# Patient Record
Sex: FEMALE | Race: WHITE | NOT HISPANIC OR LATINO | Employment: OTHER | URBAN - METROPOLITAN AREA
[De-identification: names, ages, dates, MRNs, and addresses within clinical notes are randomized per-mention and may not be internally consistent; named-entity substitution may affect disease eponyms.]

---

## 2017-01-05 ENCOUNTER — APPOINTMENT (OUTPATIENT)
Dept: GENERAL RADIOLOGY | Facility: CLINIC | Age: 82
End: 2017-01-05
Attending: EMERGENCY MEDICINE
Payer: COMMERCIAL

## 2017-01-05 ENCOUNTER — HOSPITAL ENCOUNTER (EMERGENCY)
Facility: CLINIC | Age: 82
Discharge: HOME | End: 2017-01-05
Attending: EMERGENCY MEDICINE | Admitting: EMERGENCY MEDICINE
Payer: COMMERCIAL

## 2017-01-05 VITALS
OXYGEN SATURATION: 97 % | BODY MASS INDEX: 29.99 KG/M2 | RESPIRATION RATE: 14 BRPM | HEART RATE: 67 BPM | HEIGHT: 65 IN | SYSTOLIC BLOOD PRESSURE: 161 MMHG | WEIGHT: 180 LBS | DIASTOLIC BLOOD PRESSURE: 76 MMHG

## 2017-01-05 DIAGNOSIS — R07.9 CHEST PAIN, UNSPECIFIED TYPE: ICD-10-CM

## 2017-01-05 LAB
ANION GAP SERPL CALCULATED.3IONS-SCNC: 8 MMOL/L (ref 3–14)
BASOPHILS # BLD AUTO: 0.1 10E9/L (ref 0–0.2)
BASOPHILS NFR BLD AUTO: 0.6 %
BUN SERPL-MCNC: 17 MG/DL (ref 7–30)
CALCIUM SERPL-MCNC: 9 MG/DL (ref 8.5–10.1)
CHLORIDE SERPL-SCNC: 111 MMOL/L (ref 94–109)
CO2 SERPL-SCNC: 27 MMOL/L (ref 20–32)
CREAT SERPL-MCNC: 0.95 MG/DL (ref 0.52–1.04)
DIFFERENTIAL METHOD BLD: NORMAL
EOSINOPHIL # BLD AUTO: 0.3 10E9/L (ref 0–0.7)
EOSINOPHIL NFR BLD AUTO: 3.6 %
ERYTHROCYTE [DISTWIDTH] IN BLOOD BY AUTOMATED COUNT: 12.9 % (ref 10–15)
GFR SERPL CREATININE-BSD FRML MDRD: 57 ML/MIN/1.7M2
GLUCOSE SERPL-MCNC: 84 MG/DL (ref 70–99)
HCT VFR BLD AUTO: 45 % (ref 35–47)
HGB BLD-MCNC: 14.7 G/DL (ref 11.7–15.7)
IMM GRANULOCYTES # BLD: 0 10E9/L (ref 0–0.4)
IMM GRANULOCYTES NFR BLD: 0.4 %
LYMPHOCYTES # BLD AUTO: 3.2 10E9/L (ref 0.8–5.3)
LYMPHOCYTES NFR BLD AUTO: 37.3 %
MCH RBC QN AUTO: 30.4 PG (ref 26.5–33)
MCHC RBC AUTO-ENTMCNC: 32.7 G/DL (ref 31.5–36.5)
MCV RBC AUTO: 93 FL (ref 78–100)
MONOCYTES # BLD AUTO: 0.7 10E9/L (ref 0–1.3)
MONOCYTES NFR BLD AUTO: 8 %
NEUTROPHILS # BLD AUTO: 4.3 10E9/L (ref 1.6–8.3)
NEUTROPHILS NFR BLD AUTO: 50.1 %
NT-PROBNP SERPL-MCNC: 155 PG/ML (ref 0–1800)
PLATELET # BLD AUTO: 212 10E9/L (ref 150–450)
POTASSIUM SERPL-SCNC: 4.1 MMOL/L (ref 3.4–5.3)
RBC # BLD AUTO: 4.83 10E12/L (ref 3.8–5.2)
SODIUM SERPL-SCNC: 146 MMOL/L (ref 133–144)
TROPONIN I SERPL-MCNC: NORMAL UG/L (ref 0–0.04)
TROPONIN I SERPL-MCNC: NORMAL UG/L (ref 0–0.04)
WBC # BLD AUTO: 8.6 10E9/L (ref 4–11)

## 2017-01-05 PROCEDURE — 93005 ELECTROCARDIOGRAM TRACING: CPT

## 2017-01-05 PROCEDURE — 93010 ELECTROCARDIOGRAM REPORT: CPT | Performed by: EMERGENCY MEDICINE

## 2017-01-05 PROCEDURE — 71020 XR CHEST 2 VW: CPT

## 2017-01-05 PROCEDURE — 83880 ASSAY OF NATRIURETIC PEPTIDE: CPT | Performed by: EMERGENCY MEDICINE

## 2017-01-05 PROCEDURE — 80048 BASIC METABOLIC PNL TOTAL CA: CPT | Performed by: EMERGENCY MEDICINE

## 2017-01-05 PROCEDURE — 85025 COMPLETE CBC W/AUTO DIFF WBC: CPT | Performed by: EMERGENCY MEDICINE

## 2017-01-05 PROCEDURE — 99285 EMERGENCY DEPT VISIT HI MDM: CPT | Mod: 25

## 2017-01-05 PROCEDURE — 99285 EMERGENCY DEPT VISIT HI MDM: CPT | Mod: 25 | Performed by: EMERGENCY MEDICINE

## 2017-01-05 PROCEDURE — 84484 ASSAY OF TROPONIN QUANT: CPT | Performed by: EMERGENCY MEDICINE

## 2017-01-05 ASSESSMENT — ENCOUNTER SYMPTOMS
FATIGUE: 0
CHEST TIGHTNESS: 0
APPETITE CHANGE: 0
LIGHT-HEADEDNESS: 0
SHORTNESS OF BREATH: 1
HEADACHES: 0
NECK PAIN: 0
WEAKNESS: 0
VOMITING: 0
COUGH: 0
NUMBNESS: 0
ABDOMINAL PAIN: 0
FEVER: 0
NAUSEA: 0
BACK PAIN: 0
DIARRHEA: 0

## 2017-01-05 NOTE — ED AVS SNAPSHOT
[facility] Emergency Department    [address] [address]    Phone:  [phone]    Fax:  [phone]                                       Caitlyn Wasserman   MRN: [id number]    Department:  [facility] Emergency Department   Date of Visit:  1/5/2017           Patient Information     Date Of Birth          1935        Your diagnoses for this visit were:     Chest pain, unspecified type        You were seen by Tenzin Alvarez MD.      Follow-up Information     Follow up with Stephanie Salgado MD. Schedule an appointment as soon as possible for a visit in 1 day.    Specialty:  Family Practice    Why:  For follow up of your chest pain and to get results of your stress test    Contact information:    [facility]  [address] [place] [address] [id number] [phone]          Follow up with [facility]. Call today.    Specialty:  Cardiology    Why:  To schedule a stress test    Contact information:    [address] [place] [address] [phone]    Additional information:    The medical center is located at   [address] [place] [address] (between [facility] and   [facility] in [place], four miles north   of [place]).      Discharge References/Attachments     CHEST PAIN, UNCERTAIN CAUSE (ENGLISH)      24 Hour Appointment Hotline       To make an appointment at any [facility] clinic, call [phone] ([phone]). If you don't have a family doctor or clinic, we will help you find one. [facility] clinics are conveniently located to serve the needs of you and your family.          ED Discharge Orders     Exercise Stress Echocardiogram                    Review of your medicines      Our records show that you are taking the medicines listed below. If these are incorrect, please call your family doctor or clinic.        Dose / Directions Last dose taken    atenolol 50 MG tablet   Commonly known as:  TENORMIN   Dose:  50 mg   Quantity:  90  tablet        Take 1 tablet (50 mg) by mouth daily   Refills:  3        ipratropium 0.03 % spray   Commonly known as:  ATROVENT   Dose:  2 spray   Quantity:  2 Box        Spray 2 sprays in nostril daily as needed for rhinitis   Refills:  1        METAMUCIL PO        Refills:  0        sulfamethoxazole-trimethoprim 800-160 MG per tablet   Commonly known as:  BACTRIM DS/SEPTRA DS   Dose:  1 tablet   Quantity:  14 tablet        Take 1 tablet by mouth 2 times daily   Refills:  0        TUMS 500 MG chewable tablet   Quantity:  90   Generic drug:  calcium carbonate        two tablets daily   Refills:  0                Procedures and tests performed during your visit     Basic metabolic panel    CBC with platelets differential    Chest XR,  PA & LAT    EKG 12-LEAD, TRACING ONLY    NT pro BNP    Troponin I    Troponin I (second draw)      Orders Needing Specimen Collection     None      Pending Results     No orders found from 1/4/2017 to 1/6/2017.            Pending Culture Results     No orders found from 1/4/2017 to 1/6/2017.       Test Results from your hospital stay           1/5/2017  4:48 AM - Interface, Team[employer] Results      Component Results     Component Value Ref Range & Units Status    WBC 8.6 4.0 - 11.0 10e9/L Final    RBC Count 4.83 3.8 - 5.2 10e12/L Final    Hemoglobin 14.7 11.7 - 15.7 g/dL Final    Hematocrit 45.0 35.0 - 47.0 % Final    MCV 93 78 - 100 fl Final    MCH 30.4 26.5 - 33.0 pg Final    MCHC 32.7 31.5 - 36.5 g/dL Final    RDW 12.9 10.0 - 15.0 % Final    Platelet Count 212 150 - 450 10e9/L Final    Diff Method Automated Method  Final    % Neutrophils 50.1 % Final    % Lymphocytes 37.3 % Final    % Monocytes 8.0 % Final    % Eosinophils 3.6 % Final    % Basophils 0.6 % Final    % Immature Granulocytes 0.4 % Final    Absolute Neutrophil 4.3 1.6 - 8.3 10e9/L Final    Absolute Lymphocytes 3.2 0.8 - 5.3 10e9/L Final    Absolute Monocytes 0.7 0.0 - 1.3 10e9/L Final    Absolute Eosinophils 0.3 0.0 - 0.7  10e9/L Final    Absolute Basophils 0.1 0.0 - 0.2 10e9/L Final    Abs Immature Granulocytes 0.0 0 - 0.4 10e9/L Final         1/5/2017  5:04 AM - Interface, Flexilab Results      Component Results     Component Value Ref Range & Units Status    Sodium 146 (H) 133 - 144 mmol/L Final    Potassium 4.1 3.4 - 5.3 mmol/L Final    Chloride 111 (H) 94 - 109 mmol/L Final    Carbon Dioxide 27 20 - 32 mmol/L Final    Anion Gap 8 3 - 14 mmol/L Final    Glucose 84 70 - 99 mg/dL Final    Urea Nitrogen 17 7 - 30 mg/dL Final    Creatinine 0.95 0.52 - 1.04 mg/dL Final    GFR Estimate 57 (L) >60 mL/min/1.7m2 Final    Non  GFR Calc    GFR Estimate If Black 69 >60 mL/min/1.7m2 Final    African American GFR Calc    Calcium 9.0 8.5 - 10.1 mg/dL Final         1/5/2017  5:04 AM - Interface, Flexilab Results      Component Results     Component Value Ref Range & Units Status    Troponin I ES  0.000 - 0.045 ug/L Final    <0.015  The 99th percentile for upper reference range is 0.045 ug/L.  Troponin values in   the range of 0.045 - 0.120 ug/L may be associated with risks of adverse   clinical events.           1/5/2017  5:30 AM - Interface, Flexilab Results      Component Results     Component Value Ref Range & Units Status    N-Terminal Pro BNP Inpatient 155 0 - 1800 pg/mL Final    Reference range shown and results flagged as abnormal are suggested inpatient   cut points for confirming diagnosis if CHF in an acute setting. Establishing   a   baseline value for each individual patient is useful for follow-up. An   inpatient or emergency department NT-proPBNP <300 pg/mL effectively rules out   acute CHF, with 99% negative predictive value.  The outpatient non-acute reference range for ruling out CHF is:   0-125 pg/mL (age 18 to less than 75)   0-450 pg/mL (age 75 yrs and older)           1/5/2017  6:31 AM - Interface, Radiant Ib      Narrative     XR CHEST 2 VW  1/5/2017 5:48 AM      HISTORY: Dyspnea.     COMPARISON:  7/6/2009.    FINDINGS: The heart size is normal. Thoracic aorta is tortuous. The  lungs are clear. No pneumothorax or pleural effusion. Degenerative  disease in the spine.        Impression     IMPRESSION: No acute abnormality.    ZULEMA GRAF MD         1/5/2017  7:20 AM - Interface, [employer] Results      Component Results     Component Value Ref Range & Units Status    Troponin I ES  0.000 - 0.045 ug/L Final    <0.015  The 99th percentile for upper reference range is 0.045 ug/L.  Troponin values in   the range of 0.045 - 0.120 ug/L may be associated with risks of adverse   clinical events.                  Thank you for choosing [facility]       Thank you for choosing [facility] for your care. Our goal is always to provide you with excellent care. Hearing back from our patients is one way we can continue to improve our services. Please take a few minutes to complete the written survey that you may receive in the mail after you visit with us. Thank you!        [employer] Information     [employer] gives you secure access to your electronic health record. If you see a primary care provider, you can also send messages to your care team and make appointments. If you have questions, please call your primary care clinic.  If you do not have a primary care provider, please call [phone] and they will assist you.        Care EveryWhere ID     This is your Care EveryWhere ID. This could be used by other organizations to access your [facility] medical records  [id number]        After Visit Summary       This is your record. Keep this with you and show to your community pharmacist(s) and doctor(s) at your next visit.

## 2017-01-05 NOTE — ED AVS SNAPSHOT
[facility] Emergency Department    [address] [address]    Phone:  [phone]    Fax:  [phone]                                       Caitlyn Wasserman   MRN: [id number]    Department:  [facility] Emergency Department   Date of Visit:  1/5/2017           After Visit Summary Signature Page     I have received my discharge instructions, and my questions have been answered. I have discussed any challenges I see with this plan with the nurse or doctor.    ..........................................................................................................................................  Patient/Patient Representative Signature      ..........................................................................................................................................  Patient Representative Print Name and Relationship to Patient    ..................................................               ................................................  Date                                            Time    ..........................................................................................................................................  Reviewed by Signature/Title    ...................................................              ..............................................  Date                                                            Time

## 2017-01-05 NOTE — ED NOTES
Pt woke to use the bathroom and noticed 2/10 left arm pain from her shoulder to her elbow. Pain lasted approx 10 minutes and resolved w/o intervention. Pt took 325mg asa at home before coming in. Pt denies nausea or  chest pain. Denies pain at this time.

## 2017-01-05 NOTE — ED PROVIDER NOTES
"  History     Chief Complaint   Patient presents with     Arm Pain     ? cardiac     HPI  Caitlyn Wasserman is a 81 year old female with history of hyperlipidemia, COPD, and hypertension who presents for evaluation of left arm pain tonight.  Patient reports she woke from sleep to use the restroom and noticed some mild left arm pain from her shoulder to her elbow.  The patient reports a burning sensation from her left shoulder down to her elbow.  Discomfort not worsened by any kind of movement of her arm or neck.  Patient took 325 mg of aspirin and symptoms went away within about 10 minutes.  No associated nausea, diaphoresis, or dyspnea with the arm pain tonight.  Patient denies any previous similar symptoms.  Patient does report some exertional dyspnea recently which she has attributed to her COPD but has found her inhaler does not help so she has not been taking it.  Patient also reports some increasing lower extremity edema.    I have reviewed the Medications, Allergies, Past Medical and Surgical History, and Social History in the Epic system.    Review of Systems   Constitutional: Negative for fever, appetite change and fatigue.   HENT: Negative for congestion.    Respiratory: Positive for shortness of breath (exertional). Negative for cough and chest tightness.    Cardiovascular: Positive for leg swelling (Chronic).   Gastrointestinal: Negative for nausea, vomiting, abdominal pain and diarrhea.   Genitourinary: Negative for decreased urine volume.   Musculoskeletal: Negative for back pain and neck pain.   Neurological: Negative for weakness, light-headedness, numbness and headaches.   All other systems reviewed and are negative.      Physical Exam   BP: (!) 190/94 mmHg  Pulse: 67  Resp: 19  Height: 165.1 cm (5' 5\")  Weight: 81.647 kg (180 lb)  SpO2: 99 %  Physical Exam   Constitutional: She is oriented to person, place, and time. She appears well-developed and well-nourished. No distress.   HENT:   Head: " Normocephalic.   Mouth/Throat: Oropharynx is clear and moist.   Eyes: Pupils are equal, round, and reactive to light.   Neck: Normal range of motion.   Cardiovascular: Normal rate, regular rhythm and normal heart sounds.    No murmur heard.  Pulmonary/Chest: Effort normal and breath sounds normal. No respiratory distress. She has no rales.   Abdominal: Soft. There is no tenderness.   Musculoskeletal: Normal range of motion. She exhibits edema (3+ pitting bilaterally).   Neurological: She is alert and oriented to person, place, and time.   Skin: Skin is warm and dry. She is not diaphoretic.   Psychiatric: She has a normal mood and affect.   Nursing note and vitals reviewed.      ED Course   Procedures             EKG Interpretation:      Interpreted by Tenzin Alvarez  Time reviewed: 0440  Symptoms at time of EKG: left arm pain   Rhythm: normal sinus   Rate: Normal  Axis: Normal  Ectopy: None  Conduction: normal  ST Segments/ T Waves: ST Segment Depression I, aVF, V4, V5 and V6  Q Waves: none  Comparison to prior: Similar to previous EKG 10/08/2014    Clinical Impression: Sinus rhythm with lateral ST depressions, Not significantly changed from previous                       Labs Ordered and Resulted from Time of ED Arrival Up to the Time of Departure from the ED   BASIC METABOLIC PANEL - Abnormal; Notable for the following:     Sodium 146 (*)     Chloride 111 (*)     GFR Estimate 57 (*)     All other components within normal limits   CBC WITH PLATELETS DIFFERENTIAL   TROPONIN I   NT PROBNP INPATIENT   TROPONIN I          HEART Score  Background  Calculates the overall risk of adverse event in patient's presenting with chest pain.  Based on 5 criteria (each assigned 0-2 points) including suspiciousness of history, EKG, age, risk factors and troponin.    Data  81 year old female  has Urinary frequency; Abdominal pain, left lower quadrant; LEFT HIP PAIN; Right shoulder pain; Numbness in right leg; Cataract;  Recurrent UTI; HYPERLIPIDEMIA LDL GOAL <130; COPD (chronic obstructive pulmonary disease) (H); Kyphosis of cervical region; Neck pain, chronic; Health Care Home; Essential tremor; Pedal edema; Osteoarthritis, right knee; Chronic rhinitis; Essential hypertension; and Dysuria on her problem list.   reports that she quit smoking about 46 years ago. Her smoking use included Cigarettes. She has a 20 pack-year smoking history. She has never used smokeless tobacco.  family history includes Allergies in her maternal grandfather; C.A.D. in her brother and father; CANCER in her brother and mother; CEREBROVASCULAR DISEASE in her father; DIABETES in her paternal grandmother; Neurologic Disorder in her mother; Respiratory in her maternal grandfather. There is no history of Breast Cancer.  TROPI   *   1/5/2017    Value: <0.015  The 99th percentile for upper reference range is 0.045 ug/L.  Troponin values in   the range of 0.045 - 0.120 ug/L may be associated with risks of adverse   clinical events.    Criteria   0-2 points for each of 5 items (maximum of 10 points):  Score 0- History slightly suspicious for coronary syndrome  Score 1- EKG with Non-specific repolarization disturbance  Score 2- Age 65 years or older  Score 1- One to 2 risk factors for atherosclerotic disease  Score 0- Within normal limits for troponin levels  Interpretation  Risk of adverse outcome  Heart Score: 4  Total Score 4-6- Adverse Outcome Risk 20.3% - Supports admission with standard rule-out management -serial troponins and stress testing    6:17 AM: Pt re-assessed. Remains pain free. Given her age and risk factors, despite negative troponin and low risk story, patient is a moderate risk for adverse outcome and I advised admission for serial troponins and stress test.  Patient reports that given she is feeling better and she is concerned about the cost of an observation admission, she requests outpatient stress test and a planned follow-up with her  primary care provider.  I recommended that if we were going to proceed with this course, that we repeat a 2 hour troponin level and if this is negative, she can continue with outpatient stress test.  Patient is amenable to this plan.          Assessments & Plan (with Medical Decision Making)  81-year-old female with history of hypertension, hyperlipidemia, and obesity as well as COPD who presents for evaluation of left arm pain tonight.  Patient states she woke from sleep to urinate and shortly thereafter developed burning discomfort in her left arm.  No other neurologic symptoms to suggest nerve impingement as the source.  Patient denied associated diaphoresis, nausea, or dyspnea tonight does report recent exertional dyspnea.  EKG showed some ST depressions, but this was unchanged from previous.  Troponin 1 was negative.  X-ray obtained showed no evidence of effusion or cardiomegaly.  Given patient's pitting edema, exertional dyspnea, concern for possible progressive heart failure: BNP obtained was normal making this exceedingly unlikely.  Discussed recommendation for serial troponins and stress test.  Patient concerned about cost of this and would prefer outpatient workup.  Patient denies of her intermediate risk for adverse outcome but still prefers outpatient workup.  Stress test ordered for patient inpatient advised that she must follow up closely with her primary care provider after obtaining the stress test next 1-2 days for results.  Patient also advised that if she develops any new symptoms or recurrent symptoms or anything else that is concerning to her that she must return to the emergency department for repeat evaluation if she has not been fully cleared from a cardiac standpoint at this time.  2nd troponin also performed at 2 hours from ED arrival is also normal essentially ruling out acute myocardial infarction.  Patient discharged home with plan for stress test in 24-48 hours and close primary care  follow-up      I have reviewed the nursing notes.    I have reviewed the findings, diagnosis, plan and need for follow up with the patient.    New Prescriptions    No medications on file       Final diagnoses:   Chest pain, unspecified type       1/5/2017   [facility] EMERGENCY DEPARTMENT      Alvarez, Tenzin Mckeon MD  01/05/17 0738

## 2017-01-06 ENCOUNTER — HOSPITAL ENCOUNTER (OUTPATIENT)
Dept: CARDIOLOGY | Facility: CLINIC | Age: 82
Discharge: HOME | End: 2017-01-06
Attending: EMERGENCY MEDICINE | Admitting: EMERGENCY MEDICINE
Payer: COMMERCIAL

## 2017-01-06 DIAGNOSIS — R07.9 CHEST PAIN, UNSPECIFIED TYPE: ICD-10-CM

## 2017-01-06 PROCEDURE — 93016 CV STRESS TEST SUPVJ ONLY: CPT | Performed by: INTERNAL MEDICINE

## 2017-01-06 PROCEDURE — 93321 DOPPLER ECHO F-UP/LMTD STD: CPT | Mod: 26 | Performed by: INTERNAL MEDICINE

## 2017-01-06 PROCEDURE — 93018 CV STRESS TEST I&R ONLY: CPT | Performed by: INTERNAL MEDICINE

## 2017-01-06 PROCEDURE — 93325 DOPPLER ECHO COLOR FLOW MAPG: CPT | Mod: 26 | Performed by: INTERNAL MEDICINE

## 2017-01-06 PROCEDURE — 93350 STRESS TTE ONLY: CPT | Mod: 26 | Performed by: INTERNAL MEDICINE

## 2017-01-06 PROCEDURE — 93350 STRESS TTE ONLY: CPT | Mod: TC

## 2017-01-10 ENCOUNTER — OFFICE VISIT (OUTPATIENT)
Dept: FAMILY MEDICINE | Facility: CLINIC | Age: 82
End: 2017-01-10
Payer: COMMERCIAL

## 2017-01-10 VITALS
WEIGHT: 174 LBS | HEIGHT: 64 IN | SYSTOLIC BLOOD PRESSURE: 144 MMHG | TEMPERATURE: 97.7 F | HEART RATE: 73 BPM | BODY MASS INDEX: 29.71 KG/M2 | DIASTOLIC BLOOD PRESSURE: 71 MMHG

## 2017-01-10 DIAGNOSIS — N39.0 RECURRENT UTI: ICD-10-CM

## 2017-01-10 DIAGNOSIS — J44.9 CHRONIC OBSTRUCTIVE PULMONARY DISEASE, UNSPECIFIED COPD TYPE (H): Primary | ICD-10-CM

## 2017-01-10 DIAGNOSIS — R30.0 DYSURIA: ICD-10-CM

## 2017-01-10 DIAGNOSIS — R60.0 PEDAL EDEMA: ICD-10-CM

## 2017-01-10 DIAGNOSIS — J31.0 CHRONIC RHINITIS: ICD-10-CM

## 2017-01-10 PROCEDURE — 99214 OFFICE O/P EST MOD 30 MIN: CPT | Performed by: FAMILY MEDICINE

## 2017-01-10 RX ORDER — IPRATROPIUM BROMIDE 21 UG/1
2 SPRAY, METERED NASAL DAILY PRN
Qty: 2 BOX | Refills: 1 | Status: SHIPPED | OUTPATIENT
Start: 2017-01-10 | End: 2018-02-15

## 2017-01-10 RX ORDER — HYDROCHLOROTHIAZIDE 12.5 MG/1
12.5 TABLET ORAL DAILY
Qty: 30 TABLET | Refills: 1 | Status: SHIPPED | OUTPATIENT
Start: 2017-01-10 | End: 2018-02-15 | Stop reason: ALTCHOICE

## 2017-01-10 NOTE — MR AVS SNAPSHOT
After Visit Summary   1/10/2017    Caitlyn Wasserman    MRN: [id number]           Patient Information     Date Of Birth          1935        Visit Information        Provider Department      1/10/2017 9:20 AM Stephanie Salgado MD [facility]        Today's Diagnoses     Chronic obstructive pulmonary disease, unspecified COPD type (H)    -  1     Pedal edema         Chronic rhinitis           Care Instructions          Thank you for choosing [facility].  You may be receiving a survey in the mail from [facility] regarding your visit today.  Please take a few minutes to complete and return the survey to let us know how we are doing.      If you have questions or concerns, please contact us via [employer] or you can contact your care team at [phone].    Our Clinic hours are:  Monday 6:40 am  to 7:00 pm  Tuesday -Friday 6:40 am to 5:00 pm    The [place] outpatient lab hours are:  Monday - Friday 6:10 am to 4:45 pm  Saturdays 7:00 am to 11:00 am  Appointments are required, call [phone]    If you have clinical questions after hours or would like to schedule an appointment,  call the clinic at [phone].          Follow-ups after your visit        Additional Services     PULMONARY REHAB REFERRAL                 Future tests that were ordered for you today     Open Future Orders        Priority Expected Expires Ordered    Potassium Routine  2/10/2017 1/10/2017    PULMONARY REHAB REFERRAL Routine  1/10/2018 1/10/2017    General PFT Lab (Please always keep checked) Routine  1/10/2018 1/10/2017    Pulmonary Function Test Routine  1/10/2018 1/10/2017            Who to contact     If you have questions or need follow up information about today's clinic visit or your schedule please contact [facility] directly at [phone].  Normal or non-critical lab and imaging results will be communicated to you by MyChart, letter or phone within 4 business days after the clinic  "has received the results. If you do not hear from us within 7 days, please contact the clinic through [employer] or phone. If you have a critical or abnormal lab result, we will notify you by phone as soon as possible.  Submit refill requests through [employer] or call your pharmacy and they will forward the refill request to us. Please allow 3 business days for your refill to be completed.          Additional Information About Your Visit        [employer] Information     [employer] gives you secure access to your electronic health record. If you see a primary care provider, you can also send messages to your care team and make appointments. If you have questions, please call your primary care clinic.  If you do not have a primary care provider, please call [phone] and they will assist you.        Care EveryWhere ID     This is your Care EveryWhere ID. This could be used by other organizations to access your [facility] medical records  [id number]        Your Vitals Were     Pulse Temperature Height BMI (Body Mass Index)          73 97.7  F (36.5  C) (Tympanic) 5' 4\" (1.626 m) 29.85 kg/m2         Blood Pressure from Last 3 Encounters:   01/10/17 144/71   01/05/17 161/76   11/08/16 138/80    Weight from Last 3 Encounters:   01/10/17 174 lb (78.926 kg)   01/05/17 180 lb (81.647 kg)   11/08/16 173 lb (78.472 kg)                 Today's Medication Changes          These changes are accurate as of: 1/10/17  9:49 AM.  If you have any questions, ask your nurse or doctor.               Start taking these medicines.        Dose/Directions    hydrochlorothiazide 12.5 MG Tabs tablet   Used for:  Pedal edema   Started by:  Stephanie Salgado MD        Dose:  12.5 mg   Take 1 tablet (12.5 mg) by mouth daily   Quantity:  30 tablet   Refills:  1         Stop taking these medicines if you haven't already. Please contact your care team if you have questions.     METAMUCIL PO   Stopped by:  Stephanie Salgado MD           " sulfamethoxazole-trimethoprim 800-160 MG per tablet   Commonly known as:  BACTRIM DS/SEPTRA DS   Stopped by:  Stephanie Salgado MD           TUMS 500 MG chewable tablet   Generic drug:  calcium carbonate   Stopped by:  Stephanie Salgado MD                Where to get your medicines      These medications were sent to [facility] - [address]  [address], [place] [address]    Hours:  [facility] Phone:  [phone]    - hydrochlorothiazide 12.5 MG Tabs tablet  - ipratropium 0.03 % spray             Primary Care Provider Office Phone # Fax #    Stephanie Salgado [phone]       [facility] [address] [facility] [address]        Thank you!     Thank you for choosing [facility]  for your care. Our goal is always to provide you with excellent care. Hearing back from our patients is one way we can continue to improve our services. Please take a few minutes to complete the written survey that you may receive in the mail after your visit with us. Thank you!             Your Updated Medication List - Protect others around you: Learn how to safely use, store and throw away your medicines at www.disposemymeds.org.          This list is accurate as of: 1/10/17  9:49 AM.  Always use your most recent med list.                   Brand Name Dispense Instructions for use    atenolol 50 MG tablet    TENORMIN    90 tablet    Take 1 tablet (50 mg) by mouth daily       hydrochlorothiazide 12.5 MG Tabs tablet     30 tablet    Take 1 tablet (12.5 mg) by mouth daily       ipratropium 0.03 % spray    ATROVENT    2 Box    Spray 2 sprays in nostril daily as needed for rhinitis

## 2017-01-10 NOTE — PATIENT INSTRUCTIONS
Thank you for choosing [facility].  You may be receiving a survey in the mail from Karl Lieberman regarding your visit today.  Please take a few minutes to complete and return the survey to let us know how we are doing.      If you have questions or concerns, please contact us via [employer] or you can contact your care team at [phone].    Our Clinic hours are:  Monday 6:40 am  to 7:00 pm  Tuesday -Friday 6:40 am to 5:00 pm    The [place] outpatient lab hours are:  Monday - Friday 6:10 am to 4:45 pm  Saturdays 7:00 am to 11:00 am  Appointments are required, call [phone]    If you have clinical questions after hours or would like to schedule an appointment,  call the clinic at [phone].

## 2017-01-10 NOTE — PROGRESS NOTES
a  SUBJECTIVE:                                                    Caitlyn Wasserman is 81 year old female   Chief Complaint   Patient presents with     Hospital F/U     ER f/u 1/5/17 left arm pain     Refill Request     needs refill on nasal spray     ED/UC Followup:    [place]  [facility] [place] of [place]: 1/5/17  Reason for visit: Left arm pain  Current Status: Improved  ED note:        Arm Pain        ? cardiac      HPI  Caitlyn Wasserman is a 81 year old female with history of hyperlipidemia, COPD, and hypertension who presents for evaluation of left arm pain tonight.  Patient reports she woke from sleep to use the restroom and noticed some mild left arm pain from her shoulder to her elbow.  The patient reports a burning sensation from her left shoulder down to her elbow.  Discomfort not worsened by any kind of movement of her arm or neck.  Patient took 325 mg of aspirin and symptoms went away within about 10 minutes. No associated nausea, diaphoresis, or dyspnea with the arm pain tonight.  Patient denies any previous similar symptoms.  Patient does report some exertional dyspnea recently which she has attributed to her COPD but has found her inhaler does not help so she has not been taking it.  Patient also reports some increasing lower extremity edema.           Stress test was normal, no ischemia and pain is gone     Problem list and histories reviewed & adjusted, as indicated.  Additional history: 6 UTI in past year, edema in legs bilaterally, thigh pain on right.  Dyspnea with exertion, history of smoking, refill nasal spray.  Inhalers do not help, need new medication.    Patient Active Problem List   Diagnosis     Urinary frequency     Abdominal pain, left lower quadrant     LEFT HIP PAIN     Right shoulder pain     Numbness in right leg     Cataract     Recurrent UTI     HYPERLIPIDEMIA LDL GOAL <130     COPD (chronic obstructive pulmonary disease) (H)     Kyphosis of cervical region     Neck pain, chronic      Health Care Home     Essential tremor     Pedal edema     Osteoarthritis, right knee     Chronic rhinitis     Essential hypertension     Dysuria     Chronic obstructive pulmonary disease, unspecified COPD type (H)     Past Surgical History   Procedure Laterality Date     Hc anter colporrhaphy,blad/vagina  6/04     Cystocele Repair,rectocele repair.     Joint replacement, hip rt/lt  3/07, 4/07     Joint Replacement Hip /LT- dislocated in front repeat surgery 1 week later       Social History   Substance Use Topics     Smoking status: Former Smoker -- 1.00 packs/day for 20 years     Types: Cigarettes     Quit date: 01/01/1971     Smokeless tobacco: Never Used     Alcohol Use: Yes      Comment: 1 wine a month maybe     Family History   Problem Relation Age of Onset     CANCER Mother      Bladder     C.A.D. Father      CEREBROVASCULAR DISEASE Father      Allergies Maternal Grandfather      Respiratory Maternal Grandfather      Asthma     DIABETES Paternal Grandmother      Type II     CANCER Brother      Multiple myloma     C.A.D. Brother      Breast Cancer No family hx of      Neurologic Disorder Mother      heriditary tremor         Current Outpatient Prescriptions   Medication Sig Dispense Refill     ipratropium (ATROVENT) 0.03 % spray Spray 2 sprays in nostril daily as needed for rhinitis 2 Box 1     hydrochlorothiazide 12.5 MG TABS tablet Take 1 tablet (12.5 mg) by mouth daily 30 tablet 1     atenolol (TENORMIN) 50 MG tablet Take 1 tablet (50 mg) by mouth daily 90 tablet 3     Allergies   Allergen Reactions     Codeine      Cortisone      Diclofenac Sodium      Hydrocodone Nausea and Vomiting     Oxycodone Nausea and Vomiting     Tramadol Nausea and Vomiting     Recent Labs   Lab Test  01/05/17   0440  11/08/16   1334   09/12/12   1215  03/12/12   0941  11/05/11   0841   07/30/10   0928   LDL   --    --    --    --   138*  151*   --   135*   HDL   --    --    --    --   44*  46*   --   44*   TRIG   --    --    --     "--   158*  204*   --   145   ALT   --    --    --    --   18   --    --    --    CR  0.95  0.93   < >  0.97   --    --    < >   --    GFRESTIMATED  57*  58*   < >  56*   --    --    < >   --    GFRESTBLACK  69  70   < >  68   --    --    < >   --    POTASSIUM  4.1  4.0   < >  4.7   --    --    --    --    TSH   --   1.83   --   1.62   --    --    --    --     < > = values in this interval not displayed.      BP Readings from Last 3 Encounters:   01/10/17 144/71   01/05/17 161/76   11/08/16 138/80    Wt Readings from Last 3 Encounters:   01/10/17 174 lb (78.926 kg)   01/05/17 180 lb (81.647 kg)   11/08/16 173 lb (78.472 kg)         ROS:  Constitutional, HEENT, cardiovascular, pulmonary, gi and gu systems are negative, except as otherwise noted.    OBJECTIVE:                                                    /71 mmHg  Pulse 73  Temp(Src) 97.7  F (36.5  C) (Tympanic)  Ht 5' 4\" (1.626 m)  Wt 174 lb (78.926 kg)  BMI 29.85 kg/m2  GENERAL APPEARANCE ADULT: Alert, no acute distress  HENT: Ears and TMs normal, oral mucosa and posterior oropharynx normal  RESP: lungs clear to auscultation   CV: normal rate, regular rhythm, no murmur or gallop  PSYCH: mentation appears normal., affect and mood normal  Diagnostic Test Results:  pending     ASSESSMENT/PLAN:                                                    1. Chronic obstructive pulmonary disease, unspecified COPD type (H)  Inhaler in past did not help, will get better diagnosis with PFT and pulmonary rehab  - PULMONARY REHAB REFERRAL; Future  - General PFT Lab (Please always keep checked); Future  - Pulmonary Function Test; Future    2. Pedal edema  Recheck potassium in 2 weeks  - hydrochlorothiazide 12.5 MG TABS tablet; Take 1 tablet (12.5 mg) by mouth daily  Dispense: 30 tablet; Refill: 1  - ANKLE-ARM INDEX SINGLE LEVEL BILATERAL; Future  - Potassium; Future    3. Chronic rhinitis  due for review and refill, taking medication without difficulty  - ipratropium " (ATROVENT) 0.03 % spray; Spray 2 sprays in nostril daily as needed for rhinitis  Dispense: 2 Box; Refill: 1    4. Dysuria  5. Recurrent UTI  No acute sxs at this time, has several positive cultures for E. Coli, recommend better toileting hygiene and if still occuring see urology.      Stephanie Salgado MD  [facility]

## 2017-01-10 NOTE — NURSING NOTE
"Chief Complaint   Patient presents with     Hospital F/U     ER f/u 1/5/17 left arm pain     Refill Request     needs refill on nasal spray       Initial /71 mmHg  Pulse 73  Temp(Src) 97.7  F (36.5  C) (Tympanic)  Ht 5' 4\" (1.626 m)  Wt 174 lb (78.926 kg)  BMI 29.85 kg/m2 Estimated body mass index is 29.85 kg/(m^2) as calculated from the following:    Height as of this encounter: 5' 4\" (1.626 m).    Weight as of this encounter: 174 lb (78.926 kg).  BP completed using cuff size: regular  "

## 2017-01-12 DIAGNOSIS — J31.0 CHRONIC RHINITIS: ICD-10-CM

## 2017-01-12 LAB — POTASSIUM SERPL-SCNC: 3.7 MMOL/L (ref 3.4–5.3)

## 2017-01-12 PROCEDURE — 36415 COLL VENOUS BLD VENIPUNCTURE: CPT | Performed by: FAMILY MEDICINE

## 2017-01-12 PROCEDURE — 84132 ASSAY OF SERUM POTASSIUM: CPT | Performed by: FAMILY MEDICINE

## 2017-01-18 ENCOUNTER — HOSPITAL ENCOUNTER (OUTPATIENT)
Dept: RESPIRATORY THERAPY | Facility: CLINIC | Age: 82
Discharge: HOME | End: 2017-01-18
Attending: FAMILY MEDICINE | Admitting: FAMILY MEDICINE
Payer: COMMERCIAL

## 2017-01-18 DIAGNOSIS — J44.9 CHRONIC OBSTRUCTIVE PULMONARY DISEASE, UNSPECIFIED COPD TYPE (H): ICD-10-CM

## 2017-01-18 PROCEDURE — 94060 EVALUATION OF WHEEZING: CPT | Mod: 26 | Performed by: INTERNAL MEDICINE

## 2017-01-18 PROCEDURE — 94729 DIFFUSING CAPACITY: CPT | Mod: 26 | Performed by: INTERNAL MEDICINE

## 2017-01-18 PROCEDURE — 94726 PLETHYSMOGRAPHY LUNG VOLUMES: CPT

## 2017-01-18 PROCEDURE — 94729 DIFFUSING CAPACITY: CPT

## 2017-01-18 PROCEDURE — 94060 EVALUATION OF WHEEZING: CPT

## 2017-01-18 PROCEDURE — 94726 PLETHYSMOGRAPHY LUNG VOLUMES: CPT | Mod: 26 | Performed by: INTERNAL MEDICINE

## 2017-01-19 ENCOUNTER — OFFICE VISIT (OUTPATIENT)
Dept: FAMILY MEDICINE | Facility: CLINIC | Age: 82
End: 2017-01-19
Payer: COMMERCIAL

## 2017-01-19 VITALS
SYSTOLIC BLOOD PRESSURE: 113 MMHG | HEIGHT: 64 IN | BODY MASS INDEX: 29.41 KG/M2 | DIASTOLIC BLOOD PRESSURE: 64 MMHG | WEIGHT: 172.3 LBS | HEART RATE: 63 BPM

## 2017-01-19 DIAGNOSIS — J44.9 CHRONIC OBSTRUCTIVE PULMONARY DISEASE, UNSPECIFIED COPD TYPE (H): ICD-10-CM

## 2017-01-19 DIAGNOSIS — I10 HTN, GOAL BELOW 140/90: Primary | ICD-10-CM

## 2017-01-19 DIAGNOSIS — R60.0 PEDAL EDEMA: ICD-10-CM

## 2017-01-19 PROCEDURE — 99214 OFFICE O/P EST MOD 30 MIN: CPT | Performed by: FAMILY MEDICINE

## 2017-01-19 RX ORDER — ATENOLOL AND CHLORTHALIDONE TABLET 50; 25 MG/1; MG/1
1 TABLET ORAL DAILY
Qty: 90 TABLET | Refills: 3 | Status: SHIPPED | OUTPATIENT
Start: 2017-01-19 | End: 2018-01-17

## 2017-01-19 RX ORDER — CALCIUM CARBONATE 500 MG/1
1 TABLET, CHEWABLE ORAL DAILY
Qty: 150 TABLET | COMMUNITY
Start: 2017-01-19 | End: 2019-12-09

## 2017-01-19 NOTE — NURSING NOTE
"Chief Complaint   Patient presents with     RECHECK     recheck pedal edema       Initial /64 mmHg  Pulse 63  Ht 5' 4\" (1.626 m)  Wt 172 lb 4.8 oz (78.155 kg)  BMI 29.56 kg/m2 Estimated body mass index is 29.56 kg/(m^2) as calculated from the following:    Height as of this encounter: 5' 4\" (1.626 m).    Weight as of this encounter: 172 lb 4.8 oz (78.155 kg).  BP completed using cuff size: regular  "

## 2017-01-19 NOTE — PROGRESS NOTES
a  SUBJECTIVE:                                                    Caitlyn Wasserman is 81 year old female   Chief Complaint   Patient presents with     RECHECK     recheck pedal edema     Concern - Recheck pedal edema     Onset: intermittent the past several years    Description: Pt states that the edema is getting better, but not 100% normal yet    Intensity: mild    Progression of Symptoms:  improving    Accompanying Signs & Symptoms:  n/a       Previous history of similar problem:   Yes, hx of pedal edema    Precipitating factors:   Worsened by: being on her feet    Alleviating factors:  Improved by: taking her medications         Problem list and histories reviewed & adjusted, as indicated.  Additional history: as documented    Patient Active Problem List   Diagnosis     Urinary frequency     Abdominal pain, left lower quadrant     LEFT HIP PAIN     Right shoulder pain     Numbness in right leg     Cataract     Recurrent UTI     HYPERLIPIDEMIA LDL GOAL <130     COPD (chronic obstructive pulmonary disease) (H)     Kyphosis of cervical region     Neck pain, chronic     Health Care Home     Essential tremor     Pedal edema     Osteoarthritis, right knee     Chronic rhinitis     Essential hypertension     Dysuria     Chronic obstructive pulmonary disease, unspecified COPD type (H)     Past Surgical History   Procedure Laterality Date     Hc anter colporrhaphy,blad/vagina  6/04     Cystocele Repair,rectocele repair.     Joint replacement, hip rt/lt  3/07, 4/07     Joint Replacement Hip /LT- dislocated in front repeat surgery 1 week later       Social History   Substance Use Topics     Smoking status: Former Smoker -- 1.00 packs/day for 20 years     Types: Cigarettes     Quit date: 01/01/1971     Smokeless tobacco: Never Used     Alcohol Use: Yes      Comment: 1 wine a month maybe     Family History   Problem Relation Age of Onset     CANCER Mother      Bladder     C.A.D. Father      CEREBROVASCULAR DISEASE Father       Allergies Maternal Grandfather      Respiratory Maternal Grandfather      Asthma     DIABETES Paternal Grandmother      Type II     CANCER Brother      Multiple myloma     C.A.D. Brother      Breast Cancer No family hx of      Neurologic Disorder Mother      heriditary tremor         Current Outpatient Prescriptions   Medication Sig Dispense Refill     ipratropium (ATROVENT) 0.03 % spray Spray 2 sprays in nostril daily as needed for rhinitis 2 Box 1     hydrochlorothiazide 12.5 MG TABS tablet Take 1 tablet (12.5 mg) by mouth daily 30 tablet 1     atenolol (TENORMIN) 50 MG tablet Take 1 tablet (50 mg) by mouth daily 90 tablet 3     Allergies   Allergen Reactions     Codeine      Cortisone      Diclofenac Sodium      Hydrocodone Nausea and Vomiting     Oxycodone Nausea and Vomiting     Tramadol Nausea and Vomiting     Recent Labs   Lab Test  01/12/17   0918  01/05/17   0440  11/08/16   1334   09/12/12   1215  03/12/12   0941  11/05/11   0841   07/30/10   0928   LDL   --    --    --    --    --   138*  151*   --   135*   HDL   --    --    --    --    --   44*  46*   --   44*   TRIG   --    --    --    --    --   158*  204*   --   145   ALT   --    --    --    --    --   18   --    --    --    CR   --   0.95  0.93   < >  0.97   --    --    < >   --    GFRESTIMATED   --   57*  58*   < >  56*   --    --    < >   --    GFRESTBLACK   --   69  70   < >  68   --    --    < >   --    POTASSIUM  3.7  4.1  4.0   < >  4.7   --    --    --    --    TSH   --    --   1.83   --   1.62   --    --    --    --     < > = values in this interval not displayed.      BP Readings from Last 3 Encounters:   01/19/17 113/64   01/10/17 144/71   01/05/17 161/76    Wt Readings from Last 3 Encounters:   01/19/17 172 lb 4.8 oz (78.155 kg)   01/10/17 174 lb (78.926 kg)   01/05/17 180 lb (81.647 kg)         ROS:  Constitutional, HEENT, cardiovascular, pulmonary, gi and gu systems are negative, except as otherwise noted.    OBJECTIVE:                    "                                 /64 mmHg  Pulse 63  Ht 5' 4\" (1.626 m)  Wt 172 lb 4.8 oz (78.155 kg)  BMI 29.56 kg/m2  GENERAL APPEARANCE ADULT: Alert, no acute distress  HENT: Ears and TMs normal, oral mucosa and posterior oropharynx normal  RESP: lungs clear to auscultation   CV: normal rate, regular rhythm, no murmur or gallop, bilateral pedal edema, +1 to mid thigh  Diagnostic Test Results:  Results for orders placed or performed during the hospital encounter of 01/18/17   General PFT Lab (Please always keep checked)   Result Value Ref Range    FVC-Pred 2.56 L    FVC-Pre 2.27 L    FVC-%Pred-Pre 88 %    FEV1-Pre 1.77 L    FEV1-%Pred-Pre 90 %    FEV1FVC-Pred 77 %    FEV1FVC-Pre 78 %    FEFMax-Pred 4.86 L/sec    FEFMax-Pre 5.16 L/sec    FEFMax-%Pred-Pre 106 %    FEF2575-Pred 1.57 L/sec    FEF2575-Pre 1.55 L/sec    MYL2403-%Pred-Pre 98 %    FEF2575-Post 1.35 L/sec    GCM4604-%Pred-Post 86 %    ExpTime-Pre 6.92 sec    FIFMax-Pre 2.14 L/sec    VC-Pred 2.87 L    VC-Pre 2.52 L    VC-%Pred-Pre 87 %    IC-Pred 2.47 L    IC-Pre 2.20 L    IC-%Pred-Pre 89 %    ERV-Pred 0.40 L    ERV-Pre 0.23 L    ERV-%Pred-Pre 56 %    FEV1FEV6-Pred 78 %    FEV1FEV6-Pre 78 %    FRCPleth-Pred 2.72 L    FRCPleth-Pre 2.98 L    FRCPleth-%Pred-Pre 109 %    RVPleth-Pred 2.21 L    RVPleth-Pre 2.66 L    RVPleth-%Pred-Pre 120 %    TLCPleth-Pred 4.94 L    TLCPleth-Pre 5.19 L    TLCPleth-%Pred-Pre 105 %    DLCOunc-Pred 19.49 ml/min/mmHg    DLCOunc-Pre 17.84 ml/min/mmHg    DLCOunc-%Pred-Pre 91 %    DLCOcor-Pre 17.19 ml/min/mmHg    DLCOcor-%Pred-Pre 88 %    VA-Pre 3.91 L    VA-%Pred-Pre 77 %    FEV1SVC-Pred 68 %    FEV1SVC-Pre 70 %        ASSESSMENT/PLAN:                                                    1. HTN, goal below 140/90  2. Pedal edema  Will take 2 12.5 chlorthalidone and if edema better with fill new combination pill.  If not return for alternative.  Potassium was normal, checked last week.  - atenolol-chlorthalidone (TENORETIC 50) " 50-25 MG per tablet; Take 1 tablet by mouth daily  Dispense: 90 tablet; Refill: 3    3. Chronic obstructive pulmonary disease, unspecified COPD type (H)  Did pulmonary function tests yesterday, no interpretation yet.  Has tried inhalers in past without much improvement, may need now.      Stephanie Salgado MD  [facility]

## 2017-01-19 NOTE — PATIENT INSTRUCTIONS
Thank you for choosing [facility].  You may be receiving a survey in the mail from Karl Lieberman regarding your visit today.  Please take a few minutes to complete and return the survey to let us know how we are doing.      If you have questions or concerns, please contact us via [employer] or you can contact your care team at [phone].    Our Clinic hours are:  Monday 6:40 am  to 7:00 pm  Tuesday -Friday 6:40 am to 5:00 pm    The [place] outpatient lab hours are:  Monday - Friday 6:10 am to 4:45 pm  Saturdays 7:00 am to 11:00 am  Appointments are required, call [phone]    If you have clinical questions after hours or would like to schedule an appointment,  call the clinic at [phone].

## 2017-01-19 NOTE — MR AVS SNAPSHOT
After Visit Summary   1/19/2017    Caitlyn Wasserman    MRN: [id number]           Patient Information     Date Of Birth          1935        Visit Information        Provider Department      1/19/2017 8:00 AM Stephanie Salgado MD [facility]        Today's Diagnoses     HTN, goal below 140/90    -  1     Pedal edema           Care Instructions          Thank you for choosing [facility].  You may be receiving a survey in the mail from [facility] regarding your visit today.  Please take a few minutes to complete and return the survey to let us know how we are doing.      If you have questions or concerns, please contact us via [employer] or you can contact your care team at [phone].    Our Clinic hours are:  Monday 6:40 am  to 7:00 pm  Tuesday -Friday 6:40 am to 5:00 pm    The [place] outpatient lab hours are:  Monday - Friday 6:10 am to 4:45 pm  Saturdays 7:00 am to 11:00 am  Appointments are required, call [phone]    If you have clinical questions after hours or would like to schedule an appointment,  call the clinic at [phone].          Follow-ups after your visit        Who to contact     If you have questions or need follow up information about today's clinic visit or your schedule please contact [facility] directly at [phone].  Normal or non-critical lab and imaging results will be communicated to you by MyChart, letter or phone within 4 business days after the clinic has received the results. If you do not hear from us within 7 days, please contact the clinic through [employer] or phone. If you have a critical or abnormal lab result, we will notify you by phone as soon as possible.  Submit refill requests through [employer] or call your pharmacy and they will forward the refill request to us. Please allow 3 business days for your refill to be completed.          Additional Information About Your Visit        [employer] Information     [employer] gives you  "secure access to your electronic health record. If you see a primary care provider, you can also send messages to your care team and make appointments. If you have questions, please call your primary care clinic.  If you do not have a primary care provider, please call [phone] and they will assist you.        Care EveryWhere ID     This is your Care EveryWhere ID. This could be used by other organizations to access your [facility] medical records  [id number]        Your Vitals Were     Pulse Height BMI (Body Mass Index)             63 5' 4\" (1.626 m) 29.56 kg/m2          Blood Pressure from Last 3 Encounters:   01/19/17 113/64   01/10/17 144/71   01/05/17 161/76    Weight from Last 3 Encounters:   01/19/17 172 lb 4.8 oz (78.155 kg)   01/10/17 174 lb (78.926 kg)   01/05/17 180 lb (81.647 kg)              We Performed the Following     Basic metabolic panel          Today's Medication Changes          These changes are accurate as of: 1/19/17  8:32 AM.  If you have any questions, ask your nurse or doctor.               Start taking these medicines.        Dose/Directions    atenolol-chlorthalidone 50-25 MG per tablet   Commonly known as:  TENORETIC 50   Used for:  HTN, goal below 140/90, Pedal edema   Started by:  Stephanie Salgado MD        Dose:  1 tablet   Take 1 tablet by mouth daily   Quantity:  90 tablet   Refills:  3            Where to get your medicines      These medications were sent to [facility] - [address]  [address], [place] [address]    Hours:  [facility] Phone:  [phone]    - atenolol-chlorthalidone 50-25 MG per tablet             Primary Care Provider Office Phone # Fax #    Stephanie Salgado [phone]       [facility] [address] [facility] [address]        Thank you!     Thank you for choosing [facility]  for your care. Our goal is always to provide you with " excellent care. Hearing back from our patients is one way we can continue to improve our services. Please take a few minutes to complete the written survey that you may receive in the mail after your visit with us. Thank you!             Your Updated Medication List - Protect others around you: Learn how to safely use, store and throw away your medicines at www.disposemymeds.org.          This list is accurate as of: 1/19/17  8:32 AM.  Always use your most recent med list.                   Brand Name Dispense Instructions for use    atenolol 50 MG tablet    TENORMIN    90 tablet    Take 1 tablet (50 mg) by mouth daily       atenolol-chlorthalidone 50-25 MG per tablet    TENORETIC 50    90 tablet    Take 1 tablet by mouth daily       hydrochlorothiazide 12.5 MG Tabs tablet     30 tablet    Take 1 tablet (12.5 mg) by mouth daily       ipratropium 0.03 % spray    ATROVENT    2 Box    Spray 2 sprays in nostril daily as needed for rhinitis

## 2017-01-20 ASSESSMENT — PATIENT HEALTH QUESTIONNAIRE - PHQ9: SUM OF ALL RESPONSES TO PHQ QUESTIONS 1-9: 4

## 2017-01-22 LAB
DLCOCOR-%PRED-PRE: 88 %
DLCOCOR-PRE: 17.19 ML/MIN/MMHG
DLCOUNC-%PRED-PRE: 91 %
DLCOUNC-PRE: 17.84 ML/MIN/MMHG
DLCOUNC-PRED: 19.49 ML/MIN/MMHG
ERV-%PRED-PRE: 56 %
ERV-PRE: 0.23 L
ERV-PRED: 0.4 L
EXPTIME-PRE: 6.92 SEC
FEF2575-%PRED-POST: 86 %
FEF2575-%PRED-PRE: 98 %
FEF2575-POST: 1.35 L/SEC
FEF2575-PRE: 1.55 L/SEC
FEF2575-PRED: 1.57 L/SEC
FEFMAX-%PRED-PRE: 106 %
FEFMAX-PRE: 5.16 L/SEC
FEFMAX-PRED: 4.86 L/SEC
FEV1-%PRED-PRE: 90 %
FEV1-PRE: 1.77 L
FEV1FEV6-PRE: 78 %
FEV1FEV6-PRED: 78 %
FEV1FVC-PRE: 78 %
FEV1FVC-PRED: 77 %
FEV1SVC-PRE: 70 %
FEV1SVC-PRED: 68 %
FIFMAX-PRE: 2.14 L/SEC
FRCPLETH-%PRED-PRE: 109 %
FRCPLETH-PRE: 2.98 L
FRCPLETH-PRED: 2.72 L
FVC-%PRED-PRE: 88 %
FVC-PRE: 2.27 L
FVC-PRED: 2.56 L
IC-%PRED-PRE: 89 %
IC-PRE: 2.2 L
IC-PRED: 2.47 L
RVPLETH-%PRED-PRE: 120 %
RVPLETH-PRE: 2.66 L
RVPLETH-PRED: 2.21 L
TLCPLETH-%PRED-PRE: 105 %
TLCPLETH-PRE: 5.19 L
TLCPLETH-PRED: 4.94 L
VA-%PRED-PRE: 77 %
VA-PRE: 3.91 L
VC-%PRED-PRE: 87 %
VC-PRE: 2.52 L
VC-PRED: 2.87 L

## 2017-01-24 NOTE — PROGRESS NOTES
Quick Note:    Pulmonary function tests are normal. Please notify.   Thank you. EUSEBIO GRAF MD    ______

## 2017-02-21 ENCOUNTER — OFFICE VISIT (OUTPATIENT)
Dept: FAMILY MEDICINE | Facility: CLINIC | Age: 82
End: 2017-02-21
Payer: COMMERCIAL

## 2017-02-21 VITALS
BODY MASS INDEX: 29.53 KG/M2 | SYSTOLIC BLOOD PRESSURE: 145 MMHG | WEIGHT: 173 LBS | HEART RATE: 77 BPM | DIASTOLIC BLOOD PRESSURE: 72 MMHG | RESPIRATION RATE: 16 BRPM | TEMPERATURE: 97.6 F | OXYGEN SATURATION: 97 % | HEIGHT: 64 IN

## 2017-02-21 DIAGNOSIS — N39.0 URINARY TRACT INFECTION WITHOUT HEMATURIA, SITE UNSPECIFIED: Primary | ICD-10-CM

## 2017-02-21 DIAGNOSIS — R30.0 DYSURIA: ICD-10-CM

## 2017-02-21 DIAGNOSIS — E78.49 OTHER HYPERLIPIDEMIA: ICD-10-CM

## 2017-02-21 LAB
ALBUMIN UR-MCNC: 100 MG/DL
APPEARANCE UR: CLEAR
BACTERIA #/AREA URNS HPF: ABNORMAL /HPF
BILIRUB UR QL STRIP: NEGATIVE
COLOR UR AUTO: YELLOW
GLUCOSE UR STRIP-MCNC: NEGATIVE MG/DL
HGB UR QL STRIP: ABNORMAL
KETONES UR STRIP-MCNC: NEGATIVE MG/DL
LEUKOCYTE ESTERASE UR QL STRIP: ABNORMAL
NITRATE UR QL: NEGATIVE
NON-SQ EPI CELLS #/AREA URNS LPF: ABNORMAL /LPF
PH UR STRIP: 6.5 PH (ref 5–7)
RBC #/AREA URNS AUTO: ABNORMAL /HPF (ref 0–2)
SP GR UR STRIP: 1.02 (ref 1–1.03)
URN SPEC COLLECT METH UR: ABNORMAL
UROBILINOGEN UR STRIP-ACNC: 0.2 EU/DL (ref 0.2–1)
WBC #/AREA URNS AUTO: ABNORMAL /HPF (ref 0–2)

## 2017-02-21 PROCEDURE — 87086 URINE CULTURE/COLONY COUNT: CPT | Performed by: NURSE PRACTITIONER

## 2017-02-21 PROCEDURE — 87088 URINE BACTERIA CULTURE: CPT | Performed by: NURSE PRACTITIONER

## 2017-02-21 PROCEDURE — 81001 URINALYSIS AUTO W/SCOPE: CPT | Performed by: NURSE PRACTITIONER

## 2017-02-21 PROCEDURE — 99213 OFFICE O/P EST LOW 20 MIN: CPT | Performed by: NURSE PRACTITIONER

## 2017-02-21 PROCEDURE — 87186 SC STD MICRODIL/AGAR DIL: CPT | Performed by: NURSE PRACTITIONER

## 2017-02-21 RX ORDER — SULFAMETHOXAZOLE/TRIMETHOPRIM 800-160 MG
1 TABLET ORAL 2 TIMES DAILY
Qty: 14 TABLET | Refills: 0 | Status: SHIPPED | OUTPATIENT
Start: 2017-02-21 | End: 2018-02-15

## 2017-02-21 NOTE — NURSING NOTE
"Chief Complaint   Patient presents with     Urinary Problem       Initial /72 (BP Location: Right arm, Patient Position: Chair, Cuff Size: Adult Regular)  Pulse 77  Temp 97.6  F (36.4  C) (Oral)  Resp 16  Ht 5' 4\" (1.626 m)  Wt 173 lb (78.5 kg)  SpO2 97%  BMI 29.7 kg/m2 Estimated body mass index is 29.7 kg/(m^2) as calculated from the following:    Height as of this encounter: 5' 4\" (1.626 m).    Weight as of this encounter: 173 lb (78.5 kg).  Medication Reconciliation: complete  "

## 2017-02-21 NOTE — PROGRESS NOTES
SUBJECTIVE:                                                    Caitlyn Wasserman is a 81 year old female who presents to clinic today for the following health issues:  she is not on the HCTZ or the Tenormin as she was switched to a atenolon-chlorthalidone instead.    URINARY TRACT SYMPTOMS      Duration: started yesterday morning    Description  dysuria, frequency and urgency    Intensity:  moderate    Accompanying signs and symptoms:  Fever/chills: no   Flank pain no   Nausea and vomiting: no   Vaginal symptoms: none  Abdominal/Pelvic Pain: YES- yesterday    History  History of frequent UTI's: YES  History of kidney stones: no   Sexually Active: no   Possibility of pregnancy: No    Precipitating or alleviating factors: None    Therapies tried and outcome: excederin       Outcome: feeling better today.           Problem list and histories reviewed & adjusted, as indicated.  Additional history: she states today she actually feels a bit better, she's been drinking a lot of water.      Patient Active Problem List   Diagnosis     Urinary frequency     LEFT HIP PAIN     Right shoulder pain     Numbness in right leg     Cataract     Recurrent UTI     HYPERLIPIDEMIA LDL GOAL <130     Kyphosis of cervical region     Neck pain, chronic     Health Care Home     Essential tremor     Pedal edema     Osteoarthritis, right knee     Chronic rhinitis     Essential hypertension     Dysuria     Chronic obstructive pulmonary disease, unspecified COPD type (H)     Past Surgical History   Procedure Laterality Date     Hc anter colporrhaphy,blad/vagina  6/04     Cystocele Repair,rectocele repair.     Joint replacement, hip rt/lt  3/07, 4/07     Joint Replacement Hip /LT- dislocated in front repeat surgery 1 week later       Social History   Substance Use Topics     Smoking status: Former Smoker     Packs/day: 1.00     Years: 20.00     Types: Cigarettes     Quit date: 1/1/1971     Smokeless tobacco: Never Used     Alcohol use Yes       "Comment: 1 wine a month maybe     Family History   Problem Relation Age of Onset     CANCER Mother      Bladder     Neurologic Disorder Mother      heriditary tremor     C.A.D. Father      CEREBROVASCULAR DISEASE Father      Allergies Maternal Grandfather      Respiratory Maternal Grandfather      Asthma     DIABETES Paternal Grandmother      Type II     CANCER Brother      Multiple myloma     C.A.D. Brother      Breast Cancer No family hx of          Current Outpatient Prescriptions   Medication Sig Dispense Refill     acetaminophen-caffeine (EXCEDRIN TENSION HEADACHE) 500-65 MG TABS Take 2 tablets by mouth every 6 hours as needed for mild pain       sulfamethoxazole-trimethoprim (BACTRIM DS/SEPTRA DS) 800-160 MG per tablet Take 1 tablet by mouth 2 times daily 14 tablet 0     atenolol-chlorthalidone (TENORETIC 50) 50-25 MG per tablet Take 1 tablet by mouth daily 90 tablet 3     psyllium (METAMUCIL) 28.3 % POWD 2 teaspoons a day in water       calcium carbonate (TUMS) 500 MG chewable tablet Take 1 tablet (500 mg) by mouth daily 150 tablet      ipratropium (ATROVENT) 0.03 % spray Spray 2 sprays in nostril daily as needed for rhinitis 2 Box 1     hydrochlorothiazide 12.5 MG TABS tablet Take 1 tablet (12.5 mg) by mouth daily (Patient not taking: Reported on 2/21/2017) 30 tablet 1     Allergies   Allergen Reactions     Codeine      Cortisone      Diclofenac Sodium      Hydrocodone Nausea and Vomiting     Oxycodone Nausea and Vomiting     Tramadol Nausea and Vomiting        ROS: 10 point ROS neg other than the symptoms noted above in the HPI.    OBJECTIVE:                                                    BP (P) 138/70  Pulse 77  Temp 97.6  F (36.4  C) (Oral)  Resp 16  Ht 5' 4\" (1.626 m)  Wt 173 lb (78.5 kg)  SpO2 97%  BMI 29.7 kg/m2  Body mass index is 29.7 kg/(m^2).  GENERAL: healthy, alert and no distress  NECK: no adenopathy, no asymmetry  RESP: lungs clear to auscultation - no rales, rhonchi or wheezes  CV: regular " rate and rhythm, normal S1 S2, no S3 or S4, no murmur  ABDOMEN: soft, nontender, no hepatosplenomegaly, no masses and bowel sounds normal, no CVA tenderness  MS: no gross musculoskeletal defects noted      Diagnostic Test Results:  Results for orders placed or performed in visit on 02/21/17 (from the past 24 hour(s))   UA reflex to Microscopic and Culture   Result Value Ref Range    Color Urine Yellow     Appearance Urine Clear     Glucose Urine Negative NEG mg/dL    Bilirubin Urine Negative NEG    Ketones Urine Negative NEG mg/dL    Specific Gravity Urine 1.020 1.003 - 1.035    Blood Urine Large (A) NEG    pH Urine 6.5 5.0 - 7.0 pH    Protein Albumin Urine 100 (A) NEG mg/dL    Urobilinogen Urine 0.2 0.2 - 1.0 EU/dL    Nitrite Urine Negative NEG    Leukocyte Esterase Urine Moderate (A) NEG    Source Midstream Urine    Urine Microscopic   Result Value Ref Range    WBC Urine  (A) 0 - 2 /HPF    RBC Urine  (A) 0 - 2 /HPF    Squamous Epithelial /LPF Urine Few FEW /LPF    Bacteria Urine Moderate (A) NEG /HPF        ASSESSMENT/PLAN:                                                            1. Urinary tract infection without hematuria, site unspecified    - sulfamethoxazole-trimethoprim (BACTRIM DS/SEPTRA DS) 800-160 MG per tablet; Take 1 tablet by mouth 2 times daily  Dispense: 14 tablet; Refill: 0  Discussed how to take the medication(s), expected outcomes, potential side effects.    2. Dysuria    - UA reflex to Microscopic and Culture  - Urine Culture Aerobic Bacterial  - Urine Microscopic    3. Other hyperlipidemia    - Lipid Profile (Chol, Trig, HDL, LDL calc); Future  She's due for this. She has refused statins in the past due to leg pain. Will see where her level is at now but she remains hesitant to resume a different statin.    See Patient Instructions  Follow up if symptoms persist or worsen and as needed.    Patient Instructions               Urinary Tract Infection         What is a urinary tract  infection?   A urinary tract infection (UTI) is an infection in the urinary tract. The urinary tract includes the:   kidneys   ureters (the tubes draining urine from the kidneys to the bladder)   bladder   urethra (the tube that drains urine from the bladder).   Any or all of these parts of the urinary tract can get infected.   How does it occur?   Urinary tract infection is usually caused by bacteria. Normally the urinary tract does not have any bacteria or other organisms in it. Bacteria that cause UTI often spread from the rectum or vagina to the urethra and then to the bladder or kidneys. Urinary tract infection is more common in women than men because the urethra is shorter in women. This makes it easier for bacteria to move up to the bladder. Sometimes bacteria spread from another part of the body through the bloodstream to the urinary tract.   Some of the things that can lead to an infection are:   a blockage in the urinary tract, such as a kidney stone   sexual activity   getting older, when it may get harder to empty and flush out the bladder completely.   Women are more likely to have an infection if they:   are newly sexually active or have a new sex partner   are past menopause   are pregnant   have a history of diabetes, a problem with the immune system, sickle-cell anemia, stroke, kidney stones, or any illness that makes it hard to empty the bladder completely.   What are the symptoms?   The symptoms of UTI may include:   urinating more often   feeling an urgent need to urinate   pain or discomfort (burning) when you urinate   urine that smells bad   pain in the lower pelvis, stomach, lower back, or side   urine that looks cloudy or reddish   fever or chills   sweats   nausea and vomiting   leaking of urine   change in amount of urine, either more or less   pain during sex.   How is it diagnosed?   Your healthcare provider will ask about your symptoms and medical history. Your provider will examine  you. The exam may include a pelvic exam. Your provider will check for tenderness of the bladder or kidney. A sample of your urine may be tested for bacteria and pus. If you are having fever and are feeling very ill, you may have a blood test to look for signs of more serious infection.   If you keep having infections or symptoms after treatment, your provider may suggest these tests:   An intravenous pyelogram (IVP). An IVP is a special type of X-ray of the kidneys, ureters, and bladder.   An ultrasound scan to look at the urinary tract.   A cystoscopy. This is an exam of the inside of the urethra and bladder with a small lighted instrument. It is usually done by a specialist called a urologist.   How is it treated?   UTIs are usually treated with antibiotics. Your provider can also prescribe a medicine called Pyridium to relieve burning and discomfort. (Pyridium turns your urine a dark orange color.)   If the infection is causing fever, pain, or vomiting or you have a severe kidney infection, you may need to stay at the hospital for treatment.   How long will the effects last?   With antibiotic treatment, the symptoms of a bacterial infection stop in 1 to 3 days. Take all of the antibiotic your healthcare provider prescribes, even after the symptoms go away. If you stop taking your medicine before the scheduled end of treatment, the infection may come back   Without treatment, the infection can last a long time. If it is not treated, the infection can permanently damage the bladder and kidneys, or it may spread to the blood. If the infection spreads to the blood, it can be fatal.   How can I take care of myself?   Follow your healthcare provider's treatment. Take all of the antibiotic that your healthcare provider prescribes, even when you feel better. Do not take medicine left over from previous prescriptions.   Drink more fluids, especially water, to help flush bacteria from your system.   If you have a fever:    Take aspirin or acetaminophen to control the fever. Check with your healthcare provider before you give any medicine that contains aspirin or salicylates to a child or teen. This includes medicines like baby aspirin, some cold medicines, and Pepto Bismol. Children and teens who take aspirin are at risk for a serious illness called Reye's syndrome.   Keep a daily record of your temperature.   A hot water bottle or an electric heating pad on a low setting can help relieve cramps or lower abdominal or back pain. Keep a cloth between your skin and the hot water bottle or heating pad so that you don't burn your skin.   Soaking in a tub for 20 to 30 minutes may help relieve any back or abdominal pain.   Follow your healthcare provider's directions for a follow-up urine test. Your provider may want to test your urine soon after you finish taking the antibiotic.   Call your healthcare provider right away if:   You keep having symptoms after taking an antibiotic for 2 days.   Your symptoms get worse.   You have a fever of 101.5? F (38.6? C) or higher.   You have new vomiting.   You have new pain in your side, back, or belly.   You have any symptoms that worry you.   How can I help prevent urinary tract infection?   You can help prevent UTIs if you:   Drink lots of fluids every day.   Don't wait to go to the bathroom when you feel the need to urinate.   Empty your bladder completely when you urinate.   Use good hygiene when you use the toilet. For example, wipe from front to back to keep rectal bacteria from getting into the vagina and urethra.   Avoid using irritating cosmetics or chemicals in the area of the vagina and urethra (such as strong soaps, feminine hygiene sprays or douches, or scented napkins or panty liners).   Practice safe sex. Always use latex or polyurethane condoms.   Urinate soon after sex.   Keep your genital area clean.   Wear underwear that is all cotton or has a cotton crotch. Pantyhose should also  have a cotton crotch. Cotton allows better air circulation than nylon. Change underwear and pantyhose every day.     Published by [employer].  This content is reviewed periodically and is subject to change as new health information becomes available. The information is intended to inform and educate and is not a replacement for medical evaluation, advice, diagnosis or treatment by a healthcare professional.   Developed by Malissa Ruvalcaba RN, [place], and [employer].   ? 2010 [facility] and/or its affiliates. All Rights Reserved.   Copyright   [employer] 2011            Thank you for choosing [facility].  You may be receiving a survey in the mail from [employer] regarding your visit today.  Please take a few minutes to complete and return the survey to let us know how we are doing.      Our Clinic hours are:  Mondays    7:20 am - 7 pm  Tues -  Fri  7:20 am - 5 pm    Clinic Phone: [phone]    The clinic lab opens at 7:30 am Mon - Fri and appointments are required.    [facility] [place]  Ph. [phone]  Monday-Thursday 8 am - 7pm  Tues/Wed/Fri 8 am - 5:30 pm             SUDHAKAR Yung CNP  [facility]

## 2017-02-21 NOTE — PATIENT INSTRUCTIONS
Urinary Tract Infection         What is a urinary tract infection?   A urinary tract infection (UTI) is an infection in the urinary tract. The urinary tract includes the:   kidneys   ureters (the tubes draining urine from the kidneys to the bladder)   bladder   urethra (the tube that drains urine from the bladder).   Any or all of these parts of the urinary tract can get infected.   How does it occur?   Urinary tract infection is usually caused by bacteria. Normally the urinary tract does not have any bacteria or other organisms in it. Bacteria that cause UTI often spread from the rectum or vagina to the urethra and then to the bladder or kidneys. Urinary tract infection is more common in women than men because the urethra is shorter in women. This makes it easier for bacteria to move up to the bladder. Sometimes bacteria spread from another part of the body through the bloodstream to the urinary tract.   Some of the things that can lead to an infection are:   a blockage in the urinary tract, such as a kidney stone   sexual activity   getting older, when it may get harder to empty and flush out the bladder completely.   Women are more likely to have an infection if they:   are newly sexually active or have a new sex partner   are past menopause   are pregnant   have a history of diabetes, a problem with the immune system, sickle-cell anemia, stroke, kidney stones, or any illness that makes it hard to empty the bladder completely.   What are the symptoms?   The symptoms of UTI may include:   urinating more often   feeling an urgent need to urinate   pain or discomfort (burning) when you urinate   urine that smells bad   pain in the lower pelvis, stomach, lower back, or side   urine that looks cloudy or reddish   fever or chills   sweats   nausea and vomiting   leaking of urine   change in amount of urine, either more or less   pain during sex.   How is it diagnosed?   Your healthcare provider will ask  about your symptoms and medical history. Your provider will examine you. The exam may include a pelvic exam. Your provider will check for tenderness of the bladder or kidney. A sample of your urine may be tested for bacteria and pus. If you are having fever and are feeling very ill, you may have a blood test to look for signs of more serious infection.   If you keep having infections or symptoms after treatment, your provider may suggest these tests:   An intravenous pyelogram (IVP). An IVP is a special type of X-ray of the kidneys, ureters, and bladder.   An ultrasound scan to look at the urinary tract.   A cystoscopy. This is an exam of the inside of the urethra and bladder with a small lighted instrument. It is usually done by a specialist called a urologist.   How is it treated?   UTIs are usually treated with antibiotics. Your provider can also prescribe a medicine called Pyridium to relieve burning and discomfort. (Pyridium turns your urine a dark orange color.)   If the infection is causing fever, pain, or vomiting or you have a severe kidney infection, you may need to stay at the hospital for treatment.   How long will the effects last?   With antibiotic treatment, the symptoms of a bacterial infection stop in 1 to 3 days. Take all of the antibiotic your healthcare provider prescribes, even after the symptoms go away. If you stop taking your medicine before the scheduled end of treatment, the infection may come back   Without treatment, the infection can last a long time. If it is not treated, the infection can permanently damage the bladder and kidneys, or it may spread to the blood. If the infection spreads to the blood, it can be fatal.   How can I take care of myself?   Follow your healthcare provider's treatment. Take all of the antibiotic that your healthcare provider prescribes, even when you feel better. Do not take medicine left over from previous prescriptions.   Drink more fluids, especially  water, to help flush bacteria from your system.   If you have a fever:   Take aspirin or acetaminophen to control the fever. Check with your healthcare provider before you give any medicine that contains aspirin or salicylates to a child or teen. This includes medicines like baby aspirin, some cold medicines, and Pepto Bismol. Children and teens who take aspirin are at risk for a serious illness called Reye's syndrome.   Keep a daily record of your temperature.   A hot water bottle or an electric heating pad on a low setting can help relieve cramps or lower abdominal or back pain. Keep a cloth between your skin and the hot water bottle or heating pad so that you don't burn your skin.   Soaking in a tub for 20 to 30 minutes may help relieve any back or abdominal pain.   Follow your healthcare provider's directions for a follow-up urine test. Your provider may want to test your urine soon after you finish taking the antibiotic.   Call your healthcare provider right away if:   You keep having symptoms after taking an antibiotic for 2 days.   Your symptoms get worse.   You have a fever of 101.5? F (38.6? C) or higher.   You have new vomiting.   You have new pain in your side, back, or belly.   You have any symptoms that worry you.   How can I help prevent urinary tract infection?   You can help prevent UTIs if you:   Drink lots of fluids every day.   Don't wait to go to the bathroom when you feel the need to urinate.   Empty your bladder completely when you urinate.   Use good hygiene when you use the toilet. For example, wipe from front to back to keep rectal bacteria from getting into the vagina and urethra.   Avoid using irritating cosmetics or chemicals in the area of the vagina and urethra (such as strong soaps, feminine hygiene sprays or douches, or scented napkins or panty liners).   Practice safe sex. Always use latex or polyurethane condoms.   Urinate soon after sex.   Keep your genital area clean.   Wear  underwear that is all cotton or has a cotton crotch. Pantyhose should also have a cotton crotch. Cotton allows better air circulation than nylon. Change underwear and pantyhose every day.     Published by [employer].  This content is reviewed periodically and is subject to change as new health information becomes available. The information is intended to inform and educate and is not a replacement for medical evaluation, advice, diagnosis or treatment by a healthcare professional.   Developed by Malissa Ruvalcaba RN, [place], and [employer].   ? 2010 [facility] and/or its affiliates. All Rights Reserved.   Copyright   [employer] 2011            Thank you for choosing [facility].  You may be receiving a survey in the mail from [employer] regarding your visit today.  Please take a few minutes to complete and return the survey to let us know how we are doing.      Our Clinic hours are:  Mondays    7:20 am - 7 pm  Tues -  Fri  7:20 am - 5 pm    Clinic Phone: [phone]    The clinic lab opens at 7:30 am Mon - Fri and appointments are required.    [facility] [place]  Ph. [phone]  Monday-Thursday 8 am - 7pm  Tues/Wed/Fri 8 am - 5:30 pm

## 2017-02-21 NOTE — MR AVS SNAPSHOT
After Visit Summary   2/21/2017    Caitlyn Wasserman    MRN: [id number]           Patient Information     Date Of Birth          1935        Visit Information        Provider Department      2/21/2017 9:00 AM Cindy Flor APRN [facility]        Today's Diagnoses     Urinary tract infection without hematuria, site unspecified    -  1    Dysuria          Care Instructions                Urinary Tract Infection         What is a urinary tract infection?   A urinary tract infection (UTI) is an infection in the urinary tract. The urinary tract includes the:   kidneys   ureters (the tubes draining urine from the kidneys to the bladder)   bladder   urethra (the tube that drains urine from the bladder).   Any or all of these parts of the urinary tract can get infected.   How does it occur?   Urinary tract infection is usually caused by bacteria. Normally the urinary tract does not have any bacteria or other organisms in it. Bacteria that cause UTI often spread from the rectum or vagina to the urethra and then to the bladder or kidneys. Urinary tract infection is more common in women than men because the urethra is shorter in women. This makes it easier for bacteria to move up to the bladder. Sometimes bacteria spread from another part of the body through the bloodstream to the urinary tract.   Some of the things that can lead to an infection are:   a blockage in the urinary tract, such as a kidney stone   sexual activity   getting older, when it may get harder to empty and flush out the bladder completely.   Women are more likely to have an infection if they:   are newly sexually active or have a new sex partner   are past menopause   are pregnant   have a history of diabetes, a problem with the immune system, sickle-cell anemia, stroke, kidney stones, or any illness that makes it hard to empty the bladder completely.   What are the symptoms?   The symptoms of UTI may include:    urinating more often   feeling an urgent need to urinate   pain or discomfort (burning) when you urinate   urine that smells bad   pain in the lower pelvis, stomach, lower back, or side   urine that looks cloudy or reddish   fever or chills   sweats   nausea and vomiting   leaking of urine   change in amount of urine, either more or less   pain during sex.   How is it diagnosed?   Your healthcare provider will ask about your symptoms and medical history. Your provider will examine you. The exam may include a pelvic exam. Your provider will check for tenderness of the bladder or kidney. A sample of your urine may be tested for bacteria and pus. If you are having fever and are feeling very ill, you may have a blood test to look for signs of more serious infection.   If you keep having infections or symptoms after treatment, your provider may suggest these tests:   An intravenous pyelogram (IVP). An IVP is a special type of X-ray of the kidneys, ureters, and bladder.   An ultrasound scan to look at the urinary tract.   A cystoscopy. This is an exam of the inside of the urethra and bladder with a small lighted instrument. It is usually done by a specialist called a urologist.   How is it treated?   UTIs are usually treated with antibiotics. Your provider can also prescribe a medicine called Pyridium to relieve burning and discomfort. (Pyridium turns your urine a dark orange color.)   If the infection is causing fever, pain, or vomiting or you have a severe kidney infection, you may need to stay at the hospital for treatment.   How long will the effects last?   With antibiotic treatment, the symptoms of a bacterial infection stop in 1 to 3 days. Take all of the antibiotic your healthcare provider prescribes, even after the symptoms go away. If you stop taking your medicine before the scheduled end of treatment, the infection may come back   Without treatment, the infection can last a long time. If it is not treated,  the infection can permanently damage the bladder and kidneys, or it may spread to the blood. If the infection spreads to the blood, it can be fatal.   How can I take care of myself?   Follow your healthcare provider's treatment. Take all of the antibiotic that your healthcare provider prescribes, even when you feel better. Do not take medicine left over from previous prescriptions.   Drink more fluids, especially water, to help flush bacteria from your system.   If you have a fever:   Take aspirin or acetaminophen to control the fever. Check with your healthcare provider before you give any medicine that contains aspirin or salicylates to a child or teen. This includes medicines like baby aspirin, some cold medicines, and Pepto Bismol. Children and teens who take aspirin are at risk for a serious illness called Reye's syndrome.   Keep a daily record of your temperature.   A hot water bottle or an electric heating pad on a low setting can help relieve cramps or lower abdominal or back pain. Keep a cloth between your skin and the hot water bottle or heating pad so that you don't burn your skin.   Soaking in a tub for 20 to 30 minutes may help relieve any back or abdominal pain.   Follow your healthcare provider's directions for a follow-up urine test. Your provider may want to test your urine soon after you finish taking the antibiotic.   Call your healthcare provider right away if:   You keep having symptoms after taking an antibiotic for 2 days.   Your symptoms get worse.   You have a fever of 101.5? F (38.6? C) or higher.   You have new vomiting.   You have new pain in your side, back, or belly.   You have any symptoms that worry you.   How can I help prevent urinary tract infection?   You can help prevent UTIs if you:   Drink lots of fluids every day.   Don't wait to go to the bathroom when you feel the need to urinate.   Empty your bladder completely when you urinate.   Use good hygiene when you use the toilet.  For example, wipe from front to back to keep rectal bacteria from getting into the vagina and urethra.   Avoid using irritating cosmetics or chemicals in the area of the vagina and urethra (such as strong soaps, feminine hygiene sprays or douches, or scented napkins or panty liners).   Practice safe sex. Always use latex or polyurethane condoms.   Urinate soon after sex.   Keep your genital area clean.   Wear underwear that is all cotton or has a cotton crotch. Pantyhose should also have a cotton crotch. Cotton allows better air circulation than nylon. Change underwear and pantyhose every day.     Published by [employer].  This content is reviewed periodically and is subject to change as new health information becomes available. The information is intended to inform and educate and is not a replacement for medical evaluation, advice, diagnosis or treatment by a healthcare professional.   Developed by Malissa Ruvalcaba RN, [place], and [employer].   ? 2010 [facility] and/or its affiliates. All Rights Reserved.   Copyright   [employer] 2011            Thank you for choosing [facility].  You may be receiving a survey in the mail from [email or web address] regarding your visit today.  Please take a few minutes to complete and return the survey to let us know how we are doing.      Our Clinic hours are:  Mondays    7:20 am - 7 pm  Tues -  Fri  7:20 am - 5 pm    Clinic Phone: [phone]    The clinic lab opens at 7:30 am Mon - Fri and appointments are required.    [facility]  Ph. [phone]  Monday-Thursday 8 am - 7pm  Tues/Wed/Fri 8 am - 5:30 pm               Follow-ups after your visit        Who to contact     If you have questions or need follow up information about today's clinic visit or your schedule please contact [facility] directly at [phone].  Normal or non-critical lab and imaging results will be communicated to you by MyChart, letter or phone within 4  "business days after the clinic has received the results. If you do not hear from us within 7 days, please contact the clinic through [employer] or phone. If you have a critical or abnormal lab result, we will notify you by phone as soon as possible.  Submit refill requests through [employer] or call your pharmacy and they will forward the refill request to us. Please allow 3 business days for your refill to be completed.          Additional Information About Your Visit        [employer] Information     [employer] gives you secure access to your electronic health record. If you see a primary care provider, you can also send messages to your care team and make appointments. If you have questions, please call your primary care clinic.  If you do not have a primary care provider, please call [phone] and they will assist you.        Care EveryWhere ID     This is your Care EveryWhere ID. This could be used by other organizations to access your [facility] medical records  [id number]        Your Vitals Were     Pulse Temperature Respirations Height Pulse Oximetry BMI (Body Mass Index)    77 97.6  F (36.4  C) (Oral) 16 5' 4\" (1.626 m) 97% 29.7 kg/m2       Blood Pressure from Last 3 Encounters:   02/21/17 (P) 138/70   01/19/17 113/64   01/10/17 144/71    Weight from Last 3 Encounters:   02/21/17 173 lb (78.5 kg)   01/19/17 172 lb 4.8 oz (78.2 kg)   01/10/17 174 lb (78.9 kg)              We Performed the Following     UA reflex to Microscopic and Culture     Urine Culture Aerobic Bacterial     Urine Microscopic          Today's Medication Changes          These changes are accurate as of: 2/21/17  9:28 AM.  If you have any questions, ask your nurse or doctor.               Start taking these medicines.        Dose/Directions    sulfamethoxazole-trimethoprim 800-160 MG per tablet   Commonly known as:  BACTRIM DS/SEPTRA DS   Used for:  Urinary tract infection without hematuria, site unspecified   Started by:  Cindy Flor APRN " CNP        Dose:  1 tablet   Take 1 tablet by mouth 2 times daily   Quantity:  14 tablet   Refills:  0         Stop taking these medicines if you haven't already. Please contact your care team if you have questions.     atenolol 50 MG tablet   Commonly known as:  TENORMIN   Stopped by:  Cindy Flor APRN CNP                Where to get your medicines      These medications were sent to [facility] - [place], [place] - [address]  [address] [facility] [address]     Phone:  [phone]     sulfamethoxazole-trimethoprim 800-160 MG per tablet                Primary Care Provider Office Phone # Fax #    Stephanie Mia Salgado [phone]       [facility] [address] [facility] [address]        Thank you!     Thank you for choosing [facility]  for your care. Our goal is always to provide you with excellent care. Hearing back from our patients is one way we can continue to improve our services. Please take a few minutes to complete the written survey that you may receive in the mail after your visit with us. Thank you!             Your Updated Medication List - Protect others around you: Learn how to safely use, store and throw away your medicines at www.disposemymeds.org.          This list is accurate as of: 2/21/17  9:28 AM.  Always use your most recent med list.                   Brand Name Dispense Instructions for use    acetaminophen-caffeine 500-65 MG Tabs    EXCEDRIN TENSION HEADACHE     Take 2 tablets by mouth every 6 hours as needed for mild pain       atenolol-chlorthalidone 50-25 MG per tablet    TENORETIC 50    90 tablet    Take 1 tablet by mouth daily       calcium carbonate 500 MG chewable tablet    TUMS    150 tablet    Take 1 tablet (500 mg) by mouth daily       hydrochlorothiazide 12.5 MG Tabs tablet     30 tablet    Take 1 tablet (12.5 mg) by mouth daily       ipratropium 0.03 % spray    ATROVENT     2 Box    Spray 2 sprays in nostril daily as needed for rhinitis       METAMUCIL 28.3 % Powd   Generic drug:  psyllium      2 teaspoons a day in water       sulfamethoxazole-trimethoprim 800-160 MG per tablet    BACTRIM DS/SEPTRA DS    14 tablet    Take 1 tablet by mouth 2 times daily

## 2017-02-24 LAB
BACTERIA SPEC CULT: ABNORMAL
MICRO REPORT STATUS: ABNORMAL
MICROORGANISM SPEC CULT: ABNORMAL
SPECIMEN SOURCE: ABNORMAL

## 2017-02-24 RX ORDER — NITROFURANTOIN 25; 75 MG/1; MG/1
100 CAPSULE ORAL 2 TIMES DAILY
Qty: 14 CAPSULE | Refills: 0 | Status: SHIPPED | OUTPATIENT
Start: 2017-02-24 | End: 2017-03-03

## 2017-04-20 ENCOUNTER — TRANSFERRED RECORDS (OUTPATIENT)
Dept: HEALTH INFORMATION MANAGEMENT | Facility: CLINIC | Age: 82
End: 2017-04-20

## 2017-10-18 ENCOUNTER — ALLIED HEALTH/NURSE VISIT (OUTPATIENT)
Dept: FAMILY MEDICINE | Facility: CLINIC | Age: 82
End: 2017-10-18
Payer: COMMERCIAL

## 2017-10-18 DIAGNOSIS — Z23 NEED FOR PROPHYLACTIC VACCINATION AND INOCULATION AGAINST INFLUENZA: Primary | ICD-10-CM

## 2017-10-18 PROCEDURE — 90662 IIV NO PRSV INCREASED AG IM: CPT

## 2017-10-18 PROCEDURE — 99207 ZZC NO CHARGE NURSE ONLY: CPT

## 2017-10-18 PROCEDURE — G0008 ADMIN INFLUENZA VIRUS VAC: HCPCS

## 2017-10-18 NOTE — MR AVS SNAPSHOT
After Visit Summary   10/18/2017    Caitlyn Wasserman    MRN: [id number]           Patient Information     Date Of Birth          1935        Visit Information        Provider Department      10/18/2017 9:30 AM Nahum/Lonnie Medrano [facility]        Today's Diagnoses     Need for prophylactic vaccination and inoculation against influenza    -  1       Follow-ups after your visit        Who to contact     If you have questions or need follow up information about today's clinic visit or your schedule please contact [facility] directly at [phone].  Normal or non-critical lab and imaging results will be communicated to you by [employer], letter or phone within 4 business days after the clinic has received the results. If you do not hear from us within 7 days, please contact the clinic through [employer] or phone. If you have a critical or abnormal lab result, we will notify you by phone as soon as possible.  Submit refill requests through [employer] or call your pharmacy and they will forward the refill request to us. Please allow 3 business days for your refill to be completed.          Additional Information About Your Visit        MyChart Information     [employer] gives you secure access to your electronic health record. If you see a primary care provider, you can also send messages to your care team and make appointments. If you have questions, please call your primary care clinic.  If you do not have a primary care provider, please call [phone] and they will assist you.        Care EveryWhere ID     This is your Care EveryWhere ID. This could be used by other organizations to access your [facility] medical records  [id number]         Blood Pressure from Last 3 Encounters:   02/21/17 (P) 138/70   01/19/17 113/64   01/10/17 144/71    Weight from Last 3 Encounters:   02/21/17 173 lb (78.5 kg)   01/19/17 172 lb 4.8 oz (78.2 kg)   01/10/17 174 lb (78.9 kg)              We  Performed the Following     ADMIN INFLUENZA (For MEDICARE Patients ONLY) []     FLU VACCINE, INCREASED ANTIGEN, PRESV FREE, AGE 65+ [80647]        Primary Care Provider Office Phone # Fax #    Stephanie Mia Salgado [phone] [address] [facility] [address]        Equal Access to Services     ST[facility]E [facility] : Hadii aad ku hadasho Soomaali, waaxda luqadaha, qaybta kaalmada adeegyada, alondra hongin hayaan adejailene olsonsarahisha salmeron . So [facility] [phone].    ATENCIÓN: Si habla español, tiene a duffy disposición servicios gratuitos de asistencia lingüística. [facility] [phone].    We comply with applicable federal civil rights laws and [place] laws. We do not discriminate on the basis of race, color, national origin, age, disability, sex, sexual orientation, or gender identity.            Thank you!     Thank you for choosing [facility]  for your care. Our goal is always to provide you with excellent care. Hearing back from our patients is one way we can continue to improve our services. Please take a few minutes to complete the written survey that you may receive in the mail after your visit with us. Thank you!             Your Updated Medication List - Protect others around you: Learn how to safely use, store and throw away your medicines at www.disposemymeds.org.          This list is accurate as of: 10/18/17  9:32 AM.  Always use your most recent med list.                   Brand Name Dispense Instructions for use Diagnosis    acetaminophen-caffeine 500-65 MG Tabs    EXCEDRIN TENSION HEADACHE     Take 2 tablets by mouth every 6 hours as needed for mild pain        atenolol-chlorthalidone 50-25 MG per tablet    TENORETIC 50    90 tablet    Take 1 tablet by mouth daily    HTN, goal below 140/90, Pedal edema       calcium carbonate 500 MG chewable tablet    TUMS    150 tablet    Take 1 tablet (500 mg) by mouth daily        hydrochlorothiazide 12.5 MG Tabs tablet     30  tablet    Take 1 tablet (12.5 mg) by mouth daily    Pedal edema       ipratropium 0.03 % spray    ATROVENT    2 Box    Spray 2 sprays in nostril daily as needed for rhinitis    Chronic rhinitis       METAMUCIL 28.3 % Powd   Generic drug:  psyllium      2 teaspoons a day in water        sulfamethoxazole-trimethoprim 800-160 MG per tablet    BACTRIM DS/SEPTRA DS    14 tablet    Take 1 tablet by mouth 2 times daily    Urinary tract infection without hematuria, site unspecified

## 2017-10-18 NOTE — PROGRESS NOTES
Injectable Influenza Immunization Documentation    1.  Is the person to be vaccinated sick today?   No    2. Does the person to be vaccinated have an allergy to a component   of the vaccine?   No    3. Has the person to be vaccinated ever had a serious reaction   to influenza vaccine in the past?   No    4. Has the person to be vaccinated ever had Guillain-Barré syndrome?   No    Form completed by Nay Goldstein CMA

## 2018-01-17 DIAGNOSIS — R60.0 PEDAL EDEMA: ICD-10-CM

## 2018-01-17 DIAGNOSIS — I10 HTN, GOAL BELOW 140/90: ICD-10-CM

## 2018-01-17 NOTE — TELEPHONE ENCOUNTER
"Requested Prescriptions   Pending Prescriptions Disp Refills     atenolol-chlorthalidone (TENORETIC 50) 50-25 MG per tablet  Last Written Prescription Date:  01/19/17  Last Fill Quantity: 90,  # refills: 3   Last Office Visit with G, P or [facility] prescribing provider:  02/21/17   Future Office Visit:      90 tablet 3     Sig: Take 1 tablet by mouth daily    Beta-Blockers Protocol Failed    1/17/2018  3:50 PM       Failed - Blood pressure under 140/90    BP Readings from Last 3 Encounters:   02/21/17 (P) 138/70   01/19/17 113/64   01/10/17 144/71                Passed - Patient is age 6 or older       Passed - Recent or future visit with authorizing provider's specialty    Patient had office visit in the last year or has a visit in the next 30 days with authorizing provider.  See \"Patient Info\" tab in inbasket, or \"Choose Columns\" in Meds & Orders section of the refill encounter.               "

## 2018-01-23 RX ORDER — ATENOLOL AND CHLORTHALIDONE TABLET 50; 25 MG/1; MG/1
1 TABLET ORAL DAILY
Qty: 30 TABLET | Refills: 0 | Status: SHIPPED | OUTPATIENT
Start: 2018-01-23 | End: 2018-02-15

## 2018-02-15 ENCOUNTER — OFFICE VISIT (OUTPATIENT)
Dept: FAMILY MEDICINE | Facility: CLINIC | Age: 83
End: 2018-02-15
Payer: COMMERCIAL

## 2018-02-15 VITALS
BODY MASS INDEX: 28.67 KG/M2 | TEMPERATURE: 97.8 F | HEART RATE: 53 BPM | DIASTOLIC BLOOD PRESSURE: 71 MMHG | OXYGEN SATURATION: 98 % | SYSTOLIC BLOOD PRESSURE: 125 MMHG | RESPIRATION RATE: 18 BRPM | WEIGHT: 167 LBS

## 2018-02-15 DIAGNOSIS — J44.9 CHRONIC OBSTRUCTIVE PULMONARY DISEASE, UNSPECIFIED COPD TYPE (H): Primary | ICD-10-CM

## 2018-02-15 DIAGNOSIS — J30.89 CHRONIC NON-SEASONAL ALLERGIC RHINITIS, UNSPECIFIED TRIGGER: ICD-10-CM

## 2018-02-15 DIAGNOSIS — I10 HTN, GOAL BELOW 140/90: ICD-10-CM

## 2018-02-15 DIAGNOSIS — M19.90 ARTHRITIS: ICD-10-CM

## 2018-02-15 DIAGNOSIS — R60.0 PEDAL EDEMA: ICD-10-CM

## 2018-02-15 LAB
ALBUMIN SERPL-MCNC: 3.8 G/DL (ref 3.4–5)
ALP SERPL-CCNC: 75 U/L (ref 40–150)
ALT SERPL W P-5'-P-CCNC: 23 U/L (ref 0–50)
ANION GAP SERPL CALCULATED.3IONS-SCNC: 6 MMOL/L (ref 3–14)
AST SERPL W P-5'-P-CCNC: 24 U/L (ref 0–45)
BASOPHILS # BLD AUTO: 0.1 10E9/L (ref 0–0.2)
BASOPHILS NFR BLD AUTO: 1 %
BILIRUB SERPL-MCNC: 0.6 MG/DL (ref 0.2–1.3)
BUN SERPL-MCNC: 15 MG/DL (ref 7–30)
CALCIUM SERPL-MCNC: 9.2 MG/DL (ref 8.5–10.1)
CHLORIDE SERPL-SCNC: 102 MMOL/L (ref 94–109)
CHOLEST SERPL-MCNC: 236 MG/DL
CO2 SERPL-SCNC: 31 MMOL/L (ref 20–32)
CREAT SERPL-MCNC: 0.88 MG/DL (ref 0.52–1.04)
CRP SERPL-MCNC: <2.9 MG/L (ref 0–8)
DIFFERENTIAL METHOD BLD: NORMAL
EOSINOPHIL # BLD AUTO: 0.3 10E9/L (ref 0–0.7)
EOSINOPHIL NFR BLD AUTO: 3.9 %
ERYTHROCYTE [DISTWIDTH] IN BLOOD BY AUTOMATED COUNT: 12.9 % (ref 10–15)
ERYTHROCYTE [SEDIMENTATION RATE] IN BLOOD BY WESTERGREN METHOD: 7 MM/H (ref 0–30)
GFR SERPL CREATININE-BSD FRML MDRD: 61 ML/MIN/1.7M2
GLUCOSE SERPL-MCNC: 96 MG/DL (ref 70–99)
HCT VFR BLD AUTO: 44.3 % (ref 35–47)
HDLC SERPL-MCNC: 62 MG/DL
HGB BLD-MCNC: 14.5 G/DL (ref 11.7–15.7)
LDLC SERPL CALC-MCNC: 154 MG/DL
LYMPHOCYTES # BLD AUTO: 2.1 10E9/L (ref 0.8–5.3)
LYMPHOCYTES NFR BLD AUTO: 29.6 %
MCH RBC QN AUTO: 31.4 PG (ref 26.5–33)
MCHC RBC AUTO-ENTMCNC: 32.7 G/DL (ref 31.5–36.5)
MCV RBC AUTO: 96 FL (ref 78–100)
MONOCYTES # BLD AUTO: 0.6 10E9/L (ref 0–1.3)
MONOCYTES NFR BLD AUTO: 8.5 %
NEUTROPHILS # BLD AUTO: 4.1 10E9/L (ref 1.6–8.3)
NEUTROPHILS NFR BLD AUTO: 57 %
NONHDLC SERPL-MCNC: 174 MG/DL
PLATELET # BLD AUTO: 280 10E9/L (ref 150–450)
POTASSIUM SERPL-SCNC: 4 MMOL/L (ref 3.4–5.3)
PROT SERPL-MCNC: 7.6 G/DL (ref 6.8–8.8)
RBC # BLD AUTO: 4.62 10E12/L (ref 3.8–5.2)
SODIUM SERPL-SCNC: 139 MMOL/L (ref 133–144)
TRIGL SERPL-MCNC: 98 MG/DL
URATE SERPL-MCNC: 6.8 MG/DL (ref 2.6–6)
WBC # BLD AUTO: 7.1 10E9/L (ref 4–11)

## 2018-02-15 PROCEDURE — 86038 ANTINUCLEAR ANTIBODIES: CPT | Performed by: FAMILY MEDICINE

## 2018-02-15 PROCEDURE — 36415 COLL VENOUS BLD VENIPUNCTURE: CPT | Performed by: FAMILY MEDICINE

## 2018-02-15 PROCEDURE — 84550 ASSAY OF BLOOD/URIC ACID: CPT | Performed by: FAMILY MEDICINE

## 2018-02-15 PROCEDURE — 86618 LYME DISEASE ANTIBODY: CPT | Performed by: FAMILY MEDICINE

## 2018-02-15 PROCEDURE — 86431 RHEUMATOID FACTOR QUANT: CPT | Performed by: FAMILY MEDICINE

## 2018-02-15 PROCEDURE — 86140 C-REACTIVE PROTEIN: CPT | Performed by: FAMILY MEDICINE

## 2018-02-15 PROCEDURE — 85652 RBC SED RATE AUTOMATED: CPT | Performed by: FAMILY MEDICINE

## 2018-02-15 PROCEDURE — 85025 COMPLETE CBC W/AUTO DIFF WBC: CPT | Performed by: FAMILY MEDICINE

## 2018-02-15 PROCEDURE — 99214 OFFICE O/P EST MOD 30 MIN: CPT | Performed by: FAMILY MEDICINE

## 2018-02-15 PROCEDURE — 80061 LIPID PANEL: CPT | Performed by: FAMILY MEDICINE

## 2018-02-15 PROCEDURE — 80053 COMPREHEN METABOLIC PANEL: CPT | Performed by: FAMILY MEDICINE

## 2018-02-15 RX ORDER — ATENOLOL AND CHLORTHALIDONE TABLET 50; 25 MG/1; MG/1
1 TABLET ORAL DAILY
Qty: 90 TABLET | Refills: 3 | Status: SHIPPED | OUTPATIENT
Start: 2018-02-15 | End: 2019-03-11

## 2018-02-15 RX ORDER — IPRATROPIUM BROMIDE 21 UG/1
2 SPRAY, METERED NASAL DAILY PRN
Qty: 2 BOX | Refills: 3 | Status: SHIPPED | OUTPATIENT
Start: 2018-02-15 | End: 2019-05-07

## 2018-02-15 ASSESSMENT — ANXIETY QUESTIONNAIRES
6. BECOMING EASILY ANNOYED OR IRRITABLE: NOT AT ALL
2. NOT BEING ABLE TO STOP OR CONTROL WORRYING: NOT AT ALL
GAD7 TOTAL SCORE: 0
IF YOU CHECKED OFF ANY PROBLEMS ON THIS QUESTIONNAIRE, HOW DIFFICULT HAVE THESE PROBLEMS MADE IT FOR YOU TO DO YOUR WORK, TAKE CARE OF THINGS AT HOME, OR GET ALONG WITH OTHER PEOPLE: NOT DIFFICULT AT ALL
7. FEELING AFRAID AS IF SOMETHING AWFUL MIGHT HAPPEN: NOT AT ALL
5. BEING SO RESTLESS THAT IT IS HARD TO SIT STILL: NOT AT ALL
1. FEELING NERVOUS, ANXIOUS, OR ON EDGE: NOT AT ALL
3. WORRYING TOO MUCH ABOUT DIFFERENT THINGS: NOT AT ALL

## 2018-02-15 ASSESSMENT — PAIN SCALES - GENERAL: PAINLEVEL: MILD PAIN (2)

## 2018-02-15 ASSESSMENT — PATIENT HEALTH QUESTIONNAIRE - PHQ9: 5. POOR APPETITE OR OVEREATING: NOT AT ALL

## 2018-02-15 NOTE — NURSING NOTE
"Chief Complaint   Patient presents with     Hypertension     Arthritis       Initial Breastfeeding? No Estimated body mass index is 29.7 kg/(m^2) as calculated from the following:    Height as of 2/21/17: 5' 4\" (1.626 m).    Weight as of 2/21/17: 173 lb (78.5 kg).      Health Maintenance that is potentially due pending provider review:  PHQ9    Possibly completing today per provider review.    Is there anyone who you would like to be able to receive your results? No  If yes have patient fill out KATE    "

## 2018-02-15 NOTE — MR AVS SNAPSHOT
After Visit Summary   2/15/2018    Caitlyn Wasserman    MRN: [id number]           Patient Information     Date Of Birth          1935        Visit Information        Provider Department      2/15/2018 8:40 AM Stephanie Salgado MD [facility]        Today's Diagnoses     Chronic obstructive pulmonary disease, unspecified COPD type (H)    -  1    HTN, goal below 140/90        Pedal edema        Chronic non-seasonal allergic rhinitis, unspecified trigger        Arthritis           Follow-ups after your visit        Additional Services     LYMPHEDEMA THERAPY REFERRAL                 Who to contact     If you have questions or need follow up information about today's clinic visit or your schedule please contact [facility] directly at [phone].  Normal or non-critical lab and imaging results will be communicated to you by MyChart, letter or phone within 4 business days after the clinic has received the results. If you do not hear from us within 7 days, please contact the clinic through CompareMyFarehart or phone. If you have a critical or abnormal lab result, we will notify you by phone as soon as possible.  Submit refill requests through [employer]thel[employer] or call your pharmacy and they will forward the refill request to us. Please allow 3 business days for your refill to be completed.          Additional Information About Your Visit        MyChart Information     2theloo gives you secure access to your electronic health record. If you see a primary care provider, you can also send messages to your care team and make appointments. If you have questions, please call your primary care clinic.  If you do not have a primary care provider, please call [phone] and they will assist you.        Care EveryWhere ID     This is your Care EveryWhere ID. This could be used by other organizations to access your [facility] medical records  [id number]        Your Vitals Were     Pulse Temperature  Respirations Pulse Oximetry Breastfeeding? BMI (Body Mass Index)    53 97.8  F (36.6  C) (Tympanic) 18 98% No 28.67 kg/m2       Blood Pressure from Last 3 Encounters:   02/15/18 125/71   02/21/17 (P) 138/70   01/19/17 113/64    Weight from Last 3 Encounters:   02/15/18 167 lb (75.8 kg)   02/21/17 173 lb (78.5 kg)   01/19/17 172 lb 4.8 oz (78.2 kg)              We Performed the Following     Anti Nuclear Solange IgG by IFA with Reflex     CBC with platelets differential     Comprehensive metabolic panel     CRP inflammation     Erythrocyte sedimentation rate auto     Lipid panel reflex to direct LDL Fasting     Lyme Disease Solange with reflex to WB Serum     LYMPHEDEMA THERAPY REFERRAL     Rheumatoid factor     Uric acid          Today's Medication Changes          These changes are accurate as of 2/15/18  9:19 AM.  If you have any questions, ask your nurse or doctor.               Stop taking these medicines if you haven't already. Please contact your care team if you have questions.     hydrochlorothiazide 12.5 MG Tabs tablet   Stopped by:  Stephanie Salgado MD           sulfamethoxazole-trimethoprim 800-160 MG per tablet   Commonly known as:  BACTRIM DS/SEPTRA DS   Stopped by:  Stephanie Salgado MD                Where to get your medicines      These medications were sent to [facility] - [place] - [address]  [address], [place] [address]    Hours:  SHAYY Gallegos [facility] Phone:  [phone]     atenolol-chlorthalidone 50-25 MG per tablet    ipratropium 0.03 % spray                Primary Care Provider Office Phone # Fax #    Stephanie Salgado [phone] [address] [facility] [address]        Equal Access to Services     MICAHEL JACKSON : Amarjit Sierra, darryl christianson, jack kaalmajasmyne bronson, alondra rizvi. So [facility] [phone].    ATENCIÓN: Si habla español, tiene a duffy disposición servicios  oksana de asistencia lingüística. Sumanth montanez [phone].    We comply with applicable federal civil rights laws and [place] laws. We do not discriminate on the basis of race, color, national origin, age, disability, sex, sexual orientation, or gender identity.            Thank you!     Thank you for choosing [facility]  for your care. Our goal is always to provide you with excellent care. Hearing back from our patients is one way we can continue to improve our services. Please take a few minutes to complete the written survey that you may receive in the mail after your visit with us. Thank you!             Your Updated Medication List - Protect others around you: Learn how to safely use, store and throw away your medicines at www.disposemymeds.org.          This list is accurate as of 2/15/18  9:19 AM.  Always use your most recent med list.                   Brand Name Dispense Instructions for use Diagnosis    acetaminophen-caffeine 500-65 MG Tabs    EXCEDRIN TENSION HEADACHE     Take 2 tablets by mouth every 6 hours as needed for mild pain        atenolol-chlorthalidone 50-25 MG per tablet    TENORETIC 50    90 tablet    Take 1 tablet by mouth daily Needs labs and office visit    HTN, goal below 140/90, Pedal edema       calcium carbonate 500 MG chewable tablet    TUMS    150 tablet    Take 1 tablet (500 mg) by mouth daily        ipratropium 0.03 % spray    ATROVENT    2 Box    Spray 2 sprays in nostril daily as needed for rhinitis    Chronic non-seasonal allergic rhinitis, unspecified trigger       METAMUCIL 28.3 % Powd   Generic drug:  psyllium      2 teaspoons a day in water

## 2018-02-15 NOTE — PROGRESS NOTES
"  SUBJECTIVE:   Caitlyn Wasserman is a 82 year old female who presents to clinic today for the following health issues:      Hypertension Follow-up      Outpatient blood pressures are being checked at home.  Results are wnl.    Low Salt Diet: not monitoring salt      Amount of exercise or physical activity: as tolerated due to right knee pain with knee replacement    Problems taking medications regularly: No    Medication side effects: none    Diet: regular (no restrictions)      Musculoskeletal problem/pain      Duration: 1.5 month    Description  Location: left middle finger    Intensity:  mild    Accompanying signs and symptoms: swelling and lump with bleeding and festering, getting better but does have other \"arthritic bumps\" on the hands    History  Previous similar problem: no   Previous evaluation:  None for this bump    Precipitating or alleviating factors:  Trauma or overuse: no   Aggravating factors include: lifting    Therapies tried and outcome: kept clean and now going down in size      Problem list and histories reviewed & adjusted, as indicated.  Additional history: as documented    Patient Active Problem List   Diagnosis     Urinary frequency     LEFT HIP PAIN     Right shoulder pain     Numbness in right leg     Cataract     Recurrent UTI     HYPERLIPIDEMIA LDL GOAL <130     Kyphosis of cervical region     Neck pain, chronic     Health Care Home     Essential tremor     Pedal edema     Osteoarthritis, right knee     Chronic rhinitis     Essential hypertension     Dysuria     Chronic obstructive pulmonary disease, unspecified COPD type (H)     Past Surgical History:   Procedure Laterality Date     HC ANTER COLPORRHAPHY,BLAD/VAGINA  6/04    Cystocele Repair,rectocele repair.     JOINT REPLACEMENT, HIP RT/LT  3/07, 4/07    Joint Replacement Hip /LT- dislocated in front repeat surgery 1 week later       Social History   Substance Use Topics     Smoking status: Former Smoker     Packs/day: 1.00     Years: " 20.00     Types: Cigarettes     Quit date: 1/1/1971     Smokeless tobacco: Never Used     Alcohol use Yes      Comment: 1 wine a month maybe     Family History   Problem Relation Age of Onset     CANCER Mother      Bladder     Neurologic Disorder Mother      heriditary tremor     C.A.D. Father      CEREBROVASCULAR DISEASE Father      Allergies Maternal Grandfather      Respiratory Maternal Grandfather      Asthma     DIABETES Paternal Grandmother      Type II     CANCER Brother      Multiple myloma     C.A.D. Brother      Breast Cancer No family hx of          Current Outpatient Prescriptions   Medication Sig Dispense Refill     atenolol-chlorthalidone (TENORETIC 50) 50-25 MG per tablet Take 1 tablet by mouth daily Needs labs and office visit 90 tablet 3     ipratropium (ATROVENT) 0.03 % spray Spray 2 sprays in nostril daily as needed for rhinitis 2 Box 3     acetaminophen-caffeine (EXCEDRIN TENSION HEADACHE) 500-65 MG TABS Take 2 tablets by mouth every 6 hours as needed for mild pain       psyllium (METAMUCIL) 28.3 % POWD 2 teaspoons a day in water       calcium carbonate (TUMS) 500 MG chewable tablet Take 1 tablet (500 mg) by mouth daily 150 tablet      [DISCONTINUED] atenolol-chlorthalidone (TENORETIC 50) 50-25 MG per tablet Take 1 tablet by mouth daily Needs labs and office visit 30 tablet 0     Allergies   Allergen Reactions     Codeine      Cortisone      Diclofenac Sodium      Hydrocodone Nausea and Vomiting     Oxycodone Nausea and Vomiting     Tramadol Nausea and Vomiting     Recent Labs   Lab Test  01/12/17   0918  01/05/17   0440  11/08/16   1334   09/12/12   1215  03/12/12   0941  11/05/11   0841   07/30/10   0928   LDL   --    --    --    --    --   138*  151*   --   135*   HDL   --    --    --    --    --   44*  46*   --   44*   TRIG   --    --    --    --    --   158*  204*   --   145   ALT   --    --    --    --    --   18   --    --    --    CR   --   0.95  0.93   < >  0.97   --    --    < >   --     GFRESTIMATED   --   57*  58*   < >  56*   --    --    < >   --    GFRESTBLACK   --   69  70   < >  68   --    --    < >   --    POTASSIUM  3.7  4.1  4.0   < >  4.7   --    --    --    --    TSH   --    --   1.83   --   1.62   --    --    --    --     < > = values in this interval not displayed.      BP Readings from Last 3 Encounters:   02/15/18 125/71   02/21/17 (P) 138/70   01/19/17 113/64    Wt Readings from Last 3 Encounters:   02/15/18 167 lb (75.8 kg)   02/21/17 173 lb (78.5 kg)   01/19/17 172 lb 4.8 oz (78.2 kg)         ROS:  Constitutional, HEENT, cardiovascular, pulmonary, gi and gu systems are negative, except as otherwise noted.    OBJECTIVE:                                                    /71  Pulse 53  Temp 97.8  F (36.6  C) (Tympanic)  Resp 18  Wt 167 lb (75.8 kg)  SpO2 98%  Breastfeeding? No  BMI 28.67 kg/m2  GENERAL APPEARANCE ADULT: Alert, no acute distress  HENT: Ears and TMs normal, oral mucosa and posterior oropharynx normal  RESP: lungs clear to auscultation   CV: normal rate, regular rhythm, no murmur or gallop    MS: arthritis of the DID and CMC joints  PSYCH: mentation appears normal., affect and mood normal  Diagnostic Test Results:  PHQ-9 Interpretation:  0-9  Not Major Depression  10-19  Mild/Moderate Depression monthly contacts, meds and therapy  20-27  Severe Depression, weekly contacts, meds and therapy  PHQ-9 SCORE 1/19/2017 2/15/2018   Total Score 4 2   GAD7 score   Interpretation:    0-5  mild anxiety,   6-10 moderate anxiety   11-15 severe anxiety  Last 3 GAD7 scores on record =  CALVIN-7 SCORE 2/15/2018   Total Score 0                    ASSESSMENT/PLAN:                                                    1. HTN, goal below 140/90  due for labs and refill, taking medication without difficulty  - atenolol-chlorthalidone (TENORETIC 50) 50-25 MG per tablet; Take 1 tablet by mouth daily Needs labs and office visit  Dispense: 90 tablet; Refill: 3  - Lipid panel reflex to  direct LDL Fasting    2. Pedal edema  due for labs and refill, taking medication without difficulty  - atenolol-chlorthalidone (TENORETIC 50) 50-25 MG per tablet; Take 1 tablet by mouth daily Needs labs and office visit  Dispense: 90 tablet; Refill: 3  - LYMPHEDEMA THERAPY REFERRAL    3. Chronic non-seasonal allergic rhinitis, unspecified trigger  due for review and refill, taking medication without difficulty  - ipratropium (ATROVENT) 0.03 % spray; Spray 2 sprays in nostril daily as needed for rhinitis  Dispense: 2 Box; Refill: 3    4. Chronic obstructive pulmonary disease, unspecified COPD type (H)  Stable not using medications    5. Arthritis  Family history of osteoarthritis.  Never had workup.  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  - Erythrocyte sedimentation rate auto  - CRP inflammation  - CBC with platelets differential  - Uric acid  - Rheumatoid factor  - Lyme Disease Solange with reflex to WB Serum  - Anti Nuclear Solange IgG by IFA with Reflex  - Comprehensive metabolic panel    Stephanie Salgado MD  [facility]

## 2018-02-16 LAB
ANA SER QL IF: NEGATIVE
B BURGDOR IGG+IGM SER QL: 0.77 (ref 0–0.89)
RHEUMATOID FACT SER NEPH-ACNC: 27 IU/ML (ref 0–20)

## 2018-02-16 ASSESSMENT — PATIENT HEALTH QUESTIONNAIRE - PHQ9: SUM OF ALL RESPONSES TO PHQ QUESTIONS 1-9: 2

## 2018-02-16 ASSESSMENT — ANXIETY QUESTIONNAIRES: GAD7 TOTAL SCORE: 0

## 2018-02-17 PROCEDURE — 82274 ASSAY TEST FOR BLOOD FECAL: CPT | Performed by: NURSE PRACTITIONER

## 2018-02-21 ENCOUNTER — DOCUMENTATION ONLY (OUTPATIENT)
Dept: LAB | Facility: CLINIC | Age: 83
End: 2018-02-21

## 2018-02-21 DIAGNOSIS — Z12.11 SPECIAL SCREENING FOR MALIGNANT NEOPLASMS, COLON: ICD-10-CM

## 2018-02-21 DIAGNOSIS — Z12.11 SPECIAL SCREENING FOR MALIGNANT NEOPLASMS, COLON: Primary | ICD-10-CM

## 2018-02-21 LAB — HEMOCCULT STL QL IA: NEGATIVE

## 2018-02-21 NOTE — PROGRESS NOTES
Patient reports:  LOV 2/15/18  She was handed a FIT test to complete on her way out of her OV   [facility] reports there is not an order in Epic  See note below  Order cued for completion and signature  Please advise    Routing to provider.    Luisa WALLACE Rn

## 2018-02-22 ENCOUNTER — HOSPITAL ENCOUNTER (OUTPATIENT)
Dept: PHYSICAL THERAPY | Facility: CLINIC | Age: 83
Setting detail: THERAPIES SERIES
End: 2018-02-22
Attending: FAMILY MEDICINE
Payer: COMMERCIAL

## 2018-02-22 PROCEDURE — 97161 PT EVAL LOW COMPLEX 20 MIN: CPT | Mod: GP | Performed by: PHYSICAL THERAPIST

## 2018-02-22 PROCEDURE — G8988 SELF CARE GOAL STATUS: HCPCS | Mod: GP,CI | Performed by: PHYSICAL THERAPIST

## 2018-02-22 PROCEDURE — 97140 MANUAL THERAPY 1/> REGIONS: CPT | Mod: GP | Performed by: PHYSICAL THERAPIST

## 2018-02-22 PROCEDURE — G8987 SELF CARE CURRENT STATUS: HCPCS | Mod: GP,CK | Performed by: PHYSICAL THERAPIST

## 2018-02-22 PROCEDURE — 40000099 ZZH STATISTIC LYMPHEDEMA VISIT: Performed by: PHYSICAL THERAPIST

## 2018-02-22 NOTE — PROGRESS NOTES
"Outpatient Lymphedema Therapy Evaluation     02/22/18 1300   Rehab Discipline   Discipline PT   Type of Visit   Type of visit Initial Edema Evaluation       present No   General Information   Start of care 02/22/18   Referring physician Dr. Naomi MD   Orders Evaluate and treat as indicated   Order date 02/15/18   Medical diagnosis BLE pedal edema   Edema onset (2/15/18 - date of referral)   Affected body parts RLE;LLE   Edema etiology comments dependency; L-hip and R-knee replacements ~5 years ago; reports swelling has been \"on and off\" since hip replacement in 2007   Pertinent history of current problem (PT: include personal factors and/or comorbidities that impact the POC; OT: include additional occupational profile info) up until recently pt noticed that diet makes a big difference in her swelling, especially sugar; reports since after Christmas 2017 has been trying to watch diet more;  swelling best in the morning but not compeltely gone and worsens with dependency during the day; reports not doing a lot of elevation, does sleep in a bed at night; recently haddiuretic increased but pt didn't notice change in swelling   Surgical / medical history reviewed Yes   Edema special tests (no history of DVTs)   Prior level of functional mobility Independent with functional mobility, no AD   Prior treatment Diuretics  (with BP med)   Patient role / employment history Retired   Living environment comments lives with    Current assistive devices (no AD)   Fall Risk Screen   Fall screen completed by PT   Have you fallen 2 or more times in the past year? No   Have you fallen and had an injury in the past year? No   Is patient a fall risk? No   System Outcome Measures   Outcome Measures Lymphedema   Lymphedema Life Impact Scale (score range 0-72). A higher score indicates greater impairment. 10   Subjective Report   Patient report of symptoms legs are tight   Precautions   Precautions comments no " known precautions   Patient / Family Goals   Patient / family goals statement to make the swelling better   Pain   Patient currently in pain No   Vital Signs   Vital signs Pulse;SPO2   Pulse 68bpm   SPO2 97%   Cognitive Status   Orientation Orientation to person, place and time   Level of consciousness Alert   Follows commands and answers questions 100% of the time   Personal safety and judgement Intact   Memory Intact   Edema Exam / Assessment   Skin condition Pitting;Intact   Skin condition comments BLE skin all intact with 2+ pitting at B feet and then 3+ pitting present ankles to knee, pittnig is soft   Scar No   Capillary refill Symmetrical   Dorsal pedal pulse Symmetrical   Stemmer sign Negative   Ulceration No   Girth Measurements   Girth Measurements Refer to separate girth measurement flowsheet   Volume LE   Right LE (mL) 3845.7   Left LE (mL) 3735.4   LE volume comparison RLE volume greater than LLE volume   % difference 2.9%   Range of Motion   ROM No deficits were identified   ROM comments BLE WFL   Strength   Strength No deficits were identified   Strength comments BLE WFL   Posture   Posture Forward head position   Palpation   Palpation denies sensitivities   Activities of Daily Living   Activities of Daily Living Independent with functional moblity and ADL, no AD   Sensory   Sensory perception Light touch   Light touch Intact   Vascular Assessment   Vascular Assessment Comments multiple spider veins in B feet   Coordination   Coordination Gross motor coordination appropriate   Muscle Tone   Muscle tone No deficits were identified   Planned Edema Interventions   Planned edema interventions Gradient compression bandaging;Fit for compression garment;Exercises;Precautions to prevent infection / exacerbation;Education;Manual therapy;Skin care / precautions;Home management program development   Clinical Impression   Criteria for skilled therapeutic intervention met Yes   Therapy diagnosis BLE secondary  lymphedema   Influenced by the following impairments / conditions Stage 2;Phlebolymphedema   Functional limitations due to impairments / conditions snug fiting socks and shoes; heaviness in legs with mobility   Clinical Presentation Stable/Uncomplicated   Clinical Presentation Rationale clinical judgement; no weeping or wounds; edema is acute on chronic issue   Clinical Decision Making (Complexity) Low complexity   Treatment frequency 2 times / week   Treatment duration 12 weeks   Patient / family and/or staff in agreement with plan of care Yes   Risks and benefits of therapy have been explained Yes   Education Assessment   Preferred learning style Listening   Barriers to learning No barriers   Goals   Edema Eval Goals 1;2;3;4;5   Goal 1   Goal identifier stg   Goal description pt to have around the clock tolerance to BLE GCB for edema reduction response   Target date 03/08/18   Goal 2   Goal identifier stg   Goal description pt and/or  to be independent with BLE GCB for edema reduction response   Target date 03/08/18   Goal 3   Goal identifier ltg   Goal description once appropriate, pt to be independent with donning, doffing and care of compression garments for longterm edema management for maintenance   Target date 05/23/18   Goal 4   Goal identifier ltg   Goal description pt to be independent with longterm edema management via HEP, elevation and compression garment wear/use   Target date 05/23/18   Goal 5   Goal identifier ltg   Goal description pt to have at least 2-3 point improvement on LLIS due to decreased edema reductions in BLEs   Target date 05/23/18   Total Evaluation Time   Total evaluation time 10

## 2018-02-26 ENCOUNTER — HOSPITAL ENCOUNTER (OUTPATIENT)
Dept: PHYSICAL THERAPY | Facility: CLINIC | Age: 83
Setting detail: THERAPIES SERIES
End: 2018-02-26
Attending: FAMILY MEDICINE
Payer: COMMERCIAL

## 2018-02-26 PROCEDURE — 97140 MANUAL THERAPY 1/> REGIONS: CPT | Mod: GP | Performed by: REHABILITATION PRACTITIONER

## 2018-02-26 PROCEDURE — 40000099 ZZH STATISTIC LYMPHEDEMA VISIT: Performed by: REHABILITATION PRACTITIONER

## 2018-02-28 ENCOUNTER — HOSPITAL ENCOUNTER (OUTPATIENT)
Dept: PHYSICAL THERAPY | Facility: CLINIC | Age: 83
Setting detail: THERAPIES SERIES
End: 2018-02-28
Attending: FAMILY MEDICINE
Payer: COMMERCIAL

## 2018-02-28 PROCEDURE — 40000099 ZZH STATISTIC LYMPHEDEMA VISIT: Performed by: REHABILITATION PRACTITIONER

## 2018-02-28 PROCEDURE — 97140 MANUAL THERAPY 1/> REGIONS: CPT | Mod: GP | Performed by: REHABILITATION PRACTITIONER

## 2018-03-06 ENCOUNTER — HOSPITAL ENCOUNTER (OUTPATIENT)
Dept: PHYSICAL THERAPY | Facility: CLINIC | Age: 83
Setting detail: THERAPIES SERIES
End: 2018-03-06
Attending: FAMILY MEDICINE
Payer: COMMERCIAL

## 2018-03-06 PROCEDURE — 97140 MANUAL THERAPY 1/> REGIONS: CPT | Mod: GP | Performed by: REHABILITATION PRACTITIONER

## 2018-03-06 PROCEDURE — 40000099 ZZH STATISTIC LYMPHEDEMA VISIT: Performed by: REHABILITATION PRACTITIONER

## 2018-03-08 ENCOUNTER — HOSPITAL ENCOUNTER (OUTPATIENT)
Dept: PHYSICAL THERAPY | Facility: CLINIC | Age: 83
Setting detail: THERAPIES SERIES
End: 2018-03-08
Attending: FAMILY MEDICINE
Payer: COMMERCIAL

## 2018-03-08 PROCEDURE — 40000099 ZZH STATISTIC LYMPHEDEMA VISIT: Performed by: REHABILITATION PRACTITIONER

## 2018-03-08 PROCEDURE — 97140 MANUAL THERAPY 1/> REGIONS: CPT | Mod: GP | Performed by: REHABILITATION PRACTITIONER

## 2018-03-08 NOTE — ADDENDUM NOTE
Encounter addended by: Kalpana Wolf, PT on: 3/8/2018 12:38 PM<BR>     Actions taken: Flowsheet accepted

## 2018-03-13 ENCOUNTER — HOSPITAL ENCOUNTER (OUTPATIENT)
Dept: PHYSICAL THERAPY | Facility: CLINIC | Age: 83
Setting detail: THERAPIES SERIES
End: 2018-03-13
Attending: FAMILY MEDICINE
Payer: COMMERCIAL

## 2018-03-13 PROCEDURE — 97140 MANUAL THERAPY 1/> REGIONS: CPT | Mod: GP | Performed by: REHABILITATION PRACTITIONER

## 2018-03-13 PROCEDURE — 40000099 ZZH STATISTIC LYMPHEDEMA VISIT: Performed by: REHABILITATION PRACTITIONER

## 2018-04-04 NOTE — ADDENDUM NOTE
Encounter addended by: Kalpana Wolf, PT on: 4/4/2018  3:43 PM<BR>     Actions taken: Sign clinical note, Episode resolved

## 2018-04-04 NOTE — PROGRESS NOTES
Outpatient Physical Therapy Discharge Note     Patient: Caitlyn Wasserman  : 1935    Beginning/End Dates of Reporting Period:  2018 to 3/24/2018    Referring Provider: Dr. Naomi MD    Therapy Diagnosis:  BLE secondary lymphedema     Client Self Report: brought in new garments and brother in-law gave me a pr he does not wear anymore they are the same size but closed toe    Objective Measurements:  Objective Measure: girth  Details: R LE +7.6%, L LE +12.2%     Outcome Measures (most recent score):   LLIS = 10 on date of initial evaluation; pt didn't return to clinic for final appt for discharge so unable to re-assess LLIS    Goals:  Goal Identifier stg   Goal Description pt to have around the clock tolerance to BLE GCB for edema reduction response   Target Date 18   Date Met  18   Progress:     Goal Identifier stg   Goal Description pt and/or  to be independent with BLE GCB for edema reduction response   Target Date 18   Date Met  18   Progress:     Goal Identifier ltg   Goal Description once appropriate, pt to be independent with donning, doffing and care of compression garments for longterm edema management for maintenance   Target Date 18   Date Met      Progress:     Goal Identifier ltg   Goal Description pt to be independent with longterm edema management via HEP, elevation and compression garment wear/use   Target Date 18   Date Met      Progress:     Goal Identifier ltg   Goal Description pt to have at least 2-3 point improvement on LLIS due to decreased edema reductions in BLEs   Target Date 18   Date Met      Progress:       Progress Toward Goals:   Patient was making good progress toward goals and fit into knee hi 20-30mmHg compression stockings (Jobst Relief OT, SB, medium/reg). Last seen in clinic on 3/13/18 and instructed to return in 1 week but pt has cancelled all future appts.      Plan:  Discharge from therapy.    Discharge:    Reason for  Discharge: Patient chooses to discontinue therapy.  Medicare G-code: Patient did not attend a final scheduled session prior to discharge. Unable to determine discharge functional status.    Equipment Issued: 20-30mmHg knee hi Jobst Relief OT, SB (medium, regular)    Discharge Plan: Patient to continue home program.

## 2018-04-11 ENCOUNTER — OFFICE VISIT (OUTPATIENT)
Dept: FAMILY MEDICINE | Facility: CLINIC | Age: 83
End: 2018-04-11
Payer: COMMERCIAL

## 2018-04-11 VITALS
SYSTOLIC BLOOD PRESSURE: 128 MMHG | WEIGHT: 166 LBS | DIASTOLIC BLOOD PRESSURE: 86 MMHG | BODY MASS INDEX: 28.34 KG/M2 | HEART RATE: 61 BPM | TEMPERATURE: 98 F | HEIGHT: 64 IN

## 2018-04-11 DIAGNOSIS — N90.7 LABIAL CYST: Primary | ICD-10-CM

## 2018-04-11 PROCEDURE — 99213 OFFICE O/P EST LOW 20 MIN: CPT | Performed by: FAMILY MEDICINE

## 2018-04-11 RX ORDER — CEPHALEXIN 500 MG/1
500 CAPSULE ORAL 3 TIMES DAILY
Qty: 40 CAPSULE | Refills: 0 | Status: SHIPPED | OUTPATIENT
Start: 2018-04-11 | End: 2018-05-21

## 2018-04-11 NOTE — PROGRESS NOTES
SUBJECTIVE:   Caitlyn Wasserman is a 82 year old female who presents to clinic today for the following health issues:      Concern - Patient has lump in vaginal area   Onset: 4-8    Description:   Patient notice a lump on vulva about 3 days ago she has been trying to treat it with warm water.it is very painful      Intensity: moderate, severe    Progression of Symptoms:  Worsening with pain     Accompanying Signs & Symptoms:  Patient has always had bladder problems but not this type of problem    Previous history of similar problem:   None     Precipitating factors:   Worsened by: with pressure, rubbing from her jeans     Alleviating factors:  Improved by: none     Therapies Tried and outcome: Patient had tried to release some pressure by squeezing some of the liquid out of it, did feel alittle better after   History as above, patient noticed a lump on her left labial a few days ago, cyst is painful to the touch. She reports that she attempted to squeeze fluid out of the lump and had some blood come out of it. She reports no fevers or chills. Lump seems worse with her jeans rubbing on it .       Problem list and histories reviewed & adjusted, as indicated.  Additional history: as documented    Patient Active Problem List   Diagnosis     Urinary frequency     LEFT HIP PAIN     Right shoulder pain     Numbness in right leg     Cataract     Recurrent UTI     HYPERLIPIDEMIA LDL GOAL <130     Kyphosis of cervical region     Neck pain, chronic     Health Care Home     Essential tremor     Pedal edema     Osteoarthritis, right knee     Chronic rhinitis     Essential hypertension     Dysuria     Chronic obstructive pulmonary disease, unspecified COPD type (H)     Past Surgical History:   Procedure Laterality Date     HC ANTER COLPORRHAPHY,BLAD/VAGINA  6/04    Cystocele Repair,rectocele repair.     JOINT REPLACEMENT, HIP RT/LT  3/07, 4/07    Joint Replacement Hip /LT- dislocated in front repeat surgery 1 week later        Social History   Substance Use Topics     Smoking status: Former Smoker     Packs/day: 1.00     Years: 20.00     Types: Cigarettes     Quit date: 1/1/1971     Smokeless tobacco: Never Used     Alcohol use Yes      Comment: 1 wine a month maybe     Family History   Problem Relation Age of Onset     CANCER Mother      Bladder     Neurologic Disorder Mother      heriditary tremor     C.A.D. Father      CEREBROVASCULAR DISEASE Father      Allergies Maternal Grandfather      Respiratory Maternal Grandfather      Asthma     DIABETES Paternal Grandmother      Type II     CANCER Brother      Multiple myloma     C.A.D. Brother      Breast Cancer No family hx of          Current Outpatient Prescriptions   Medication Sig Dispense Refill     cephALEXin (KEFLEX) 500 MG capsule Take 1 capsule (500 mg) by mouth 3 times daily 40 capsule 0     atenolol-chlorthalidone (TENORETIC 50) 50-25 MG per tablet Take 1 tablet by mouth daily Needs labs and office visit 90 tablet 3     ipratropium (ATROVENT) 0.03 % spray Spray 2 sprays in nostril daily as needed for rhinitis 2 Box 3     acetaminophen-caffeine (EXCEDRIN TENSION HEADACHE) 500-65 MG TABS Take 2 tablets by mouth every 6 hours as needed for mild pain       psyllium (METAMUCIL) 28.3 % POWD 2 teaspoons a day in water       calcium carbonate (TUMS) 500 MG chewable tablet Take 1 tablet (500 mg) by mouth daily 150 tablet      Allergies   Allergen Reactions     Codeine      Cortisone      Diclofenac Sodium      Hydrocodone Nausea and Vomiting     Oxycodone Nausea and Vomiting     Tramadol Nausea and Vomiting     BP Readings from Last 3 Encounters:   04/11/18 128/86   02/15/18 125/71   02/21/17 (P) 138/70    Wt Readings from Last 3 Encounters:   04/11/18 166 lb (75.3 kg)   02/15/18 167 lb (75.8 kg)   02/21/17 173 lb (78.5 kg)                  Labs reviewed in EPIC    Reviewed and updated as needed this visit by clinical staff  Tobacco  Allergies  Med Hx  Surg Hx  Fam Hx  Soc Hx     "  Reviewed and updated as needed this visit by Provider         ROS:  Constitutional, HEENT, cardiovascular, pulmonary, gi and gu systems are negative, except as otherwise noted.    OBJECTIVE:     /86  Pulse 61  Temp 98  F (36.7  C) (Tympanic)  Ht 5' 4\" (1.626 m)  Wt 166 lb (75.3 kg)  BMI 28.49 kg/m2  Body mass index is 28.49 kg/(m^2).  GENERAL: healthy, alert and no distress   (female): normal female external genitalia, normal urethral meatus , vaginal mucosa pink, moist, well rugated and vaginal skin findings: There is a small cyst on the right labial , has a sore on the cyst. It is tender to the touch and hard.    Diagnostic Test Results:  none     ASSESSMENT/PLAN:   1. Labial cyst  Patient prescribed antibiotics. Encouraged to have sitz baths. May need an incision and drainage if swelling persists.  - cephALEXin (KEFLEX) 500 MG capsule; Take 1 capsule (500 mg) by mouth 3 times daily  Dispense: 40 capsule; Refill: 0    FUTURE APPOINTMENTS:       - Follow-up visit as needed    Leana Braga MD  [facility]  "

## 2018-04-11 NOTE — PATIENT INSTRUCTIONS
Thank you for choosing [facility].  You may be receiving a survey in the mail from [employer] regarding your visit today.  Please take a few minutes to complete and return the survey to let us know how we are doing.      If you have questions or concerns, please contact us via [employer] or you can contact your care team at [phone].    Our Clinic hours are:  Monday 6:40 am  to 7:00 pm  Tuesday -Friday 6:40 am to 5:00 pm    The [place] outpatient lab hours are:  Monday - Friday 6:10 am to 4:45 pm  Saturdays 7:00 am to 11:00 am  Appointments are required, call [phone]    If you have clinical questions after hours or would like to schedule an appointment,  call the clinic at [phone].  Bartholin s Cyst: Common Treatments    The Bartholin s glands are a pair of very small glands found inside the labia (vaginal lips). (There is one on either side.) The glands produce fluid to help keep the vagina moist. When the opening of a Bartholin s gland becomes blocked, the gland may swell and form a cyst. The cyst may vary in size from small to large (1 to 3 cm in size). It may feel like a firm lump within the labia.  Treatment depends on various factors. These include the size of the cyst, whether it causes symptoms, and whether it s infected. Small and/or uninfected cysts don t usually need treatment. Large cysts and those that are painful or infected will likely need treatment. Below are some common treatments that may be done:    Incision and drainage: With this procedure, the area over the cyst is numbed. The cyst is cut and drained. Often, a thin tube with a balloon on the end (called a Word catheter) is then inserted into the empty cyst. This allows for further drainage of fluid. The catheter is left in place for 4 to 6 weeks. It is then removed by the healthcare provider.    Marsupialization: With this procedure, the area over the cyst is numbed. The cyst is cut and drained. The edges of the skin  are then stitched to create a small opening that will allow for further drainage of fluid.  Note: If you have recurrent cysts, other treatments may be needed. Your provider can tell you more about these options.  If your cyst is infected, antibiotics will likely be prescribed. Most infections of Bartholin s cysts are due to bacteria that are normally present in the vagina. Sometimes, the bacteria may have been passed to you during sex. This is called a sexually transmitted disease (STD). A test (culture) of the fluid can confirm if you have an STD.  Home care  Medicines    If antibiotics are prescribed, be sure to take them exactly as directed. Don t stop taking the medicine, even if you start to feel better. Note: If the cause of your infection is an STD, any sexual partners you have will also need to be tested and treated.    If you have pain, use over-the-counter pain medicine as directed. If needed, stronger pain medicines can be prescribed.   General care    To help relieve discomfort, you may be advised to take sitz baths for the next few days. For this, you soak in bath filled with 2 to 3 inches of warm water for 10 to 15 minutes, a few times a day.    Avoid having sex until the cyst has healed. If the cause of your infection is an STD, also avoid having sex until you and any partners you have are done with treatment.  Follow-up care  Follow up with your healthcare provider as directed. Be sure to keep all appointments. These are needed to ensure that you are recovering well after your treatment. If you have a catheter, your provider will let you know when to return to have it removed.  When to seek medical advice  Call your healthcare provider right away if any of these occur:    Fever does not go down or worsens    Increased redness, pain, swelling, or foul-smelling drainage from the cyst or the area around it    Pain in the lower abdomen     New rash or joint pain    Catheter falls out before the scheduled  time to remove it (only if a Word catheter was placed)  Date Last Reviewed: 6/11/2015 2000-2017 [employer]. [address] [place] [address], [place] [address]. All rights reserved. This information is not intended as a substitute for professional medical care. Always follow your healthcare professional's instructions.

## 2018-04-11 NOTE — NURSING NOTE
"Chief Complaint   Patient presents with     Vaginal Problem     lump       Initial /66 (BP Location: Left arm, Cuff Size: Adult Regular)  Pulse 61  Temp 98  F (36.7  C) (Tympanic)  Ht 5' 4\" (1.626 m)  Wt 166 lb (75.3 kg)  BMI 28.49 kg/m2 Estimated body mass index is 28.49 kg/(m^2) as calculated from the following:    Height as of this encounter: 5' 4\" (1.626 m).    Weight as of this encounter: 166 lb (75.3 kg).  Medication Reconciliation: complete  "

## 2018-04-11 NOTE — MR AVS SNAPSHOT
After Visit Summary   4/11/2018    Caitlyn Wasserman    MRN: [id number]           Patient Information     Date Of Birth          1935        Visit Information        Provider Department      4/11/2018 11:20 AM Leana Braga MD [facility]        Today's Diagnoses     Labial cyst    -  1      Care Instructions          Thank you for choosing [facility].  You may be receiving a survey in the mail from [employer] regarding your visit today.  Please take a few minutes to complete and return the survey to let us know how we are doing.      If you have questions or concerns, please contact us via [employer] or you can contact your care team at [phone].    Our Clinic hours are:  Monday 6:40 am  to 7:00 pm  Tuesday -Friday 6:40 am to 5:00 pm    The [place] outpatient lab hours are:  Monday - Friday 6:10 am to 4:45 pm  Saturdays 7:00 am to 11:00 am  Appointments are required, call [phone]    If you have clinical questions after hours or would like to schedule an appointment,  call the clinic at [phone].  Bartholin s Cyst: Common Treatments    The Bartholin s glands are a pair of very small glands found inside the labia (vaginal lips). (There is one on either side.) The glands produce fluid to help keep the vagina moist. When the opening of a Bartholin s gland becomes blocked, the gland may swell and form a cyst. The cyst may vary in size from small to large (1 to 3 cm in size). It may feel like a firm lump within the labia.  Treatment depends on various factors. These include the size of the cyst, whether it causes symptoms, and whether it s infected. Small and/or uninfected cysts don t usually need treatment. Large cysts and those that are painful or infected will likely need treatment. Below are some common treatments that may be done:    Incision and drainage: With this procedure, the area over the cyst is numbed. The cyst is cut and drained. Often, a thin tube  with a balloon on the end (called a Word catheter) is then inserted into the empty cyst. This allows for further drainage of fluid. The catheter is left in place for 4 to 6 weeks. It is then removed by the healthcare provider.    Marsupialization: With this procedure, the area over the cyst is numbed. The cyst is cut and drained. The edges of the skin are then stitched to create a small opening that will allow for further drainage of fluid.  Note: If you have recurrent cysts, other treatments may be needed. Your provider can tell you more about these options.  If your cyst is infected, antibiotics will likely be prescribed. Most infections of Bartholin s cysts are due to bacteria that are normally present in the vagina. Sometimes, the bacteria may have been passed to you during sex. This is called a sexually transmitted disease (STD). A test (culture) of the fluid can confirm if you have an STD.  Home care  Medicines    If antibiotics are prescribed, be sure to take them exactly as directed. Don t stop taking the medicine, even if you start to feel better. Note: If the cause of your infection is an STD, any sexual partners you have will also need to be tested and treated.    If you have pain, use over-the-counter pain medicine as directed. If needed, stronger pain medicines can be prescribed.   General care    To help relieve discomfort, you may be advised to take sitz baths for the next few days. For this, you soak in bath filled with 2 to 3 inches of warm water for 10 to 15 minutes, a few times a day.    Avoid having sex until the cyst has healed. If the cause of your infection is an STD, also avoid having sex until you and any partners you have are done with treatment.  Follow-up care  Follow up with your healthcare provider as directed. Be sure to keep all appointments. These are needed to ensure that you are recovering well after your treatment. If you have a catheter, your provider will let you know when to  return to have it removed.  When to seek medical advice  Call your healthcare provider right away if any of these occur:    Fever does not go down or worsens    Increased redness, pain, swelling, or foul-smelling drainage from the cyst or the area around it    Pain in the lower abdomen     New rash or joint pain    Catheter falls out before the scheduled time to remove it (only if a Word catheter was placed)  Date Last Reviewed: 6/11/2015 2000-2017 [employer]. [address] [place] [address]. All rights reserved. This information is not intended as a substitute for professional medical care. Always follow your healthcare professional's instructions.                Follow-ups after your visit        Who to contact     If you have questions or need follow up information about today's clinic visit or your schedule please contact [facility] directly at [phone].  Normal or non-critical lab and imaging results will be communicated to you by MyChart, letter or phone within 4 business days after the clinic has received the results. If you do not hear from us within 7 days, please contact the clinic through [employer] or phone. If you have a critical or abnormal lab result, we will notify you by phone as soon as possible.  Submit refill requests through [employer] or call your pharmacy and they will forward the refill request to us. Please allow 3 business days for your refill to be completed.          Additional Information About Your Visit        MyChart Information     [employer] gives you secure access to your electronic health record. If you see a primary care provider, you can also send messages to your care team and make appointments. If you have questions, please call your primary care clinic.  If you do not have a primary care provider, please call [phone] and they will assist you.        Care EveryWhere ID     This is your Care EveryWhere ID. This could be used by other  "organizations to access your [facility] medical records  [id number]        Your Vitals Were     Pulse Temperature Height BMI (Body Mass Index)          61 98  F (36.7  C) (Tympanic) 5' 4\" (1.626 m) 28.49 kg/m2         Blood Pressure from Last 3 Encounters:   04/11/18 145/69   02/15/18 125/71   02/21/17 (P) 138/70    Weight from Last 3 Encounters:   04/11/18 166 lb (75.3 kg)   02/15/18 167 lb (75.8 kg)   02/21/17 173 lb (78.5 kg)              Today, you had the following     No orders found for display         Today's Medication Changes          These changes are accurate as of 4/11/18 11:49 AM.  If you have any questions, ask your nurse or doctor.               Start taking these medicines.        Dose/Directions    cephALEXin 500 MG capsule   Commonly known as:  KEFLEX   Used for:  Labial cyst   Started by:  Leana Braga MD        Dose:  500 mg   Take 1 capsule (500 mg) by mouth 3 times daily   Quantity:  40 capsule   Refills:  0            Where to get your medicines      These medications were sent to [facility] - [id number]  [address], [place] [address]    Hours:  SHAYY Gallegos  Phone:  [phone]     cephALEXin 500 MG capsule                Primary Care Provider Office Phone # Fax #    Stephanie Mia Salgado [phone] [address] [facility] [address]        Equal Access to Services     [facility] AH: Hadii jeff correa hadasho Soomaali, waaxda luqadaha, qaybta kaalmada adeegyada, alondra rizvi. So [facility] [phone].    ATENCIÓN: Si habla español, tiene a duffy disposición servicios gratuitos de asistencia lingüística. Llame al [phone].    We comply with applicable federal civil rights laws and [place] laws. We do not discriminate on the basis of race, color, national origin, age, disability, sex, sexual orientation, or gender identity.            Thank you!     Thank you for " choosing [facility]  for your care. Our goal is always to provide you with excellent care. Hearing back from our patients is one way we can continue to improve our services. Please take a few minutes to complete the written survey that you may receive in the mail after your visit with us. Thank you!             Your Updated Medication List - Protect others around you: Learn how to safely use, store and throw away your medicines at www.disposemymeds.org.          This list is accurate as of 4/11/18 11:49 AM.  Always use your most recent med list.                   Brand Name Dispense Instructions for use Diagnosis    acetaminophen-caffeine 500-65 MG Tabs    EXCEDRIN TENSION HEADACHE     Take 2 tablets by mouth every 6 hours as needed for mild pain        atenolol-chlorthalidone 50-25 MG per tablet    TENORETIC 50    90 tablet    Take 1 tablet by mouth daily Needs labs and office visit    HTN, goal below 140/90, Pedal edema       calcium carbonate 500 MG chewable tablet    TUMS    150 tablet    Take 1 tablet (500 mg) by mouth daily        cephALEXin 500 MG capsule    KEFLEX    40 capsule    Take 1 capsule (500 mg) by mouth 3 times daily    Labial cyst       ipratropium 0.03 % spray    ATROVENT    2 Box    Spray 2 sprays in nostril daily as needed for rhinitis    Chronic non-seasonal allergic rhinitis, unspecified trigger       METAMUCIL 28.3 % Powd   Generic drug:  psyllium      2 teaspoons a day in water

## 2018-04-11 NOTE — NURSING NOTE
"Chief Complaint   Patient presents with     Vaginal Problem     lump       Initial /69  Pulse 61  Temp 98  F (36.7  C) (Tympanic)  Ht 5' 4\" (1.626 m)  Wt 166 lb (75.3 kg)  BMI 28.49 kg/m2 Estimated body mass index is 28.49 kg/(m^2) as calculated from the following:    Height as of this encounter: 5' 4\" (1.626 m).    Weight as of this encounter: 166 lb (75.3 kg).  Medication Reconciliation: complete  "

## 2018-05-21 ENCOUNTER — OFFICE VISIT (OUTPATIENT)
Dept: FAMILY MEDICINE | Facility: CLINIC | Age: 83
End: 2018-05-21
Payer: COMMERCIAL

## 2018-05-21 VITALS
TEMPERATURE: 97.1 F | SYSTOLIC BLOOD PRESSURE: 125 MMHG | RESPIRATION RATE: 16 BRPM | WEIGHT: 166 LBS | BODY MASS INDEX: 28.34 KG/M2 | HEART RATE: 58 BPM | HEIGHT: 64 IN | OXYGEN SATURATION: 96 % | DIASTOLIC BLOOD PRESSURE: 70 MMHG

## 2018-05-21 DIAGNOSIS — R30.0 DYSURIA: ICD-10-CM

## 2018-05-21 DIAGNOSIS — N39.0 URINARY TRACT INFECTION WITHOUT HEMATURIA, SITE UNSPECIFIED: Primary | ICD-10-CM

## 2018-05-21 DIAGNOSIS — N89.8 VAGINAL ITCHING: ICD-10-CM

## 2018-05-21 DIAGNOSIS — R82.90 NONSPECIFIC FINDING ON EXAMINATION OF URINE: ICD-10-CM

## 2018-05-21 LAB
ALBUMIN UR-MCNC: 30 MG/DL
APPEARANCE UR: CLEAR
BACTERIA #/AREA URNS HPF: ABNORMAL /HPF
BILIRUB UR QL STRIP: NEGATIVE
COLOR UR AUTO: YELLOW
GLUCOSE UR STRIP-MCNC: NEGATIVE MG/DL
HGB UR QL STRIP: ABNORMAL
KETONES UR STRIP-MCNC: NEGATIVE MG/DL
LEUKOCYTE ESTERASE UR QL STRIP: ABNORMAL
NITRATE UR QL: NEGATIVE
PH UR STRIP: 5.5 PH (ref 5–7)
RBC #/AREA URNS AUTO: ABNORMAL /HPF
SOURCE: ABNORMAL
SP GR UR STRIP: 1.01 (ref 1–1.03)
SPECIMEN SOURCE: NORMAL
UROBILINOGEN UR STRIP-ACNC: 0.2 EU/DL (ref 0.2–1)
WBC #/AREA URNS AUTO: ABNORMAL /HPF
WET PREP SPEC: NORMAL

## 2018-05-21 PROCEDURE — 87088 URINE BACTERIA CULTURE: CPT | Performed by: NURSE PRACTITIONER

## 2018-05-21 PROCEDURE — 81001 URINALYSIS AUTO W/SCOPE: CPT | Performed by: NURSE PRACTITIONER

## 2018-05-21 PROCEDURE — 87186 SC STD MICRODIL/AGAR DIL: CPT | Performed by: NURSE PRACTITIONER

## 2018-05-21 PROCEDURE — 87210 SMEAR WET MOUNT SALINE/INK: CPT | Performed by: NURSE PRACTITIONER

## 2018-05-21 PROCEDURE — 99213 OFFICE O/P EST LOW 20 MIN: CPT | Performed by: NURSE PRACTITIONER

## 2018-05-21 PROCEDURE — 87086 URINE CULTURE/COLONY COUNT: CPT | Performed by: NURSE PRACTITIONER

## 2018-05-21 RX ORDER — SULFAMETHOXAZOLE/TRIMETHOPRIM 800-160 MG
1 TABLET ORAL 2 TIMES DAILY
Qty: 14 TABLET | Refills: 0 | Status: SHIPPED | OUTPATIENT
Start: 2018-05-21 | End: 2018-05-24 | Stop reason: ALTCHOICE

## 2018-05-21 NOTE — MR AVS SNAPSHOT
After Visit Summary   5/21/2018    Caitlyn Wasserman    MRN: [id number]           Patient Information     Date Of Birth          1935        Visit Information        Provider Department      5/21/2018 10:40 AM Cindy Flor APRN [facility]        Today's Diagnoses     Urinary tract infection without hematuria, site unspecified    -  1    Dysuria        Vaginal itching        Nonspecific finding on examination of urine          Care Instructions                Urinary Tract Infection         What is a urinary tract infection?   A urinary tract infection (UTI) is an infection in the urinary tract. The urinary tract includes the:   kidneys   ureters (the tubes draining urine from the kidneys to the bladder)   bladder   urethra (the tube that drains urine from the bladder).   Any or all of these parts of the urinary tract can get infected.   How does it occur?   Urinary tract infection is usually caused by bacteria. Normally the urinary tract does not have any bacteria or other organisms in it. Bacteria that cause UTI often spread from the rectum or vagina to the urethra and then to the bladder or kidneys. Urinary tract infection is more common in women than men because the urethra is shorter in women. This makes it easier for bacteria to move up to the bladder. Sometimes bacteria spread from another part of the body through the bloodstream to the urinary tract.   Some of the things that can lead to an infection are:   a blockage in the urinary tract, such as a kidney stone   sexual activity   getting older, when it may get harder to empty and flush out the bladder completely.   Women are more likely to have an infection if they:   are newly sexually active or have a new sex partner   are past menopause   are pregnant   have a history of diabetes, a problem with the immune system, sickle-cell anemia, stroke, kidney stones, or any illness that makes it hard to empty the  bladder completely.   What are the symptoms?   The symptoms of UTI may include:   urinating more often   feeling an urgent need to urinate   pain or discomfort (burning) when you urinate   urine that smells bad   pain in the lower pelvis, stomach, lower back, or side   urine that looks cloudy or reddish   fever or chills   sweats   nausea and vomiting   leaking of urine   change in amount of urine, either more or less   pain during sex.   How is it diagnosed?   Your healthcare provider will ask about your symptoms and medical history. Your provider will examine you. The exam may include a pelvic exam. Your provider will check for tenderness of the bladder or kidney. A sample of your urine may be tested for bacteria and pus. If you are having fever and are feeling very ill, you may have a blood test to look for signs of more serious infection.   If you keep having infections or symptoms after treatment, your provider may suggest these tests:   An intravenous pyelogram (IVP). An IVP is a special type of X-ray of the kidneys, ureters, and bladder.   An ultrasound scan to look at the urinary tract.   A cystoscopy. This is an exam of the inside of the urethra and bladder with a small lighted instrument. It is usually done by a specialist called a urologist.   How is it treated?   UTIs are usually treated with antibiotics. Your provider can also prescribe a medicine called Pyridium to relieve burning and discomfort. (Pyridium turns your urine a dark orange color.)   If the infection is causing fever, pain, or vomiting or you have a severe kidney infection, you may need to stay at the hospital for treatment.   How long will the effects last?   With antibiotic treatment, the symptoms of a bacterial infection stop in 1 to 3 days. Take all of the antibiotic your healthcare provider prescribes, even after the symptoms go away. If you stop taking your medicine before the scheduled end of treatment, the infection may come back    Without treatment, the infection can last a long time. If it is not treated, the infection can permanently damage the bladder and kidneys, or it may spread to the blood. If the infection spreads to the blood, it can be fatal.   How can I take care of myself?   Follow your healthcare provider's treatment. Take all of the antibiotic that your healthcare provider prescribes, even when you feel better. Do not take medicine left over from previous prescriptions.   Drink more fluids, especially water, to help flush bacteria from your system.   If you have a fever:   Take aspirin or acetaminophen to control the fever. Check with your healthcare provider before you give any medicine that contains aspirin or salicylates to a child or teen. This includes medicines like baby aspirin, some cold medicines, and Pepto Bismol. Children and teens who take aspirin are at risk for a serious illness called Reye's syndrome.   Keep a daily record of your temperature.   A hot water bottle or an electric heating pad on a low setting can help relieve cramps or lower abdominal or back pain. Keep a cloth between your skin and the hot water bottle or heating pad so that you don't burn your skin.   Soaking in a tub for 20 to 30 minutes may help relieve any back or abdominal pain.   Follow your healthcare provider's directions for a follow-up urine test. Your provider may want to test your urine soon after you finish taking the antibiotic.   Call your healthcare provider right away if:   You keep having symptoms after taking an antibiotic for 2 days.   Your symptoms get worse.   You have a fever of 101.5? F (38.6? C) or higher.   You have new vomiting.   You have new pain in your side, back, or belly.   You have any symptoms that worry you.   How can I help prevent urinary tract infection?   You can help prevent UTIs if you:   Drink lots of fluids every day.   Don't wait to go to the bathroom when you feel the need to urinate.   Empty your  bladder completely when you urinate.   Use good hygiene when you use the toilet. For example, wipe from front to back to keep rectal bacteria from getting into the vagina and urethra.   Avoid using irritating cosmetics or chemicals in the area of the vagina and urethra (such as strong soaps, feminine hygiene sprays or douches, or scented napkins or panty liners).   Practice safe sex. Always use latex or polyurethane condoms.   Urinate soon after sex.   Keep your genital area clean.   Wear underwear that is all cotton or has a cotton crotch. Pantyhose should also have a cotton crotch. Cotton allows better air circulation than nylon. Change underwear and pantyhose every day.     Published by [employer].  This content is reviewed periodically and is subject to change as new health information becomes available. The information is intended to inform and educate and is not a replacement for medical evaluation, advice, diagnosis or treatment by a healthcare professional.   Developed by Malissa Ruvalcaba RN, [place], and [employer].   ? 2010 [facility] and/or its affiliates. All Rights Reserved.   Copyright   Clinical Reference Systems 2011      Follow up if symptoms persist or worsen and as needed.        Thank you for choosing [facility].  You may be receiving a survey in the mail from [employer] regarding your visit today.  Please take a few minutes to complete and return the survey to let us know how we are doing.      Our Clinic hours are:  Mondays    7:20 am - 7 pm  Tues -  Fri  7:20 am - 5 pm    Clinic Phone: [phone]    The clinic lab opens at 7:30 am Mon - Fri and appointments are required.    [facility]  Ph. [phone]  Monday-Thursday 8 am - 7pm  Tues/Wed/Fri 8 am - 5:30 pm                 Follow-ups after your visit        Who to contact     If you have questions or need follow up information about today's clinic visit or your schedule please contact [facility]  "directly at [phone].  Normal or non-critical lab and imaging results will be communicated to you by IRIhart, letter or phone within 4 business days after the clinic has received the results. If you do not hear from us within 7 days, please contact the clinic through to bet or phone. If you have a critical or abnormal lab result, we will notify you by phone as soon as possible.  Submit refill requests through [employer] or call your pharmacy and they will forward the refill request to us. Please allow 3 business days for your refill to be completed.          Additional Information About Your Visit        IRIhart Information     [employer] gives you secure access to your electronic health record. If you see a primary care provider, you can also send messages to your care team and make appointments. If you have questions, please call your primary care clinic.  If you do not have a primary care provider, please call [phone] and they will assist you.        Care EveryWhere ID     This is your Care EveryWhere ID. This could be used by other organizations to access your [place] medical records  [id number]        Your Vitals Were     Pulse Temperature Respirations Height Pulse Oximetry BMI (Body Mass Index)    58 97.1  F (36.2  C) (Oral) 16 5' 4\" (1.626 m) 96% 28.49 kg/m2       Blood Pressure from Last 3 Encounters:   05/21/18 125/70   04/11/18 128/86   02/15/18 125/71    Weight from Last 3 Encounters:   05/21/18 166 lb (75.3 kg)   04/11/18 166 lb (75.3 kg)   02/15/18 167 lb (75.8 kg)              We Performed the Following     UA reflex to Microscopic and Culture     Urine Culture Aerobic Bacterial     Urine Microscopic     Wet prep          Today's Medication Changes          These changes are accurate as of 5/21/18 11:27 AM.  If you have any questions, ask your nurse or doctor.               Start taking these medicines.        Dose/Directions    sulfamethoxazole-trimethoprim 800-160 MG per tablet   Commonly " known as:  BACTRIM DS/SEPTRA DS   Used for:  Urinary tract infection without hematuria, site unspecified   Started by:  Cindy Flor APRN CNP        Dose:  1 tablet   Take 1 tablet by mouth 2 times daily   Quantity:  14 tablet   Refills:  0            Where to get your medicines      [facility] [place] [facility] [place] [facility] [address] [address] [facility] [facility]  [address] [facility] [address]     Phone:  [phone]     sulfamethoxazole-trimethoprim 800-160 MG per tablet                Primary Care Provider Office Phone # Fax #    Stephanie Mia Salgado [phone] [address] [facility] [address]        Equal Access to Services     MICHAEL JACKSON AH: Hadii jeff correa hadasho Soomaali, waaxda luqadaha, qaybta kaalmada adeegyada, alondra goff haycorey salmeron . So [facility] [phone].    ATENCIÓN: Si habla español, tiene a duffy disposición servicios gratuitos de asistencia lingüística. Llame al [phone].    We comply with applicable federal civil rights laws and [place] laws. We do not discriminate on the basis of race, color, national origin, age, disability, sex, sexual orientation, or gender identity.            Thank you!     Thank you for choosing [facility]  for your care. Our goal is always to provide you with excellent care. Hearing back from our patients is one way we can continue to improve our services. Please take a few minutes to complete the written survey that you may receive in the mail after your visit with us. Thank you!             Your Updated Medication List - Protect others around you: Learn how to safely use, store and throw away your medicines at www.disposemymeds.org.          This list is accurate as of 5/21/18 11:27 AM.  Always use your most recent med list.                   Brand Name Dispense Instructions for use Diagnosis    acetaminophen-caffeine 500-65 MG Tabs    EXCEDRIN TENSION  HEADACHE     Take 2 tablets by mouth every 6 hours as needed for mild pain        APPLE CIDER VINEGAR DIET PO      Apple cider vinegar, lemon and honey hot drink every 4 days.        atenolol-chlorthalidone 50-25 MG per tablet    TENORETIC 50    90 tablet    Take 1 tablet by mouth daily Needs labs and office visit    HTN, goal below 140/90, Pedal edema       calcium carbonate 500 MG chewable tablet    TUMS    150 tablet    Take 1 tablet (500 mg) by mouth daily        ipratropium 0.03 % spray    ATROVENT    2 Box    Spray 2 sprays in nostril daily as needed for rhinitis    Chronic non-seasonal allergic rhinitis, unspecified trigger       METAMUCIL 28.3 % Powd   Generic drug:  psyllium      2 teaspoons a day in water        sulfamethoxazole-trimethoprim 800-160 MG per tablet    BACTRIM DS/SEPTRA DS    14 tablet    Take 1 tablet by mouth 2 times daily    Urinary tract infection without hematuria, site unspecified

## 2018-05-21 NOTE — PATIENT INSTRUCTIONS
Urinary Tract Infection         What is a urinary tract infection?   A urinary tract infection (UTI) is an infection in the urinary tract. The urinary tract includes the:   kidneys   ureters (the tubes draining urine from the kidneys to the bladder)   bladder   urethra (the tube that drains urine from the bladder).   Any or all of these parts of the urinary tract can get infected.   How does it occur?   Urinary tract infection is usually caused by bacteria. Normally the urinary tract does not have any bacteria or other organisms in it. Bacteria that cause UTI often spread from the rectum or vagina to the urethra and then to the bladder or kidneys. Urinary tract infection is more common in women than men because the urethra is shorter in women. This makes it easier for bacteria to move up to the bladder. Sometimes bacteria spread from another part of the body through the bloodstream to the urinary tract.   Some of the things that can lead to an infection are:   a blockage in the urinary tract, such as a kidney stone   sexual activity   getting older, when it may get harder to empty and flush out the bladder completely.   Women are more likely to have an infection if they:   are newly sexually active or have a new sex partner   are past menopause   are pregnant   have a history of diabetes, a problem with the immune system, sickle-cell anemia, stroke, kidney stones, or any illness that makes it hard to empty the bladder completely.   What are the symptoms?   The symptoms of UTI may include:   urinating more often   feeling an urgent need to urinate   pain or discomfort (burning) when you urinate   urine that smells bad   pain in the lower pelvis, stomach, lower back, or side   urine that looks cloudy or reddish   fever or chills   sweats   nausea and vomiting   leaking of urine   change in amount of urine, either more or less   pain during sex.   How is it diagnosed?   Your healthcare provider will ask  about your symptoms and medical history. Your provider will examine you. The exam may include a pelvic exam. Your provider will check for tenderness of the bladder or kidney. A sample of your urine may be tested for bacteria and pus. If you are having fever and are feeling very ill, you may have a blood test to look for signs of more serious infection.   If you keep having infections or symptoms after treatment, your provider may suggest these tests:   An intravenous pyelogram (IVP). An IVP is a special type of X-ray of the kidneys, ureters, and bladder.   An ultrasound scan to look at the urinary tract.   A cystoscopy. This is an exam of the inside of the urethra and bladder with a small lighted instrument. It is usually done by a specialist called a urologist.   How is it treated?   UTIs are usually treated with antibiotics. Your provider can also prescribe a medicine called Pyridium to relieve burning and discomfort. (Pyridium turns your urine a dark orange color.)   If the infection is causing fever, pain, or vomiting or you have a severe kidney infection, you may need to stay at the hospital for treatment.   How long will the effects last?   With antibiotic treatment, the symptoms of a bacterial infection stop in 1 to 3 days. Take all of the antibiotic your healthcare provider prescribes, even after the symptoms go away. If you stop taking your medicine before the scheduled end of treatment, the infection may come back   Without treatment, the infection can last a long time. If it is not treated, the infection can permanently damage the bladder and kidneys, or it may spread to the blood. If the infection spreads to the blood, it can be fatal.   How can I take care of myself?   Follow your healthcare provider's treatment. Take all of the antibiotic that your healthcare provider prescribes, even when you feel better. Do not take medicine left over from previous prescriptions.   Drink more fluids, especially  water, to help flush bacteria from your system.   If you have a fever:   Take aspirin or acetaminophen to control the fever. Check with your healthcare provider before you give any medicine that contains aspirin or salicylates to a child or teen. This includes medicines like baby aspirin, some cold medicines, and Pepto Bismol. Children and teens who take aspirin are at risk for a serious illness called Reye's syndrome.   Keep a daily record of your temperature.   A hot water bottle or an electric heating pad on a low setting can help relieve cramps or lower abdominal or back pain. Keep a cloth between your skin and the hot water bottle or heating pad so that you don't burn your skin.   Soaking in a tub for 20 to 30 minutes may help relieve any back or abdominal pain.   Follow your healthcare provider's directions for a follow-up urine test. Your provider may want to test your urine soon after you finish taking the antibiotic.   Call your healthcare provider right away if:   You keep having symptoms after taking an antibiotic for 2 days.   Your symptoms get worse.   You have a fever of 101.5? F (38.6? C) or higher.   You have new vomiting.   You have new pain in your side, back, or belly.   You have any symptoms that worry you.   How can I help prevent urinary tract infection?   You can help prevent UTIs if you:   Drink lots of fluids every day.   Don't wait to go to the bathroom when you feel the need to urinate.   Empty your bladder completely when you urinate.   Use good hygiene when you use the toilet. For example, wipe from front to back to keep rectal bacteria from getting into the vagina and urethra.   Avoid using irritating cosmetics or chemicals in the area of the vagina and urethra (such as strong soaps, feminine hygiene sprays or douches, or scented napkins or panty liners).   Practice safe sex. Always use latex or polyurethane condoms.   Urinate soon after sex.   Keep your genital area clean.   Wear  underwear that is all cotton or has a cotton crotch. Pantyhose should also have a cotton crotch. Cotton allows better air circulation than nylon. Change underwear and pantyhose every day.     Published by [employer].  This content is reviewed periodically and is subject to change as new health information becomes available. The information is intended to inform and educate and is not a replacement for medical evaluation, advice, diagnosis or treatment by a healthcare professional.   Developed by Malissa Ruvalcaba RN, [place], and [employer].   ? 2010 [facility] [facility] and/or its affiliates. All Rights Reserved.   Copyright   Clinical Reference Systems 2011      Follow up if symptoms persist or worsen and as needed.        Thank you for choosing [facility].  You may be receiving a survey in the mail from [employer] regarding your visit today.  Please take a few minutes to complete and return the survey to let us know how we are doing.      Our Clinic hours are:  Mondays    7:20 am - 7 pm  Tues -  Fri  7:20 am - 5 pm    Clinic Phone: [phone]    The clinic lab opens at 7:30 am Mon - Fri and appointments are required.    [facility]. [phone]  Monday-Thursday 8 am - 7pm  Tues/Wed/Fri 8 am - 5:30 pm

## 2018-05-21 NOTE — PROGRESS NOTES
SUBJECTIVE:   Caitlyn Wasserman is a 82 year old female who presents to clinic today for the following health issues:      URINARY TRACT SYMPTOMS      Duration: started Friday on a drive to [place].    Description  dysuria, frequency, urgency and incontinence    Intensity:  moderate    Accompanying signs and symptoms:  Fever/chills: no   Flank pain no   Nausea and vomiting: no   Vaginal symptoms: itching  Abdominal/Pelvic Pain: no     History  History of frequent UTI's: YES  History of kidney stones: no   Sexually Active: no   Possibility of pregnancy: No    Precipitating or alleviating factors: was on a car trip for a wedding to [place].    Therapies tried and outcome: Apple cider vinegar, lemon juice with honey and hot water.   Outcome: has helped to keep the UTI's away for 1 year.          Problem list and histories reviewed & adjusted, as indicated.  Additional history: she has been doing really well as far as not having any recurrent UTI's since she started drinking apple cider vinegar daily.  Current UTI symptoms started 3 days ago and have slowly worsening.   No hematuria, no fever, chills.    Patient Active Problem List   Diagnosis     Urinary frequency     LEFT HIP PAIN     Right shoulder pain     Numbness in right leg     Cataract     Recurrent UTI     HYPERLIPIDEMIA LDL GOAL <130     Kyphosis of cervical region     Neck pain, chronic     Health Care Home     Essential tremor     Pedal edema     Osteoarthritis, right knee     Chronic rhinitis     Essential hypertension     Dysuria     Chronic obstructive pulmonary disease, unspecified COPD type (H)     Past Surgical History:   Procedure Laterality Date     HC ANTER COLPORRHAPHY,BLAD/VAGINA  6/04    Cystocele Repair,rectocele repair.     JOINT REPLACEMENT, HIP RT/LT  3/07, 4/07    Joint Replacement Hip /LT- dislocated in front repeat surgery 1 week later       Social History   Substance Use Topics     Smoking status: Former Smoker     Packs/day: 1.00     Years:  20.00     Types: Cigarettes     Quit date: 1/1/1971     Smokeless tobacco: Never Used     Alcohol use Yes      Comment: 1 wine a month maybe     Family History   Problem Relation Age of Onset     CANCER Mother      Bladder     Neurologic Disorder Mother      heriditary tremor     C.A.D. Father      CEREBROVASCULAR DISEASE Father      Allergies Maternal Grandfather      Respiratory Maternal Grandfather      Asthma     DIABETES Paternal Grandmother      Type II     CANCER Brother      Multiple myloma     C.A.D. Brother      Breast Cancer No family hx of          Current Outpatient Prescriptions   Medication Sig Dispense Refill     acetaminophen-caffeine (EXCEDRIN TENSION HEADACHE) 500-65 MG TABS Take 2 tablets by mouth every 6 hours as needed for mild pain       atenolol-chlorthalidone (TENORETIC 50) 50-25 MG per tablet Take 1 tablet by mouth daily Needs labs and office visit 90 tablet 3     calcium carbonate (TUMS) 500 MG chewable tablet Take 1 tablet (500 mg) by mouth daily 150 tablet      ipratropium (ATROVENT) 0.03 % spray Spray 2 sprays in nostril daily as needed for rhinitis 2 Box 3     Misc Natural Products (APPLE CIDER VINEGAR DIET PO) Apple cider vinegar, lemon and honey hot drink every 4 days.       psyllium (METAMUCIL) 28.3 % POWD 2 teaspoons a day in water       sulfamethoxazole-trimethoprim (BACTRIM DS/SEPTRA DS) 800-160 MG per tablet Take 1 tablet by mouth 2 times daily 14 tablet 0     Allergies   Allergen Reactions     Codeine      Cortisone      Diclofenac Sodium      Hydrocodone Nausea and Vomiting     Oxycodone Nausea and Vomiting     Tramadol Nausea and Vomiting       Reviewed and updated as needed this visit by clinical staff  Tobacco  Allergies  Meds  Med Hx  Surg Hx  Fam Hx  Soc Hx      Reviewed and updated as needed this visit by Provider          ROS: 10 point ROS neg other than the symptoms noted above in the HPI.    OBJECTIVE:     /70 (BP Location: Left arm, Patient Position:  "Chair, Cuff Size: Adult Regular)  Pulse 58  Temp 97.1  F (36.2  C) (Oral)  Resp 16  Ht 5' 4\" (1.626 m)  Wt 166 lb (75.3 kg)  SpO2 96%  BMI 28.49 kg/m2  Body mass index is 28.49 kg/(m^2).  GENERAL: healthy, alert and no distress  HENT: pharynx without erythema  NECK: no adenopathy, no asymmetry  RESP: lungs clear to auscultation - no rales, rhonchi or wheezes  CV: regular rate and rhythm, normal S1 S2, no S3 or S4, no murmur  ABDOMEN: soft, nontender, no hepatosplenomegaly, no masses and bowel sounds normal, no CVA tenderness  MS: no gross musculoskeletal defects noted      Diagnostic Test Results:  Results for orders placed or performed in visit on 05/21/18 (from the past 24 hour(s))   UA reflex to Microscopic and Culture   Result Value Ref Range    Color Urine Yellow     Appearance Urine Clear     Glucose Urine Negative NEG^Negative mg/dL    Bilirubin Urine Negative NEG^Negative    Ketones Urine Negative NEG^Negative mg/dL    Specific Gravity Urine 1.010 1.003 - 1.035    Blood Urine Moderate (A) NEG^Negative    pH Urine 5.5 5.0 - 7.0 pH    Protein Albumin Urine 30 (A) NEG^Negative mg/dL    Urobilinogen Urine 0.2 0.2 - 1.0 EU/dL    Nitrite Urine Negative NEG^Negative    Leukocyte Esterase Urine Small (A) NEG^Negative    Source Midstream Urine    Wet prep   Result Value Ref Range    Specimen Description Vagina     Wet Prep No yeast seen     Wet Prep No clue cells seen     Wet Prep No Trichomonas seen    Urine Microscopic   Result Value Ref Range    WBC Urine 25-50 (A) OTO5^0 - 5 /HPF    RBC Urine O - 2 OTO2^O - 2 /HPF    Bacteria Urine Many (A) NEG^Negative /HPF       ASSESSMENT/PLAN:             1. Urinary tract infection without hematuria, site unspecified    - sulfamethoxazole-trimethoprim (BACTRIM DS/SEPTRA DS) 800-160 MG per tablet; Take 1 tablet by mouth 2 times daily  Dispense: 14 tablet; Refill: 0  Discussed how to take the medication(s), expected outcomes, potential side effects.    2. Dysuria    - UA " reflex to Microscopic and Culture  - Urine Microscopic  - Urine Culture Aerobic Bacterial    3. Vaginal itching    - Wet prep    4. Nonspecific finding on examination of urine    - Urine Culture Aerobic Bacterial    See Patient Instructions  Follow up if symptoms persist or worsen and as needed.    Patient Instructions               Urinary Tract Infection         What is a urinary tract infection?   A urinary tract infection (UTI) is an infection in the urinary tract. The urinary tract includes the:   kidneys   ureters (the tubes draining urine from the kidneys to the bladder)   bladder   urethra (the tube that drains urine from the bladder).   Any or all of these parts of the urinary tract can get infected.   How does it occur?   Urinary tract infection is usually caused by bacteria. Normally the urinary tract does not have any bacteria or other organisms in it. Bacteria that cause UTI often spread from the rectum or vagina to the urethra and then to the bladder or kidneys. Urinary tract infection is more common in women than men because the urethra is shorter in women. This makes it easier for bacteria to move up to the bladder. Sometimes bacteria spread from another part of the body through the bloodstream to the urinary tract.   Some of the things that can lead to an infection are:   a blockage in the urinary tract, such as a kidney stone   sexual activity   getting older, when it may get harder to empty and flush out the bladder completely.   Women are more likely to have an infection if they:   are newly sexually active or have a new sex partner   are past menopause   are pregnant   have a history of diabetes, a problem with the immune system, sickle-cell anemia, stroke, kidney stones, or any illness that makes it hard to empty the bladder completely.   What are the symptoms?   The symptoms of UTI may include:   urinating more often   feeling an urgent need to urinate   pain or discomfort (burning) when you  urinate   urine that smells bad   pain in the lower pelvis, stomach, lower back, or side   urine that looks cloudy or reddish   fever or chills   sweats   nausea and vomiting   leaking of urine   change in amount of urine, either more or less   pain during sex.   How is it diagnosed?   Your healthcare provider will ask about your symptoms and medical history. Your provider will examine you. The exam may include a pelvic exam. Your provider will check for tenderness of the bladder or kidney. A sample of your urine may be tested for bacteria and pus. If you are having fever and are feeling very ill, you may have a blood test to look for signs of more serious infection.   If you keep having infections or symptoms after treatment, your provider may suggest these tests:   An intravenous pyelogram (IVP). An IVP is a special type of X-ray of the kidneys, ureters, and bladder.   An ultrasound scan to look at the urinary tract.   A cystoscopy. This is an exam of the inside of the urethra and bladder with a small lighted instrument. It is usually done by a specialist called a urologist.   How is it treated?   UTIs are usually treated with antibiotics. Your provider can also prescribe a medicine called Pyridium to relieve burning and discomfort. (Pyridium turns your urine a dark orange color.)   If the infection is causing fever, pain, or vomiting or you have a severe kidney infection, you may need to stay at the hospital for treatment.   How long will the effects last?   With antibiotic treatment, the symptoms of a bacterial infection stop in 1 to 3 days. Take all of the antibiotic your healthcare provider prescribes, even after the symptoms go away. If you stop taking your medicine before the scheduled end of treatment, the infection may come back   Without treatment, the infection can last a long time. If it is not treated, the infection can permanently damage the bladder and kidneys, or it may spread to the blood. If the  infection spreads to the blood, it can be fatal.   How can I take care of myself?   Follow your healthcare provider's treatment. Take all of the antibiotic that your healthcare provider prescribes, even when you feel better. Do not take medicine left over from previous prescriptions.   Drink more fluids, especially water, to help flush bacteria from your system.   If you have a fever:   Take aspirin or acetaminophen to control the fever. Check with your healthcare provider before you give any medicine that contains aspirin or salicylates to a child or teen. This includes medicines like baby aspirin, some cold medicines, and Pepto Bismol. Children and teens who take aspirin are at risk for a serious illness called Reye's syndrome.   Keep a daily record of your temperature.   A hot water bottle or an electric heating pad on a low setting can help relieve cramps or lower abdominal or back pain. Keep a cloth between your skin and the hot water bottle or heating pad so that you don't burn your skin.   Soaking in a tub for 20 to 30 minutes may help relieve any back or abdominal pain.   Follow your healthcare provider's directions for a follow-up urine test. Your provider may want to test your urine soon after you finish taking the antibiotic.   Call your healthcare provider right away if:   You keep having symptoms after taking an antibiotic for 2 days.   Your symptoms get worse.   You have a fever of 101.5? F (38.6? C) or higher.   You have new vomiting.   You have new pain in your side, back, or belly.   You have any symptoms that worry you.   How can I help prevent urinary tract infection?   You can help prevent UTIs if you:   Drink lots of fluids every day.   Don't wait to go to the bathroom when you feel the need to urinate.   Empty your bladder completely when you urinate.   Use good hygiene when you use the toilet. For example, wipe from front to back to keep rectal bacteria from getting into the vagina and  urethra.   Avoid using irritating cosmetics or chemicals in the area of the vagina and urethra (such as strong soaps, feminine hygiene sprays or douches, or scented napkins or panty liners).   Practice safe sex. Always use latex or polyurethane condoms.   Urinate soon after sex.   Keep your genital area clean.   Wear underwear that is all cotton or has a cotton crotch. Pantyhose should also have a cotton crotch. Cotton allows better air circulation than nylon. Change underwear and pantyhose every day.     Published by [employer].  This content is reviewed periodically and is subject to change as new health information becomes available. The information is intended to inform and educate and is not a replacement for medical evaluation, advice, diagnosis or treatment by a healthcare professional.   Developed by Malissa Ruvalcaba RN, [place], and [employer].   ? 2010 [employer] and/or its affiliates. All Rights Reserved.   Copyright   [employer] 2011      Follow up if symptoms persist or worsen and as needed.        Thank you for choosing [facility].  You may be receiving a survey in the mail from [employer] regarding your visit today.  Please take a few minutes to complete and return the survey to let us know how we are doing.      Our Clinic hours are:  Mondays    7:20 am - 7 pm  Tues -  Fri  7:20 am - 5 pm    Clinic Phone: [phone]    The clinic lab opens at 7:30 am Mon - Fri and appointments are required.    [facility]  Ph. [phone]  Monday-Thursday 8 am - 7pm  Tues/Wed/Fri 8 am - 5:30 pm             SUDHAKAR Yung CNP  [facility]

## 2018-05-24 DIAGNOSIS — N39.0 URINARY TRACT INFECTION: Primary | ICD-10-CM

## 2018-05-24 LAB
BACTERIA SPEC CULT: ABNORMAL
SPECIMEN SOURCE: ABNORMAL

## 2018-05-24 RX ORDER — CIPROFLOXACIN 250 MG/1
250 TABLET, FILM COATED ORAL 2 TIMES DAILY
Qty: 6 TABLET | Refills: 0 | Status: CANCELLED | OUTPATIENT
Start: 2018-05-24

## 2018-05-24 RX ORDER — CIPROFLOXACIN 250 MG/1
250 TABLET, FILM COATED ORAL 2 TIMES DAILY
Qty: 6 TABLET | Refills: 0 | Status: SHIPPED | OUTPATIENT
Start: 2018-05-24 | End: 2019-04-27

## 2018-05-24 NOTE — PROGRESS NOTES
Order for cipro 250mg po BID x 3 days pended and sent to PCP due to interaction flagging:    Drug-Drug Interaction Report    Calcium / Quinolones    Significance: Moderate     Warning: The antimicrobial effectiveness of ciprofloxacin may be decreased by calcium carbonate, APPLE CIDER VINEGAR DIET PO secondary to impaired gastrointestinal absorption.    Onset: Rapid    Document Level: Probable    Interacting Medications/Orders:  Calcium  Oral, Systemic Quinolones  Oral, Systemic   1. calcium carbonate    Order ([id number]): calcium carbonate (TUMS) 500 MG chewable tablet Route: Oral  Start: 01/19/2017 End: none Frequency: DAILY  2. APPLE CIDER VINEGAR DIET PO    Order ([id number]): Misc Natural Products (APPLE CIDER VINEGAR DIET PO) Route: Oral  Start: none End: none Frequency: 1. ciprofloxacin    Order: ciprofloxacin (CIPRO) 250 MG tablet Route: Oral  Start: 05/24/2018 End: none Frequency: 2 TIMES DAILY     Management Code: Professional review suggested    Gypsy BEASLEY RN

## 2018-06-12 ENCOUNTER — TRANSFERRED RECORDS (OUTPATIENT)
Dept: HEALTH INFORMATION MANAGEMENT | Facility: CLINIC | Age: 83
End: 2018-06-12

## 2018-09-28 ENCOUNTER — ALLIED HEALTH/NURSE VISIT (OUTPATIENT)
Dept: FAMILY MEDICINE | Facility: CLINIC | Age: 83
End: 2018-09-28
Payer: COMMERCIAL

## 2018-09-28 DIAGNOSIS — Z23 NEED FOR PROPHYLACTIC VACCINATION AND INOCULATION AGAINST INFLUENZA: Primary | ICD-10-CM

## 2018-09-28 PROCEDURE — 90662 IIV NO PRSV INCREASED AG IM: CPT

## 2018-09-28 PROCEDURE — G0008 ADMIN INFLUENZA VIRUS VAC: HCPCS

## 2018-09-28 NOTE — MR AVS SNAPSHOT
After Visit Summary   9/28/2018    Caitlyn Wasserman    MRN: [id number]           Patient Information     Date Of Birth          1935        Visit Information        Provider Department      9/28/2018 1:00 PM Nahum/Lonnie Medrano [facility]        Today's Diagnoses     Need for prophylactic vaccination and inoculation against influenza    -  1       Follow-ups after your visit        Who to contact     If you have questions or need follow up information about today's clinic visit or your schedule please contact [facility] directly at [phone].  Normal or non-critical lab and imaging results will be communicated to you by [employer], letter or phone within 4 business days after the clinic has received the results. If you do not hear from us within 7 days, please contact the clinic through [employer] or phone. If you have a critical or abnormal lab result, we will notify you by phone as soon as possible.  Submit refill requests through [employer] or call your pharmacy and they will forward the refill request to us. Please allow 3 business days for your refill to be completed.          Additional Information About Your Visit        MyChart Information     [employer] gives you secure access to your electronic health record. If you see a primary care provider, you can also send messages to your care team and make appointments. If you have questions, please call your primary care clinic.  If you do not have a primary care provider, please call [phone] and they will assist you.        Care EveryWhere ID     This is your Care EveryWhere ID. This could be used by other organizations to access your [facility] medical records  [id number]         Blood Pressure from Last 3 Encounters:   05/21/18 125/70   04/11/18 128/86   02/15/18 125/71    Weight from Last 3 Encounters:   05/21/18 166 lb (75.3 kg)   04/11/18 166 lb (75.3 kg)   02/15/18 167 lb (75.8 kg)              We Performed the  Following     ADMIN INFLUENZA (For MEDICARE Patients ONLY) []     FLU VACCINE, INCREASED ANTIGEN, PRESV FREE, AGE 65+ [34043]        Primary Care Provider Office Phone # Fax #    Stephanie Mia Salgado [phone] [address] [address]        Equal Access to Services     MICHAEL JACKSON : Hadii aad ku hadasho Soomaali, waaxda luqadaha, qaybta kaalmada adeegyada, waxay idiin hayaan adeeg kharash laKerryaan . So [facility] [phone].    ATENCIÓN: Si habla español, tiene a duffy disposición servicios gratuitos de asistencia lingüística. Llame al [phone].    We comply with applicable federal civil rights laws and [place] laws. We do not discriminate on the basis of race, color, national origin, age, disability, sex, sexual orientation, or gender identity.            Thank you!     Thank you for choosing [facility]  for your care. Our goal is always to provide you with excellent care. Hearing back from our patients is one way we can continue to improve our services. Please take a few minutes to complete the written survey that you may receive in the mail after your visit with us. Thank you!             Your Updated Medication List - Protect others around you: Learn how to safely use, store and throw away your medicines at www.disposemymeds.org.          This list is accurate as of 9/28/18  1:15 PM.  Always use your most recent med list.                   Brand Name Dispense Instructions for use Diagnosis    acetaminophen-caffeine 500-65 MG Tabs    EXCEDRIN TENSION HEADACHE     Take 2 tablets by mouth every 6 hours as needed for mild pain        APPLE CIDER VINEGAR DIET PO      Apple cider vinegar, lemon and honey hot drink every 4 days.        atenolol-chlorthalidone 50-25 MG per tablet    TENORETIC 50    90 tablet    Take 1 tablet by mouth daily Needs labs and office visit    HTN, goal below 140/90, Pedal edema       calcium carbonate 500 MG chewable tablet    TUMS     150 tablet    Take 1 tablet (500 mg) by mouth daily        ciprofloxacin 250 MG tablet    CIPRO    6 tablet    Take 1 tablet (250 mg) by mouth 2 times daily    Urinary tract infection without hematuria, site unspecified       ipratropium 0.03 % spray    ATROVENT    2 Box    Spray 2 sprays in nostril daily as needed for rhinitis    Chronic non-seasonal allergic rhinitis, unspecified trigger       METAMUCIL 28.3 % Powd   Generic drug:  psyllium      2 teaspoons a day in water

## 2018-09-28 NOTE — PROGRESS NOTES

## 2018-09-28 NOTE — NURSING NOTE
Prior to injection verified patient identity using patient's name and date of birth.  Due to injection administration, patient instructed to remain in clinic for 15 minutes  afterwards, and to report any adverse reaction to me immediately.    Khushboo Olivarez

## 2018-10-30 ENCOUNTER — TRANSFERRED RECORDS (OUTPATIENT)
Dept: HEALTH INFORMATION MANAGEMENT | Facility: CLINIC | Age: 83
End: 2018-10-30

## 2019-03-11 DIAGNOSIS — I10 HTN, GOAL BELOW 140/90: ICD-10-CM

## 2019-03-11 DIAGNOSIS — R60.0 PEDAL EDEMA: ICD-10-CM

## 2019-03-11 RX ORDER — ATENOLOL AND CHLORTHALIDONE TABLET 50; 25 MG/1; MG/1
1 TABLET ORAL DAILY
Qty: 30 TABLET | Refills: 0 | Status: SHIPPED | OUTPATIENT
Start: 2019-03-11 | End: 2019-12-09

## 2019-03-11 NOTE — LETTER
[facility]  [address] [address]  [phone]        March 11, 2019  Caitlyn MULLIGAN Medina  [address] [address]    Dear Caitlyn,    I care about your health and have reviewed your health plan. I have reviewed your medical conditions, medication list, and lab results and am making recommendations based on this review, to better manage your health.    You are in particular need of attention regarding:  -Wellness (Physical) Visit     I am recommending that you:  -schedule a WELLNESS (Physical) APPOINTMENT with me.        Here is a list of Health Maintenance topics that are due now or due soon:  Health Maintenance Due   Topic Date Due     COPD ACTION PLAN Q1 YR  1935     MEDICARE ANNUAL WELLNESS VISIT  10/07/2002     ZOSTER IMMUNIZATION (2 of 3) 11/05/2009     ADVANCE DIRECTIVE PLANNING Q5 YRS  11/04/2016     DEXA Q3 YR  12/17/2016     DTAP/TDAP/TD IMMUNIZATION (2 - Td) 10/27/2018     CALVIN QUESTIONNAIRE 1 YEAR  02/15/2019     PHQ-9 Q1YR  02/15/2019     FALL RISK ASSESSMENT  02/15/2019       Please call us at [phone] (or use Risk I/O) to address the above recommendations.     Thank you for trusting [facility] and we appreciate the opportunity to serve you.  We look forward to supporting your healthcare needs in the future.    Healthy Regards,    Dr. Salgado/Gypsy BEASLEY RN

## 2019-04-27 ENCOUNTER — HOSPITAL ENCOUNTER (EMERGENCY)
Facility: CLINIC | Age: 84
Discharge: HOME | End: 2019-04-27
Attending: PHYSICIAN ASSISTANT | Admitting: PHYSICIAN ASSISTANT
Payer: COMMERCIAL

## 2019-04-27 VITALS
SYSTOLIC BLOOD PRESSURE: 145 MMHG | HEART RATE: 50 BPM | WEIGHT: 154 LBS | OXYGEN SATURATION: 99 % | TEMPERATURE: 97.1 F | DIASTOLIC BLOOD PRESSURE: 86 MMHG | BODY MASS INDEX: 26.43 KG/M2 | RESPIRATION RATE: 16 BRPM

## 2019-04-27 DIAGNOSIS — N30.00 ACUTE CYSTITIS WITHOUT HEMATURIA: ICD-10-CM

## 2019-04-27 LAB
ALBUMIN UR-MCNC: NEGATIVE MG/DL
APPEARANCE UR: ABNORMAL
BILIRUB UR QL STRIP: NEGATIVE
COLOR UR AUTO: YELLOW
GLUCOSE UR STRIP-MCNC: NEGATIVE MG/DL
HGB UR QL STRIP: NEGATIVE
KETONES UR STRIP-MCNC: NEGATIVE MG/DL
LEUKOCYTE ESTERASE UR QL STRIP: ABNORMAL
NITRATE UR QL: NEGATIVE
PH UR STRIP: 7 PH (ref 5–7)
RBC #/AREA URNS AUTO: 3 /HPF (ref 0–2)
SOURCE: ABNORMAL
SP GR UR STRIP: 1.01 (ref 1–1.03)
SQUAMOUS #/AREA URNS AUTO: 8 /HPF (ref 0–1)
UROBILINOGEN UR STRIP-MCNC: 0 MG/DL (ref 0–2)
WBC #/AREA URNS AUTO: >182 /HPF (ref 0–5)
WBC CLUMPS #/AREA URNS HPF: PRESENT /HPF

## 2019-04-27 PROCEDURE — G0463 HOSPITAL OUTPT CLINIC VISIT: HCPCS | Performed by: PHYSICIAN ASSISTANT

## 2019-04-27 PROCEDURE — 87086 URINE CULTURE/COLONY COUNT: CPT | Performed by: PHYSICIAN ASSISTANT

## 2019-04-27 PROCEDURE — 81001 URINALYSIS AUTO W/SCOPE: CPT | Performed by: PHYSICIAN ASSISTANT

## 2019-04-27 PROCEDURE — 99214 OFFICE O/P EST MOD 30 MIN: CPT | Mod: Z6 | Performed by: PHYSICIAN ASSISTANT

## 2019-04-27 RX ORDER — CIPROFLOXACIN 250 MG/1
250 TABLET, FILM COATED ORAL 2 TIMES DAILY
Qty: 6 TABLET | Refills: 0 | Status: SHIPPED | OUTPATIENT
Start: 2019-04-27 | End: 2019-05-07

## 2019-04-27 NOTE — ED AVS SNAPSHOT
[facility]  [address] [address]  Phone:  [phone]  Fax:  [phone]                                    Caitlyn Wasserman   MRN: [id number]    Department:  [facility]   Date of Visit:  4/27/2019           After Visit Summary Signature Page    I have received my discharge instructions, and my questions have been answered. I have discussed any challenges I see with this plan with the nurse or doctor.    ..........................................................................................................................................  Patient/Patient Representative Signature      ..........................................................................................................................................  Patient Representative Print Name and Relationship to Patient    ..................................................               ................................................  Date                                   Time    ..........................................................................................................................................  Reviewed by Signature/Title    ...................................................              ..............................................  Date                                               Time          22EPIC Rev 08/18

## 2019-04-27 NOTE — ED PROVIDER NOTES
History     Chief Complaint   Patient presents with     UTI     HPI  Caitlyn Wasserman is a 83 year old female who presents to the urgent care with concern over suspected urinary tract infection is been present for approximate last week.  Patient complains of frequency, urgency she developed dysuria yesterday.  She is also noted suprapubic pressure and low back pain.  She has not had any hematuria.  No recent fever, chills, myalgias, cough, dyspnea, wheezing, vaginal itching, pain or  or discharge. She does have a history of frequent urinary tract infections, most recently treated approximately 4 months ago per her report.  Last available records in Epic showed culture at that time grew out pseudomonas aeruginosa     Allergies:  Allergies   Allergen Reactions     Codeine      Cortisone      Diclofenac Sodium      Hydrocodone Nausea and Vomiting     Oxycodone Nausea and Vomiting     Tramadol Nausea and Vomiting     Problem List:    Patient Active Problem List    Diagnosis Date Noted     Chronic obstructive pulmonary disease, unspecified COPD type (H) 01/10/2017     Priority: Medium     Chronic rhinitis 12/04/2015     Priority: Medium     Essential hypertension 12/04/2015     Priority: Medium     Dysuria 12/04/2015     Priority: Medium     Osteoarthritis, right knee 10/08/2014     Priority: Medium     Pedal edema 12/03/2013     Priority: Medium     Essential tremor 11/15/2013     Priority: Medium     Health Care Home 12/03/2012     Priority: Medium     Roseann Obando RN-PHN  FPA / [facility]   [phone]    DX V65.8 REPLACED WITH 65599 HEALTH CARE HOME (04/08/2013)       Kyphosis of cervical region 11/28/2011     Priority: Medium     Neck pain, chronic 11/28/2011     Priority: Medium     HYPERLIPIDEMIA LDL GOAL <130 10/31/2010     Priority: Medium     Recurrent UTI 09/20/2010     Priority: Medium     Cataract 07/06/2009     Priority: Medium     Utility update for deleted IMO code  Imo Update  utility       Right shoulder pain 10/27/2008     Priority: Medium     Numbness in right leg 10/27/2008     Priority: Medium     LEFT HIP PAIN 2006     Priority: Medium     Urinary frequency 2005     Priority: Medium      Past Medical History:    Past Medical History:   Diagnosis Date     Migraine, unspecified, without mention of intractable migraine without mention of status migrainosus      Other urinary incontinence      Pure hypercholesterolemia      Unspecified essential hypertension      Past Surgical History:    Past Surgical History:   Procedure Laterality Date     HC ANTER COLPORRHAPHY,BLAD/VAGINA      Cystocele Repair,rectocele repair.     JOINT REPLACEMENT, HIP RT/LT  3/07,     Joint Replacement Hip /LT- dislocated in front repeat surgery 1 week later     Family History:    Family History   Problem Relation Age of Onset     Cancer Mother         Bladder     Neurologic Disorder Mother         heriditary tremor     C.A.D. Father      Cerebrovascular Disease Father      Allergies Maternal Grandfather      Respiratory Maternal Grandfather         Asthma     Diabetes Paternal Grandmother         Type II     Cancer Brother         Multiple myloma     C.A.D. Brother      Breast Cancer No family hx of      Social History:  Marital Status:   [2]  Social History     Tobacco Use     Smoking status: Former Smoker     Packs/day: 1.00     Years: 20.00     Pack years: 20.00     Types: Cigarettes     Last attempt to quit: 1971     Years since quittin.3     Smokeless tobacco: Never Used   Substance Use Topics     Alcohol use: Yes     Comment: 1 wine a month maybe     Drug use: No      Medications:      ciprofloxacin (CIPRO) 250 MG tablet   acetaminophen-caffeine (EXCEDRIN TENSION HEADACHE) 500-65 MG TABS   atenolol-chlorthalidone (TENORETIC 50) 50-25 MG tablet   calcium carbonate (TUMS) 500 MG chewable tablet   ipratropium (ATROVENT) 0.03 % spray   Misc Natural Products (APPLE CIDER  VINEGAR DIET PO)   psyllium (METAMUCIL) 28.3 % POWD     Review of Systems  CONSTITUTIONAL:NEGATIVE for fever, chills, change in weight  INTEGUMENTARY/SKIN: NEGATIVE for worrisome rashes, moles or lesions  RESP:NEGATIVE for significant cough or SOB  GI: POSITIVE for suprapubic pressure and NEGATIVE for nausea, vomiting, diarrhea, abdominal pain   : POSITIVE for dysuria, increased frequency,  urgency, burning NEGATIVE for hematuria, vaginal itching or discharge   Physical Exam   BP: 145/86  Pulse: 50  Temp: 97.1  F (36.2  C)  Resp: 16  Weight: 69.9 kg (154 lb)  SpO2: 99 %  Physical Exam  GENERAL APPEARANCE: healthy, alert and no distress  RESP: lungs clear to auscultation - no rales, rhonchi or wheezes  CV: regular rates and rhythm, normal S1 S2, no murmur noted  ABDOMEN:  Soft, mild suprapubic tenderness to palpation no HSM or masses and bowel sounds normal  BACK: No CVA tenderness  SKIN: no suspicious lesions or rashes  ED Course        Procedures        Critical Care time:  none        Results for orders placed or performed during the hospital encounter of 04/27/19 (from the past 24 hour(s))   UA with Microscopic   Result Value Ref Range    Color Urine Yellow     Appearance Urine Cloudy     Glucose Urine Negative NEG^Negative mg/dL    Bilirubin Urine Negative NEG^Negative    Ketones Urine Negative NEG^Negative mg/dL    Specific Gravity Urine 1.014 1.003 - 1.035    Blood Urine Negative NEG^Negative    pH Urine 7.0 5.0 - 7.0 pH    Protein Albumin Urine Negative NEG^Negative mg/dL    Urobilinogen mg/dL 0.0 0.0 - 2.0 mg/dL    Nitrite Urine Negative NEG^Negative    Leukocyte Esterase Urine Large (A) NEG^Negative    Source Midstream Urine     WBC Urine >182 (H) 0 - 5 /HPF    RBC Urine 3 (H) 0 - 2 /HPF    WBC Clumps Present (A) NEG^Negative /HPF    Squamous Epithelial /HPF Urine 8 (H) 0 - 1 /HPF     Medications - No data to display    Assessments & Plan (with Medical Decision Making)     I have reviewed the nursing  notes.    I have reviewed the findings, diagnosis, plan and need for follow up with the patient.          Medication List      Started    ciprofloxacin 250 MG tablet  Commonly known as:  CIPRO  250 mg, Oral, 2 TIMES DAILY          Final diagnoses:   Acute cystitis without hematuria   83-year-old female presents to urgent care with concern over suspected urinary tract infection given 1 week history of irritative voiding symptoms, worse in the last 24 hours.  Urinalysis positive for infection.  No signs or symptoms concerning for pyelonephritis or nephrolithiasis at this time.  Patient will be discharged home stable on Cipro due to prior culture results with new culture pending today. She was instructed to follow up with PCP if no improvement in three days.  Worrisome reasons to seek care sooner discussed.      Disclaimer: This note consists of symbols derived from keyboarding, dictation, and/or voice recognition software. As a result, there may be errors in the script that have gone undetected.  Please consider this when interpreting information found in the chart.    4/27/2019   [facility] EMERGENCY DEPARTMENT     Chelly De La Torre PA-C  04/27/19 [id number]

## 2019-04-28 LAB
BACTERIA SPEC CULT: NORMAL
Lab: NORMAL
SPECIMEN SOURCE: NORMAL

## 2019-04-29 NOTE — RESULT ENCOUNTER NOTE
Final urine culture report is NEGATIVE per [place] [facility] Result protocol.    If NEGATIVE result, no change in treatment, per [place] [facility] Result protocol.

## 2019-05-06 NOTE — PROGRESS NOTES
SUBJECTIVE:                                                    Caitlyn Wasserman is 83 year old female   Chief Complaint   Patient presents with     Hypertension     Follow up and med refill. States that atenenol needs to be changed, is cheaper as two seperate medication due to insurance. This causes swelling in ankles     Medication Request     Refill of nasal spray         Hypertension Follow-up      Outpatient blood pressures are not being checked.    Low Salt Diet: no added salt      Amount of exercise or physical activity: 1 day/week for an average of 15-30 minutes    Problems taking medications regularly: No    Medication side effects: none    Diet: low salt        Problem list and histories reviewed & adjusted, as indicated.  Additional history: as documented    Patient Active Problem List   Diagnosis     Urinary frequency     LEFT HIP PAIN     Right shoulder pain     Numbness in right leg     Cataract     Recurrent UTI     HYPERLIPIDEMIA LDL GOAL <130     Kyphosis of cervical region     Neck pain, chronic     Health Care Home     Essential tremor     Pedal edema     Osteoarthritis, right knee     Chronic rhinitis     Essential hypertension     Dysuria     Chronic obstructive pulmonary disease, unspecified COPD type (H)     Osteoarthritis of both hands     Past Surgical History:   Procedure Laterality Date     HC ANTER COLPORRHAPHY,BLAD/VAGINA      Cystocele Repair,rectocele repair.     JOINT REPLACEMENT, HIP RT/LT  3/07,     Joint Replacement Hip /LT- dislocated in front repeat surgery 1 week later       Social History     Tobacco Use     Smoking status: Former Smoker     Packs/day: 1.00     Years: 20.00     Pack years: 20.00     Types: Cigarettes     Last attempt to quit: 1971     Years since quittin.3     Smokeless tobacco: Never Used   Substance Use Topics     Alcohol use: Yes     Comment: 1 wine a month maybe     Family History   Problem Relation Age of Onset     Cancer Mother          Bladder     Neurologic Disorder Mother         heriditary tremor     C.A.D. Father      Cerebrovascular Disease Father      Allergies Maternal Grandfather      Respiratory Maternal Grandfather         Asthma     Diabetes Paternal Grandmother         Type II     Cancer Brother         Multiple myloma     C.A.D. Brother      Breast Cancer No family hx of          Current Outpatient Medications   Medication Sig Dispense Refill     acetaminophen-caffeine (EXCEDRIN TENSION HEADACHE) 500-65 MG TABS Take 2 tablets by mouth every 6 hours as needed for mild pain       atenolol (TENORMIN) 50 MG tablet Take 1 tablet (50 mg) by mouth daily 90 tablet 3     atenolol-chlorthalidone (TENORETIC 50) 50-25 MG tablet Take 1 tablet by mouth daily 30 tablet 0     calcium carbonate (TUMS) 500 MG chewable tablet Take 1 tablet (500 mg) by mouth daily 150 tablet      chlorthalidone (HYGROTON) 25 MG tablet Take 1 tablet (25 mg) by mouth daily 90 tablet 3     ipratropium (ATROVENT) 0.03 % nasal spray Spray 2 sprays in nostril daily as needed for rhinitis 2 Box 3     psyllium (METAMUCIL) 28.3 % POWD 2 teaspoons a day in water       Allergies   Allergen Reactions     Codeine      Cortisone      Diclofenac Sodium      Hydrocodone Nausea and Vomiting     Oxycodone Nausea and Vomiting     Tramadol Nausea and Vomiting     Recent Labs   Lab Test 02/15/18  0928 01/12/17  0918 01/05/17  0440 11/08/16  1334  09/12/12  1215 03/12/12  0941 11/05/11  0841   *  --   --   --   --   --  138* 151*   HDL 62  --   --   --   --   --  44* 46*   TRIG 98  --   --   --   --   --  158* 204*   ALT 23  --   --   --   --   --  18  --    CR 0.88  --  0.95 0.93   < > 0.97  --   --    GFRESTIMATED 61  --  57* 58*   < > 56*  --   --    GFRESTBLACK 74  --  69 70   < > 68  --   --    POTASSIUM 4.0 3.7 4.1 4.0   < > 4.7  --   --    TSH  --   --   --  1.83  --  1.62  --   --     < > = values in this interval not displayed.      BP Readings from Last 3 Encounters:  "  05/07/19 124/66   04/27/19 145/86   05/21/18 125/70    Wt Readings from Last 3 Encounters:   05/07/19 71.2 kg (157 lb)   04/27/19 69.9 kg (154 lb)   05/21/18 75.3 kg (166 lb)         ROS:  Constitutional, HEENT, cardiovascular, pulmonary, gi and gu systems are negative, except as otherwise noted.    OBJECTIVE:                                                    /66   Pulse 54   Temp 98.5  F (36.9  C) (Tympanic)   Resp 12   Ht 1.622 m (5' 3.85\")   Wt 71.2 kg (157 lb)   SpO2 98%   BMI 27.08 kg/m    GENERAL APPEARANCE ADULT: Alert, no acute distress  HENT: Ears and TMs normal, oral mucosa and posterior oropharynx normal  RESP: lungs clear to auscultation   CV: normal rate, regular rhythm, no murmur or gallop  Diagnostic Test Results:  none      ASSESSMENT/PLAN:                                                    1. Chronic obstructive pulmonary disease, unspecified COPD type (H)  - COPD ACTION PLAN    2. Need for vaccination  - 1st  Administration  [06446]    3. Chronic non-seasonal allergic rhinitis  due for review and refill, taking medication without difficulty  - ipratropium (ATROVENT) 0.03 % nasal spray; Spray 2 sprays in nostril daily as needed for rhinitis  Dispense: 2 Box; Refill: 3    4. Menopause  - DX Hip/Pelvis/Spine; Future    5. Essential hypertension  Split the tenoretic, but same doses  - atenolol (TENORMIN) 50 MG tablet; Take 1 tablet (50 mg) by mouth daily  Dispense: 90 tablet; Refill: 3  - chlorthalidone (HYGROTON) 25 MG tablet; Take 1 tablet (25 mg) by mouth daily  Dispense: 90 tablet; Refill: 3    6. Primary osteoarthritis of both hands    7. Frequent UTI  - UROLOGY ADULT REFERRAL    Stephanie Salgado MD  [facility]    "

## 2019-05-07 ENCOUNTER — OFFICE VISIT (OUTPATIENT)
Dept: FAMILY MEDICINE | Facility: CLINIC | Age: 84
End: 2019-05-07
Payer: COMMERCIAL

## 2019-05-07 VITALS
BODY MASS INDEX: 26.8 KG/M2 | HEART RATE: 54 BPM | WEIGHT: 157 LBS | TEMPERATURE: 98.5 F | OXYGEN SATURATION: 98 % | RESPIRATION RATE: 12 BRPM | SYSTOLIC BLOOD PRESSURE: 124 MMHG | DIASTOLIC BLOOD PRESSURE: 66 MMHG | HEIGHT: 64 IN

## 2019-05-07 DIAGNOSIS — I10 ESSENTIAL HYPERTENSION: ICD-10-CM

## 2019-05-07 DIAGNOSIS — Z23 NEED FOR VACCINATION: ICD-10-CM

## 2019-05-07 DIAGNOSIS — M19.041 PRIMARY OSTEOARTHRITIS OF BOTH HANDS: ICD-10-CM

## 2019-05-07 DIAGNOSIS — J44.9 CHRONIC OBSTRUCTIVE PULMONARY DISEASE, UNSPECIFIED COPD TYPE (H): Primary | ICD-10-CM

## 2019-05-07 DIAGNOSIS — J30.89 CHRONIC NON-SEASONAL ALLERGIC RHINITIS: ICD-10-CM

## 2019-05-07 DIAGNOSIS — M19.042 PRIMARY OSTEOARTHRITIS OF BOTH HANDS: ICD-10-CM

## 2019-05-07 DIAGNOSIS — Z78.0 MENOPAUSE: ICD-10-CM

## 2019-05-07 DIAGNOSIS — N39.0 FREQUENT UTI: ICD-10-CM

## 2019-05-07 PROBLEM — M06.9 RHEUMATOID ARTHRITIS (H): Status: RESOLVED | Noted: 2019-05-07 | Resolved: 2019-05-07

## 2019-05-07 PROBLEM — M06.9 RHEUMATOID ARTHRITIS (H): Status: ACTIVE | Noted: 2019-05-07

## 2019-05-07 PROCEDURE — 90471 IMMUNIZATION ADMIN: CPT | Performed by: FAMILY MEDICINE

## 2019-05-07 PROCEDURE — 90715 TDAP VACCINE 7 YRS/> IM: CPT | Performed by: FAMILY MEDICINE

## 2019-05-07 PROCEDURE — 99214 OFFICE O/P EST MOD 30 MIN: CPT | Mod: 25 | Performed by: FAMILY MEDICINE

## 2019-05-07 RX ORDER — IPRATROPIUM BROMIDE 21 UG/1
2 SPRAY, METERED NASAL DAILY PRN
Qty: 2 BOX | Refills: 3 | Status: SHIPPED | OUTPATIENT
Start: 2019-05-07 | End: 2020-07-21

## 2019-05-07 RX ORDER — ATENOLOL 50 MG/1
50 TABLET ORAL DAILY
Qty: 90 TABLET | Refills: 3 | Status: SHIPPED | OUTPATIENT
Start: 2019-05-07 | End: 2020-04-23

## 2019-05-07 RX ORDER — CHLORTHALIDONE 25 MG/1
25 TABLET ORAL DAILY
Qty: 90 TABLET | Refills: 3 | Status: SHIPPED | OUTPATIENT
Start: 2019-05-07 | End: 2020-04-23

## 2019-05-07 ASSESSMENT — ANXIETY QUESTIONNAIRES
5. BEING SO RESTLESS THAT IT IS HARD TO SIT STILL: NOT AT ALL
IF YOU CHECKED OFF ANY PROBLEMS ON THIS QUESTIONNAIRE, HOW DIFFICULT HAVE THESE PROBLEMS MADE IT FOR YOU TO DO YOUR WORK, TAKE CARE OF THINGS AT HOME, OR GET ALONG WITH OTHER PEOPLE: NOT DIFFICULT AT ALL
1. FEELING NERVOUS, ANXIOUS, OR ON EDGE: NOT AT ALL
GAD7 TOTAL SCORE: 0
6. BECOMING EASILY ANNOYED OR IRRITABLE: NOT AT ALL
7. FEELING AFRAID AS IF SOMETHING AWFUL MIGHT HAPPEN: NOT AT ALL
2. NOT BEING ABLE TO STOP OR CONTROL WORRYING: NOT AT ALL
3. WORRYING TOO MUCH ABOUT DIFFERENT THINGS: NOT AT ALL

## 2019-05-07 ASSESSMENT — MIFFLIN-ST. JEOR: SCORE: 1159.77

## 2019-05-07 ASSESSMENT — PATIENT HEALTH QUESTIONNAIRE - PHQ9
5. POOR APPETITE OR OVEREATING: NOT AT ALL
SUM OF ALL RESPONSES TO PHQ QUESTIONS 1-9: 1

## 2019-05-07 NOTE — NURSING NOTE
"Initial /66   Pulse 54   Temp 98.5  F (36.9  C) (Tympanic)   Resp 12   Ht 1.622 m (5' 3.85\")   Wt 71.2 kg (157 lb)   SpO2 98%   BMI 27.08 kg/m   Estimated body mass index is 27.08 kg/m  as calculated from the following:    Height as of this encounter: 1.622 m (5' 3.85\").    Weight as of this encounter: 71.2 kg (157 lb). .    Screening Questionnaire for Adult Immunization    Are you sick today?   No   Do you have allergies to medications, food, a vaccine component or latex?   No   Have you ever had a serious reaction after receiving a vaccination?   No   Do you have a long-term health problem with heart disease, lung disease, asthma, kidney disease, metabolic disease (e.g. diabetes), anemia, or other blood disorder?   No   Do you have cancer, leukemia, HIV/AIDS, or any other immune system problem?   No   In the past 3 months, have you taken medications that affect  your immune system, such as prednisone, other steroids, or anticancer drugs; drugs for the treatment of rheumatoid arthritis, Crohn s disease, or psoriasis; or have you had radiation treatments?   No   Have you had a seizure, or a brain or other nervous system problem?   No   During the past year, have you received a transfusion of blood or blood     products, or been given immune (gamma) globulin or antiviral drug?   No   For women: Are you pregnant or is there a chance you could become        pregnant during the next month?   No   Have you received any vaccinations in the past 4 weeks?   No     Immunization questionnaire answers were all negative.        Per orders of Dr. dunham, injection of Tdap given by Mayda Munson. Patient instructed to remain in clinic for 15 minutes afterwards, and to report any adverse reaction to me immediately.       Screening performed by Mayda Munson on 5/7/2019 at 10:38 AM.    "

## 2019-05-08 ASSESSMENT — ANXIETY QUESTIONNAIRES: GAD7 TOTAL SCORE: 0

## 2019-09-07 NOTE — PROGRESS NOTES
Patient sent a FIT test in to the lab at [facility]. But there is no order in EP[facility].  Future FIT test pended as future, please sign.  Lab cannot do anything with the specimen until order is signed. Thank you, lab   07-Sep-2019 15:21

## 2019-10-03 ENCOUNTER — HEALTH MAINTENANCE LETTER (OUTPATIENT)
Age: 84
End: 2019-10-03

## 2019-10-11 ENCOUNTER — IMMUNIZATION (OUTPATIENT)
Dept: FAMILY MEDICINE | Facility: CLINIC | Age: 84
End: 2019-10-11
Payer: COMMERCIAL

## 2019-10-11 DIAGNOSIS — Z23 NEED FOR PROPHYLACTIC VACCINATION AND INOCULATION AGAINST INFLUENZA: Primary | ICD-10-CM

## 2019-10-11 PROCEDURE — G0008 ADMIN INFLUENZA VIRUS VAC: HCPCS

## 2019-10-11 PROCEDURE — 90662 IIV NO PRSV INCREASED AG IM: CPT

## 2019-10-11 PROCEDURE — 99207 ZZC NO CHARGE NURSE ONLY: CPT

## 2019-12-05 NOTE — PROGRESS NOTES
SUBJECTIVE:                                                    Caitlyn Wasserman is 84 year old female   Chief Complaint   Patient presents with     UTI     Symptoms starting one month. States lower left abdominal discomfort and urinary pain/ discomfort/ back pain. States that she has had two appointment in the last month with outside providers. States that she completed the medications that she was prescribed  (phenazopyridine 200 mg and Nitrofurantoin). States that she is no longer having any symptoms, just wants to make sure infection is gone.  Problem list and histories reviewed & adjusted, as indicated.  Additional history: as documented    Patient Active Problem List   Diagnosis     Urinary frequency     LEFT HIP PAIN     Right shoulder pain     Numbness in right leg     Cataract     Recurrent UTI     HYPERLIPIDEMIA LDL GOAL <130     Kyphosis of cervical region     Neck pain, chronic     Health Care Home     Essential tremor     Pedal edema     Osteoarthritis, right knee     Chronic rhinitis     Essential hypertension     Dysuria     Chronic obstructive pulmonary disease, unspecified COPD type (H)     Osteoarthritis of both hands     Past Surgical History:   Procedure Laterality Date     HC ANTER COLPORRHAPHY,BLAD/VAGINA      Cystocele Repair,rectocele repair.     JOINT REPLACEMENT, HIP RT/LT  3/07,     Joint Replacement Hip /LT- dislocated in front repeat surgery 1 week later       Social History     Tobacco Use     Smoking status: Former Smoker     Packs/day: 1.00     Years: 20.00     Pack years: 20.00     Types: Cigarettes     Last attempt to quit: 1971     Years since quittin.9     Smokeless tobacco: Never Used   Substance Use Topics     Alcohol use: Yes     Comment: 1 wine a month maybe     Family History   Problem Relation Age of Onset     Cancer Mother         Bladder     Neurologic Disorder Mother         heriditary tremor     C.A.D. Father      Cerebrovascular Disease Father       Allergies Maternal Grandfather      Respiratory Maternal Grandfather         Asthma     Diabetes Paternal Grandmother         Type II     Cancer Brother         Multiple myloma     C.A.D. Brother      Breast Cancer No family hx of          Current Outpatient Medications   Medication Sig Dispense Refill     acetaminophen-caffeine (EXCEDRIN TENSION HEADACHE) 500-65 MG TABS Take 2 tablets by mouth every 6 hours as needed for mild pain       atenolol (TENORMIN) 50 MG tablet Take 1 tablet (50 mg) by mouth daily 90 tablet 3     chlorthalidone (HYGROTON) 25 MG tablet Take 1 tablet (25 mg) by mouth daily 90 tablet 3     ipratropium (ATROVENT) 0.03 % nasal spray Spray 2 sprays in nostril daily as needed for rhinitis 2 Box 3     psyllium (METAMUCIL) 28.3 % POWD 2 teaspoons a day in water       Allergies   Allergen Reactions     Codeine      Cortisone      Diclofenac Sodium      Hydrocodone Nausea and Vomiting     Oxycodone Nausea and Vomiting     Tramadol Nausea and Vomiting     Recent Labs   Lab Test 02/15/18  0928 01/12/17  0918 01/05/17  0440 11/08/16  1334  09/12/12  1215 03/12/12  0941 11/05/11  0841   *  --   --   --   --   --  138* 151*   HDL 62  --   --   --   --   --  44* 46*   TRIG 98  --   --   --   --   --  158* 204*   ALT 23  --   --   --   --   --  18  --    CR 0.88  --  0.95 0.93   < > 0.97  --   --    GFRESTIMATED 61  --  57* 58*   < > 56*  --   --    GFRESTBLACK 74  --  69 70   < > 68  --   --    POTASSIUM 4.0 3.7 4.1 4.0   < > 4.7  --   --    TSH  --   --   --  1.83  --  1.62  --   --     < > = values in this interval not displayed.      BP Readings from Last 3 Encounters:   12/09/19 130/62   05/07/19 124/66   04/27/19 145/86    Wt Readings from Last 3 Encounters:   12/09/19 66.7 kg (147 lb)   05/07/19 71.2 kg (157 lb)   04/27/19 69.9 kg (154 lb)         ROS:  Constitutional, HEENT, cardiovascular, pulmonary, gi and gu systems are negative, except as otherwise noted.    OBJECTIVE:                       "                              /62   Pulse 50   Temp 97.5  F (36.4  C) (Tympanic)   Resp 12   Ht 1.622 m (5' 3.85\")   Wt 66.7 kg (147 lb)   SpO2 98%   BMI 25.35 kg/m     GENERAL APPEARANCE ADULT: Alert, no acute distress  PSYCH: mentation appears normal., affect and mood normal  Diagnostic Test Results:  Results for orders placed or performed in visit on 12/09/19   UA reflex to Microscopic and Culture     Status: Abnormal   Result Value Ref Range    Color Urine Yellow     Appearance Urine Clear     Glucose Urine Negative NEG^Negative mg/dL    Bilirubin Urine Negative NEG^Negative    Ketones Urine Negative NEG^Negative mg/dL    Specific Gravity Urine <=1.005 1.003 - 1.035    Blood Urine Negative NEG^Negative    pH Urine 6.5 5.0 - 7.0 pH    Protein Albumin Urine Negative NEG^Negative mg/dL    Urobilinogen Urine 0.2 0.2 - 1.0 EU/dL    Nitrite Urine Negative NEG^Negative    Leukocyte Esterase Urine Moderate (A) NEG^Negative    Source Midstream Urine    Urine Microscopic     Status: Abnormal   Result Value Ref Range    WBC Urine 5-10 (A) OTO5^0 - 5 /HPF    RBC Urine O - 2 OTO2^O - 2 /HPF    Squamous Epithelial /LPF Urine Few FEW^Few /LPF          ASSESSMENT/PLAN:                                                    1. Urinary problem  resolving infection, check culture and no more meds at this time.   thinks she has bladder possible prolapse  - UA reflex to Microscopic and Culture  - Urine Microscopic  - Urine Culture Aerobic Bacterial    2. Female bladder prolapse  - OB/GYN REFERRAL    Stephanie Salgado MD  [facility]    "

## 2019-12-09 ENCOUNTER — OFFICE VISIT (OUTPATIENT)
Dept: FAMILY MEDICINE | Facility: CLINIC | Age: 84
End: 2019-12-09
Payer: COMMERCIAL

## 2019-12-09 VITALS
HEART RATE: 50 BPM | TEMPERATURE: 97.5 F | HEIGHT: 64 IN | BODY MASS INDEX: 25.1 KG/M2 | OXYGEN SATURATION: 98 % | SYSTOLIC BLOOD PRESSURE: 130 MMHG | WEIGHT: 147 LBS | RESPIRATION RATE: 12 BRPM | DIASTOLIC BLOOD PRESSURE: 62 MMHG

## 2019-12-09 DIAGNOSIS — N81.10 FEMALE BLADDER PROLAPSE: ICD-10-CM

## 2019-12-09 DIAGNOSIS — R39.89 URINARY PROBLEM: Primary | ICD-10-CM

## 2019-12-09 LAB
ALBUMIN UR-MCNC: NEGATIVE MG/DL
APPEARANCE UR: CLEAR
BILIRUB UR QL STRIP: NEGATIVE
COLOR UR AUTO: YELLOW
GLUCOSE UR STRIP-MCNC: NEGATIVE MG/DL
HGB UR QL STRIP: NEGATIVE
KETONES UR STRIP-MCNC: NEGATIVE MG/DL
LEUKOCYTE ESTERASE UR QL STRIP: ABNORMAL
NITRATE UR QL: NEGATIVE
NON-SQ EPI CELLS #/AREA URNS LPF: ABNORMAL /LPF
PH UR STRIP: 6.5 PH (ref 5–7)
RBC #/AREA URNS AUTO: ABNORMAL /HPF
SOURCE: ABNORMAL
SP GR UR STRIP: <=1.005 (ref 1–1.03)
UROBILINOGEN UR STRIP-ACNC: 0.2 EU/DL (ref 0.2–1)
WBC #/AREA URNS AUTO: ABNORMAL /HPF

## 2019-12-09 PROCEDURE — 81001 URINALYSIS AUTO W/SCOPE: CPT | Performed by: FAMILY MEDICINE

## 2019-12-09 PROCEDURE — 99214 OFFICE O/P EST MOD 30 MIN: CPT | Performed by: FAMILY MEDICINE

## 2019-12-09 PROCEDURE — 87086 URINE CULTURE/COLONY COUNT: CPT | Performed by: FAMILY MEDICINE

## 2019-12-09 ASSESSMENT — MIFFLIN-ST. JEOR: SCORE: 1109.41

## 2019-12-09 NOTE — NURSING NOTE
"Initial /62   Pulse 50   Temp 97.5  F (36.4  C) (Tympanic)   Resp 12   Ht 1.622 m (5' 3.85\")   Wt 66.7 kg (147 lb)   SpO2 98%   BMI 25.35 kg/m   Estimated body mass index is 25.35 kg/m  as calculated from the following:    Height as of this encounter: 1.622 m (5' 3.85\").    Weight as of this encounter: 66.7 kg (147 lb). .      "

## 2019-12-11 LAB
BACTERIA SPEC CULT: NORMAL
SPECIMEN SOURCE: NORMAL

## 2020-01-13 ENCOUNTER — OFFICE VISIT (OUTPATIENT)
Dept: OBGYN | Facility: CLINIC | Age: 85
End: 2020-01-13
Payer: COMMERCIAL

## 2020-01-13 VITALS
BODY MASS INDEX: 25.1 KG/M2 | DIASTOLIC BLOOD PRESSURE: 81 MMHG | HEART RATE: 49 BPM | RESPIRATION RATE: 18 BRPM | TEMPERATURE: 97.4 F | WEIGHT: 147 LBS | HEIGHT: 64 IN | SYSTOLIC BLOOD PRESSURE: 156 MMHG

## 2020-01-13 DIAGNOSIS — N81.9 FEMALE GENITAL PROLAPSE, UNSPECIFIED TYPE: Primary | ICD-10-CM

## 2020-01-13 DIAGNOSIS — R35.0 URINARY FREQUENCY: ICD-10-CM

## 2020-01-13 LAB
ALBUMIN UR-MCNC: NEGATIVE MG/DL
APPEARANCE UR: ABNORMAL
BACTERIA #/AREA URNS HPF: ABNORMAL /HPF
BILIRUB UR QL STRIP: NEGATIVE
COLOR UR AUTO: YELLOW
GLUCOSE UR STRIP-MCNC: NEGATIVE MG/DL
HGB UR QL STRIP: NEGATIVE
KETONES UR STRIP-MCNC: NEGATIVE MG/DL
LEUKOCYTE ESTERASE UR QL STRIP: ABNORMAL
NITRATE UR QL: NEGATIVE
NON-SQ EPI CELLS #/AREA URNS LPF: ABNORMAL /LPF
PH UR STRIP: 7 PH (ref 5–7)
RBC #/AREA URNS AUTO: ABNORMAL /HPF
SOURCE: ABNORMAL
SP GR UR STRIP: <=1.005 (ref 1–1.03)
UROBILINOGEN UR STRIP-ACNC: 0.2 EU/DL (ref 0.2–1)
WBC #/AREA URNS AUTO: ABNORMAL /HPF

## 2020-01-13 PROCEDURE — A4562 PESSARY, NON RUBBER,ANY TYPE: HCPCS | Performed by: OBSTETRICS & GYNECOLOGY

## 2020-01-13 PROCEDURE — 57160 INSERT PESSARY/OTHER DEVICE: CPT | Performed by: OBSTETRICS & GYNECOLOGY

## 2020-01-13 PROCEDURE — 81001 URINALYSIS AUTO W/SCOPE: CPT | Performed by: OBSTETRICS & GYNECOLOGY

## 2020-01-13 PROCEDURE — 99203 OFFICE O/P NEW LOW 30 MIN: CPT | Mod: 25 | Performed by: OBSTETRICS & GYNECOLOGY

## 2020-01-13 ASSESSMENT — MIFFLIN-ST. JEOR: SCORE: 1111.79

## 2020-01-13 NOTE — NURSING NOTE
"Initial BP (!) 156/81 (BP Location: Left arm, Patient Position: Chair, Cuff Size: Adult Large)   Pulse (!) 49   Temp 97.4  F (36.3  C) (Tympanic)   Resp 18   Ht 1.626 m (5' 4\")   Wt 66.7 kg (147 lb)   BMI 25.23 kg/m   Estimated body mass index is 25.23 kg/m  as calculated from the following:    Height as of this encounter: 1.626 m (5' 4\").    Weight as of this encounter: 66.7 kg (147 lb). .    "

## 2020-01-13 NOTE — PROGRESS NOTES
Caitlyn is a 84 year old   female who presents for consult as requested by Dr. Salgado due to vaginal prolapse; she has a h/o TVT, anterior, posterior and enterocele repair, all done with mesh, by Dr. Pennington in ; she states gradually since that time, she has developed recurrent protrusion, now making it hard to void completely, associated wth constipation and having recurrent UTI.  she has not used any vaginal ERT therapy. she denies bleeding; she is not sexually active.  She has occasional insensate urine loss..    Patient Active Problem List    Diagnosis Date Noted     Osteoarthritis of both hands 2019     Priority: Medium     Chronic obstructive pulmonary disease, unspecified COPD type (H) 01/10/2017     Priority: Medium     Chronic rhinitis 2015     Priority: Medium     Essential hypertension 2015     Priority: Medium     Dysuria 2015     Priority: Medium     Osteoarthritis, right knee 10/08/2014     Priority: Medium     Pedal edema 2013     Priority: Medium     Essential tremor 11/15/2013     Priority: Medium     Health Care Home 2012     Priority: Medium     Roseann Obando RN-PHN  FPA / [facility]   [phone]    DX V65.8 REPLACED WITH [facility] (2013)       Kyphosis of cervical region 2011     Priority: Medium     Neck pain, chronic 2011     Priority: Medium     HYPERLIPIDEMIA LDL GOAL <130 10/31/2010     Priority: Medium     Recurrent UTI 2010     Priority: Medium     Cataract 2009     Priority: Medium     Utility update for deleted IMO code  Imo Update utility       Right shoulder pain 10/27/2008     Priority: Medium     Numbness in right leg 10/27/2008     Priority: Medium     LEFT HIP PAIN 2006     Priority: Medium     Urinary frequency 2005     Priority: Medium       All systems were reviewed and pertinent information in noted in subjective/HPI.    Past Medical History:   Diagnosis  Date     Migraine, unspecified, without mention of intractable migraine without mention of status migrainosus      Other urinary incontinence      Pure hypercholesterolemia      Unspecified essential hypertension        Past Surgical History:   Procedure Laterality Date     HC ANTER COLPORRHAPHY,BLAD/VAGINA      Cystocele Repair,rectocele repair.     JOINT REPLACEMENT, HIP RT/LT  3/07,     Joint Replacement Hip /LT- dislocated in front repeat surgery 1 week later         Current Outpatient Medications:      acetaminophen-caffeine (EXCEDRIN TENSION HEADACHE) 500-65 MG TABS, Take 2 tablets by mouth every 6 hours as needed for mild pain, Disp: , Rfl:      atenolol (TENORMIN) 50 MG tablet, Take 1 tablet (50 mg) by mouth daily, Disp: 90 tablet, Rfl: 3     chlorthalidone (HYGROTON) 25 MG tablet, Take 1 tablet (25 mg) by mouth daily, Disp: 90 tablet, Rfl: 3     ipratropium (ATROVENT) 0.03 % nasal spray, Spray 2 sprays in nostril daily as needed for rhinitis, Disp: 2 Box, Rfl: 3     psyllium (METAMUCIL) 28.3 % POWD, 2 teaspoons a day in water, Disp: , Rfl:     ALLERGIES:  Codeine; Cortisone; Diclofenac sodium; Hydrocodone; Oxycodone; and Tramadol    Social History     Socioeconomic History     Marital status:      Spouse name: Neeraj Wasserman     Number of children: 3     Years of education: 14     Highest education level: None   Occupational History     Employer: RETIRED   Social Needs     Financial resource strain: None     Food insecurity:     Worry: None     Inability: None     Transportation needs:     Medical: None     Non-medical: None   Tobacco Use     Smoking status: Former Smoker     Packs/day: 1.00     Years: 20.00     Pack years: 20.00     Types: Cigarettes     Last attempt to quit: 1971     Years since quittin.0     Smokeless tobacco: Never Used   Substance and Sexual Activity     Alcohol use: Yes     Comment: 1 wine a month maybe     Drug use: No     Sexual activity: Never   Lifestyle  "    Physical activity:     Days per week: None     Minutes per session: None     Stress: None   Relationships     Social connections:     Talks on phone: None     Gets together: None     Attends Restoration service: None     Active member of club or organization: None     Attends meetings of clubs or organizations: None     Relationship status: None     Intimate partner violence:     Fear of current or ex partner: None     Emotionally abused: None     Physically abused: None     Forced sexual activity: None   Other Topics Concern      Service No     Blood Transfusions No     Caffeine Concern Yes     Comment: 1 a day     Occupational Exposure No     Hobby Hazards No     Sleep Concern No     Stress Concern No     Weight Concern Yes     Comment: always ongoing problem     Special Diet Yes     Comment: calcium and vit D     Back Care No     Exercise No     Bike Helmet No     Seat Belt Yes     Self-Exams Yes     Parent/sibling w/ CABG, MI or angioplasty before 65F 55M? No   Social History Narrative     None       Family History   Problem Relation Age of Onset     Cancer Mother         Bladder     Neurologic Disorder Mother         heriditary tremor     C.A.D. Father      Cerebrovascular Disease Father      Allergies Maternal Grandfather      Respiratory Maternal Grandfather         Asthma     Diabetes Paternal Grandmother         Type II     Cancer Brother         Multiple myloma     C.A.D. Brother      Breast Cancer No family hx of        OBJECTIVE:  Vitals: BP (!) 156/81 (BP Location: Left arm, Patient Position: Chair, Cuff Size: Adult Large)   Pulse (!) 49   Temp 97.4  F (36.3  C) (Tympanic)   Resp 18   Ht 1.626 m (5' 4\")   Wt 66.7 kg (147 lb)   BMI 25.23 kg/m   BMI= Body mass index is 25.23 kg/m .   No LMP recorded. Patient is postmenopausal.     GENERAL APPEARANCE: healthy, alert and no distress  ABDOMEN:  soft, nontender, no hepato-splenomegaly or hernias  PELVIC:  Gaping perineum with smooth wall " vaginal protrusion visible just inside introitus; uterus very small ; cervix protrudes to introitus with strain; accompanying rectocele; very poorly estrogenized.    Tried to fit with various sizes ring with support but extruded with Valsalva; similarly tried gradually smaller Gelhorn pessaries, until able to accomodate and retained 2# Gelhorn.    ASSESSMENT:      ICD-10-CM    1. Female genital prolapse, unspecified type N81.9 *UA reflex to Microscopic     FIT/INSERT INTRAVAG SUPPORT DEVICE/PESSARY     PESSARY, NON RUBBER,ANY TYPE    1. Transvaginal tape placement. 2004, Dr. Yuen  2. Cystocele repair with mesh. 3. Rectocele repair with mesh.  4. Enterocele repair with mesh.   2. Urinary frequency R35.0 MEASURE POST-VOID RESIDUAL URINE/BLADDER CAPACITY, US NON-IMAGING     *UA reflex to Microscopic       PLAN:  Advised to go about her regular activities and RETURN TO CLINIC on Friday to see if able to maintain pessary. If able to, then add vaginal estradiol and discuss long term use of indwelling pessary  Van Vargas MD  [facility]      Van Vargas MD    Duration of visit:  30 minutes, >50% in discussion of current issues, treatment options and treatment planning.  VAN VARGAS MD

## 2020-01-17 ENCOUNTER — OFFICE VISIT (OUTPATIENT)
Dept: OBGYN | Facility: CLINIC | Age: 85
End: 2020-01-17
Payer: COMMERCIAL

## 2020-01-17 VITALS
HEIGHT: 64 IN | DIASTOLIC BLOOD PRESSURE: 51 MMHG | HEART RATE: 50 BPM | RESPIRATION RATE: 18 BRPM | SYSTOLIC BLOOD PRESSURE: 156 MMHG | WEIGHT: 147 LBS | BODY MASS INDEX: 25.1 KG/M2 | TEMPERATURE: 97.1 F

## 2020-01-17 DIAGNOSIS — N81.10 VAGINAL PROLAPSE: ICD-10-CM

## 2020-01-17 PROCEDURE — 99214 OFFICE O/P EST MOD 30 MIN: CPT | Performed by: OBSTETRICS & GYNECOLOGY

## 2020-01-17 ASSESSMENT — MIFFLIN-ST. JEOR: SCORE: 1111.79

## 2020-01-17 NOTE — PROGRESS NOTES
Caitlyn is a 84 year old   female who presents for f/u of pessary for recurrent vaginal prolapse; predominantly the anterior compartment; the Gelhorn pessary expelled spontaneously later the day it was placed..    Patient Active Problem List    Diagnosis Date Noted     Vaginal prolapse 2020     Priority: Medium     she has a h/o TVT, anterior, posterior and enterocele repair, all done with mesh, by Dr. Pennington in        Osteoarthritis of both hands 2019     Priority: Medium     Chronic obstructive pulmonary disease, unspecified COPD type (H) 01/10/2017     Priority: Medium     Chronic rhinitis 2015     Priority: Medium     Essential hypertension 2015     Priority: Medium     Dysuria 2015     Priority: Medium     Osteoarthritis, right knee 10/08/2014     Priority: Medium     Pedal edema 2013     Priority: Medium     Essential tremor 11/15/2013     Priority: Medium     Health Care Home 2012     Priority: Medium     Roseann Obando RN-PHN  FPA / [facility]   [phone]    DX V65.8 REPLACED WITH [facility] (2013)       Kyphosis of cervical region 2011     Priority: Medium     Neck pain, chronic 2011     Priority: Medium     HYPERLIPIDEMIA LDL GOAL <130 10/31/2010     Priority: Medium     Recurrent UTI 2010     Priority: Medium     Cataract 2009     Priority: Medium     Utility update for deleted IMO code  Imo Update utility       Right shoulder pain 10/27/2008     Priority: Medium     Numbness in right leg 10/27/2008     Priority: Medium     LEFT HIP PAIN 2006     Priority: Medium     Urinary frequency 2005     Priority: Medium       All systems were reviewed and pertinent information in noted in subjective/HPI.    Past Medical History:   Diagnosis Date     Migraine, unspecified, without mention of intractable migraine without mention of status migrainosus      Other urinary incontinence       Pure hypercholesterolemia      Unspecified essential hypertension        Past Surgical History:   Procedure Laterality Date     HC ANTER COLPORRHAPHY,BLAD/VAGINA      Cystocele Repair,rectocele repair.     JOINT REPLACEMENT, HIP RT/LT  3/07,     Joint Replacement Hip /LT- dislocated in front repeat surgery 1 week later         Current Outpatient Medications:      acetaminophen-caffeine (EXCEDRIN TENSION HEADACHE) 500-65 MG TABS, Take 2 tablets by mouth every 6 hours as needed for mild pain, Disp: , Rfl:      atenolol (TENORMIN) 50 MG tablet, Take 1 tablet (50 mg) by mouth daily, Disp: 90 tablet, Rfl: 3     chlorthalidone (HYGROTON) 25 MG tablet, Take 1 tablet (25 mg) by mouth daily, Disp: 90 tablet, Rfl: 3     ipratropium (ATROVENT) 0.03 % nasal spray, Spray 2 sprays in nostril daily as needed for rhinitis, Disp: 2 Box, Rfl: 3     psyllium (METAMUCIL) 28.3 % POWD, 2 teaspoons a day in water, Disp: , Rfl:     ALLERGIES:  Codeine; Cortisone; Diclofenac sodium; Hydrocodone; Oxycodone; and Tramadol    Social History     Socioeconomic History     Marital status:      Spouse name: Neeraj Wasserman     Number of children: 3     Years of education: 14     Highest education level: None   Occupational History     Employer: RETIRED   Social Needs     Financial resource strain: None     Food insecurity:     Worry: None     Inability: None     Transportation needs:     Medical: None     Non-medical: None   Tobacco Use     Smoking status: Former Smoker     Packs/day: 1.00     Years: 20.00     Pack years: 20.00     Types: Cigarettes     Last attempt to quit: 1971     Years since quittin.0     Smokeless tobacco: Never Used   Substance and Sexual Activity     Alcohol use: Yes     Comment: 1 wine a month maybe     Drug use: No     Sexual activity: Never   Lifestyle     Physical activity:     Days per week: None     Minutes per session: None     Stress: None   Relationships     Social connections:     Talks on  "phone: None     Gets together: None     Attends Latter day service: None     Active member of club or organization: None     Attends meetings of clubs or organizations: None     Relationship status: None     Intimate partner violence:     Fear of current or ex partner: None     Emotionally abused: None     Physically abused: None     Forced sexual activity: None   Other Topics Concern      Service No     Blood Transfusions No     Caffeine Concern Yes     Comment: 1 a day     Occupational Exposure No     Hobby Hazards No     Sleep Concern No     Stress Concern No     Weight Concern Yes     Comment: always ongoing problem     Special Diet Yes     Comment: calcium and vit D     Back Care No     Exercise No     Bike Helmet No     Seat Belt Yes     Self-Exams Yes     Parent/sibling w/ CABG, MI or angioplasty before 65F 55M? No   Social History Narrative     None       Family History   Problem Relation Age of Onset     Cancer Mother         Bladder     Neurologic Disorder Mother         heriditary tremor     C.A.D. Father      Cerebrovascular Disease Father      Allergies Maternal Grandfather      Respiratory Maternal Grandfather         Asthma     Diabetes Paternal Grandmother         Type II     Cancer Brother         Multiple myloma     C.A.D. Brother      Breast Cancer No family hx of        OBJECTIVE:  Vitals: BP (!) 156/51 (BP Location: Left arm, Patient Position: Chair, Cuff Size: Adult Large)   Pulse 50   Temp 97.1  F (36.2  C) (Tympanic)   Resp 18   Ht 1.626 m (5' 4\")   Wt 66.7 kg (147 lb)   BMI 25.23 kg/m   BMI= Body mass index is 25.23 kg/m .   No LMP recorded. Patient is postmenopausal.     Pelvic: large anterior wall prolapse with gaping perineum  Tried various sized pessaries, ring with support, donut and Gelhorn, and pt able to expel every pessary with a cough; unable to identify any pessary that would stay in.    ASSESSMENT:      ICD-10-CM    1. Vaginal prolapse N81.10        PLAN:  I " discussed with Caitlyn that her options are 1)living with it (chance of developing worsening prolapse over time), 2) major surgery (can be very challenging when pt has mesh in both anterior and posterior compartment; could consider vault suspension or even colpocleisis, offered second opinion with Dr. Cedillo; 3) minor surgery, perineorraphy, to try to provide a better platform of support at introitus so pessary may have a better chance of retention.    Van Cee MD  [facility]    Duration of visit:  30 minutes, >50% in discussion of current issues, treatment options and treatment planning.  VAN Cee MD

## 2020-01-17 NOTE — NURSING NOTE
"Initial BP (!) 156/51 (BP Location: Left arm, Patient Position: Chair, Cuff Size: Adult Large)   Pulse 50   Temp 97.1  F (36.2  C) (Tympanic)   Resp 18   Ht 1.626 m (5' 4\")   Wt 66.7 kg (147 lb)   BMI 25.23 kg/m   Estimated body mass index is 25.23 kg/m  as calculated from the following:    Height as of this encounter: 1.626 m (5' 4\").    Weight as of this encounter: 66.7 kg (147 lb). .      "

## 2020-02-04 ENCOUNTER — OFFICE VISIT (OUTPATIENT)
Dept: FAMILY MEDICINE | Facility: CLINIC | Age: 85
End: 2020-02-04
Payer: COMMERCIAL

## 2020-02-04 VITALS
OXYGEN SATURATION: 99 % | SYSTOLIC BLOOD PRESSURE: 136 MMHG | WEIGHT: 147 LBS | HEART RATE: 51 BPM | HEIGHT: 64 IN | DIASTOLIC BLOOD PRESSURE: 74 MMHG | BODY MASS INDEX: 25.1 KG/M2 | TEMPERATURE: 97.7 F

## 2020-02-04 DIAGNOSIS — J44.9 CHRONIC OBSTRUCTIVE PULMONARY DISEASE, UNSPECIFIED COPD TYPE (H): ICD-10-CM

## 2020-02-04 DIAGNOSIS — K59.01 SLOW TRANSIT CONSTIPATION: Primary | ICD-10-CM

## 2020-02-04 PROCEDURE — 99214 OFFICE O/P EST MOD 30 MIN: CPT | Performed by: FAMILY MEDICINE

## 2020-02-04 ASSESSMENT — MIFFLIN-ST. JEOR: SCORE: 1111.79

## 2020-02-04 NOTE — PROGRESS NOTES
a  SUBJECTIVE:                                                    Caitlyn Wasserman is 84 year old female   Chief Complaint   Patient presents with     Constipation     has been dealing with bouts of constipation since last summer. Has been using high dosing of the metamucil. Has had some luck with that, but still has hard walnut to pebble sized stool. Has bladder prolapse and has seen Dr. Cee. Pessory did not work. Nsancy does not want to do surgery at this time.  Has added stool softner more recently.       Problem list and histories reviewed & adjusted, as indicated.  Additional history: as documented    Patient Active Problem List   Diagnosis     Urinary frequency     LEFT HIP PAIN     Right shoulder pain     Numbness in right leg     Cataract     Recurrent UTI     HYPERLIPIDEMIA LDL GOAL <130     Kyphosis of cervical region     Neck pain, chronic     Health Care Home     Essential tremor     Pedal edema     Osteoarthritis, right knee     Chronic rhinitis     Essential hypertension     Dysuria     Chronic obstructive pulmonary disease, unspecified COPD type (H)     Osteoarthritis of both hands     Vaginal prolapse     Past Surgical History:   Procedure Laterality Date     HC ANTER COLPORRHAPHY,BLAD/VAGINA      Cystocele Repair,rectocele repair.     JOINT REPLACEMENT, HIP RT/LT  3/07,     Joint Replacement Hip /LT- dislocated in front repeat surgery 1 week later       Social History     Tobacco Use     Smoking status: Former Smoker     Packs/day: 1.00     Years: 20.00     Pack years: 20.00     Types: Cigarettes     Last attempt to quit: 1971     Years since quittin.1     Smokeless tobacco: Never Used   Substance Use Topics     Alcohol use: Yes     Comment: 1 wine a month maybe     Family History   Problem Relation Age of Onset     Cancer Mother         Bladder     Neurologic Disorder Mother         heriditary tremor     C.A.D. Father      Cerebrovascular Disease Father      Allergies Maternal  Grandfather      Respiratory Maternal Grandfather         Asthma     Diabetes Paternal Grandmother         Type II     Cancer Brother         Multiple myloma     C.A.D. Brother      Cancer Sister         kidney cancer     Neurofibromatosis Son      Breast Cancer No family hx of          Current Outpatient Medications   Medication Sig Dispense Refill     acetaminophen-caffeine (EXCEDRIN TENSION HEADACHE) 500-65 MG TABS Take 2 tablets by mouth every 6 hours as needed for mild pain       atenolol (TENORMIN) 50 MG tablet Take 1 tablet (50 mg) by mouth daily 90 tablet 3     chlorthalidone (HYGROTON) 25 MG tablet Take 1 tablet (25 mg) by mouth daily 90 tablet 3     ipratropium (ATROVENT) 0.03 % nasal spray Spray 2 sprays in nostril daily as needed for rhinitis 2 Box 3     psyllium (METAMUCIL) 28.3 % POWD 2 teaspoons a day in water       Allergies   Allergen Reactions     Codeine      Cortisone      Diclofenac Sodium      Hydrocodone Nausea and Vomiting     Oxycodone Nausea and Vomiting     Tramadol Nausea and Vomiting     Recent Labs   Lab Test 02/15/18  0928 01/12/17  0918 01/05/17  0440 11/08/16  1334  09/12/12  1215 03/12/12  0941   *  --   --   --   --   --  138*   HDL 62  --   --   --   --   --  44*   TRIG 98  --   --   --   --   --  158*   ALT 23  --   --   --   --   --  18   CR 0.88  --  0.95 0.93   < > 0.97  --    GFRESTIMATED 61  --  57* 58*   < > 56*  --    GFRESTBLACK 74  --  69 70   < > 68  --    POTASSIUM 4.0 3.7 4.1 4.0   < > 4.7  --    TSH  --   --   --  1.83  --  1.62  --     < > = values in this interval not displayed.      BP Readings from Last 3 Encounters:   02/04/20 136/74   01/17/20 (!) 156/51   01/13/20 (!) 156/81    Wt Readings from Last 3 Encounters:   02/04/20 66.7 kg (147 lb)   01/17/20 66.7 kg (147 lb)   01/13/20 66.7 kg (147 lb)         ROS:  Constitutional, HEENT, cardiovascular, pulmonary, gi and gu systems are negative, except as otherwise noted.    OBJECTIVE:                          "                           /74 (Cuff Size: Adult Regular)   Pulse 51   Temp 97.7  F (36.5  C) (Tympanic)   Ht 1.626 m (5' 4\")   Wt 66.7 kg (147 lb)   SpO2 99%   BMI 25.23 kg/m    GENERAL APPEARANCE ADULT: Alert, no acute distress  PSYCH: mentation appears normal., affect and mood normal  Diagnostic Test Results:  none      ASSESSMENT/PLAN:                                                    1. Slow transit constipation  Reviewed recommendations, most important to decrease metamucil dose and have with meal so food bolus if filled with fiber and not fiber plug moving through when taken alone.  See patient instructions    2. Chronic obstructive pulmonary disease, unspecified COPD type (H)  Stable, not on meds.      Stephanie Salgado MD  [facility]      "

## 2020-02-04 NOTE — PATIENT INSTRUCTIONS
CONSTIPATION:  1.  You have to eat regularily to poop regularily.  2.  You need a safe place and time to have a bowel movement (BM).  3.  You need to be relaxed to have a BM.  4.  Constipation is common when you do not have enough fluid, (fluid in general and fluid mixed in with the food you eat).  5.  Have fiber and fluid with every meal.  Mix it in with the other food of the meal (1 bite of meat, 1 bite of fiber, 1 bite of veg,  a drink of fluid, 1 bite of fiber, etc.).    OSMOTIC AND LUBRICANT LAXATIVES  1.  MIRALAX (polyethylene glycol 3350 powder)     Adults:   17 gm of powder per day in 8 oz of water x 1 week    4.   MINERAL OIL, olive, canola, vegetable, safflower, peanut, any oil   One teaspoon with every meal.    5.   MAGNESIUM HYDROXIDE (milk of magnesia)    once daily  6.   Smooth Move Tea        FIBER daily with every meal so food bolus is fiber filled.  1.    METAMUCIL    12 years and over:   1 tsp or 1 tbsp, depending on concentration and formulation/read label    2.   BENEFIBER    12 years and over:   1-2 tbsp 1-3 times daily     3.   CITRUCEL    12 years and over:  1 rounded tbsp 1-3 times daily    4.   Fiber rich foods, prunes, apricots, beans, lentils, prune juice, etc.    5.   Getting business done.... holding on until complete, psychologic holding

## 2020-02-08 ENCOUNTER — HEALTH MAINTENANCE LETTER (OUTPATIENT)
Age: 85
End: 2020-02-08

## 2020-04-22 DIAGNOSIS — I10 ESSENTIAL HYPERTENSION: ICD-10-CM

## 2020-04-23 RX ORDER — CHLORTHALIDONE 25 MG/1
25 TABLET ORAL DAILY
Qty: 90 TABLET | Refills: 0 | Status: SHIPPED | OUTPATIENT
Start: 2020-04-23 | End: 2020-07-21

## 2020-04-23 RX ORDER — ATENOLOL 50 MG/1
50 TABLET ORAL DAILY
Qty: 90 TABLET | Refills: 0 | Status: SHIPPED | OUTPATIENT
Start: 2020-04-23 | End: 2020-07-21

## 2020-06-09 ENCOUNTER — OFFICE VISIT (OUTPATIENT)
Dept: FAMILY MEDICINE | Facility: CLINIC | Age: 85
End: 2020-06-09
Payer: COMMERCIAL

## 2020-06-09 VITALS
HEIGHT: 64 IN | OXYGEN SATURATION: 100 % | DIASTOLIC BLOOD PRESSURE: 70 MMHG | SYSTOLIC BLOOD PRESSURE: 138 MMHG | BODY MASS INDEX: 25.74 KG/M2 | HEART RATE: 52 BPM | RESPIRATION RATE: 16 BRPM | WEIGHT: 150.8 LBS | TEMPERATURE: 97.5 F

## 2020-06-09 DIAGNOSIS — R82.90 NONSPECIFIC FINDING ON EXAMINATION OF URINE: ICD-10-CM

## 2020-06-09 DIAGNOSIS — K59.01 SLOW TRANSIT CONSTIPATION: Primary | ICD-10-CM

## 2020-06-09 DIAGNOSIS — N39.0 RECURRENT UTI: ICD-10-CM

## 2020-06-09 LAB
ALBUMIN UR-MCNC: NEGATIVE MG/DL
APPEARANCE UR: ABNORMAL
BILIRUB UR QL STRIP: NEGATIVE
COLOR UR AUTO: YELLOW
GLUCOSE UR STRIP-MCNC: NEGATIVE MG/DL
HGB UR QL STRIP: ABNORMAL
KETONES UR STRIP-MCNC: NEGATIVE MG/DL
LEUKOCYTE ESTERASE UR QL STRIP: ABNORMAL
NITRATE UR QL: NEGATIVE
NON-SQ EPI CELLS #/AREA URNS LPF: ABNORMAL /LPF
PH UR STRIP: 7.5 PH (ref 5–7)
RBC #/AREA URNS AUTO: ABNORMAL /HPF
SOURCE: ABNORMAL
SP GR UR STRIP: 1.01 (ref 1–1.03)
UROBILINOGEN UR STRIP-ACNC: 0.2 EU/DL (ref 0.2–1)
WBC #/AREA URNS AUTO: ABNORMAL /HPF

## 2020-06-09 PROCEDURE — 87086 URINE CULTURE/COLONY COUNT: CPT | Performed by: FAMILY MEDICINE

## 2020-06-09 PROCEDURE — 81001 URINALYSIS AUTO W/SCOPE: CPT | Performed by: FAMILY MEDICINE

## 2020-06-09 PROCEDURE — 99214 OFFICE O/P EST MOD 30 MIN: CPT | Performed by: FAMILY MEDICINE

## 2020-06-09 RX ORDER — SULFAMETHOXAZOLE/TRIMETHOPRIM 800-160 MG
1 TABLET ORAL 2 TIMES DAILY
Qty: 10 TABLET | Refills: 0 | Status: SHIPPED | OUTPATIENT
Start: 2020-06-09 | End: 2020-06-14

## 2020-06-09 ASSESSMENT — MIFFLIN-ST. JEOR: SCORE: 1125.05

## 2020-06-09 NOTE — PROGRESS NOTES
SUBJECTIVE:                                                    Caitlyn Wasserman is 84 year old female   Chief Complaint   Patient presents with     UTI     X ongoing issue pt. has a hx .     Constipation     X ongong issue pt. was seen in [facility] for same. pt. has been following instructions and now has started again last week.      URINARY TRACT SYMPTOMS      Duration: X ongoing    Description  frequency and urgency    Intensity:  Severe urgency    Accompanying signs and symptoms:  Fever/chills: no   Flank pain no   Nausea and vomiting: no   Vaginal symptoms: none  Abdominal/Pelvic Pain: YES- some cramping ? Due to constipation    History  History of frequent UTI's: YES  History of kidney stones: no   Sexually Active: no   Possibility of pregnancy: No    Precipitating or alleviating factors: None    Therapies tried and outcome: increase fluid intake   Outcome:       Problem list and histories reviewed & adjusted, as indicated.  Additional history: as documented    Patient Active Problem List   Diagnosis     Urinary frequency     LEFT HIP PAIN     Right shoulder pain     Numbness in right leg     Cataract     Recurrent UTI     HYPERLIPIDEMIA LDL GOAL <130     Kyphosis of cervical region     Neck pain, chronic     Health Care Home     Essential tremor     Pedal edema     Osteoarthritis, right knee     Chronic rhinitis     Essential hypertension     Dysuria     Chronic obstructive pulmonary disease, unspecified COPD type (H)     Osteoarthritis of both hands     Vaginal prolapse     Past Surgical History:   Procedure Laterality Date     HC ANTER COLPORRHAPHY,BLAD/VAGINA  6/04    Cystocele Repair,rectocele repair.     JOINT REPLACEMENT, HIP RT/LT  3/07, 4/07    Joint Replacement Hip /LT- dislocated in front repeat surgery 1 week later       Social History     Tobacco Use     Smoking status: Former Smoker     Packs/day: 1.00     Years: 20.00     Pack years: 20.00     Types: Cigarettes     Last attempt to quit: 1/1/1971      Years since quittin.4     Smokeless tobacco: Never Used   Substance Use Topics     Alcohol use: Yes     Comment: 1 wine a month maybe     Family History   Problem Relation Age of Onset     Cancer Mother         Bladder     Neurologic Disorder Mother         heriditary tremor     C.A.D. Father      Cerebrovascular Disease Father      Allergies Maternal Grandfather      Respiratory Maternal Grandfather         Asthma     Diabetes Paternal Grandmother         Type II     Cancer Brother         Multiple myloma     C.A.D. Brother      Cancer Sister         kidney cancer     Neurofibromatosis Son      Breast Cancer No family hx of          Current Outpatient Medications   Medication Sig Dispense Refill     acetaminophen-caffeine (EXCEDRIN TENSION HEADACHE) 500-65 MG TABS Take 2 tablets by mouth every 6 hours as needed for mild pain       atenolol (TENORMIN) 50 MG tablet Take 1 tablet (50 mg) by mouth daily 90 tablet 0     chlorthalidone (HYGROTON) 25 MG tablet Take 1 tablet (25 mg) by mouth daily Needs labs before next refill 90 tablet 0     ipratropium (ATROVENT) 0.03 % nasal spray Spray 2 sprays in nostril daily as needed for rhinitis 2 Box 3     psyllium (METAMUCIL) 28.3 % POWD 2 teaspoons a day in water       sulfamethoxazole-trimethoprim (BACTRIM DS) 800-160 MG tablet Take 1 tablet by mouth 2 times daily for 5 days 10 tablet 0     Allergies   Allergen Reactions     Codeine      Cortisone      Diclofenac Sodium      Hydrocodone Nausea and Vomiting     Oxycodone Nausea and Vomiting     Tramadol Nausea and Vomiting     Recent Labs   Lab Test 02/15/18  0928 17  0918 17  0440 16  1334  12  1215   *  --   --   --   --   --    HDL 62  --   --   --   --   --    TRIG 98  --   --   --   --   --    ALT 23  --   --   --   --   --    CR 0.88  --  0.95 0.93   < > 0.97   GFRESTIMATED 61  --  57* 58*   < > 56*   GFRESTBLACK 74  --  69 70   < > 68   POTASSIUM 4.0 3.7 4.1 4.0   < > 4.7   TSH  --  "  --   --  1.83  --  1.62    < > = values in this interval not displayed.      BP Readings from Last 3 Encounters:   06/09/20 138/70   02/04/20 136/74   01/17/20 (!) 156/51    Wt Readings from Last 3 Encounters:   06/09/20 68.4 kg (150 lb 12.8 oz)   02/04/20 66.7 kg (147 lb)   01/17/20 66.7 kg (147 lb)         ROS:  Constitutional, HEENT, cardiovascular, pulmonary, gi and gu systems are negative, except as otherwise noted.    OBJECTIVE:                                                    /70   Pulse 52   Temp 97.5  F (36.4  C) (Tympanic)   Resp 16   Ht 1.619 m (5' 3.75\")   Wt 68.4 kg (150 lb 12.8 oz)   SpO2 100%   BMI 26.09 kg/m    GENERAL APPEARANCE ADULT: Alert, no acute distress  ABDOMEN: soft, no organomegaly, masses or tenderness  PSYCH: mentation appears normal., affect and mood normal  Diagnostic Test Results:  Results for orders placed or performed in visit on 06/09/20   *UA reflex to Microscopic and Culture ([place] [facility] (except [place] and [place])     Status: Abnormal    Specimen: Midstream Urine   Result Value Ref Range    Color Urine Yellow     Appearance Urine Cloudy     Glucose Urine Negative NEG^Negative mg/dL    Bilirubin Urine Negative NEG^Negative    Ketones Urine Negative NEG^Negative mg/dL    Specific Gravity Urine 1.010 1.003 - 1.035    Blood Urine Trace (A) NEG^Negative    pH Urine 7.5 (H) 5.0 - 7.0 pH    Protein Albumin Urine Negative NEG^Negative mg/dL    Urobilinogen Urine 0.2 0.2 - 1.0 EU/dL    Nitrite Urine Negative NEG^Negative    Leukocyte Esterase Urine Large (A) NEG^Negative    Source Midstream Urine    Urine Microscopic     Status: Abnormal   Result Value Ref Range    WBC Urine 25-50 (A) OTO5^0 - 5 /HPF    RBC Urine O - 2 OTO2^O - 2 /HPF    Squamous Epithelial /LPF Urine Few FEW^Few /LPF          ASSESSMENT/PLAN:                                                    1. Slow transit constipation  Stress related, no known foods that set it off.  Smooth move " tea effective.  No concerning signs of obstruction, will get FIT  - *UA reflex to Microscopic and Culture ([place] and [facility] (except [place] and [place])  - Fecal colorectal cancer screen (FIT); Future  - Urine Microscopic    2. Recurrent UTI  Will notify in 3 days of culture results and adjust treatment per sensitivities.  - sulfamethoxazole-trimethoprim (BACTRIM DS) 800-160 MG tablet; Take 1 tablet by mouth 2 times daily for 5 days  Dispense: 10 tablet; Refill: 0  - Urine Culture Aerobic Bacterial    Stephanie Salgado MD  [facility]

## 2020-06-09 NOTE — PATIENT INSTRUCTIONS
Constipation:    Have fiber and fluid with every meal.  Mix it in with the other food of the meal (1 bite of meat, 1 bite of fiber, 1 bite of veg,  a drink of fluid, 1 bite of fiber, etc.).      FIBER daily  1/3 dose  with every meal so food bolus is fiber filled.  1.    METAMUCIL    12 years and over:   1 tsp or 1 tbsp, depending on concentration and formulation/read label    2.   BENEFIBER    12 years and over:   1-2 tbsp 1-3 times daily     3.   CITRUCEL    12 years and over:  1 rounded tbsp 1-3 times daily    Constipation:  OSMOTIC AND LUBRICANT LAXATIVES  1.  MIRALAX (polyethylene glycol 3350 powder)     Adults:   17 gm of powder per day in 8 oz of water x 1 week    2.   MINERAL OIL, olive, canola, vegetable, safflower, peanut, any oil   One teaspoon with every meal.    3.   MAGNESIUM HYDROXIDE (milk of magnesia)    once daily    4. SmoothMove tea, seep gently, is very powerful.    5.  Take care of things, gut is tied to emotions and if worrying or not getting something done will not let colon relax.

## 2020-06-10 LAB
BACTERIA SPEC CULT: NORMAL
Lab: NORMAL
SPECIMEN SOURCE: NORMAL

## 2020-06-11 ENCOUNTER — TELEPHONE (OUTPATIENT)
Dept: FAMILY MEDICINE | Facility: CLINIC | Age: 85
End: 2020-06-11

## 2020-06-11 NOTE — TELEPHONE ENCOUNTER
Patient called stating that her FIT test kit she received was not complete and would just like a new test.  She is being put on the [facility] schedule to pick this up.

## 2020-06-12 DIAGNOSIS — K59.01 SLOW TRANSIT CONSTIPATION: ICD-10-CM

## 2020-06-12 PROCEDURE — 82274 ASSAY TEST FOR BLOOD FECAL: CPT | Performed by: FAMILY MEDICINE

## 2020-06-16 LAB — HEMOCCULT STL QL IA: NEGATIVE

## 2020-07-21 ENCOUNTER — OFFICE VISIT (OUTPATIENT)
Dept: FAMILY MEDICINE | Facility: CLINIC | Age: 85
End: 2020-07-21
Payer: COMMERCIAL

## 2020-07-21 VITALS
WEIGHT: 147 LBS | OXYGEN SATURATION: 100 % | SYSTOLIC BLOOD PRESSURE: 130 MMHG | RESPIRATION RATE: 16 BRPM | HEIGHT: 64 IN | HEART RATE: 49 BPM | TEMPERATURE: 97.6 F | DIASTOLIC BLOOD PRESSURE: 80 MMHG | BODY MASS INDEX: 25.1 KG/M2

## 2020-07-21 DIAGNOSIS — R30.0 DYSURIA: Primary | ICD-10-CM

## 2020-07-21 DIAGNOSIS — R82.90 NONSPECIFIC FINDING ON EXAMINATION OF URINE: ICD-10-CM

## 2020-07-21 DIAGNOSIS — I10 ESSENTIAL HYPERTENSION: ICD-10-CM

## 2020-07-21 DIAGNOSIS — N39.0 URINARY TRACT INFECTION WITHOUT HEMATURIA, SITE UNSPECIFIED: ICD-10-CM

## 2020-07-21 DIAGNOSIS — M25.551 HIP PAIN, RIGHT: ICD-10-CM

## 2020-07-21 DIAGNOSIS — J30.89 CHRONIC NON-SEASONAL ALLERGIC RHINITIS: ICD-10-CM

## 2020-07-21 LAB
ALBUMIN UR-MCNC: NEGATIVE MG/DL
ANION GAP SERPL CALCULATED.3IONS-SCNC: 2 MMOL/L (ref 3–14)
APPEARANCE UR: ABNORMAL
BACTERIA #/AREA URNS HPF: ABNORMAL /HPF
BILIRUB UR QL STRIP: NEGATIVE
BUN SERPL-MCNC: 14 MG/DL (ref 7–30)
CALCIUM SERPL-MCNC: 8.8 MG/DL (ref 8.5–10.1)
CHLORIDE SERPL-SCNC: 98 MMOL/L (ref 94–109)
CO2 SERPL-SCNC: 32 MMOL/L (ref 20–32)
COLOR UR AUTO: YELLOW
CREAT SERPL-MCNC: 0.86 MG/DL (ref 0.52–1.04)
GFR SERPL CREATININE-BSD FRML MDRD: 62 ML/MIN/{1.73_M2}
GLUCOSE SERPL-MCNC: 80 MG/DL (ref 70–99)
GLUCOSE UR STRIP-MCNC: NEGATIVE MG/DL
HGB UR QL STRIP: ABNORMAL
KETONES UR STRIP-MCNC: NEGATIVE MG/DL
LEUKOCYTE ESTERASE UR QL STRIP: ABNORMAL
NITRATE UR QL: NEGATIVE
NON-SQ EPI CELLS #/AREA URNS LPF: ABNORMAL /LPF
PH UR STRIP: 7 PH (ref 5–7)
POTASSIUM SERPL-SCNC: 3.3 MMOL/L (ref 3.4–5.3)
RBC #/AREA URNS AUTO: ABNORMAL /HPF
SODIUM SERPL-SCNC: 132 MMOL/L (ref 133–144)
SOURCE: ABNORMAL
SP GR UR STRIP: 1.01 (ref 1–1.03)
UROBILINOGEN UR STRIP-ACNC: 0.2 EU/DL (ref 0.2–1)
WBC #/AREA URNS AUTO: ABNORMAL /HPF

## 2020-07-21 PROCEDURE — 80048 BASIC METABOLIC PNL TOTAL CA: CPT | Performed by: NURSE PRACTITIONER

## 2020-07-21 PROCEDURE — 81001 URINALYSIS AUTO W/SCOPE: CPT | Performed by: NURSE PRACTITIONER

## 2020-07-21 PROCEDURE — 99214 OFFICE O/P EST MOD 30 MIN: CPT | Mod: 25 | Performed by: NURSE PRACTITIONER

## 2020-07-21 PROCEDURE — G0438 PPPS, INITIAL VISIT: HCPCS | Performed by: NURSE PRACTITIONER

## 2020-07-21 PROCEDURE — 87086 URINE CULTURE/COLONY COUNT: CPT | Performed by: NURSE PRACTITIONER

## 2020-07-21 PROCEDURE — 36415 COLL VENOUS BLD VENIPUNCTURE: CPT | Performed by: NURSE PRACTITIONER

## 2020-07-21 RX ORDER — SULFAMETHOXAZOLE/TRIMETHOPRIM 800-160 MG
1 TABLET ORAL 2 TIMES DAILY
Qty: 14 TABLET | Refills: 0 | Status: SHIPPED | OUTPATIENT
Start: 2020-07-21 | End: 2020-08-11

## 2020-07-21 RX ORDER — ATENOLOL 50 MG/1
50 TABLET ORAL DAILY
Qty: 90 TABLET | Refills: 1 | Status: SHIPPED | OUTPATIENT
Start: 2020-07-21 | End: 2020-08-11

## 2020-07-21 RX ORDER — CHLORTHALIDONE 25 MG/1
25 TABLET ORAL DAILY
Qty: 90 TABLET | Refills: 1 | Status: SHIPPED | OUTPATIENT
Start: 2020-07-21 | End: 2020-08-11

## 2020-07-21 RX ORDER — IPRATROPIUM BROMIDE 21 UG/1
2 SPRAY, METERED NASAL DAILY PRN
Qty: 2 BOX | Refills: 3 | Status: SHIPPED | OUTPATIENT
Start: 2020-07-21 | End: 2022-01-17

## 2020-07-21 ASSESSMENT — MIFFLIN-ST. JEOR: SCORE: 1107.82

## 2020-07-21 NOTE — PATIENT INSTRUCTIONS
Bring us a copy of advanced care directives when completed.  Will be notified of pending labs.  Follow up with orthopedics for right hip pain and gynecology for prolapse and frequent urinary complaints.  Take antibiotic as prescribe until urine culture is available.  Push fluids.          Preventive Health Recommendations    See your health care provider every year to    Review health changes.     Discuss preventive care.      Review your medicines if your doctor has prescribed any.    You no longer need a yearly Pap test unless you've had an abnormal Pap test in the past 10 years. If you have vaginal symptoms, such as bleeding or discharge, be sure to talk with your provider about a Pap test.    Every 1 to 2 years, have a mammogram.  If you are over 69, talk with your health care provider about whether or not you want to continue having screening mammograms.    Every 10 years, have a colonoscopy. Or, have a yearly FIT test (stool test). These exams will check for colon cancer.     Have a cholesterol test every 5 years, or more often if your doctor advises it.     Have a diabetes test (fasting glucose) every three years. If you are at risk for diabetes, you should have this test more often.     At age 65, have a bone density scan (DEXA) to check for osteoporosis (brittle bone disease).    Shots:    Get a flu shot each year.    Get a tetanus shot every 10 years.    Talk to your doctor about your pneumonia vaccines. There are now two you should receive - Pneumovax (PPSV 23) and Prevnar (PCV 13).    Talk to your pharmacist about the shingles vaccine.    Talk to your doctor about the hepatitis B vaccine.    Nutrition:     Eat at least 5 servings of fruits and vegetables each day.    Eat whole-grain bread, whole-wheat pasta and brown rice instead of white grains and rice.    Get adequate Calcium and Vitamin D.     Lifestyle    Exercise at least 150 minutes a week (30 minutes a day, 5 days a week). This will help you  control your weight and prevent disease.    Limit alcohol to one drink per day.    No smoking.     Wear sunscreen to prevent skin cancer.     See your dentist twice a year for an exam and cleaning.    See your eye doctor every 1 to 2 years to screen for conditions such as glaucoma, macular degeneration and cataracts.    Personalized Prevention Plan  You are due for the preventive services outlined below.  Your care team is available to assist you in scheduling these services.  If you have already completed any of these items, please share that information with your care team to update in your medical record.  Health Maintenance Due   Topic Date Due     Annual Wellness Visit  10/07/2002     Zoster (Shingles) Vaccine (2 of 3) 11/05/2009     Discuss Advance Care Planning  11/04/2016     Osteoporosis Screening  12/17/2016     FALL RISK ASSESSMENT  02/15/2019

## 2020-07-21 NOTE — PROGRESS NOTES
"Subjective     Caitlyn Wasserman is a 84 year old female who presents to clinic today for the following health issues:    HPI       Hypertension Follow-up      Do you check your blood pressure regularly outside of the clinic? No     Are you following a low salt diet? No    Are your blood pressures ever more than 140 on the top number (systolic) OR more   than 90 on the bottom number (diastolic), for example 140/90? No      How many servings of fruits and vegetables do you eat daily?  2-3    On average, how many sweetened beverages do you drink each day (Examples: soda, juice, sweet tea, etc.  Do NOT count diet or artificially sweetened beverages)?   0    How many days per week do you exercise enough to make your heart beat faster? 3 or less    How many minutes a day do you exercise enough to make your heart beat faster? 9 or less    How many days per week do you miss taking your medication? 0    Annual Wellness Visit    Are you in the first 12 months of your Medicare Part B coverage?  No    Physical Health:    In general, how would you rate your overall physical health? good    Outside of work, how many days during the week do you exercise?none    Outside of work, approximately how many minutes a day do you exercise?not applicable    If you drink alcohol do you typically have >3 drinks per day or >7 drinks per week? No    Do you usually eat at least 4 servings of fruit and vegetables a day, include whole grains & fiber and avoid regularly eating high fat or \"junk\" foods? Yes    Do you have any problems taking medications regularly? No    Do you have any side effects from medications? none    Needs assistance for the following daily activities: no assistance needed    Which of the following safety concerns are present in your home?  none identified     Hearing impairment: No    In the past 6 months, have you been bothered by leaking of urine? yes    Mental Health:    In general, how would you rate your overall mental or " emotional health? good  PHQ-2 Score: 0    Do you feel safe in your environment? Yes    Have you ever done Advance Care Planning? (For example, a Health Directive, POLST, or a discussion with a medical provider or your loved ones about your wishes)? No, advance care planning information given to patient to review.  Patient plans to discuss their wishes with loved ones or provider.      Fall risk:  Fallen 2 or more times in the past year?: No  Any fall with injury in the past year?: No    Cognitive Screenin) Repeat 3 items (Leader, Season, Table)    2) Clock draw: NORMAL  3) 3 item recall: Recalls 2 objects   Results: NORMAL clock, 1-2 items recalled: COGNITIVE IMPAIRMENT LESS LIKELY    Mini-CogTM Copyright S Anne. Licensed by the author for use in [employer]; reprinted with permission (adolfo@[email or web address]). All rights reserved.      Do you have sleep apnea, excessive snoring or daytime drowsiness?: no    Current providers sharing in care for this patient include:   Patient Care Team:  Cindy Flor APRN CNP as PCP - General (Nurse Practitioner - Family)  Stephanie Salgado MD as Assigned PCP          URINARY TRACT SYMPTOMS      Duration: 1 week    Description  frequency    Intensity:  moderate    Accompanying signs and symptoms:  Fever/chills: no   Flank pain no   Nausea and vomiting: no   Vaginal symptoms: itching  Abdominal/Pelvic Pain: no     History  History of frequent UTI's: YES  History of kidney stones: no   Sexually Active: no   Possibility of pregnancy: No    Precipitating or alleviating factors: history of prolapsed uterine    Therapies tried and outcome: pessary   Outcome: not helped    She has had prolapse surgery many years ago. Has had recent concerns with UTI's. Cultures do appear to come back negative.   She has seen GYN and the pessary tried did not help; she will consult with them again.  Right hip has been bothering her. She has had left total hip replaced in the past.  No  other acute concerns voiced.    Patient Active Problem List   Diagnosis     Urinary frequency     LEFT HIP PAIN     Right shoulder pain     Numbness in right leg     Cataract     Recurrent UTI     HYPERLIPIDEMIA LDL GOAL <130     Kyphosis of cervical region     Neck pain, chronic     Health Care Home     Essential tremor     Pedal edema     Osteoarthritis, right knee     Chronic rhinitis     Essential hypertension     Dysuria     Chronic obstructive pulmonary disease, unspecified COPD type (H)     Osteoarthritis of both hands     Vaginal prolapse     Past Surgical History:   Procedure Laterality Date     HC ANTER COLPORRHAPHY,BLAD/VAGINA      Cystocele Repair,rectocele repair.     JOINT REPLACEMENT, HIP RT/LT  3/07,     Joint Replacement Hip /LT- dislocated in front repeat surgery 1 week later       Social History     Tobacco Use     Smoking status: Former Smoker     Packs/day: 1.00     Years: 20.00     Pack years: 20.00     Types: Cigarettes     Last attempt to quit: 1971     Years since quittin.5     Smokeless tobacco: Never Used   Substance Use Topics     Alcohol use: Yes     Comment: 1 wine a month maybe     Family History   Problem Relation Age of Onset     Cancer Mother         Bladder     Neurologic Disorder Mother         heriditary tremor     C.A.D. Father      Cerebrovascular Disease Father      Allergies Maternal Grandfather      Respiratory Maternal Grandfather         Asthma     Diabetes Paternal Grandmother         Type II     Cancer Brother         Multiple myloma     C.A.D. Brother      Cancer Sister         kidney cancer     Neurofibromatosis Son      Breast Cancer No family hx of          Current Outpatient Medications   Medication Sig Dispense Refill     acetaminophen-caffeine (EXCEDRIN TENSION HEADACHE) 500-65 MG TABS Take 2 tablets by mouth every 6 hours as needed for mild pain       atenolol (TENORMIN) 50 MG tablet Take 1 tablet (50 mg) by mouth daily 90 tablet 1      "chlorthalidone (HYGROTON) 25 MG tablet Take 1 tablet (25 mg) by mouth daily Needs labs before next refill 90 tablet 1     ipratropium (ATROVENT) 0.03 % nasal spray Spray 2 sprays in nostril daily as needed for rhinitis 2 Box 3     psyllium (METAMUCIL) 28.3 % POWD 2 teaspoons a day in water       sulfamethoxazole-trimethoprim (BACTRIM DS) 800-160 MG tablet Take 1 tablet by mouth 2 times daily 14 tablet 0     Allergies   Allergen Reactions     Codeine      Cortisone      Diclofenac Sodium      Hydrocodone Nausea and Vomiting     Oxycodone Nausea and Vomiting     Tramadol Nausea and Vomiting     Recent Labs   Lab Test 02/15/18  0928 01/12/17  0918 01/05/17  0440 11/08/16  1334  09/12/12  1215   *  --   --   --   --   --    HDL 62  --   --   --   --   --    TRIG 98  --   --   --   --   --    ALT 23  --   --   --   --   --    CR 0.88  --  0.95 0.93   < > 0.97   GFRESTIMATED 61  --  57* 58*   < > 56*   GFRESTBLACK 74  --  69 70   < > 68   POTASSIUM 4.0 3.7 4.1 4.0   < > 4.7   TSH  --   --   --  1.83  --  1.62    < > = values in this interval not displayed.      BP Readings from Last 3 Encounters:   07/21/20 130/80   06/09/20 138/70   02/04/20 136/74    Wt Readings from Last 3 Encounters:   07/21/20 66.7 kg (147 lb)   06/09/20 68.4 kg (150 lb 12.8 oz)   02/04/20 66.7 kg (147 lb)                    Reviewed and updated as needed this visit by Provider         Review of Systems   Constitutional, HEENT, cardiovascular, pulmonary, gi and gu systems are negative, except as otherwise noted.      Objective    /80 (BP Location: Left arm, Patient Position: Chair, Cuff Size: Adult Regular)   Pulse (!) 49   Temp 97.6  F (36.4  C) (Tympanic)   Resp 16   Ht 1.619 m (5' 3.75\")   Wt 66.7 kg (147 lb)   SpO2 100%   BMI 25.43 kg/m    Body mass index is 25.43 kg/m .  Physical Exam   GENERAL: healthy, alert and no distress  NECK: no adenopathy, no asymmetry  RESP: lungs clear to auscultation - no rales, rhonchi or " wheezes  CV: regular rate and rhythm, normal S1 S2, no S3 or S4, no murmur  ABDOMEN: soft, nontender, no hepatosplenomegaly, no masses and bowel sounds normal, no CVA tenderness  MS: no gross musculoskeletal defects noted      Diagnostic Test Results:  Labs reviewed in Epic  Results for orders placed or performed in visit on 07/21/20 (from the past 24 hour(s))   UA reflex to Microscopic and Culture    Specimen: Midstream Urine   Result Value Ref Range    Color Urine Yellow     Appearance Urine Slightly Cloudy     Glucose Urine Negative NEG^Negative mg/dL    Bilirubin Urine Negative NEG^Negative    Ketones Urine Negative NEG^Negative mg/dL    Specific Gravity Urine 1.010 1.003 - 1.035    Blood Urine Trace (A) NEG^Negative    pH Urine 7.0 5.0 - 7.0 pH    Protein Albumin Urine Negative NEG^Negative mg/dL    Urobilinogen Urine 0.2 0.2 - 1.0 EU/dL    Nitrite Urine Negative NEG^Negative    Leukocyte Esterase Urine Large (A) NEG^Negative    Source Midstream Urine    Urine Microscopic   Result Value Ref Range    WBC Urine 5-10 (A) OTO5^0 - 5 /HPF    RBC Urine O - 2 OTO2^O - 2 /HPF    Squamous Epithelial /LPF Urine Many (A) FEW^Few /LPF    Bacteria Urine Moderate (A) NEG^Negative /HPF           Assessment & Plan     1. Dysuria    - UA reflex to Microscopic and Culture  - Urine Microscopic    2. Essential hypertension    - atenolol (TENORMIN) 50 MG tablet; Take 1 tablet (50 mg) by mouth daily  Dispense: 90 tablet; Refill: 1  - chlorthalidone (HYGROTON) 25 MG tablet; Take 1 tablet (25 mg) by mouth daily Needs labs before next refill  Dispense: 90 tablet; Refill: 1  - Basic metabolic panel  (Ca, Cl, CO2, Creat, Gluc, K, Na, BUN)    3. Chronic non-seasonal allergic rhinitis    - ipratropium (ATROVENT) 0.03 % nasal spray; Spray 2 sprays in nostril daily as needed for rhinitis  Dispense: 2 Box; Refill: 3    4. Hip pain, right      5. Nonspecific finding on examination of urine    - Urine Culture Aerobic Bacterial    6. Urinary  "tract infection without hematuria, site unspecified    - sulfamethoxazole-trimethoprim (BACTRIM DS) 800-160 MG tablet; Take 1 tablet by mouth 2 times daily  Dispense: 14 tablet; Refill: 0   Discussed how to take the medication(s), expected outcomes, potential side effects.    BMI:   Estimated body mass index is 25.43 kg/m  as calculated from the following:    Height as of this encounter: 1.619 m (5' 3.75\").    Weight as of this encounter: 66.7 kg (147 lb).           See Patient Instructions  Patient Instructions     Bring us a copy of advanced care directives when completed.  Will be notified of pending labs.  Follow up with orthopedics for right hip pain and gynecology for prolapse and frequent urinary complaints.  Take antibiotic as prescribe until urine culture is available.  Push fluids.          Preventive Health Recommendations    See your health care provider every year to    Review health changes.     Discuss preventive care.      Review your medicines if your doctor has prescribed any.    You no longer need a yearly Pap test unless you've had an abnormal Pap test in the past 10 years. If you have vaginal symptoms, such as bleeding or discharge, be sure to talk with your provider about a Pap test.    Every 1 to 2 years, have a mammogram.  If you are over 69, talk with your health care provider about whether or not you want to continue having screening mammograms.    Every 10 years, have a colonoscopy. Or, have a yearly FIT test (stool test). These exams will check for colon cancer.     Have a cholesterol test every 5 years, or more often if your doctor advises it.     Have a diabetes test (fasting glucose) every three years. If you are at risk for diabetes, you should have this test more often.     At age 65, have a bone density scan (DEXA) to check for osteoporosis (brittle bone disease).    Shots:    Get a flu shot each year.    Get a tetanus shot every 10 years.    Talk to your doctor about your pneumonia " vaccines. There are now two you should receive - Pneumovax (PPSV 23) and Prevnar (PCV 13).    Talk to your pharmacist about the shingles vaccine.    Talk to your doctor about the hepatitis B vaccine.    Nutrition:     Eat at least 5 servings of fruits and vegetables each day.    Eat whole-grain bread, whole-wheat pasta and brown rice instead of white grains and rice.    Get adequate Calcium and Vitamin D.     Lifestyle    Exercise at least 150 minutes a week (30 minutes a day, 5 days a week). This will help you control your weight and prevent disease.    Limit alcohol to one drink per day.    No smoking.     Wear sunscreen to prevent skin cancer.     See your dentist twice a year for an exam and cleaning.    See your eye doctor every 1 to 2 years to screen for conditions such as glaucoma, macular degeneration and cataracts.    Personalized Prevention Plan  You are due for the preventive services outlined below.  Your care team is available to assist you in scheduling these services.  If you have already completed any of these items, please share that information with your care team to update in your medical record.  Health Maintenance Due   Topic Date Due     Annual Wellness Visit  10/07/2002     Zoster (Shingles) Vaccine (2 of 3) 11/05/2009     Discuss Advance Care Planning  11/04/2016     Osteoporosis Screening  12/17/2016     FALL RISK ASSESSMENT  02/15/2019       Return for or sooner if symptoms persist or worsen, Routine Visit.    SUDHAKAR Yung [facility]

## 2020-07-22 LAB
BACTERIA SPEC CULT: NORMAL
BACTERIA SPEC CULT: NORMAL
SPECIMEN SOURCE: NORMAL

## 2020-07-27 ENCOUNTER — TRANSFERRED RECORDS (OUTPATIENT)
Dept: HEALTH INFORMATION MANAGEMENT | Facility: CLINIC | Age: 85
End: 2020-07-27

## 2020-07-28 ENCOUNTER — TELEPHONE (OUTPATIENT)
Dept: OBGYN | Facility: CLINIC | Age: 85
End: 2020-07-28

## 2020-07-28 NOTE — TELEPHONE ENCOUNTER
Reason for Call:  Other call back    Detailed comments: Pt states she saw Dr. Cee in Jan to discuss protruding bladder.  States she is now having issues with hips and has a surgery scheduled.  States bladder seems to be doing better - just when she sits down and at the end of the day.  Wondering about for hip surgery - when they need to do a catheter can this still be done?    Phone Number Patient can be reached at: Home number on file [phone] (home) or 6connect pt back a message.    Best Time:     Can we leave a detailed message on this number? YES    Call taken on 7/28/2020 at 11:40 AM by Lisa Collier

## 2020-07-29 NOTE — TELEPHONE ENCOUNTER
Pt calling back - states she is home now if RN can try her back.    -Lisa Collier  [facility]

## 2020-07-29 NOTE — TELEPHONE ENCOUNTER
Patient advised a diaz catheter can be placed for surgery as needed even with prolapse.    Pt in agreement and reports understanding.    Licha Fields   [facility]  RN

## 2020-07-29 NOTE — TELEPHONE ENCOUNTER
Return call to pt.  Unable to reach.  Left message for pt to return call to clinic.      Licha Fields   [facility]  RN

## 2020-08-03 ENCOUNTER — HOSPITAL ENCOUNTER (INPATIENT)
Facility: CLINIC | Age: 85
Setting detail: SURGERY ADMIT
End: 2020-08-03
Attending: ORTHOPAEDIC SURGERY | Admitting: ORTHOPAEDIC SURGERY
Payer: COMMERCIAL

## 2020-08-03 DIAGNOSIS — Z11.59 ENCOUNTER FOR SCREENING FOR OTHER VIRAL DISEASES: Primary | ICD-10-CM

## 2020-08-11 ENCOUNTER — OFFICE VISIT (OUTPATIENT)
Dept: FAMILY MEDICINE | Facility: CLINIC | Age: 85
End: 2020-08-11
Payer: COMMERCIAL

## 2020-08-11 VITALS
WEIGHT: 148 LBS | RESPIRATION RATE: 16 BRPM | HEIGHT: 64 IN | BODY MASS INDEX: 25.27 KG/M2 | HEART RATE: 47 BPM | OXYGEN SATURATION: 97 % | TEMPERATURE: 97.1 F | SYSTOLIC BLOOD PRESSURE: 138 MMHG | DIASTOLIC BLOOD PRESSURE: 80 MMHG

## 2020-08-11 DIAGNOSIS — R00.1 SINUS BRADYCARDIA: ICD-10-CM

## 2020-08-11 DIAGNOSIS — Z01.818 PREOP GENERAL PHYSICAL EXAM: Primary | ICD-10-CM

## 2020-08-11 DIAGNOSIS — M16.11 OSTEOARTHRITIS OF RIGHT HIP, UNSPECIFIED OSTEOARTHRITIS TYPE: ICD-10-CM

## 2020-08-11 DIAGNOSIS — I10 ESSENTIAL HYPERTENSION: ICD-10-CM

## 2020-08-11 LAB
ANION GAP SERPL CALCULATED.3IONS-SCNC: 4 MMOL/L (ref 3–14)
BUN SERPL-MCNC: 12 MG/DL (ref 7–30)
CALCIUM SERPL-MCNC: 9.2 MG/DL (ref 8.5–10.1)
CHLORIDE SERPL-SCNC: 98 MMOL/L (ref 94–109)
CO2 SERPL-SCNC: 32 MMOL/L (ref 20–32)
CREAT SERPL-MCNC: 0.83 MG/DL (ref 0.52–1.04)
ERYTHROCYTE [DISTWIDTH] IN BLOOD BY AUTOMATED COUNT: 12.5 % (ref 10–15)
GFR SERPL CREATININE-BSD FRML MDRD: 66 ML/MIN/{1.73_M2}
GLUCOSE SERPL-MCNC: 85 MG/DL (ref 70–99)
HCT VFR BLD AUTO: 42.6 % (ref 35–47)
HGB BLD-MCNC: 13.9 G/DL (ref 11.7–15.7)
MCH RBC QN AUTO: 30.7 PG (ref 26.5–33)
MCHC RBC AUTO-ENTMCNC: 32.6 G/DL (ref 31.5–36.5)
MCV RBC AUTO: 94 FL (ref 78–100)
MRSA DNA SPEC QL NAA+PROBE: NEGATIVE
PLATELET # BLD AUTO: 230 10E9/L (ref 150–450)
POTASSIUM SERPL-SCNC: 3.9 MMOL/L (ref 3.4–5.3)
RBC # BLD AUTO: 4.53 10E12/L (ref 3.8–5.2)
SODIUM SERPL-SCNC: 134 MMOL/L (ref 133–144)
SPECIMEN SOURCE: NORMAL
WBC # BLD AUTO: 6.2 10E9/L (ref 4–11)

## 2020-08-11 PROCEDURE — 93000 ELECTROCARDIOGRAM COMPLETE: CPT | Performed by: NURSE PRACTITIONER

## 2020-08-11 PROCEDURE — 87641 MR-STAPH DNA AMP PROBE: CPT | Performed by: NURSE PRACTITIONER

## 2020-08-11 PROCEDURE — 36415 COLL VENOUS BLD VENIPUNCTURE: CPT | Performed by: NURSE PRACTITIONER

## 2020-08-11 PROCEDURE — 85027 COMPLETE CBC AUTOMATED: CPT | Performed by: NURSE PRACTITIONER

## 2020-08-11 PROCEDURE — 80048 BASIC METABOLIC PNL TOTAL CA: CPT | Performed by: NURSE PRACTITIONER

## 2020-08-11 PROCEDURE — 99215 OFFICE O/P EST HI 40 MIN: CPT | Performed by: NURSE PRACTITIONER

## 2020-08-11 PROCEDURE — 87640 STAPH A DNA AMP PROBE: CPT | Mod: 59 | Performed by: NURSE PRACTITIONER

## 2020-08-11 RX ORDER — ATENOLOL 50 MG/1
25 TABLET ORAL DAILY
Qty: 90 TABLET | Refills: 1 | COMMUNITY
Start: 2020-08-11 | End: 2021-01-08

## 2020-08-11 RX ORDER — CHLORTHALIDONE 25 MG/1
50 TABLET ORAL DAILY
Qty: 90 TABLET | Refills: 1 | COMMUNITY
Start: 2020-08-11 | End: 2020-08-27

## 2020-08-11 ASSESSMENT — MIFFLIN-ST. JEOR: SCORE: 1112.35

## 2020-08-11 NOTE — PROGRESS NOTES
[facility]  [address]  [facility] [address]  [phone]  Dept: [phone]    PRE-OP EVALUATION:  Today's date: 2020    Caitlyn Wasserman (: 1935) presents for pre-operative evaluation assessment as requested by Dr. Shah.  She requires evaluation and anesthesia risk assessment prior to undergoing surgery/procedure for treatment of Hip pain .    Proposed Surgery/ Procedure: Total right hip.  Date of Surgery/ Procedure: 2020  Time of Surgery/ Procedure: 2pm  Hospital/Surgical Facility: [facility]  Surgery Fax Number: Note does not need to be faxed, will be available electronically in Epic.  Primary Physician: Cindy Flor  Type of Anesthesia Anticipated: General    Preoperative Questionnaire:   No - Have you ever had a heart attack or stroke?  No - Have you ever had surgery on your heart or blood vessels, such as a stent, coronary (heart) bypass, or surgery on an artery in the head, neck, heart, or legs?  No - Do you have chest pain when you are physically active?  No - Do you have a history of heart failure?  No - Do you currently have a cold, bronchitis, or symptoms of other respiratory (head and chest) infections?  YES - DO YOU HAVE A COUGH, SHORTNESS OF BREATH, OR WHEEZING? Has an occasional bronchitis, no present symptoms  No - Do you or anyone in your family have a history of blood clots?  No - Do you or anyone in your family have a serious bleeding problem, such as long-lasting bleeding after surgeries or cuts?  YES - HAVE YOU EVERY HAD ANEMIA OR BEEN TOLD TO TAKE IRON PILLS? Childhood and pregnancy  No - Have you had any abnormal blood loss such as black, tarry or bloody stools, or abnormal vaginal bleeding?  No - Have you ever had a blood transfusion?  Yes - Are you willing to have a blood transfusion if it is medically needed before, during, or after your surgery?  No - Have you or anyone in your family ever had problems with anesthesia  (sedation for surgery)?  No - Do you have sleep apnea, excessive snoring, or daytime drowsiness?   No - Do you have any artifical heart valves or other implanted medical devices, such as a pacemaker, defibrillator, or continuous glucose monitor?  YES - DO YOU HAVE ANY ARTIFICIAL JOINTS? Right knee and left hip  No - Are you allergic to latex?  No - Is there any chance that you may be pregnant?    Patient has a Health Care Directive or Living Will:  YES sending updated one to scanning today.    HPI:     HPI related to upcoming procedure: she has had ongoing issues with right hip pain. She's had previous left total hip replacement. Scheduled for right hip replacement and she's hopeful that will help her alleviate her pain and help her regain her activity level.      See problem list for active medical problems.  Problems all longstanding and stable, except as noted/documented.  See ROS for pertinent symptoms related to these conditions.      MEDICAL HISTORY:     Patient Active Problem List    Diagnosis Date Noted     Vaginal prolapse 01/17/2020     Priority: Medium     she has a h/o TVT, anterior, posterior and enterocele repair, all done with mesh, by Dr. Pennington in 2004       Osteoarthritis of both hands 05/07/2019     Priority: Medium     Chronic obstructive pulmonary disease, unspecified COPD type (H) 01/10/2017     Priority: Medium     Chronic rhinitis 12/04/2015     Priority: Medium     Essential hypertension 12/04/2015     Priority: Medium     Dysuria 12/04/2015     Priority: Medium     Osteoarthritis, right knee 10/08/2014     Priority: Medium     Pedal edema 12/03/2013     Priority: Medium     Essential tremor 11/15/2013     Priority: Medium     Health Care Home 12/03/2012     Priority: Medium     Roseann Obando RN-PHN  A / [facility]   [phone]    DX V65.8 REPLACED WITH 02261 HEALTH CARE HOME (04/08/2013)       Kyphosis of cervical region 11/28/2011     Priority: Medium     Neck  pain, chronic 11/28/2011     Priority: Medium     HYPERLIPIDEMIA LDL GOAL <130 10/31/2010     Priority: Medium     Recurrent UTI 09/20/2010     Priority: Medium     Cataract 07/06/2009     Priority: Medium     Utility update for deleted IMO code  Imo Update utility       Right shoulder pain 10/27/2008     Priority: Medium     Numbness in right leg 10/27/2008     Priority: Medium     LEFT HIP PAIN 03/23/2006     Priority: Medium     Urinary frequency 06/09/2005     Priority: Medium      Past Medical History:   Diagnosis Date     Migraine, unspecified, without mention of intractable migraine without mention of status migrainosus      Other urinary incontinence      Pure hypercholesterolemia      Unspecified essential hypertension      Past Surgical History:   Procedure Laterality Date     AS TOTAL KNEE ARTHROPLASTY Right 2011     HC ANTER COLPORRHAPHY,BLAD/VAGINA  6/04    Cystocele Repair,rectocele repair.     JOINT REPLACEMENT, HIP RT/LT  3/07, 4/07    Joint Replacement Hip /LT- dislocated in front repeat surgery 1 week later     Current Outpatient Medications   Medication Sig Dispense Refill     acetaminophen-caffeine (EXCEDRIN TENSION HEADACHE) 500-65 MG TABS Take 2 tablets by mouth every 6 hours as needed for mild pain       atenolol (TENORMIN) 50 MG tablet Take 0.5 tablets (25 mg) by mouth daily 90 tablet 1     chlorthalidone (HYGROTON) 25 MG tablet Take 2 tablets (50 mg) by mouth daily Needs labs before next refill 90 tablet 1     ipratropium (ATROVENT) 0.03 % nasal spray Spray 2 sprays in nostril daily as needed for rhinitis 2 Box 3     psyllium (METAMUCIL) 28.3 % POWD 2 teaspoons a day in water       OTC products: Aspirin PRN    Allergies   Allergen Reactions     Codeine      Cortisone      Diclofenac Sodium      Hydrocodone Nausea and Vomiting     Oxycodone Nausea and Vomiting     Tramadol Nausea and Vomiting      Latex Allergy: NO    Social History     Tobacco Use     Smoking status: Former Smoker      "Packs/day: 1.00     Years: 20.00     Pack years: 20.00     Types: Cigarettes     Last attempt to quit: 1971     Years since quittin.6     Smokeless tobacco: Never Used   Substance Use Topics     Alcohol use: Yes     Comment: 1 wine a month maybe     History   Drug Use No       REVIEW OF SYSTEMS:   CONSTITUTIONAL: NEGATIVE for fever, chills, change in weight  INTEGUMENTARY/SKIN: NEGATIVE for worrisome rashes, moles or lesions  EYES: NEGATIVE for vision changes or irritation  ENT/MOUTH: NEGATIVE for ear, mouth and throat problems  RESP: NEGATIVE for significant cough or SOB  BREAST: NEGATIVE for masses, tenderness or discharge  CV: NEGATIVE for chest pain, palpitations or peripheral edema  GI: NEGATIVE for nausea, abdominal pain, heartburn, or change in bowel habits  : NEGATIVE for frequency, dysuria, or hematuria  MUSCULOSKELETAL: NEGATIVE for significant arthralgias or myalgia POSITIVE for chronic right hip pain  NEURO: NEGATIVE for weakness, dizziness or paresthesias  ENDOCRINE: NEGATIVE for temperature intolerance, skin/hair changes  HEME: NEGATIVE for bleeding problems  PSYCHIATRIC: NEGATIVE for changes in mood or affect    EXAM:   /80   Pulse (!) 47   Temp 97.1  F (36.2  C) (Tympanic)   Resp 16   Ht 1.619 m (5' 3.75\")   Wt 67.1 kg (148 lb)   LMP  (LMP Unknown)   SpO2 97%   BMI 25.60 kg/m      GENERAL APPEARANCE: healthy, alert and no distress     EYES: EOMI, PERRL     HENT: ear canals and TM's normal and nose and mouth without ulcers or lesions     NECK: no adenopathy, no asymmetry, masses, or scars and thyroid normal to palpation     RESP: lungs clear to auscultation - no rales, rhonchi or wheezes     CV: slow rate and regular rhythm, normal S1 S2, no S3 or S4 and no murmur, click or rub, ankle edema bilaterally     ABDOMEN:  soft, nontender, no HSM or masses and bowel sounds normal     MS: extremities normal- no gross deformities noted     SKIN: no suspicious lesions or rashes     " NEURO: Normal strength and tone, sensory exam grossly normal, mentation intact and speech normal     PSYCH: mentation appears normal. and affect normal/bright     LYMPHATICS: No cervical adenopathy    DIAGNOSTICS:     EKG: sinus bradycardia, normal axis, normal intervals, no acute ST/T changes c/w ischemia, no LVH by voltage criteria, unchanged from previous tracings  MRSA/MSSA screening for elective joint replacement surgery  Labs Drawn and in Process:   Unresulted Labs Ordered in the Past 30 Days of this Admission     Date and Time Order Name Status Description    8/11/2020 1037 BASIC METABOLIC PANEL In process     8/11/2020 1037 CBC WITH PLATELETS In process     8/11/2020 1037 METHICILLIN RESIST/SENS S. AUREUS PCR In process           Recent Labs   Lab Test 07/21/20  1020 02/15/18  0928  01/05/17  0440  10/30/14  0814 10/27/14  0807   HGB  --  14.5  --  14.7  --   --   --    PLT  --  280  --  212  --   --   --    INR  --   --   --   --   --  2.52* 2.14*   * 139  --  146*   < >  --   --    POTASSIUM 3.3* 4.0   < > 4.1   < >  --   --    CR 0.86 0.88  --  0.95   < >  --   --     < > = values in this interval not displayed.        IMPRESSION:   Reason for surgery/procedure: right hip osteoarthritis  Diagnosis/reason for consult: evaluation for anesthesia risk    The proposed surgical procedure is considered INTERMEDIATE risk.    REVISED CARDIAC RISK INDEX  The patient has the following serious cardiovascular risks for perioperative complications such as (MI, PE, VFib and 3  AV Block):  No serious cardiac risks  INTERPRETATION: 0 risks: Class I (very low risk - 0.4% complication rate)    The patient has the following additional risks for perioperative complications:  No identified additional risks      ICD-10-CM    1. 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 Osteoarthritis of right hip, unspecified osteoarthritis type  M16.11 Methicillin Resistant Staph Aureus PCR     CBC with platelets     Basic metabolic panel  (Ca, Cl, CO2, Creat, Gluc, K, Na, BUN)     EKG 12-lead complete w/read - Clinics   3. Essential hypertension  I10 atenolol (TENORMIN) 50 MG tablet     chlorthalidone (HYGROTON) 25 MG tablet   4. Sinus bradycardia  R00.1      Patient Instructions   Heart rate was low so take half of the atenolol dose or 25 mg daily.  Increase the chlorthalidone to 2 tablets or 50 mg daily.  Return to have nurses check blood pressure and pulse before surgery.    Before Your Surgery      Call your surgeon if there is any change in your health. This includes signs of a cold or flu (such as a sore throat, runny nose, cough, rash or fever).    Do not smoke, drink alcohol or take over the counter medicine (unless your surgeon or primary care doctor tells you to) for the 24 hours before and after surgery.    If you take prescribed drugs: Follow your doctor s orders about which medicines to take and which to stop until after surgery.    Eating and drinking prior to surgery: follow the instructions from your surgeon    Take a shower or bath the night before surgery. Use the soap your surgeon gave you to gently clean your skin. If you do not have soap from your surgeon, use your regular soap. Do not shave or scrub the surgery site.  Wear clean pajamas and have clean sheets on your bed.       RECOMMENDATIONS:     --Consult hospital rounder / IM to assist post-op medical management    --Patient is to take all scheduled medications on the day of surgery EXCEPT for modifications listed below.    APPROVAL GIVEN to proceed with proposed procedure, without further diagnostic evaluation       Signed Electronically by: SUDHAKAR Yung CNP    Copy of this evaluation report is provided to requesting physician.    [place] Preop Guidelines    Revised Cardiac Risk Index

## 2020-08-11 NOTE — PATIENT INSTRUCTIONS
Heart rate was low so take half of the atenolol dose or 25 mg daily.  Increase the chlorthalidone to 2 tablets or 50 mg daily.  Return to have nurses check blood pressure and pulse before surgery.    Before Your Surgery      Call your surgeon if there is any change in your health. This includes signs of a cold or flu (such as a sore throat, runny nose, cough, rash or fever).    Do not smoke, drink alcohol or take over the counter medicine (unless your surgeon or primary care doctor tells you to) for the 24 hours before and after surgery.    If you take prescribed drugs: Follow your doctor s orders about which medicines to take and which to stop until after surgery.    Eating and drinking prior to surgery: follow the instructions from your surgeon    Take a shower or bath the night before surgery. Use the soap your surgeon gave you to gently clean your skin. If you do not have soap from your surgeon, use your regular soap. Do not shave or scrub the surgery site.  Wear clean pajamas and have clean sheets on your bed.

## 2020-08-19 DIAGNOSIS — I10 ESSENTIAL HYPERTENSION: ICD-10-CM

## 2020-08-19 LAB
ANION GAP SERPL CALCULATED.3IONS-SCNC: 3 MMOL/L (ref 3–14)
BUN SERPL-MCNC: 14 MG/DL (ref 7–30)
CALCIUM SERPL-MCNC: 9.3 MG/DL (ref 8.5–10.1)
CHLORIDE SERPL-SCNC: 97 MMOL/L (ref 94–109)
CO2 SERPL-SCNC: 34 MMOL/L (ref 20–32)
CREAT SERPL-MCNC: 0.83 MG/DL (ref 0.52–1.04)
GFR SERPL CREATININE-BSD FRML MDRD: 65 ML/MIN/{1.73_M2}
GLUCOSE SERPL-MCNC: 79 MG/DL (ref 70–99)
POTASSIUM SERPL-SCNC: 3.1 MMOL/L (ref 3.4–5.3)
SODIUM SERPL-SCNC: 134 MMOL/L (ref 133–144)

## 2020-08-19 PROCEDURE — 80048 BASIC METABOLIC PNL TOTAL CA: CPT | Performed by: NURSE PRACTITIONER

## 2020-08-19 PROCEDURE — 36415 COLL VENOUS BLD VENIPUNCTURE: CPT | Performed by: NURSE PRACTITIONER

## 2020-08-20 RX ORDER — POTASSIUM CHLORIDE 1500 MG/1
20 TABLET, EXTENDED RELEASE ORAL DAILY
Qty: 90 TABLET | Refills: 1 | Status: SHIPPED | OUTPATIENT
Start: 2020-08-20 | End: 2020-10-22

## 2020-08-21 ENCOUNTER — DOCUMENTATION ONLY (OUTPATIENT)
Dept: OTHER | Facility: CLINIC | Age: 85
End: 2020-08-21

## 2020-08-25 ENCOUNTER — OFFICE VISIT (OUTPATIENT)
Dept: FAMILY MEDICINE | Facility: CLINIC | Age: 85
End: 2020-08-25
Payer: COMMERCIAL

## 2020-08-25 VITALS — HEART RATE: 70 BPM | DIASTOLIC BLOOD PRESSURE: 68 MMHG | SYSTOLIC BLOOD PRESSURE: 128 MMHG

## 2020-08-25 DIAGNOSIS — I10 ESSENTIAL HYPERTENSION: Primary | ICD-10-CM

## 2020-08-25 PROCEDURE — 99207 ZZC NO CHARGE NURSE ONLY: CPT

## 2020-08-25 NOTE — PROGRESS NOTES
Caitlyn Wasserman is a 84 year old year old patient who comes in today for a Blood Pressure check because of medication change.  BP Readings from Last 3 Encounters:   08/25/20 128/68   08/11/20 138/80   07/21/20 130/80        Patient is taking medication as prescribed  Patient is tolerating medications well.  Patient is monitoring Blood Pressure at home.  Current complaints: none  Disposition:  patient reminded to call as needed     Kirstie Hall RN

## 2020-08-25 NOTE — NURSING NOTE
Caitlny Wasserman is a 84 year old year old patient who comes in today for a Blood Pressure check because of medication change.  BP Readings from Last 3 Encounters:   08/25/20 128/68   08/11/20 138/80   07/21/20 130/80       Patient is taking medication as prescribed  Patient is tolerating medications well.  Patient is monitoring Blood Pressure at home.  Current complaints: none  Disposition:  patient reminded to call as needed    Kirstie Hall RN

## 2020-08-27 ENCOUNTER — ANESTHESIA EVENT (OUTPATIENT)
Dept: SURGERY | Facility: CLINIC | Age: 85
End: 2020-08-27

## 2020-08-27 ENCOUNTER — TELEPHONE (OUTPATIENT)
Dept: FAMILY MEDICINE | Facility: CLINIC | Age: 85
End: 2020-08-27

## 2020-08-27 DIAGNOSIS — I10 ESSENTIAL HYPERTENSION: ICD-10-CM

## 2020-08-27 DIAGNOSIS — Z11.59 ENCOUNTER FOR SCREENING FOR OTHER VIRAL DISEASES: ICD-10-CM

## 2020-08-27 PROCEDURE — U0003 INFECTIOUS AGENT DETECTION BY NUCLEIC ACID (DNA OR RNA); SEVERE ACUTE RESPIRATORY SYNDROME CORONAVIRUS 2 (SARS-COV-2) (CORONAVIRUS DISEASE [COVID-19]), AMPLIFIED PROBE TECHNIQUE, MAKING USE OF HIGH THROUGHPUT TECHNOLOGIES AS DESCRIBED BY CMS-2020-01-R: HCPCS | Performed by: ORTHOPAEDIC SURGERY

## 2020-08-27 RX ORDER — SODIUM CHLORIDE, SODIUM LACTATE, POTASSIUM CHLORIDE, CALCIUM CHLORIDE 600; 310; 30; 20 MG/100ML; MG/100ML; MG/100ML; MG/100ML
INJECTION, SOLUTION INTRAVENOUS CONTINUOUS
Status: CANCELLED | OUTPATIENT
Start: 2020-08-27

## 2020-08-27 RX ORDER — GABAPENTIN 300 MG/1
300 CAPSULE ORAL ONCE
Status: CANCELLED | OUTPATIENT
Start: 2020-08-27 | End: 2020-08-27

## 2020-08-27 RX ORDER — CHLORTHALIDONE 25 MG/1
50 TABLET ORAL DAILY
Qty: 90 TABLET | Refills: 1 | Status: SHIPPED | OUTPATIENT
Start: 2020-08-27 | End: 2020-10-22

## 2020-08-27 RX ORDER — ACETAMINOPHEN 325 MG/1
975 TABLET ORAL ONCE
Status: CANCELLED | OUTPATIENT
Start: 2020-08-27 | End: 2020-08-27

## 2020-08-27 RX ORDER — LIDOCAINE 40 MG/G
CREAM TOPICAL
Status: CANCELLED | OUTPATIENT
Start: 2020-08-27

## 2020-08-27 NOTE — TELEPHONE ENCOUNTER
Pharmacy at [facility] called and verifies ok to take Potassium Chloride ER if broken and ok to take with water.    Patient called and notified regarding ok to take the Potassium if broken in half and can dissolve in water. Patient scheduled for lab on Friday.    ERNESTO Patricia

## 2020-08-27 NOTE — TELEPHONE ENCOUNTER
Reason for Call:  Other prescription    Detailed comments: Pt calling stating she would like a larger quantity of Hygroton sent to mail order since dose increase     Phone Number Patient can be reached at: Home number on file [phone] (home)    Best Time: any    Can we leave a detailed message on this number? YES    Call taken on 8/27/2020 at 11:57 AM by Sandra Nathan

## 2020-08-27 NOTE — TELEPHONE ENCOUNTER
Script sent to new pharmacy. Patient has 2 questions, she is suppose to have her potassium checked in 2 weeks per lab on 8- but the patient has hip surgery next Monday so can not complete this easily. Patient had a COVID test today and was told to stay at home till surgery. Should patient come in this Friday to complete? Ok to wait till patient is able?    Also the patient's  broke her potassium tablets in half and then realized they were extended release, can the patient still take them? Ok to dissolve in water?    Routing to Dr. Varghese as covering provider to advise.    ERNESTO Patricia RN

## 2020-08-27 NOTE — TELEPHONE ENCOUNTER
Okay to make lab apt for Friday to recheck.     Please check with pharmacist on her particular potassium supplement.     Kalpana Varghese M.D.

## 2020-08-28 DIAGNOSIS — I10 ESSENTIAL HYPERTENSION: ICD-10-CM

## 2020-08-28 LAB
ANION GAP SERPL CALCULATED.3IONS-SCNC: 3 MMOL/L (ref 3–14)
BUN SERPL-MCNC: 14 MG/DL (ref 7–30)
CALCIUM SERPL-MCNC: 9.2 MG/DL (ref 8.5–10.1)
CHLORIDE SERPL-SCNC: 96 MMOL/L (ref 94–109)
CO2 SERPL-SCNC: 33 MMOL/L (ref 20–32)
CREAT SERPL-MCNC: 0.9 MG/DL (ref 0.52–1.04)
GFR SERPL CREATININE-BSD FRML MDRD: 59 ML/MIN/{1.73_M2}
GLUCOSE SERPL-MCNC: 87 MG/DL (ref 70–99)
POTASSIUM SERPL-SCNC: 3.5 MMOL/L (ref 3.4–5.3)
SARS-COV-2 RNA SPEC QL NAA+PROBE: ABNORMAL
SODIUM SERPL-SCNC: 132 MMOL/L (ref 133–144)
SPECIMEN SOURCE: ABNORMAL

## 2020-08-28 PROCEDURE — 36415 COLL VENOUS BLD VENIPUNCTURE: CPT | Performed by: NURSE PRACTITIONER

## 2020-08-28 PROCEDURE — 80048 BASIC METABOLIC PNL TOTAL CA: CPT | Performed by: NURSE PRACTITIONER

## 2020-08-28 RX ORDER — MAGNESIUM SULFATE HEPTAHYDRATE 40 MG/ML
2 INJECTION, SOLUTION INTRAVENOUS ONCE
Status: CANCELLED | OUTPATIENT
Start: 2020-08-28

## 2020-08-28 RX ORDER — KETAMINE HYDROCHLORIDE 10 MG/ML
2.5 INJECTION, SOLUTION INTRAMUSCULAR; INTRAVENOUS ONCE
Status: CANCELLED | OUTPATIENT
Start: 2020-08-28

## 2020-08-29 ENCOUNTER — NURSE TRIAGE (OUTPATIENT)
Dept: NURSING | Facility: CLINIC | Age: 85
End: 2020-08-29

## 2020-08-29 DIAGNOSIS — I10 ESSENTIAL HYPERTENSION: Primary | ICD-10-CM

## 2020-08-29 NOTE — TELEPHONE ENCOUNTER
Pt called . Hx =Has positive covid 19 PCR test 8/27/20 for preop  .  No known travel or exposure or symptoms of Covid 19 . Hx of COPD and and age = hi risk.    FNA triage call :   Presenting problem :  Pt called.  Had surgery scheduled at [place] for Hip R total replacement Dr Pond ( [facility] ). Left message at [facility] surgery control desk and [facility], and after speaking to [facility]  , FNA decided to speak to  [facility] charge nurse jesenia  But none is available to speak to , so just left messages  that Pt positive for covid and surgery needs to be cancelled for 8/31/20 . Pt will notify the surgeon team about cancellation .   Currently : feels normal no new symptoms .   Guideline used : Covid Dx [facility]     Caller verbalizes understanding and denies further questions and will call back if further symptoms to triage or questions  . Diana Hirsch Nurse Advisor     Recommendation / teaching ;     Caller Verbalizes understanding and denies further questions and will call back if further symptoms to triage or questions  .  Diana Hirsch Nurse Advisor                  Reason for Disposition    HIGH RISK patient (e.g., age > 64 years, diabetes, heart or lung disease, weak immune system)    Additional Information    Negative: SEVERE difficulty breathing (e.g., struggling for each breath, speaks in single words)    Negative: Difficult to awaken or acting confused (e.g., disoriented, slurred speech)    Negative: Bluish (or gray) lips or face now    Negative: Shock suspected (e.g., cold/pale/clammy skin, too weak to stand, low BP, rapid pulse)    Negative: Sounds like a life-threatening emergency to the triager    Negative: [1] COVID-19 exposure AND [2] no symptoms    Negative: COVID-19 and Breastfeeding, questions about    Negative: [1] Adult with possible COVID-19 symptoms AND [2] triager concerned about severity of symptoms or other causes    Negative: SEVERE or constant  chest pain or pressure (Exception: mild central chest pain, present only when coughing)    Negative: MODERATE difficulty breathing (e.g., speaks in phrases, SOB even at rest, pulse 100-120)    Negative: Patient sounds very sick or weak to the triager    Negative: Chest pain or pressure    Negative: Fever > 103 F (39.4 C)    Negative: [1] Fever > 101 F (38.3 C) AND [2] age > 60    Negative: [1] Fever > 100.0 F (37.8 C) AND [2] bedridden (e.g., nursing home patient, CVA, chronic illness, recovering from surgery)    Commented on: MILD difficulty breathing (e.g., minimal/no SOB at rest, SOB with walking, pulse <100)     Normally has mild  Shortness of breath with walking -Hx of COPD but no change .    [facility] Specific Disposition  - [facility] Specific Patient Instructions  COVID 19 Nurse Triage Plan/Patient Instructions    Please be aware that novel coronavirus (COVID-19) may be circulating in the community. If you develop symptoms such as fever, cough, or SOB or if you have concerns about the presence of another infection including coronavirus (COVID-19), please contact your health care provider or visit [email or web address].       Home care recommended. Follow home care protocol based instructions.  Additional COVID19 information to add for patients.   How can I protect others?  If you have symptoms (fever, cough, body aches or trouble breathing): Stay home and away from others (self-isolate) until:  At least 10 days have passed since your symptoms started. And   You ve had no fever--and no medicine that reduces fever--for 1 full day (24 hours). And    Your other symptoms have resolved (gotten better).     During this time:  Stay in your own room, even for meals. Use your own bathroom if you can.   Stay away from others in your home. No hugging, kissing or shaking hands. No visitors.  Don t go to work, school or anywhere else.   Clean  high touch  surfaces often (doorknobs, counters, handles, etc.). Use a  household cleaning spray or wipes. You ll find a full list on the EPA website:  www.epa.gov/pesticide-registration/list-n-disinfectants-use-against-sars-cov-2.  Cover your mouth and nose with a mask, tissue or washcloth to avoid spreading germs.  Wash your hands and face often. Use soap and water.  Caregivers in these groups are at risk for severe illness due to COVID-19:  People 65 years and older  People who live in a nursing home or long-term care facility  People with chronic disease (lung, heart, cancer, diabetes, kidney, liver, immunologic)  People who have a weakened immune system, including those who:  Are in cancer treatment  Take medicine that weakens the immune system, such as corticosteroids  Had a bone marrow or organ transplant  Have an immune deficiency  Have poorly controlled HIV or AIDS  Are obese (body mass index of 40 or higher)  Smoke regularly  Caregivers should wear gloves while washing dishes, handling laundry and cleaning bedrooms and bathrooms.  Use caution when washing and drying laundry: Don t shake dirty laundry, and use the warmest water setting that you can.  For more tips, go to www.cdc.gov/coronavirus/2019-ncov/downloads/10Things.pdf.    How can I take care of myself?  Get lots of rest. Drink extra fluids (unless a doctor has told you not to).     Take Tylenol (acetaminophen) for fever or pain. If you have liver or kidney problems, ask your family doctor if it s okay to take Tylenol.     Adults can take either:   650 mg (two 325 mg pills) every 4 to 6 hours, or   1,000 mg (two 500 mg pills) every 8 hours as needed.   Note: Don t take more than 3,000 mg in one day.   Acetaminophen is found in many medicines (both prescribed and over-the-counter medicines). Read all labels to be sure you don t take too much.     For children, check the Tylenol bottle for the right dose. The dose is based on the child s age or weight.    If you have other health problems (like cancer, heart failure, an  organ transplant or severe kidney disease): Call your specialty clinic if you don t feel better in the next 2 days.    Know when to call 911: Emergency warning signs include:  Trouble breathing or shortness of breath  Pain or pressure in the chest that doesn t go away  Feeling confused like you haven t felt before, or not being able to wake up  Bluish-colored lips or face    What are the symptoms of COVID-19?   The most common symptoms are cough, fever and trouble breathing.   Less common symptoms include body aches, chills, diarrhea (loose, watery poops), fatigue (feeling very tired), headache, runny nose, sore throat and loss of smell.  COVID-19 can cause severe coughing (bronchitis) and lung infection (pneumonia).    How does it spread?   The virus may spread when a person coughs or sneezes into the air. The virus can travel about 6 feet this way, and it can live on surfaces.    Common  (household disinfectants) will kill the virus.    Who is at risk?  Anyone can catch COVID-19 if they re around someone who has the virus.    How can others protect themselves?   Stay away from people who have COVID-19 (or symptoms of COVID-19).  Wash hands often with soap and water. Or, use hand  with at least 60% alcohol.  Avoid touching the eyes, nose or mouth.   Wear a face mask when you go out in public, when sick or when caring for a sick person.    Where can I get more information?  [facility]: About COVID-19: [email or web address]  CDC: What to Do If You re Sick: www.cdc.gov/coronavirus/2019-ncov/about/steps-when-sick.html  CDC: Ending Home Isolation: www.cdc.gov/coronavirus/2019-ncov/hcp/disposition-in-home-patients.html   CDC: Caring for Someone: www.cdc.gov/coronavirus/2019-ncov/if-you-are-sick/care-for-someone.html   [facility]: Interim Guidance for Hospital Discharge to Home: www.health.[facility].us/diseases/coronavirus/hcp/hospdischarge.pdf  [facility] clinical trials (COVID-19  research studies): clinicalaffairs.[facility]/umn-clinical-trials   Below are the COVID-19 hotlines at the [place] Department of Health ([facility]). Interpreters are available.   For health questions: Call 789-073-6362 or 1-367.196.7010 (7 a.m. to 7 p.m.)  For questions about schools and childcare: Call 903-591-7245 or 1-705.795.2572 (7 a.m. to 7 p.m.)        Thank you for taking steps to prevent the spread of this virus.  Limit your contact with others.  Wear a simple mask to cover your cough.  Wash your hands well and often.    [facility] [place]: About COVID-19: [email or web address] [email or web address]/  CDC: What to Do If You're Sick: www.cdc.gov/coronavirus/2019-ncov/about/steps-when-sick.html  CDC: Ending Home Isolation: www.cdc.gov/coronavirus/2019-ncov/hcp/disposition-in-home-patients.html   CDC: Caring for Someone: www.cdc.gov/coronavirus/2019-ncov/if-you-are-sick/care-for-someone.html   [facility]: Interim Guidance for Hospital Discharge to Home: www.[facility].mn./diseases/coronavirus/hcp/hospdischarge.pdf  [facility] clinical trials (COVID-19 research studies): clinicalaffairs.[facility]/n-clinical-trials   Below are the COVID-19 hotlines at the [place] Department of [facility] ([facility]). Interpreters are available.   For health questions: Call 577-783-5287 or 1-799.254.8811 (7 a.m. to 7 p.m.)  For questions about schools and childcare: Call 931-782-9635 or 1-123.285.5473 (7 a.m. to 7 p.m.)    Protocols used: CORONAVIRUS (COVID-19) DIAGNOSED OR [id number] 5.16.20

## 2020-08-31 ENCOUNTER — ANESTHESIA (OUTPATIENT)
Dept: SURGERY | Facility: CLINIC | Age: 85
End: 2020-08-31

## 2020-09-15 ENCOUNTER — ALLIED HEALTH/NURSE VISIT (OUTPATIENT)
Dept: FAMILY MEDICINE | Facility: CLINIC | Age: 85
End: 2020-09-15
Payer: COMMERCIAL

## 2020-09-15 DIAGNOSIS — Z23 NEED FOR PROPHYLACTIC VACCINATION AND INOCULATION AGAINST INFLUENZA: Primary | ICD-10-CM

## 2020-09-15 PROCEDURE — G0008 ADMIN INFLUENZA VIRUS VAC: HCPCS

## 2020-09-15 PROCEDURE — 99207 ZZC NO CHARGE NURSE ONLY: CPT

## 2020-09-15 PROCEDURE — 90662 IIV NO PRSV INCREASED AG IM: CPT

## 2020-09-22 RX ORDER — POTASSIUM CHLORIDE 1500 MG/1
20 TABLET, EXTENDED RELEASE ORAL DAILY
Status: ON HOLD | COMMUNITY
Start: 2020-08-20 | End: 2020-09-28

## 2020-09-23 ENCOUNTER — OFFICE VISIT (OUTPATIENT)
Dept: FAMILY MEDICINE | Facility: CLINIC | Age: 85
End: 2020-09-23
Payer: COMMERCIAL

## 2020-09-23 VITALS
OXYGEN SATURATION: 92 % | BODY MASS INDEX: 25.27 KG/M2 | RESPIRATION RATE: 16 BRPM | SYSTOLIC BLOOD PRESSURE: 120 MMHG | TEMPERATURE: 97.8 F | WEIGHT: 148 LBS | HEART RATE: 53 BPM | DIASTOLIC BLOOD PRESSURE: 70 MMHG | HEIGHT: 64 IN

## 2020-09-23 DIAGNOSIS — Z01.818 PREOP GENERAL PHYSICAL EXAM: Primary | ICD-10-CM

## 2020-09-23 DIAGNOSIS — M16.11 ARTHRITIS OF RIGHT HIP: ICD-10-CM

## 2020-09-23 LAB
ANION GAP SERPL CALCULATED.3IONS-SCNC: 3 MMOL/L (ref 3–14)
BUN SERPL-MCNC: 15 MG/DL (ref 7–30)
CALCIUM SERPL-MCNC: 9.4 MG/DL (ref 8.5–10.1)
CHLORIDE SERPL-SCNC: 99 MMOL/L (ref 94–109)
CO2 SERPL-SCNC: 32 MMOL/L (ref 20–32)
CREAT SERPL-MCNC: 0.89 MG/DL (ref 0.52–1.04)
ERYTHROCYTE [DISTWIDTH] IN BLOOD BY AUTOMATED COUNT: 12.7 % (ref 10–15)
GFR SERPL CREATININE-BSD FRML MDRD: 60 ML/MIN/{1.73_M2}
GLUCOSE SERPL-MCNC: 80 MG/DL (ref 70–99)
HCT VFR BLD AUTO: 41.9 % (ref 35–47)
HGB BLD-MCNC: 13.9 G/DL (ref 11.7–15.7)
MCH RBC QN AUTO: 31.1 PG (ref 26.5–33)
MCHC RBC AUTO-ENTMCNC: 33.2 G/DL (ref 31.5–36.5)
MCV RBC AUTO: 94 FL (ref 78–100)
MRSA DNA SPEC QL NAA+PROBE: NEGATIVE
PLATELET # BLD AUTO: 256 10E9/L (ref 150–450)
POTASSIUM SERPL-SCNC: 3.8 MMOL/L (ref 3.4–5.3)
RBC # BLD AUTO: 4.47 10E12/L (ref 3.8–5.2)
SODIUM SERPL-SCNC: 134 MMOL/L (ref 133–144)
SPECIMEN SOURCE: NORMAL
WBC # BLD AUTO: 5.9 10E9/L (ref 4–11)

## 2020-09-23 PROCEDURE — 85027 COMPLETE CBC AUTOMATED: CPT | Performed by: NURSE PRACTITIONER

## 2020-09-23 PROCEDURE — 87641 MR-STAPH DNA AMP PROBE: CPT | Performed by: NURSE PRACTITIONER

## 2020-09-23 PROCEDURE — 99214 OFFICE O/P EST MOD 30 MIN: CPT | Performed by: NURSE PRACTITIONER

## 2020-09-23 PROCEDURE — 36415 COLL VENOUS BLD VENIPUNCTURE: CPT | Performed by: NURSE PRACTITIONER

## 2020-09-23 PROCEDURE — 87640 STAPH A DNA AMP PROBE: CPT | Mod: 59 | Performed by: NURSE PRACTITIONER

## 2020-09-23 PROCEDURE — 80048 BASIC METABOLIC PNL TOTAL CA: CPT | Performed by: NURSE PRACTITIONER

## 2020-09-23 ASSESSMENT — MIFFLIN-ST. JEOR: SCORE: 1112.35

## 2020-09-23 NOTE — PATIENT INSTRUCTIONS

## 2020-09-23 NOTE — PROGRESS NOTES
"[facility]  [address]  [facility] [address]  Phone: [phone]  Primary Provider: Cindy Flor  Pre-op Performing Provider: CINDY FLOR    PREOPERATIVE EVALUATION:  Today's date: 9/23/2020    Caitlyn Wasserman is a 84 year old female who presents for a preoperative evaluation.    Surgical Information:  Surgery Details 9/23/2020   Surgery/Procedure: Total right hip   Surgery Location: [facility]   Surgeon: Dr. Shah   Surgery Date: 9/28/2020   Time of Surgery: 1 or 2pm   Where patient plans to recover: At home with family     Fax number for surgical facility: Note does not need to be faxed, will be available electronically in Epic.  Type of Anesthesia Anticipated: General    Subjective     HPI related to upcoming procedure: has had worsening right hip pain, history of osteoarthritis and has previously had right total knee and left total hip replacement.  She was scheduled to do surgery before but tested positive for COVID so it was delayed, per patient and surgery team she is \"good to go\" to have surgery done. Has no URI symptoms at present.    Preop Questions 9/23/2020   1. Have you ever had a heart attack or stroke? No   2. Have you ever had surgery on your heart or blood vessels, such as a stent placement, a coronary artery bypass, or surgery on an artery in your head, neck, heart, or legs? No   3. Do you have chest pain with activity? No   4. Do you have a history of  heart failure? No   5. Do you currently have a cold, bronchitis or symptoms of other infection? No   6. Do you have a cough, shortness of breath, or wheezing? YES - chronic and unchanged due to mild COPD   7. Do you or anyone in your family have previous history of blood clots? No   8. Do you or does anyone in your family have a serious bleeding problem such as prolonged bleeding following surgeries or cuts? No   9. Have you ever had problems with anemia or been told to take iron pills? " No   10. Have you had any abnormal blood loss such as black, tarry or bloody stools, or abnormal vaginal bleeding? No   11. Have you ever had a blood transfusion? No   Are you willing to have a blood transfusion if it is medically needed before, during, or after your surgery? Yes   13. Have you or any of your relatives ever had problems with anesthesia? No   14. Do you have sleep apnea, excessive snoring or daytime drowsiness? No   15. Do you have any artifical heart valves or other implanted medical devices like a pacemaker, defibrillator, or continuous glucose monitor? No   16. Do you have artificial joints? YES - left hip and right knee   17. Are you allergic to latex? No   18. Is there any chance that you may be pregnant? No           See problem list for active medical problems.  Problems all longstanding and stable, except as noted/documented.  See ROS for pertinent symptoms related to these conditions.      Review of Systems  CONSTITUTIONAL: NEGATIVE for fever, chills, change in weight  INTEGUMENTARY/SKIN: NEGATIVE for worrisome rashes, moles or lesions  EYES: NEGATIVE for vision changes or irritation  ENT/MOUTH: NEGATIVE for ear, mouth and throat problems  RESP: NEGATIVE for significant cough or SOB  CV: NEGATIVE for chest pain, palpitations or peripheral edema  GI: NEGATIVE for nausea, abdominal pain, heartburn, or change in bowel habits  : NEGATIVE for frequency, dysuria, or hematuria  MUSCULOSKELETAL: NEGATIVE for significant arthralgias or myalgia POSITIVE for right hip pain  NEURO: NEGATIVE for weakness, dizziness or paresthesias  ENDOCRINE: NEGATIVE for temperature intolerance, skin/hair changes  HEME: NEGATIVE for bleeding problems  PSYCHIATRIC: NEGATIVE for changes in mood or affect    Patient Active Problem List    Diagnosis Date Noted     Vaginal prolapse 01/17/2020     Priority: Medium     she has a h/o TVT, anterior, posterior and enterocele repair, all done with mesh, by Dr. Pennington in 2004        Osteoarthritis of both hands 05/07/2019     Priority: Medium     Chronic obstructive pulmonary disease, unspecified COPD type (H) 01/10/2017     Priority: Medium     Chronic rhinitis 12/04/2015     Priority: Medium     Essential hypertension 12/04/2015     Priority: Medium     Dysuria 12/04/2015     Priority: Medium     Osteoarthritis, right knee 10/08/2014     Priority: Medium     Pedal edema 12/03/2013     Priority: Medium     Essential tremor 11/15/2013     Priority: Medium     Health Care Home 12/03/2012     Priority: Medium     Roseann Obando RN-PHN  [facility] / [facility]   [phone]    DX V65.8 REPLACED WITH [facility] (04/08/2013)       Kyphosis of cervical region 11/28/2011     Priority: Medium     Neck pain, chronic 11/28/2011     Priority: Medium     HYPERLIPIDEMIA LDL GOAL <130 10/31/2010     Priority: Medium     Recurrent UTI 09/20/2010     Priority: Medium     Cataract 07/06/2009     Priority: Medium     Utility update for deleted IMO code  [employer] Update utility       Right shoulder pain 10/27/2008     Priority: Medium     Numbness in right leg 10/27/2008     Priority: Medium     LEFT HIP PAIN 03/23/2006     Priority: Medium     Urinary frequency 06/09/2005     Priority: Medium      Past Medical History:   Diagnosis Date     Migraine, unspecified, without mention of intractable migraine without mention of status migrainosus      Other urinary incontinence      Pure hypercholesterolemia      Unspecified essential hypertension      Past Surgical History:   Procedure Laterality Date     AS TOTAL KNEE ARTHROPLASTY Right 2011     HC ANTER COLPORRHAPHY,BLAD/VAGINA  6/04    Cystocele Repair,rectocele repair.     JOINT REPLACEMENT, HIP RT/LT  3/07, 4/07    Joint Replacement Hip /LT- dislocated in front repeat surgery 1 week later     Current Outpatient Medications   Medication Sig Dispense Refill     acetaminophen-caffeine (EXCEDRIN TENSION HEADACHE) 500-65 MG TABS Take 2  "tablets by mouth every 6 hours as needed for mild pain       atenolol (TENORMIN) 50 MG tablet Take 0.5 tablets (25 mg) by mouth daily 90 tablet 1     chlorthalidone (HYGROTON) 25 MG tablet Take 2 tablets (50 mg) by mouth daily Needs labs before next refill 90 tablet 1     ipratropium (ATROVENT) 0.03 % nasal spray Spray 2 sprays in nostril daily as needed for rhinitis 2 Box 3     potassium chloride ER (K-TAB) 20 MEQ CR tablet Take 1 tablet (20 mEq) by mouth daily 90 tablet 1     potassium chloride ER (KLOR-CON M) 20 MEQ CR tablet Take 20 mEq by mouth daily       psyllium (METAMUCIL) 28.3 % POWD 2 teaspoons a day in water         Allergies   Allergen Reactions     Codeine      Cortisone      Diclofenac Sodium      Hydrocodone Nausea and Vomiting     Oxycodone Nausea and Vomiting     Tramadol Nausea and Vomiting        Social History     Tobacco Use     Smoking status: Former Smoker     Packs/day: 1.00     Years: 20.00     Pack years: 20.00     Types: Cigarettes     Last attempt to quit: 1971     Years since quittin.7     Smokeless tobacco: Never Used   Substance Use Topics     Alcohol use: Yes     Comment: 1 wine a month maybe     Family History   Problem Relation Age of Onset     Cancer Mother         Bladder     Neurologic Disorder Mother         heriditary tremor     C.A.D. Father      Cerebrovascular Disease Father      Allergies Maternal Grandfather      Respiratory Maternal Grandfather         Asthma     Diabetes Paternal Grandmother         Type II     Cancer Brother         Multiple myloma     C.A.D. Brother      Cancer Sister         kidney cancer     Neurofibromatosis Son      Breast Cancer No family hx of      History   Drug Use No            Objective   /70 (BP Location: Right arm, Patient Position: Chair, Cuff Size: Adult Regular)   Pulse 53   Temp 97.8  F (36.6  C) (Tympanic)   Resp 16   Ht 1.619 m (5' 3.75\")   Wt 67.1 kg (148 lb)   LMP  (LMP Unknown)   SpO2 92%   BMI 25.60 kg/m  "   Physical Exam    GENERAL APPEARANCE: healthy, alert and no distress     EYES: grossly normal      HENT: ear canals and TM's normal and nose and mouth without ulcers or lesions     NECK: no adenopathy, no asymmetry, masses, or scars and thyroid normal to palpation     RESP: lungs clear to auscultation - no rales, rhonchi or wheezes     CV: bradycardia, normal S1 S2, no S3 or S4 and no murmur, click or rub     ABDOMEN:  soft, nontender, no HSM or masses and bowel sounds normal     MS: extremities normal- no gross deformities noted, bilateral ankle/tibial edema 1+     SKIN: no suspicious lesions or rashes     NEURO: Normal strength and tone, sensory exam grossly normal, mentation intact and speech normal     PSYCH: mentation appears normal. and affect normal/bright     LYMPHATICS: No cervical adenopathy    Recent Labs   Lab Test 08/28/20  0959 08/19/20  0927 08/11/20  1111   HGB  --   --  13.9   PLT  --   --  230   * 134 134   POTASSIUM 3.5 3.1* 3.9   CR 0.90 0.83 0.83        PRE-OP Diagnostics:  Labs pending at this time.  Results will be reviewed when available.  EKG: sinus bradycardia, normal axis, normal intervals, no acute ST/T changes c/w ischemia, no LVH by voltage criteria, unchanged from previous tracings, done 8/20         Assessment & Plan   The proposed surgical procedure is considered INTERMEDIATE risk.    REVISED CARDIAC RISK INDEX  The patient has the following serious cardiovascular risks for perioperative complications:  No serious cardiac risks = 0 points    INTERPRETATION: 0 points: Class I (very low risk - 0.4% complication rate)       1. Preop general physical exam    - Methicillin Resistant Staph Aureus PCR  - Basic metabolic panel  (Ca, Cl, CO2, Creat, Gluc, K, Na, BUN)  - CBC with platelets    2. Arthritis of right hip    - Methicillin Resistant Staph Aureus PCR  - Basic metabolic panel  (Ca, Cl, CO2, Creat, Gluc, K, Na, BUN)  - CBC with platelets    The patient has the following  additional risks and recommendations for perioperative complications:     - Consult Hospitalist / IM to assist with post-op medical management     MEDICATION INSTRUCTIONS:  Patient is to take all scheduled medications on the day of surgery    RECOMMENDATION:  APPROVAL GIVEN to proceed with proposed procedure, without further diagnostic evaluation.    No follow-ups on file.    Signed Electronically by: SUDHAKAR Yung CNP    Copy of this evaluation report is provided to requesting physician.    Preop Critical access hospital Preop Guidelines    Revised Cardiac Risk Index

## 2020-09-23 NOTE — H&P (VIEW-ONLY)
"[facility] [place]  [address]  [facility] [address]  Phone: [phone]  Primary Provider: Cindy Flor  Pre-op Performing Provider: CINDY FLOR    PREOPERATIVE EVALUATION:  Today's date: 9/23/2020    Caitlyn Wasserman is a 84 year old female who presents for a preoperative evaluation.    Surgical Information:  Surgery Details 9/23/2020   Surgery/Procedure: Total right hip   Surgery Location: [facility]   Surgeon: Dr. Shah   Surgery Date: 9/28/2020   Time of Surgery: 1 or 2pm   Where patient plans to recover: At home with family     Fax number for surgical facility: Note does not need to be faxed, will be available electronically in Epic.  Type of Anesthesia Anticipated: General    Subjective     HPI related to upcoming procedure: has had worsening right hip pain, history of osteoarthritis and has previously had right total knee and left total hip replacement.  She was scheduled to do surgery before but tested positive for COVID so it was delayed, per patient and surgery team she is \"good to go\" to have surgery done. Has no URI symptoms at present.    Preop Questions 9/23/2020   1. Have you ever had a heart attack or stroke? No   2. Have you ever had surgery on your heart or blood vessels, such as a stent placement, a coronary artery bypass, or surgery on an artery in your head, neck, heart, or legs? No   3. Do you have chest pain with activity? No   4. Do you have a history of  heart failure? No   5. Do you currently have a cold, bronchitis or symptoms of other infection? No   6. Do you have a cough, shortness of breath, or wheezing? YES - chronic and unchanged due to mild COPD   7. Do you or anyone in your family have previous history of blood clots? No   8. Do you or does anyone in your family have a serious bleeding problem such as prolonged bleeding following surgeries or cuts? No   9. Have you ever had problems with anemia or been told to take iron pills? " No   10. Have you had any abnormal blood loss such as black, tarry or bloody stools, or abnormal vaginal bleeding? No   11. Have you ever had a blood transfusion? No   Are you willing to have a blood transfusion if it is medically needed before, during, or after your surgery? Yes   13. Have you or any of your relatives ever had problems with anesthesia? No   14. Do you have sleep apnea, excessive snoring or daytime drowsiness? No   15. Do you have any artifical heart valves or other implanted medical devices like a pacemaker, defibrillator, or continuous glucose monitor? No   16. Do you have artificial joints? YES - left hip and right knee   17. Are you allergic to latex? No   18. Is there any chance that you may be pregnant? No           See problem list for active medical problems.  Problems all longstanding and stable, except as noted/documented.  See ROS for pertinent symptoms related to these conditions.      Review of Systems  CONSTITUTIONAL: NEGATIVE for fever, chills, change in weight  INTEGUMENTARY/SKIN: NEGATIVE for worrisome rashes, moles or lesions  EYES: NEGATIVE for vision changes or irritation  ENT/MOUTH: NEGATIVE for ear, mouth and throat problems  RESP: NEGATIVE for significant cough or SOB  CV: NEGATIVE for chest pain, palpitations or peripheral edema  GI: NEGATIVE for nausea, abdominal pain, heartburn, or change in bowel habits  : NEGATIVE for frequency, dysuria, or hematuria  MUSCULOSKELETAL: NEGATIVE for significant arthralgias or myalgia POSITIVE for right hip pain  NEURO: NEGATIVE for weakness, dizziness or paresthesias  ENDOCRINE: NEGATIVE for temperature intolerance, skin/hair changes  HEME: NEGATIVE for bleeding problems  PSYCHIATRIC: NEGATIVE for changes in mood or affect    Patient Active Problem List    Diagnosis Date Noted     Vaginal prolapse 01/17/2020     Priority: Medium     she has a h/o TVT, anterior, posterior and enterocele repair, all done with mesh, by Dr. Pennington in 2004        Osteoarthritis of both hands 05/07/2019     Priority: Medium     Chronic obstructive pulmonary disease, unspecified COPD type (H) 01/10/2017     Priority: Medium     Chronic rhinitis 12/04/2015     Priority: Medium     Essential hypertension 12/04/2015     Priority: Medium     Dysuria 12/04/2015     Priority: Medium     Osteoarthritis, right knee 10/08/2014     Priority: Medium     Pedal edema 12/03/2013     Priority: Medium     Essential tremor 11/15/2013     Priority: Medium     Health Care Home 12/03/2012     Priority: Medium     Roseann Obando RN-PHN  FPA / [facility]   [phone]    DX V65.8 REPLACED WITH [facility] (04/08/2013)       Kyphosis of cervical region 11/28/2011     Priority: Medium     Neck pain, chronic 11/28/2011     Priority: Medium     HYPERLIPIDEMIA LDL GOAL <130 10/31/2010     Priority: Medium     Recurrent UTI 09/20/2010     Priority: Medium     Cataract 07/06/2009     Priority: Medium     Utility update for deleted IMO code  Imo Update utility       Right shoulder pain 10/27/2008     Priority: Medium     Numbness in right leg 10/27/2008     Priority: Medium     LEFT HIP PAIN 03/23/2006     Priority: Medium     Urinary frequency 06/09/2005     Priority: Medium      Past Medical History:   Diagnosis Date     Migraine, unspecified, without mention of intractable migraine without mention of status migrainosus      Other urinary incontinence      Pure hypercholesterolemia      Unspecified essential hypertension      Past Surgical History:   Procedure Laterality Date     AS TOTAL KNEE ARTHROPLASTY Right 2011     HC ANTER COLPORRHAPHY,BLAD/VAGINA  6/04    Cystocele Repair,rectocele repair.     JOINT REPLACEMENT, HIP RT/LT  3/07, 4/07    Joint Replacement Hip /LT- dislocated in front repeat surgery 1 week later     Current Outpatient Medications   Medication Sig Dispense Refill     acetaminophen-caffeine (EXCEDRIN TENSION HEADACHE) 500-65 MG TABS Take 2  "tablets by mouth every 6 hours as needed for mild pain       atenolol (TENORMIN) 50 MG tablet Take 0.5 tablets (25 mg) by mouth daily 90 tablet 1     chlorthalidone (HYGROTON) 25 MG tablet Take 2 tablets (50 mg) by mouth daily Needs labs before next refill 90 tablet 1     ipratropium (ATROVENT) 0.03 % nasal spray Spray 2 sprays in nostril daily as needed for rhinitis 2 Box 3     potassium chloride ER (K-TAB) 20 MEQ CR tablet Take 1 tablet (20 mEq) by mouth daily 90 tablet 1     potassium chloride ER (KLOR-CON M) 20 MEQ CR tablet Take 20 mEq by mouth daily       psyllium (METAMUCIL) 28.3 % POWD 2 teaspoons a day in water         Allergies   Allergen Reactions     Codeine      Cortisone      Diclofenac Sodium      Hydrocodone Nausea and Vomiting     Oxycodone Nausea and Vomiting     Tramadol Nausea and Vomiting        Social History     Tobacco Use     Smoking status: Former Smoker     Packs/day: 1.00     Years: 20.00     Pack years: 20.00     Types: Cigarettes     Last attempt to quit: 1971     Years since quittin.7     Smokeless tobacco: Never Used   Substance Use Topics     Alcohol use: Yes     Comment: 1 wine a month maybe     Family History   Problem Relation Age of Onset     Cancer Mother         Bladder     Neurologic Disorder Mother         heriditary tremor     C.A.D. Father      Cerebrovascular Disease Father      Allergies Maternal Grandfather      Respiratory Maternal Grandfather         Asthma     Diabetes Paternal Grandmother         Type II     Cancer Brother         Multiple myloma     C.A.D. Brother      Cancer Sister         kidney cancer     Neurofibromatosis Son      Breast Cancer No family hx of      History   Drug Use No            Objective   /70 (BP Location: Right arm, Patient Position: Chair, Cuff Size: Adult Regular)   Pulse 53   Temp 97.8  F (36.6  C) (Tympanic)   Resp 16   Ht 1.619 m (5' 3.75\")   Wt 67.1 kg (148 lb)   LMP  (LMP Unknown)   SpO2 92%   BMI 25.60 kg/m  "   Physical Exam    GENERAL APPEARANCE: healthy, alert and no distress     EYES: grossly normal      HENT: ear canals and TM's normal and nose and mouth without ulcers or lesions     NECK: no adenopathy, no asymmetry, masses, or scars and thyroid normal to palpation     RESP: lungs clear to auscultation - no rales, rhonchi or wheezes     CV: bradycardia, normal S1 S2, no S3 or S4 and no murmur, click or rub     ABDOMEN:  soft, nontender, no HSM or masses and bowel sounds normal     MS: extremities normal- no gross deformities noted, bilateral ankle/tibial edema 1+     SKIN: no suspicious lesions or rashes     NEURO: Normal strength and tone, sensory exam grossly normal, mentation intact and speech normal     PSYCH: mentation appears normal. and affect normal/bright     LYMPHATICS: No cervical adenopathy    Recent Labs   Lab Test 08/28/20  0959 08/19/20  0927 08/11/20  1111   HGB  --   --  13.9   PLT  --   --  230   * 134 134   POTASSIUM 3.5 3.1* 3.9   CR 0.90 0.83 0.83        PRE-OP Diagnostics:  Labs pending at this time.  Results will be reviewed when available.  EKG: sinus bradycardia, normal axis, normal intervals, no acute ST/T changes c/w ischemia, no LVH by voltage criteria, unchanged from previous tracings, done 8/20         Assessment & Plan   The proposed surgical procedure is considered INTERMEDIATE risk.    REVISED CARDIAC RISK INDEX  The patient has the following serious cardiovascular risks for perioperative complications:  No serious cardiac risks = 0 points    INTERPRETATION: 0 points: Class I (very low risk - 0.4% complication rate)       1. Preop general physical exam    - Methicillin Resistant Staph Aureus PCR  - Basic metabolic panel  (Ca, Cl, CO2, Creat, Gluc, K, Na, BUN)  - CBC with platelets    2. Arthritis of right hip    - Methicillin Resistant Staph Aureus PCR  - Basic metabolic panel  (Ca, Cl, CO2, Creat, Gluc, K, Na, BUN)  - CBC with platelets    The patient has the following  additional risks and recommendations for perioperative complications:     - Consult Hospitalist / IM to assist with post-op medical management     MEDICATION INSTRUCTIONS:  Patient is to take all scheduled medications on the day of surgery    RECOMMENDATION:  APPROVAL GIVEN to proceed with proposed procedure, without further diagnostic evaluation.    No follow-ups on file.    Signed Electronically by: SUDHAKAR Yung CNP    Copy of this evaluation report is provided to requesting physician.    Preop [facility] Preop Guidelines    Revised Cardiac Risk Index

## 2020-09-23 NOTE — LETTER
September 29, 2020      Caitlyn Wasserman  [address] [address] [address] [address]        Dear ,    We are writing to inform you of your test results.    Your test results fall within the expected range(s) or remain unchanged from previous results.  Please continue with current treatment plan.    Resulted Orders   Methicillin Resistant Staph Aureus PCR   Result Value Ref Range    Specimen Description Nares     Methicillin Resist/Sens S. aureus PCR Negative NEG^Negative      Comment:      MRSA Negative: SA Negative  MRSA and Staphylococcus aureus target DNA not   detected, presumed negative for MRSA and SA colonization or the number of   bacteria present may be below the limit of detection for the assay. FDA   approved assay performed using [employer] GeneXpert(R) real-time PCR.     Basic metabolic panel  (Ca, Cl, CO2, Creat, Gluc, K, Na, BUN)   Result Value Ref Range    Sodium 134 133 - 144 mmol/L    Potassium 3.8 3.4 - 5.3 mmol/L    Chloride 99 94 - 109 mmol/L    Carbon Dioxide 32 20 - 32 mmol/L    Anion Gap 3 3 - 14 mmol/L    Glucose 80 70 - 99 mg/dL    Urea Nitrogen 15 7 - 30 mg/dL    Creatinine 0.89 0.52 - 1.04 mg/dL    GFR Estimate 60 (L) >60 mL/min/[1.73_m2]      Comment:      Non  GFR Calc  Starting 12/18/2018, serum creatinine based estimated GFR (eGFR) will be   calculated using the Chronic Kidney Disease Epidemiology Collaboration   (CKD-EPI) equation.      GFR Estimate If Black 69 >60 mL/min/[1.73_m2]      Comment:       GFR Calc  Starting 12/18/2018, serum creatinine based estimated GFR (eGFR) will be   calculated using the Chronic Kidney Disease Epidemiology Collaboration   (CKD-EPI) equation.      Calcium 9.4 8.5 - 10.1 mg/dL   CBC with platelets   Result Value Ref Range    WBC 5.9 4.0 - 11.0 10e9/L    RBC Count 4.47 3.8 - 5.2 10e12/L    Hemoglobin 13.9 11.7 - 15.7 g/dL    Hematocrit 41.9 35.0 - 47.0 %    MCV 94 78 - 100 fl    MCH 31.1 26.5 - 33.0 pg    MCHC 33.2  31.5 - 36.5 g/dL    RDW 12.7 10.0 - 15.0 %    Platelet Count 256 150 - 450 10e9/L       If you have any questions or concerns, please call the clinic at the number listed above.       Sincerely,        SUDHAKAR Yung CNP

## 2020-09-25 ENCOUNTER — ANESTHESIA EVENT (OUTPATIENT)
Dept: SURGERY | Facility: CLINIC | Age: 85
DRG: 470 | End: 2020-09-25
Payer: COMMERCIAL

## 2020-09-28 ENCOUNTER — APPOINTMENT (OUTPATIENT)
Dept: GENERAL RADIOLOGY | Facility: CLINIC | Age: 85
DRG: 470 | End: 2020-09-28
Attending: ORTHOPAEDIC SURGERY
Payer: COMMERCIAL

## 2020-09-28 ENCOUNTER — HOSPITAL ENCOUNTER (INPATIENT)
Facility: CLINIC | Age: 85
LOS: 1 days | Discharge: HOME | DRG: 470 | End: 2020-09-30
Attending: ORTHOPAEDIC SURGERY | Admitting: ORTHOPAEDIC SURGERY
Payer: COMMERCIAL

## 2020-09-28 ENCOUNTER — ANESTHESIA (OUTPATIENT)
Dept: SURGERY | Facility: CLINIC | Age: 85
DRG: 470 | End: 2020-09-28
Payer: COMMERCIAL

## 2020-09-28 DIAGNOSIS — I10 ESSENTIAL HYPERTENSION: ICD-10-CM

## 2020-09-28 DIAGNOSIS — M16.9 HIP ARTHROSIS: Primary | ICD-10-CM

## 2020-09-28 LAB
CREAT SERPL-MCNC: 0.84 MG/DL (ref 0.52–1.04)
GFR SERPL CREATININE-BSD FRML MDRD: 64 ML/MIN/{1.73_M2}

## 2020-09-28 PROCEDURE — 25000132 ZZH RX MED GY IP 250 OP 250 PS 637: Performed by: PHYSICIAN ASSISTANT

## 2020-09-28 PROCEDURE — 25800030 ZZH RX IP 258 OP 636: Performed by: NURSE ANESTHETIST, CERTIFIED REGISTERED

## 2020-09-28 PROCEDURE — C1776 JOINT DEVICE (IMPLANTABLE): HCPCS | Performed by: ORTHOPAEDIC SURGERY

## 2020-09-28 PROCEDURE — 71000012 ZZH RECOVERY PHASE 1 LEVEL 1 FIRST HR: Performed by: ORTHOPAEDIC SURGERY

## 2020-09-28 PROCEDURE — 25000125 ZZHC RX 250: Performed by: NURSE ANESTHETIST, CERTIFIED REGISTERED

## 2020-09-28 PROCEDURE — 25000125 ZZHC RX 250: Performed by: ORTHOPAEDIC SURGERY

## 2020-09-28 PROCEDURE — 37000008 ZZH ANESTHESIA TECHNICAL FEE, 1ST 30 MIN: Performed by: ORTHOPAEDIC SURGERY

## 2020-09-28 PROCEDURE — 25000128 H RX IP 250 OP 636: Performed by: PHYSICIAN ASSISTANT

## 2020-09-28 PROCEDURE — 0SR901Z REPLACEMENT OF RIGHT HIP JOINT WITH METAL SYNTHETIC SUBSTITUTE, OPEN APPROACH: ICD-10-PCS | Performed by: ORTHOPAEDIC SURGERY

## 2020-09-28 PROCEDURE — 27210794 ZZH OR GENERAL SUPPLY STERILE: Performed by: ORTHOPAEDIC SURGERY

## 2020-09-28 PROCEDURE — 25000132 ZZH RX MED GY IP 250 OP 250 PS 637: Performed by: ORTHOPAEDIC SURGERY

## 2020-09-28 PROCEDURE — 36000063 ZZH SURGERY LEVEL 4 EA 15 ADDTL MIN: Performed by: ORTHOPAEDIC SURGERY

## 2020-09-28 PROCEDURE — 36415 COLL VENOUS BLD VENIPUNCTURE: CPT | Performed by: ORTHOPAEDIC SURGERY

## 2020-09-28 PROCEDURE — 36000093 ZZH SURGERY LEVEL 4 1ST 30 MIN: Performed by: ORTHOPAEDIC SURGERY

## 2020-09-28 PROCEDURE — 25800030 ZZH RX IP 258 OP 636: Performed by: PHYSICIAN ASSISTANT

## 2020-09-28 PROCEDURE — 25800030 ZZH RX IP 258 OP 636: Performed by: ORTHOPAEDIC SURGERY

## 2020-09-28 PROCEDURE — 27110028 ZZH OR GENERAL SUPPLY NON-STERILE: Performed by: ORTHOPAEDIC SURGERY

## 2020-09-28 PROCEDURE — 40000986 XR PELVIS AD HIP PORTABLE RIGHT 1 VIEW

## 2020-09-28 PROCEDURE — 25000128 H RX IP 250 OP 636: Performed by: NURSE ANESTHETIST, CERTIFIED REGISTERED

## 2020-09-28 PROCEDURE — 40000305 ZZH STATISTIC PRE PROC ASSESS I: Performed by: ORTHOPAEDIC SURGERY

## 2020-09-28 PROCEDURE — 25000128 H RX IP 250 OP 636: Performed by: ORTHOPAEDIC SURGERY

## 2020-09-28 PROCEDURE — 37000009 ZZH ANESTHESIA TECHNICAL FEE, EACH ADDTL 15 MIN: Performed by: ORTHOPAEDIC SURGERY

## 2020-09-28 PROCEDURE — 82565 ASSAY OF CREATININE: CPT | Performed by: ORTHOPAEDIC SURGERY

## 2020-09-28 PROCEDURE — 25000132 ZZH RX MED GY IP 250 OP 250 PS 637: Performed by: NURSE ANESTHETIST, CERTIFIED REGISTERED

## 2020-09-28 DEVICE — IMP INSERT HIP DEPUY PINNACLE ALTRX 36X52MM 1221-36-052: Type: IMPLANTABLE DEVICE | Site: HIP | Status: FUNCTIONAL

## 2020-09-28 DEVICE — IMPLANTABLE DEVICE: Type: IMPLANTABLE DEVICE | Site: HIP | Status: FUNCTIONAL

## 2020-09-28 DEVICE — IMP HEAD FEMORAL DEPUY 36MM +1.5 1365-51-000: Type: IMPLANTABLE DEVICE | Site: HIP | Status: FUNCTIONAL

## 2020-09-28 DEVICE — IMP STEM FEMORAL HIP DEPUY SUMMIT 5 1570-01-110: Type: IMPLANTABLE DEVICE | Site: HIP | Status: FUNCTIONAL

## 2020-09-28 RX ORDER — LIDOCAINE 40 MG/G
CREAM TOPICAL
Status: DISCONTINUED | OUTPATIENT
Start: 2020-09-28 | End: 2020-09-30 | Stop reason: HOSPADM

## 2020-09-28 RX ORDER — HYDROMORPHONE HYDROCHLORIDE 1 MG/ML
0.2 INJECTION, SOLUTION INTRAMUSCULAR; INTRAVENOUS; SUBCUTANEOUS
Status: DISCONTINUED | OUTPATIENT
Start: 2020-09-28 | End: 2020-09-30 | Stop reason: HOSPADM

## 2020-09-28 RX ORDER — HYDRALAZINE HYDROCHLORIDE 20 MG/ML
2.5-5 INJECTION INTRAMUSCULAR; INTRAVENOUS EVERY 10 MIN PRN
Status: DISCONTINUED | OUTPATIENT
Start: 2020-09-28 | End: 2020-09-28 | Stop reason: HOSPADM

## 2020-09-28 RX ORDER — CEFAZOLIN SODIUM 1 G/50ML
1 INJECTION, SOLUTION INTRAVENOUS EVERY 8 HOURS
Status: COMPLETED | OUTPATIENT
Start: 2020-09-28 | End: 2020-09-29

## 2020-09-28 RX ORDER — GLYCOPYRROLATE 0.2 MG/ML
INJECTION, SOLUTION INTRAMUSCULAR; INTRAVENOUS PRN
Status: DISCONTINUED | OUTPATIENT
Start: 2020-09-28 | End: 2020-09-28

## 2020-09-28 RX ORDER — ONDANSETRON 4 MG/1
4 TABLET, ORALLY DISINTEGRATING ORAL EVERY 6 HOURS PRN
Status: DISCONTINUED | OUTPATIENT
Start: 2020-09-28 | End: 2020-09-30 | Stop reason: HOSPADM

## 2020-09-28 RX ORDER — NALOXONE HYDROCHLORIDE 0.4 MG/ML
.1-.4 INJECTION, SOLUTION INTRAMUSCULAR; INTRAVENOUS; SUBCUTANEOUS
Status: DISCONTINUED | OUTPATIENT
Start: 2020-09-28 | End: 2020-09-28

## 2020-09-28 RX ORDER — KETAMINE HYDROCHLORIDE 10 MG/ML
0.25 INJECTION, SOLUTION INTRAMUSCULAR; INTRAVENOUS ONCE
Status: DISCONTINUED | OUTPATIENT
Start: 2020-09-28 | End: 2020-09-30 | Stop reason: HOSPADM

## 2020-09-28 RX ORDER — SODIUM CHLORIDE, SODIUM LACTATE, POTASSIUM CHLORIDE, CALCIUM CHLORIDE 600; 310; 30; 20 MG/100ML; MG/100ML; MG/100ML; MG/100ML
INJECTION, SOLUTION INTRAVENOUS CONTINUOUS
Status: DISCONTINUED | OUTPATIENT
Start: 2020-09-28 | End: 2020-09-28 | Stop reason: HOSPADM

## 2020-09-28 RX ORDER — DEXTROSE MONOHYDRATE, SODIUM CHLORIDE, AND POTASSIUM CHLORIDE 50; 1.49; 4.5 G/1000ML; G/1000ML; G/1000ML
INJECTION, SOLUTION INTRAVENOUS CONTINUOUS
Status: DISCONTINUED | OUTPATIENT
Start: 2020-09-28 | End: 2020-09-30 | Stop reason: CLARIF

## 2020-09-28 RX ORDER — GABAPENTIN 300 MG/1
300 CAPSULE ORAL ONCE
Status: COMPLETED | OUTPATIENT
Start: 2020-09-28 | End: 2020-09-28

## 2020-09-28 RX ORDER — FENTANYL CITRATE 50 UG/ML
25-50 INJECTION, SOLUTION INTRAMUSCULAR; INTRAVENOUS
Status: DISCONTINUED | OUTPATIENT
Start: 2020-09-28 | End: 2020-09-28 | Stop reason: HOSPADM

## 2020-09-28 RX ORDER — CHLORTHALIDONE 25 MG/1
50 TABLET ORAL DAILY
Status: DISCONTINUED | OUTPATIENT
Start: 2020-09-29 | End: 2020-09-30

## 2020-09-28 RX ORDER — CEFAZOLIN SODIUM 1 G/50ML
1 INJECTION, SOLUTION INTRAVENOUS SEE ADMIN INSTRUCTIONS
Status: DISCONTINUED | OUTPATIENT
Start: 2020-09-28 | End: 2020-09-28 | Stop reason: HOSPADM

## 2020-09-28 RX ORDER — NALOXONE HYDROCHLORIDE 0.4 MG/ML
.1-.4 INJECTION, SOLUTION INTRAMUSCULAR; INTRAVENOUS; SUBCUTANEOUS
Status: DISCONTINUED | OUTPATIENT
Start: 2020-09-28 | End: 2020-09-30 | Stop reason: HOSPADM

## 2020-09-28 RX ORDER — MEPERIDINE HYDROCHLORIDE 25 MG/ML
12.5 INJECTION INTRAMUSCULAR; INTRAVENOUS; SUBCUTANEOUS EVERY 5 MIN PRN
Status: DISCONTINUED | OUTPATIENT
Start: 2020-09-28 | End: 2020-09-28 | Stop reason: HOSPADM

## 2020-09-28 RX ORDER — LIDOCAINE HYDROCHLORIDE 10 MG/ML
INJECTION, SOLUTION INFILTRATION; PERINEURAL PRN
Status: DISCONTINUED | OUTPATIENT
Start: 2020-09-28 | End: 2020-09-28

## 2020-09-28 RX ORDER — HYDROXYZINE HYDROCHLORIDE 10 MG/1
10 TABLET, FILM COATED ORAL EVERY 6 HOURS PRN
Status: DISCONTINUED | OUTPATIENT
Start: 2020-09-28 | End: 2020-09-30 | Stop reason: HOSPADM

## 2020-09-28 RX ORDER — ONDANSETRON 4 MG/1
4 TABLET, ORALLY DISINTEGRATING ORAL EVERY 30 MIN PRN
Status: DISCONTINUED | OUTPATIENT
Start: 2020-09-28 | End: 2020-09-28 | Stop reason: HOSPADM

## 2020-09-28 RX ORDER — CALCIUM CARBONATE 500 MG/1
1000 TABLET, CHEWABLE ORAL 4 TIMES DAILY PRN
Status: DISCONTINUED | OUTPATIENT
Start: 2020-09-28 | End: 2020-09-30 | Stop reason: HOSPADM

## 2020-09-28 RX ORDER — LIDOCAINE 40 MG/G
CREAM TOPICAL
Status: DISCONTINUED | OUTPATIENT
Start: 2020-09-28 | End: 2020-09-28 | Stop reason: HOSPADM

## 2020-09-28 RX ORDER — HYDROMORPHONE HYDROCHLORIDE 2 MG/1
2 TABLET ORAL EVERY 4 HOURS PRN
Status: DISCONTINUED | OUTPATIENT
Start: 2020-09-28 | End: 2020-09-30 | Stop reason: HOSPADM

## 2020-09-28 RX ORDER — ACETAMINOPHEN 325 MG/1
975 TABLET ORAL ONCE
Status: COMPLETED | OUTPATIENT
Start: 2020-09-28 | End: 2020-09-28

## 2020-09-28 RX ORDER — MAGNESIUM HYDROXIDE 1200 MG/15ML
LIQUID ORAL PRN
Status: DISCONTINUED | OUTPATIENT
Start: 2020-09-28 | End: 2020-09-28 | Stop reason: HOSPADM

## 2020-09-28 RX ORDER — TRANEXAMIC ACID 650 MG/1
1950 TABLET ORAL ONCE
Status: COMPLETED | OUTPATIENT
Start: 2020-09-28 | End: 2020-09-28

## 2020-09-28 RX ORDER — PROPOFOL 10 MG/ML
INJECTION, EMULSION INTRAVENOUS CONTINUOUS PRN
Status: DISCONTINUED | OUTPATIENT
Start: 2020-09-28 | End: 2020-09-28

## 2020-09-28 RX ORDER — BUPIVACAINE HYDROCHLORIDE 7.5 MG/ML
INJECTION, SOLUTION INTRASPINAL PRN
Status: DISCONTINUED | OUTPATIENT
Start: 2020-09-28 | End: 2020-09-28

## 2020-09-28 RX ORDER — PROCHLORPERAZINE MALEATE 5 MG
5 TABLET ORAL EVERY 6 HOURS PRN
Status: DISCONTINUED | OUTPATIENT
Start: 2020-09-28 | End: 2020-09-30 | Stop reason: HOSPADM

## 2020-09-28 RX ORDER — ALBUTEROL SULFATE 0.83 MG/ML
2.5 SOLUTION RESPIRATORY (INHALATION) EVERY 4 HOURS PRN
Status: DISCONTINUED | OUTPATIENT
Start: 2020-09-28 | End: 2020-09-28 | Stop reason: HOSPADM

## 2020-09-28 RX ORDER — CEFAZOLIN SODIUM 2 G/100ML
2 INJECTION, SOLUTION INTRAVENOUS
Status: COMPLETED | OUTPATIENT
Start: 2020-09-28 | End: 2020-09-28

## 2020-09-28 RX ORDER — ACETAMINOPHEN 325 MG/1
975 TABLET ORAL EVERY 8 HOURS
Status: DISCONTINUED | OUTPATIENT
Start: 2020-09-28 | End: 2020-09-30 | Stop reason: HOSPADM

## 2020-09-28 RX ORDER — PROCHLORPERAZINE 25 MG
12.5 SUPPOSITORY, RECTAL RECTAL EVERY 12 HOURS PRN
Status: DISCONTINUED | OUTPATIENT
Start: 2020-09-28 | End: 2020-09-30 | Stop reason: HOSPADM

## 2020-09-28 RX ORDER — ONDANSETRON 2 MG/ML
4 INJECTION INTRAMUSCULAR; INTRAVENOUS EVERY 6 HOURS PRN
Status: DISCONTINUED | OUTPATIENT
Start: 2020-09-28 | End: 2020-09-30 | Stop reason: HOSPADM

## 2020-09-28 RX ORDER — ATENOLOL 25 MG/1
25 TABLET ORAL DAILY
Status: DISCONTINUED | OUTPATIENT
Start: 2020-09-29 | End: 2020-09-30

## 2020-09-28 RX ORDER — ONDANSETRON 2 MG/ML
4 INJECTION INTRAMUSCULAR; INTRAVENOUS EVERY 30 MIN PRN
Status: DISCONTINUED | OUTPATIENT
Start: 2020-09-28 | End: 2020-09-28 | Stop reason: HOSPADM

## 2020-09-28 RX ADMIN — CEFAZOLIN SODIUM 1 G: 1 INJECTION, SOLUTION INTRAVENOUS at 21:50

## 2020-09-28 RX ADMIN — PHENYLEPHRINE HYDROCHLORIDE 200 MCG: 10 INJECTION INTRAVENOUS at 13:43

## 2020-09-28 RX ADMIN — TRANEXAMIC ACID 1950 MG: 650 TABLET, FILM COATED ORAL at 11:57

## 2020-09-28 RX ADMIN — PROPOFOL 125 MCG/KG/MIN: 10 INJECTION, EMULSION INTRAVENOUS at 13:21

## 2020-09-28 RX ADMIN — PHENYLEPHRINE HYDROCHLORIDE 100 MCG: 10 INJECTION INTRAVENOUS at 13:39

## 2020-09-28 RX ADMIN — HYDROMORPHONE HYDROCHLORIDE 0.2 MG: 1 INJECTION, SOLUTION INTRAMUSCULAR; INTRAVENOUS; SUBCUTANEOUS at 19:42

## 2020-09-28 RX ADMIN — CEFAZOLIN SODIUM 2 G: 2 INJECTION, SOLUTION INTRAVENOUS at 13:06

## 2020-09-28 RX ADMIN — SODIUM CHLORIDE, POTASSIUM CHLORIDE, SODIUM LACTATE AND CALCIUM CHLORIDE: 600; 310; 30; 20 INJECTION, SOLUTION INTRAVENOUS at 13:06

## 2020-09-28 RX ADMIN — BUPIVACAINE HYDROCHLORIDE IN DEXTROSE 1.6 ML: 7.5 INJECTION, SOLUTION SUBARACHNOID at 13:13

## 2020-09-28 RX ADMIN — PHENYLEPHRINE HYDROCHLORIDE 200 MCG: 10 INJECTION INTRAVENOUS at 13:57

## 2020-09-28 RX ADMIN — HYDROMORPHONE HYDROCHLORIDE 2 MG: 2 TABLET ORAL at 17:25

## 2020-09-28 RX ADMIN — ACETAMINOPHEN 975 MG: 325 TABLET, FILM COATED ORAL at 21:01

## 2020-09-28 RX ADMIN — ACETAMINOPHEN 975 MG: 325 TABLET, FILM COATED ORAL at 12:45

## 2020-09-28 RX ADMIN — GLYCOPYRROLATE 0.3 MG: 0.2 INJECTION, SOLUTION INTRAMUSCULAR; INTRAVENOUS at 13:45

## 2020-09-28 RX ADMIN — MIDAZOLAM 2 MG: 1 INJECTION INTRAMUSCULAR; INTRAVENOUS at 13:06

## 2020-09-28 RX ADMIN — LIDOCAINE HYDROCHLORIDE 50 MG: 10 INJECTION, SOLUTION INFILTRATION; PERINEURAL at 13:10

## 2020-09-28 RX ADMIN — PHENYLEPHRINE HYDROCHLORIDE 100 MCG: 10 INJECTION INTRAVENOUS at 13:47

## 2020-09-28 RX ADMIN — POTASSIUM CHLORIDE, DEXTROSE MONOHYDRATE AND SODIUM CHLORIDE: 150; 5; 450 INJECTION, SOLUTION INTRAVENOUS at 16:20

## 2020-09-28 RX ADMIN — GABAPENTIN 300 MG: 300 CAPSULE ORAL at 12:45

## 2020-09-28 RX ADMIN — HYDROMORPHONE HYDROCHLORIDE 2 MG: 2 TABLET ORAL at 22:21

## 2020-09-28 RX ADMIN — SODIUM CHLORIDE, POTASSIUM CHLORIDE, SODIUM LACTATE AND CALCIUM CHLORIDE 500 ML: 600; 310; 30; 20 INJECTION, SOLUTION INTRAVENOUS at 20:25

## 2020-09-28 ASSESSMENT — LIFESTYLE VARIABLES: TOBACCO_USE: 1

## 2020-09-28 ASSESSMENT — COPD QUESTIONNAIRES: COPD: 1

## 2020-09-28 ASSESSMENT — MIFFLIN-ST. JEOR: SCORE: 1100.45

## 2020-09-28 NOTE — PROGRESS NOTES
"SPIRITUAL HEALTH SERVICES  [facility] - Surgical    Referral Source: Pt request during pre-op phone call    - Caitlyn was in the pre-op process but welcomed the chance to pray together.  , Xavier, was present.  Caitlyn stated she was ready and that \"my hand is in His.\"  I offered prayer for the surgical team, for Caitlyn and for a successful outcome.    Plan:  will provide follow up visit tomorrow if possible.    Robin Ragland M.A., [facility]  Lead   [facility]  Office: [phone]    "

## 2020-09-28 NOTE — ANESTHESIA POSTPROCEDURE EVALUATION
Patient: Caitlyn Wasserman    Procedure(s):  TOTAL Hip Arthroplasty    Diagnosis:Osteoarthrosis, hip [M16.9]  Diagnosis Additional Information: No value filed.    Anesthesia Type:  No value filed.    Note:  Anesthesia Post Evaluation    Patient location during evaluation: Bedside  Patient participation: Able to fully participate in evaluation  Level of consciousness: awake and alert  Pain management: adequate  Airway patency: patent  Cardiovascular status: acceptable  Respiratory status: acceptable  Hydration status: acceptable  PONV: none     Anesthetic complications: None          Last vitals:  Vitals:    09/28/20 1128   BP: (!) 140/85   Resp: 16   Temp: 37.1  C (98.7  F)   SpO2: 98%         Electronically Signed By: Karli Lorenzo CRNA, APRN CRNA  September 28, 2020  2:11 PM

## 2020-09-28 NOTE — BRIEF OP NOTE
[facility]    Brief Operative Note    Pre-operative diagnosis: Osteoarthrosis, hip [M16.9]  Post-operative diagnosis Same as pre-operative diagnosis    Procedure: Procedure(s):  TOTAL Hip Arthroplasty  Surgeon: Surgeon(s) and Role:     * Ashkan Shah MD - Primary     * Joel Michel PA-C - Assisting  Anesthesia: General   Estimated blood loss: Less than 50 ml  Drains: None  Specimens: * No specimens in log *  Findings:   None.  Complications: None.  Implants:   Implant Name Type Inv. Item Serial No.  Lot No. LRB No. Used Action   Gripton Acetabular shell     Depuy [id number] Right 1 Implanted   IMP HEAD FEMORAL DEPUY 36MM +1.5 1365- Total Joint Component/Insert IMP HEAD FEMORAL DEPUY 36MM +1.5 1365-  [employer] [id number] Right 1 Implanted   IMP STEM FEMORAL HIP DEPUY SUMMIT 5 1570- Total Joint Component/Insert IMP STEM FEMORAL HIP DEPUY SUMMIT 5 1570-  [employer] [id number] Right 1 Implanted   IMP INSERT HIP DEPUY PINNACLE ALTRX [id number] 1221- Total Joint Component/Insert IMP INSERT HIP DEPUY PINNACLE ALTRX [id number] 1221-  [id number] Right 1 Implanted

## 2020-09-28 NOTE — OP NOTE
Procedure Date: 09/28/2020      PREOPERATIVE DIAGNOSIS:  Primary osteoarthrosis, right hip.      POSTOPERATIVE DIAGNOSIS:  Primary osteoarthrosis, right hip.      PROCEDURE PERFORMED:  DePuy right total hip arthroplasty.      COMPONENTS:  Acetabulum 52 mm outside diameter Williams ingrowth with Gription.  The liner is a neutral Ultrex.  The stem is a #5 standard-offset Romulus ingrowth.  Head and neck is +1.5/36 mm cobalt chrome.      SURGEON:  Ashkan Shah MD      ASSISTANT:  Chauncey Conn PA-C      ANESTHESIA:  Spinal.      ESTIMATED BLOOD LOSS:  50 mL      COMPLICATIONS:  None apparent.      INDICATIONS FOR PROCEDURE:  Caitlyn is an 84-year-old female who presented with end-stage primary osteoarthrosis of the right hip, recalcitrant to conservative treatment.  After alternatives, risks, and possible complications were carefully discussed, the patient desired surgical intervention; therefore, consent was obtained.      DESCRIPTION OF PROCEDURE:  The patient was brought to the main operating room after spinal anesthesia was adequately obtained and placed in the left lateral decubitus position.  Two grams of Ancef were given intravenously.  Right lower extremity was prepped and draped in the usual sterile fashion.      Oblique incision was made for posterolateral approach to the right hip.  Subcutaneous tissue was divided with cautery, deep fascia incised in line with the incision.  East-West retractor was placed.  I then released external rotators, incised the capsule, dislocating the femoral head.  Capital fragment was removed from the operative field after cutting at the appropriate level.      With retractors in place, resected out the remains of the capsule labral tissue and then reamed to 51 mm, with excellent circumferential bleeding bone.  Therefore, we selected a 52 mm cup, impacted it into place with appropriate anteversion and coverage.  Neutral trial liner was placed.  We then reamed and broached the  femoral canal for a #5 stem, and immediately had superb stability in all directions.  Therefore, we removed trial components, pulse-lavaged surfaces clean, impacted the liner into place, verifying all tabs had seated appropriately.  Likewise impacted the stem solidly down to the calcar.  Again performed trial reduction, elected to go +1.5 head and neck combination, the ball was impacted on a cleaned and dried Rosas taper.  Hip was reduced.      Shuck test was appropriate.  Leg lengths were equal, unable dislocate in extension and external rotation.  At 90 degrees of flexion, 20 degrees of adduction, we were over 90 degrees of internal rotation before she began to ride out of the cup.  Therefore, final consent reduction was performed, soaked in weak Betadine solution, pulse-lavaged surfaces clean.  I then removed East-West retractor.  Closed the deep fascia with #1 Stratafix, closed the subcutaneous tissue with 2-0 Stratafix, completed closure with 3-0 Stratafix.  Prineo was placed over the wound followed by sterile compression bandage.  A hip abduction pillow was placed.  The patient was turned supine, returned to PAR in stable condition, without apparent complication.         DANYA AMBROSE MD             D: 2020   T: 2020   MT: MACIE      Name:     RANJEET DE PAZ   MRN:      [id number]        Account:        [id number]   :      1935           Procedure Date: 2020      Document: [id number]

## 2020-09-28 NOTE — ANESTHESIA CARE TRANSFER NOTE
Patient: Caitlyn Wasserman    Procedure(s):  TOTAL Hip Arthroplasty    Diagnosis: Osteoarthrosis, hip [M16.9]  Diagnosis Additional Information: No value filed.    Anesthesia Type:   No value filed.     Note:  Airway :Nasal Cannula  Patient transferred to:Phase II  Handoff Report: Identifed the Patient, Identified the Reponsible Provider, Reviewed the pertinent medical history, Discussed the surgical course, Reviewed Intra-OP anesthesia mangement and issues during anesthesia, Set expectations for post-procedure period and Allowed opportunity for questions and acknowledgement of understanding      Vitals: (Last set prior to Anesthesia Care Transfer)    CRNA VITALS  9/28/2020 1341 - 9/28/2020 1411      9/28/2020             Pulse:  70    SpO2:  98 %    Resp Rate (observed):  (!) 4                Electronically Signed By: Karli Lorenzo CRNA, APRN CRNA  September 28, 2020  2:11 PM

## 2020-09-28 NOTE — ANESTHESIA PROCEDURE NOTES
Procedure note : intrathecal      Staff -   CRNA: Karli Lorenzo APRN CRNA  Performed By: CRNA  Pre-Procedure    Location: OR    Procedure Times:9/28/2020 1:06 PM and 9/28/2020 1:13 PM  Pre-Anesthestic Checklist: patient identified, IV checked, site marked, risks and benefits discussed, informed consent, monitors and equipment checked, pre-op evaluation and at physician/surgeon's request    Timeout  Correct Patient: Yes   Correct Procedure: Yes   Correct Site: Yes   Correct Laterality: N/A   Correct Position: Yes   Site Marked: N/A   .   Procedure Documentation  ASA 3  .    Procedure: intrathecal, .   Patient Position:sitting  (midline approach)     Patient Prep/Sterile Barriers; mask, sterile gloves, povidone-iodine 7.5% surgical scrub, patient draped.  .  Needle:  Spinal Needle (gauge): 27  Spinal/LP Needle Length (inches): 3.5 # of attempts: 1 and # of redirects:  Introducer used .        Assessment/Narrative  Paresthesias: No.  .  .  clear CSF fluid removed . Time Injected: 13:13

## 2020-09-28 NOTE — ANESTHESIA PREPROCEDURE EVALUATION
Anesthesia Pre-Procedure Evaluation    Patient: Caitlyn Wasserman   MRN: [id number] : 1935          Preoperative Diagnosis: Osteoarthrosis, hip [M16.9]    Procedure(s):  TOTAL Hip Arthroplasty    Past Medical History:   Diagnosis Date     COPD (chronic obstructive pulmonary disease) (H)      Migraine, unspecified, without mention of intractable migraine without mention of status migrainosus      Other urinary incontinence      Pure hypercholesterolemia      Unspecified essential hypertension      Past Surgical History:   Procedure Laterality Date     AS TOTAL KNEE ARTHROPLASTY Right      HC ANTER COLPORRHAPHY,BLAD/VAGINA      Cystocele Repair,rectocele repair.     JOINT REPLACEMENT, HIP RT/LT  3/07,     Joint Replacement Hip /LT- dislocated in front repeat surgery 1 week later       Anesthesia Evaluation     . Pt has had prior anesthetic. Type: General and Regional    No history of anesthetic complications          ROS/MED HX    ENT/Pulmonary:     (+)allergic rhinitis, tobacco use, Past use Treatment: Inhaler daily and Inhaler prn,  COPD, , . .    Neurologic:     (+)migraines, other neuro leg numbness    Cardiovascular:     (+) Dyslipidemia, hypertension----. : . . . :. . Previous cardiac testing Echodate:2017results:Interpretation Summary  Normal resting wall motion and no stress-induced wall motion abnormality.  This was a normal stress echocardiogram with no evidence of stress-induced  ischemia.date: results:ECG reviewed date: results:Marked sinus Bradycardia - occasional PAC    # PACs = 1.  BORDERLINE RHYTHM date: results:          METS/Exercise Tolerance:  >4 METS   Hematologic:  - neg hematologic  ROS       Musculoskeletal:   (+) arthritis,  other musculoskeletal- kyphosis,       GI/Hepatic:  - neg GI/hepatic ROS       Renal/Genitourinary:  - ROS Renal section negative       Endo:  - neg endo ROS       Psychiatric:  - neg psychiatric ROS       Infectious Disease:  - neg infectious disease  "ROS       Malignancy:      - no malignancy   Other:    (+) H/O Chronic Pain,  - neg other ROS                      Physical Exam  Normal systems: cardiovascular, pulmonary and dental    Airway   Mallampati: II  TM distance: >3 FB  Neck ROM: full    Dental     Cardiovascular       Pulmonary             Lab Results   Component Value Date    WBC 5.9 09/23/2020    HGB 13.9 09/23/2020    HCT 41.9 09/23/2020     09/23/2020    CRP <2.9 02/15/2018    SED 7 02/15/2018     09/23/2020    POTASSIUM 3.8 09/23/2020    CHLORIDE 99 09/23/2020    CO2 32 09/23/2020    BUN 15 09/23/2020    CR 0.89 09/23/2020    GLC 80 09/23/2020    SACHI 9.4 09/23/2020    ALBUMIN 3.8 02/15/2018    PROTTOTAL 7.6 02/15/2018    ALT 23 02/15/2018    AST 24 02/15/2018    ALKPHOS 75 02/15/2018    BILITOTAL 0.6 02/15/2018    INR 2.52 (H) 10/30/2014    TSH 1.83 11/08/2016       Preop Vitals  BP Readings from Last 3 Encounters:   09/28/20 (!) 140/85   09/23/20 120/70   08/25/20 128/68    Pulse Readings from Last 3 Encounters:   09/23/20 53   08/25/20 70   08/11/20 (!) 47      Resp Readings from Last 3 Encounters:   09/28/20 16   09/23/20 16   08/11/20 16    SpO2 Readings from Last 3 Encounters:   09/28/20 98%   09/23/20 92%   08/11/20 97%      Temp Readings from Last 1 Encounters:   09/28/20 37.1  C (98.7  F) (Oral)    Ht Readings from Last 1 Encounters:   09/28/20 1.6 m (5' 3\")      Wt Readings from Last 1 Encounters:   09/28/20 67.1 kg (148 lb)    Estimated body mass index is 26.22 kg/m  as calculated from the following:    Height as of this encounter: 1.6 m (5' 3\").    Weight as of this encounter: 67.1 kg (148 lb).       Anesthesia Plan      History & Physical Review  History and physical reviewed and following examination; no interval change.    ASA Status:  3 .    NPO Status:  > 6 hours    Plan for Spinal            Postoperative Care      Consents  Anesthetic plan, risks, benefits and alternatives discussed with:  Patient and Spouse..  "                SUDHAKAR Lino CRNA

## 2020-09-29 ENCOUNTER — APPOINTMENT (OUTPATIENT)
Dept: PHYSICAL THERAPY | Facility: CLINIC | Age: 85
DRG: 470 | End: 2020-09-29
Attending: ORTHOPAEDIC SURGERY
Payer: COMMERCIAL

## 2020-09-29 ENCOUNTER — APPOINTMENT (OUTPATIENT)
Dept: OCCUPATIONAL THERAPY | Facility: CLINIC | Age: 85
DRG: 470 | End: 2020-09-29
Attending: ORTHOPAEDIC SURGERY
Payer: COMMERCIAL

## 2020-09-29 LAB — HGB BLD-MCNC: 11.2 G/DL (ref 11.7–15.7)

## 2020-09-29 PROCEDURE — 25000128 H RX IP 250 OP 636: Performed by: ORTHOPAEDIC SURGERY

## 2020-09-29 PROCEDURE — 97110 THERAPEUTIC EXERCISES: CPT | Mod: GP | Performed by: PHYSICAL THERAPIST

## 2020-09-29 PROCEDURE — 97161 PT EVAL LOW COMPLEX 20 MIN: CPT | Mod: GP | Performed by: PHYSICAL THERAPIST

## 2020-09-29 PROCEDURE — 25000128 H RX IP 250 OP 636: Performed by: PHYSICIAN ASSISTANT

## 2020-09-29 PROCEDURE — 97535 SELF CARE MNGMENT TRAINING: CPT | Mod: GO

## 2020-09-29 PROCEDURE — 97165 OT EVAL LOW COMPLEX 30 MIN: CPT | Mod: GO

## 2020-09-29 PROCEDURE — 25000132 ZZH RX MED GY IP 250 OP 250 PS 637: Performed by: ORTHOPAEDIC SURGERY

## 2020-09-29 PROCEDURE — 97116 GAIT TRAINING THERAPY: CPT | Mod: GP | Performed by: PHYSICAL THERAPIST

## 2020-09-29 PROCEDURE — 85018 HEMOGLOBIN: CPT | Performed by: ORTHOPAEDIC SURGERY

## 2020-09-29 PROCEDURE — 36415 COLL VENOUS BLD VENIPUNCTURE: CPT | Performed by: ORTHOPAEDIC SURGERY

## 2020-09-29 PROCEDURE — 25000132 ZZH RX MED GY IP 250 OP 250 PS 637: Performed by: PHYSICIAN ASSISTANT

## 2020-09-29 PROCEDURE — 99232 SBSQ HOSP IP/OBS MODERATE 35: CPT | Performed by: INTERNAL MEDICINE

## 2020-09-29 RX ADMIN — PROCHLORPERAZINE MALEATE 5 MG: 5 TABLET ORAL at 09:31

## 2020-09-29 RX ADMIN — ACETAMINOPHEN 975 MG: 325 TABLET, FILM COATED ORAL at 20:39

## 2020-09-29 RX ADMIN — CALCIUM CARBONATE (ANTACID) CHEW TAB 500 MG 1000 MG: 500 CHEW TAB at 19:52

## 2020-09-29 RX ADMIN — ENOXAPARIN SODIUM 40 MG: 40 INJECTION SUBCUTANEOUS at 07:39

## 2020-09-29 RX ADMIN — ONDANSETRON 4 MG: 4 TABLET, ORALLY DISINTEGRATING ORAL at 11:30

## 2020-09-29 RX ADMIN — HYDROXYZINE HYDROCHLORIDE 10 MG: 10 TABLET, FILM COATED ORAL at 20:39

## 2020-09-29 RX ADMIN — HYDROMORPHONE HYDROCHLORIDE 2 MG: 2 TABLET ORAL at 07:34

## 2020-09-29 RX ADMIN — CEFAZOLIN SODIUM 1 G: 1 INJECTION, SOLUTION INTRAVENOUS at 04:39

## 2020-09-29 RX ADMIN — HYDROMORPHONE HYDROCHLORIDE 2 MG: 2 TABLET ORAL at 02:18

## 2020-09-29 RX ADMIN — ACETAMINOPHEN 975 MG: 325 TABLET, FILM COATED ORAL at 04:31

## 2020-09-29 RX ADMIN — ACETAMINOPHEN 975 MG: 325 TABLET, FILM COATED ORAL at 12:21

## 2020-09-29 RX ADMIN — ONDANSETRON 4 MG: 2 INJECTION INTRAMUSCULAR; INTRAVENOUS at 04:35

## 2020-09-29 ASSESSMENT — ACTIVITIES OF DAILY LIVING (ADL): IADL_COMMENTS: SPOUSE CAN ASSIST

## 2020-09-29 NOTE — PROGRESS NOTES
Discharge Planner PT   Patient plan for discharge: Home w/ assistance    Current status: Eval completed-    AMb 10 feet x1 with RW and CGA. Very slow, weak.  Pt not feeling well- dizzy,l nauseous,- needed to be assisted into supine.  / 47.  Pt feeling better following several minutes resting in supine.-     Barriers to return to prior living situation: medical status, mobility status - not safe to return home today.     Recommendations for discharge: home w/ assistance as needed,  Possible need for HC PT as pt appears weak.   Pt unsure re- HC services- states  may not need this if she is feeling better and has assistance at home.     Pt would also benefit from OT to review YSABEL Precautions w/ ADLs      Rationale for recommendations: POD 1 status, PLOF   ( preexisting weakness, difficulty w/ sit> stand)        Entered by: Kayleigh Taylor 09/29/2020 9:18 AM        09/29/20 0800   Quick Adds   Type of Visit Initial PT Evaluation   Living Environment   Lives With spouse   Living Arrangements   ([facility])   Home Accessibility stairs to enter home   Number of Stairs, Main Entrance 1   Stair Railings, Main Entrance railing on left side (ascending)   Living Environment Comment Pt reports her daughter will be staying with her for one week following the surgery    Functional Level Prior   Ambulation 0-->independent   Transferring 0-->independent   Toileting 0-->independent   Bathing 0-->independent   Communication 0-->understands/communicates without difficulty   Swallowing 0-->swallows foods/liquids without difficulty   Cognition 0 - no cognition issues reported   Fall history within last six months no   Which of the above functional risks had a recent onset or change? none   Prior Functional Level Comment PLOF-  Pt reports indep. ambulation with no device; pain and difficulty w/ sit> stand .     General Information   Onset of Illness/Injury or Date of Surgery - Date 09/28/20   Referring Physician DR Shah    Patient/Family Goals Statement Return w/ assistance   Pertinent History of Current Problem (include personal factors and/or comorbidities that impact the POC) Osteoarthrosis, hip ; s/p right total hip arthroplasty.    Hx of Left YSABEL 2007/ w/ revision   Precautions/Limitations right hip precautions  (P/L approach)   Weight-Bearing Status - LLE weight-bearing as tolerated   General Observations Pt alert- report her pain is controlled- has been intermittent.     General Info Comments Pre- Syncopal episode w/ first OOB attempt t last night- BP 96/44;  has not amb. yet   Pain Assessment   Patient Currently in Pain Yes, see Vital Sign flowsheet   Integumentary/Edema   Integumentary/Edema Comments WFL- wear compression stockings    Strength   Strength Comments Unable to SLR on R   MMT: Hip, Rehab Eval   Hip ABduction - Right Side (2+/5) poor plus, right   MMT: Knee, Rehab Eval   Knee Extension - Right Side (3-/5) fair minus, right   Bed Mobility   Bed Mobility Comments Mod. assistance sitting> supine   Transfer Skills   Transfer Comments Patient instructed on total hip precautions with mobility; transfer technique including car transfers; sitting surfaces ; demon. Transfer technique.  . Patient performed sit to stand with rolling walker and Min. assistance , cues   Gait   Gait Comments Pt instructed on WBAT, gait sequence-   AMb 10 feet x1 with RW and CGA. Very slow, weak.  Pt not feeling well- dizzy,l nauseous,- needed to be assisted into supine.  / 47.  Pt feeling better following several minutes resting in supine.- notifed RN    Balance   Balance Comments fair dynamic standing balance- needing CGA for balance   General Therapy Interventions   Planned Therapy Interventions bed mobility training;gait training;strengthening;transfer training;home program guidelines   Clinical Impression   Criteria for Skilled Therapeutic Intervention yes, treatment indicated   PT Diagnosis right total hip arthroplasty.   "  Influenced by the following impairments Decreased strength Left LE, pain., post  surgical precautions   Functional limitations due to impairments Altered  mobility - decreased indep. w/ bed  mobility, transfers, gait   Clinical Presentation Stable/Uncomplicated   Clinical Presentation Rationale clinical judgement   Clinical Decision Making (Complexity) Low complexity   Therapy Frequency 2x/day   Predicted Duration of Therapy Intervention (days/wks) 1 day   Anticipated Equipment Needs at Discharge   (Pt has a RW, SEC,  for home)   Anticipated Discharge Disposition Home with Assist;Home with Home Therapy   Risk & Benefits of therapy have been explained Yes   Patient, Family & other staff in agreement with plan of care Yes   [facility] AM-PAC  \"6 Clicks\" V.2 Basic Mobility Inpatient Short Form   1. Turning from your back to your side while in a flat bed without using bedrails? 3 - A Little   2. Moving from lying on your back to sitting on the side of a flat bed without using bedrails? 3 - A Little   3. Moving to and from a bed to a chair (including a wheelchair)? 3 - A Little   4. Standing up from a chair using your arms (e.g., wheelchair, or bedside chair)? 3 - A Little   5. To walk in hospital room? 3 - A Little   6. Climbing 3-5 steps with a railing? 2 - A Lot   Basic Mobility Raw Score (Score out of 24.Lower scores equate to lower levels of function) 17   Total Evaluation Time   Total Evaluation Time (Minutes) 10     "

## 2020-09-29 NOTE — PROGRESS NOTES
09/29/20 1000   Quick Adds   Type of Visit Initial Occupational Therapy Evaluation   Living Environment   Lives With spouse   Living Arrangements other (see comments)  ([facility])   Home Accessibility stairs to enter home   Number of Stairs, Main Entrance 1   Stair Railings, Main Entrance railing on left side (ascending)   Living Environment Comment per pt-daughter will be staying with her for 1 week upon discharge. walk-in shower with shower chair    Functional Level   Ambulation 0-->independent   Transferring 0-->independent   Toileting 0-->independent   Bathing 1-->assistive equipment  (shower chair )   Dressing 1-->assistive equipment  (pt uses sock aid and reacher daily )   Eating 0-->independent   Communication 0-->understands/communicates without difficulty   Swallowing 0-->swallows foods/liquids without difficulty   Cognition 0 - no cognition issues reported   Fall history within last six months no   General Information   Onset of Illness/Injury or Date of Surgery - Date 09/28/20   Referring Physician Ashkan Shah MD    Patient/Family Goals Statement to return home. decrease nausea.    Additional Occupational Profile Info/Pertinent History of Current Problem s/p R YSABEL   Precautions/Limitations right hip precautions   Weight-Bearing Status - RLE weight-bearing as tolerated   General Info Comments pt currently very nauseated- RN aware.    Cognitive Status Examination   Orientation orientation to person, place and time   Level of Consciousness alert   Pain Assessment   Patient Currently in Pain Yes, see Vital Sign flowsheet   Transfer Skills   Transfer Comments pt denies OOB activity d/t nausea.    Transfer Skill: Toilet Transfer   Level of [place]: Toilet   (pt concerned w/ toilet set-up at home.decline practice today)   Lower Body Dressing   Level of [place]: Dress Lower Body   (pt completes LB dressing daily using AE- declines concerns)   Assistive Device   (declines need for practice  "as uses AE daily)   Instrumental Activities of Daily Living (IADL)   IADL Comments spouse can assist    Activities of Daily Living Analysis   Impairments Contributing to Impaired Activities of Daily Living pain;post surgical precautions   General Therapy Interventions   Planned Therapy Interventions ADL retraining   Intervention Comments AE training    Clinical Impression   Criteria for Skilled Therapeutic Interventions Met yes, treatment indicated   OT Diagnosis decreased independence with ADLs   Influenced by the following impairments hip precautions, pain    Assessment of Occupational Performance 1-3 Performance Deficits   Identified Performance Deficits toilet transfer    Clinical Decision Making (Complexity) Low complexity   Therapy Frequency Daily   Predicted Duration of Therapy Intervention (days/wks) 2 days   Anticipated Equipment Needs at Discharge   (toilet safety frame )   Anticipated Discharge Disposition Home with Assist   Risks and Benefits of Treatment have been explained. Yes   Patient, Family & other staff in agreement with plan of care Yes   [facility] AM-PAC  \"6 Clicks\" Daily Activity Inpatient Short Form   1. Putting on and taking off regular lower body clothing? 3 - A Little   2. Bathing (including washing, rinsing, drying)? 3 - A Little   3. Toileting, which includes using toilet, bedpan or urinal? 3 - A Little   4. Putting on and taking off regular upper body clothing? 4 - None   5. Taking care of personal grooming such as brushing teeth? 4 - None   6. Eating meals? 4 - None   Daily Activity Raw Score (Score out of 24.Lower scores equate to lower levels of function) 21   Total Evaluation Time   Total Evaluation Time (Minutes) 8     "

## 2020-09-29 NOTE — PROGRESS NOTES
"[facility] Orthopaedics Progress Note      Post-operative Day: 1 Day Post-Op    Procedure(s):  TOTAL Hip Arthroplasty      Subjective:    Pain: minimal at rest.  Has only been to edge of bed.  Complains of nausea.  Did not sleep much last night.  She has numerous concerns about going home in her current state.    Chest pain, SOB:  No      Objective:  Blood pressure 132/47, pulse 54, temperature 97.8  F (36.6  C), temperature source Oral, resp. rate 16, height 1.6 m (5' 3\"), weight 67.1 kg (148 lb), SpO2 99 %, not currently breastfeeding.    Patient Vitals for the past 24 hrs:   BP Temp Temp src Pulse Resp SpO2 Height Weight   09/29/20 0516 -- -- -- -- 16 -- -- --   09/29/20 0303 -- -- -- -- 16 -- -- --   09/29/20 0215 132/47 97.8  F (36.6  C) Oral 54 14 99 % -- --   09/28/20 2310 -- -- -- -- 14 -- -- --   09/28/20 2221 -- -- -- -- 12 -- -- --   09/28/20 2217 108/52 98.6  F (37  C) Oral 56 8 100 % -- --   09/28/20 2056 104/48 -- -- 60 -- -- -- --   09/28/20 2009 97/51 -- -- 52 -- -- -- --   09/28/20 2003 100/40 -- -- 58 -- -- -- --   09/28/20 2000 -- -- -- -- 16 -- -- --   09/28/20 1942 -- -- -- -- 16 -- -- --   09/28/20 1815 -- -- -- -- 16 -- -- --   09/28/20 1725 -- -- -- -- 16 -- -- --   09/28/20 1553 134/52 97.3  F (36.3  C) Oral 55 16 99 % -- --   09/28/20 1542 -- -- -- -- -- 98 % -- --   09/28/20 1530 113/62 -- -- 55 14 97 % -- --   09/28/20 1515 119/69 -- -- 58 8 97 % -- --   09/28/20 1500 112/65 -- -- 58 (!) 6 97 % -- --   09/28/20 1445 116/65 -- -- 61 9 97 % -- --   09/28/20 1430 114/71 -- -- 67 9 95 % -- --   09/28/20 1420 107/62 -- -- 70 27 98 % -- --   09/28/20 1416 103/61 97.1  F (36.2  C) Axillary 70 11 97 % -- --   09/28/20 1128 (!) 140/85 98.7  F (37.1  C) Oral -- 16 98 % 1.6 m (5' 3\") 67.1 kg (148 lb)       Wt Readings from Last 4 Encounters:   09/28/20 67.1 kg (148 lb)   09/23/20 67.1 kg (148 lb)   08/11/20 67.1 kg (148 lb)   07/21/20 66.7 kg (147 lb)         Motor function, sensation, and " circulation intact   Yes  Wound status: incisions are clean dry and intact. Yes  Calf tenderness: Bilateral  No    Pertinent Labs   Lab Results: personally reviewed.     Recent Labs   Lab Test 09/29/20  0411 09/23/20  0842 08/28/20  0959 08/19/20  0927 08/11/20  1111  02/15/18  0928  10/30/14  0814 10/27/14  0807 10/24/14  1010   INR  --   --   --   --   --   --   --   --  2.52* 2.14* 2.28*   HGB 11.2* 13.9  --   --  13.9  --  14.5   < >  --   --   --    HCT  --  41.9  --   --  42.6  --  44.3   < >  --   --   --    MCV  --  94  --   --  94  --  96   < >  --   --   --    PLT  --  256  --   --  230  --  280   < >  --   --   --    NA  --  134 132* 134 134   < > 139   < >  --   --   --    CRP  --   --   --   --   --   --  <2.9  --   --   --   --     < > = values in this interval not displayed.       Plan: Anticoagulation protocol: Lovenox inpatient and then  mg daily at discharge  x 42  days            Pain medications:  dilaudid and tylenol            Weight bearing status:  WBAT            Disposition:  Likely home tomorrow due to her advanced age and ongoing issues with nausea, lightheadedness and weakness.  She will definitely benefit from another overnight stay and additional therapy.               Continue cares and rehabilitation     Report completed by:  Joel Michel PA-C  Date: 9/29/2020  Time: 7:00 AM

## 2020-09-29 NOTE — CONSULTS
Care Transition Initial Assessment - RN      Met with: Patient.    DATA  Active Problems:    Hip arthrosis       Cognitive Status: awake, alert and oriented.  Primary Care Clinic Name: [facility]  Primary Care MD Name: Chacho  Contact information and PCP information verified: Yes  Lives With: spouse   Living Arrangements: other (see comments)([facility])  Quality of Family Relationships: supportive, involved, helpful  Description of Support System: Supportive, Involved   Who is your support system?: , Children   Support Assessment: Adequate family and caregiver support   Insurance concerns: No Insurance issues identified  Admit date and time:  9/28/2020 11:19 AM        This writer met with pt, introduced self and role. Patient currently lives in a one level town home in [place] with her , Xavier.  She is independent with ADLs at baseline.  Discussed discharge planning and medicare guidelines in regards to home care and SNF benefits.  Discussed therapy recommendations.  Patient states she does not feel well today, but hopes to feel better prior to discharge.  She plans to have her daughters stay with her and Xavier over the next several weeks.  She declines need for home care services at this time, stating she should be well supported with her family.      CTS will remain available for any changes in discharge plans.      Family to provide transportation home upon discharge.      PLAN    Home with family  No needs    ISHAN ChaconN RN  Inpatient Care Coordinator  [facility] [phone]  [facility] [phone]

## 2020-09-29 NOTE — PROGRESS NOTES
"      [facility] Medicine Progress Note  Date of Service: 09/29/2020    Assessment & Plan   Caitlyn Wasserman is a 84 year old female who presented on 9/28/2020 for scheduled Procedure(s):  TOTAL Hip Arthroplasty by Ashkan Shah MD and is being followed by the hospital medicine service for co-management of acute and/or chronic perioperative medical problems.      S/p Procedure(s):  TOTAL Hip Arthroplasty   1 Day Post-Op    - pain control, wound cares, physical therapy, occupational therapy and DVT prophylaxis per orthopedic surgery service    Hypotension post op    BP running low and lightheadedness with standing post op. Likely related to anesthesia and narcotics for pain. Nausea/vomiting also likely due to narcotics. Received fluid bolus last night.    - holding chlorthalidone and atenolol for low BP   - encouraged patient to avoid the narcotics if pain control tolerable without it      Hypertension     On chlorthalidone and atenolol PTA. Currently held for low BP post op.    - resume meds when BP normalizes    DVT Prophylaxis: as per orthopedic surgery service - Defer to primary service  Code Status: Full Code    Lines: PIV   Corral catheter: None    Discussion: Medically, the patient appears not ready for discharge due to post op low BP    Disposition: Anticipate discharge 1-2 more days per ortho service     Attestation:  Total time: 25 minutes    João Carpenter MD       Interval History   Lightheaded overnight and this morning with standing. Nausea with emesis at lunch today. Pain \"not too bad\" presently. No chest pain, shortness of breath.     Physical Exam   Temp:  [97.3  F (36.3  C)-98.6  F (37  C)] 97.9  F (36.6  C)  Pulse:  [51-60] 51  Resp:  [8-16] 16  BP: ()/(40-53) 103/51  SpO2:  [96 %-100 %] 97 %    Weights:   Vitals:    09/28/20 1128   Weight: 67.1 kg (148 lb)    Body mass index is 26.22 kg/m .    General: alert and oriented, appears tired and weak but " nontoxic  CV: Regular, JVP normal  Respiratory: CTA bilaterally  GI: Soft, non-tender   Skin: Warm and dry    Data   Recent Labs   Lab 09/29/20  0411 09/28/20  1745 09/23/20  0842   WBC  --   --  5.9   HGB 11.2*  --  13.9   MCV  --   --  94   PLT  --   --  256   NA  --   --  134   POTASSIUM  --   --  3.8   CHLORIDE  --   --  99   CO2  --   --  32   BUN  --   --  15   CR  --  0.84 0.89   ANIONGAP  --   --  3   SACHI  --   --  9.4   GLC  --   --  80       Recent Labs   Lab 09/23/20  0842   GLC 80        Unresulted Labs Ordered in the Past 30 Days of this Admission     No orders found for last 31 day(s).           Imaging  No results found for this or any previous visit (from the past 24 hour(s)).     I reviewed all new labs and imaging results over the last 24 hours. I personally reviewed no images or EKG's today.    Medications     dextrose 5% and 0.45% NaCl + KCl 20 mEq/L Stopped (09/29/20 0701)       acetaminophen  975 mg Oral Q8H     atenolol  25 mg Oral Daily     chlorthalidone  50 mg Oral Daily     enoxaparin ANTICOAGULANT  40 mg Subcutaneous Q24H     ketamine  0.25 mcg/kg/min Intravenous Once     sodium chloride (PF)  3 mL Intracatheter Q8H   Reviewed meds    João Carpenter MD

## 2020-09-30 ENCOUNTER — APPOINTMENT (OUTPATIENT)
Dept: OCCUPATIONAL THERAPY | Facility: CLINIC | Age: 85
DRG: 470 | End: 2020-09-30
Attending: ORTHOPAEDIC SURGERY
Payer: COMMERCIAL

## 2020-09-30 ENCOUNTER — APPOINTMENT (OUTPATIENT)
Dept: PHYSICAL THERAPY | Facility: CLINIC | Age: 85
DRG: 470 | End: 2020-09-30
Attending: ORTHOPAEDIC SURGERY
Payer: COMMERCIAL

## 2020-09-30 VITALS
TEMPERATURE: 98.3 F | OXYGEN SATURATION: 93 % | SYSTOLIC BLOOD PRESSURE: 115 MMHG | BODY MASS INDEX: 26.22 KG/M2 | HEART RATE: 61 BPM | RESPIRATION RATE: 16 BRPM | DIASTOLIC BLOOD PRESSURE: 53 MMHG | WEIGHT: 148 LBS | HEIGHT: 63 IN

## 2020-09-30 PROBLEM — Z96.649 S/P TOTAL HIP ARTHROPLASTY: Status: ACTIVE | Noted: 2020-09-30

## 2020-09-30 LAB
ANION GAP SERPL CALCULATED.3IONS-SCNC: 2 MMOL/L (ref 3–14)
BUN SERPL-MCNC: 10 MG/DL (ref 7–30)
CALCIUM SERPL-MCNC: 8.5 MG/DL (ref 8.5–10.1)
CHLORIDE SERPL-SCNC: 98 MMOL/L (ref 94–109)
CO2 SERPL-SCNC: 33 MMOL/L (ref 20–32)
CREAT SERPL-MCNC: 0.8 MG/DL (ref 0.52–1.04)
GFR SERPL CREATININE-BSD FRML MDRD: 68 ML/MIN/{1.73_M2}
GLUCOSE SERPL-MCNC: 101 MG/DL (ref 70–99)
POTASSIUM SERPL-SCNC: 3.4 MMOL/L (ref 3.4–5.3)
SODIUM SERPL-SCNC: 133 MMOL/L (ref 133–144)

## 2020-09-30 PROCEDURE — 25000128 H RX IP 250 OP 636: Performed by: ORTHOPAEDIC SURGERY

## 2020-09-30 PROCEDURE — 36415 COLL VENOUS BLD VENIPUNCTURE: CPT | Performed by: INTERNAL MEDICINE

## 2020-09-30 PROCEDURE — 97110 THERAPEUTIC EXERCISES: CPT | Mod: GP | Performed by: PHYSICAL THERAPIST

## 2020-09-30 PROCEDURE — 97116 GAIT TRAINING THERAPY: CPT | Mod: GP | Performed by: PHYSICAL THERAPIST

## 2020-09-30 PROCEDURE — 25000132 ZZH RX MED GY IP 250 OP 250 PS 637: Performed by: ORTHOPAEDIC SURGERY

## 2020-09-30 PROCEDURE — 99232 SBSQ HOSP IP/OBS MODERATE 35: CPT | Performed by: PHYSICIAN ASSISTANT

## 2020-09-30 PROCEDURE — 97535 SELF CARE MNGMENT TRAINING: CPT | Mod: GO

## 2020-09-30 PROCEDURE — 12000000 ZZH R&B MED SURG/OB

## 2020-09-30 PROCEDURE — 80048 BASIC METABOLIC PNL TOTAL CA: CPT | Performed by: INTERNAL MEDICINE

## 2020-09-30 PROCEDURE — 25000132 ZZH RX MED GY IP 250 OP 250 PS 637: Performed by: PHYSICIAN ASSISTANT

## 2020-09-30 RX ORDER — ACETAMINOPHEN 325 MG/1
975 TABLET ORAL EVERY 8 HOURS
COMMUNITY
Start: 2020-09-30

## 2020-09-30 RX ORDER — CHLORTHALIDONE 25 MG/1
50 TABLET ORAL DAILY
Status: DISCONTINUED | OUTPATIENT
Start: 2020-09-30 | End: 2020-09-30 | Stop reason: HOSPADM

## 2020-09-30 RX ORDER — ASPIRIN 325 MG
325 TABLET, DELAYED RELEASE (ENTERIC COATED) ORAL DAILY
Qty: 42 TABLET | Refills: 0 | Status: SHIPPED | OUTPATIENT
Start: 2020-09-30 | End: 2021-07-27

## 2020-09-30 RX ORDER — ATENOLOL 25 MG/1
25 TABLET ORAL DAILY
Status: DISCONTINUED | OUTPATIENT
Start: 2020-09-30 | End: 2020-09-30 | Stop reason: HOSPADM

## 2020-09-30 RX ADMIN — ATENOLOL 25 MG: 25 TABLET ORAL at 09:55

## 2020-09-30 RX ADMIN — ACETAMINOPHEN 975 MG: 325 TABLET, FILM COATED ORAL at 04:42

## 2020-09-30 RX ADMIN — CHLORTHALIDONE 50 MG: 25 TABLET ORAL at 11:52

## 2020-09-30 RX ADMIN — HYDROXYZINE HYDROCHLORIDE 10 MG: 10 TABLET, FILM COATED ORAL at 11:57

## 2020-09-30 RX ADMIN — ENOXAPARIN SODIUM 40 MG: 40 INJECTION SUBCUTANEOUS at 08:51

## 2020-09-30 RX ADMIN — ACETAMINOPHEN 975 MG: 325 TABLET, FILM COATED ORAL at 12:00

## 2020-09-30 ASSESSMENT — ACTIVITIES OF DAILY LIVING (ADL)
ADLS_ACUITY_SCORE: 13
ADLS_ACUITY_SCORE: 13

## 2020-09-30 NOTE — DISCHARGE INSTRUCTIONS
Orthopedic Surgery Discharge Instructions    1. Follow up:  Follow up with Joel Conn PA-C.  in 2 weeks for post op check and x rays as scheduled.  Call [phone] if appointment needed or questions. If you have questions or concerns while at home, please call [facility]. If it is an emergency, call [phone]. You may find the answers to questions in the included discharge packet from the hospital or your pre operative packet you received in clinic prior to surgery.    2. Pain Medication:  Use pain medication as directed. If you are prescribed narcotic pain medications (Oxycodone, Norco, Percocet, Tylenol #3, Dilaudid, or Tramadol) then you should try to wean off of them as tolerated. These are an AS NEEDED medication, so if you are not having significant pain you should try to take fewer pills at a time or spread out the doses out over a longer period of time than is written on the prescription. You should not drive a car or operate machinery if you are taking prescribed narcotic pain medication. You may not receive a refill on this medication by your surgical team until your follow up appointment, so try to wean off your narcotics as soon as possible. If you need a refill on this medication you may call your surgical team to discuss during business hours. Refills are not given on the weekend under any circumstances, so plan accordingly.    3. How to wean narcotics: Within 2-3 days of surgery you should be able to begin weaning your pain medications. If upon leaving the hospital you are taking 2 tabs every 3-4 hours, you should decrease this to 1 tab every 3-4 hours. Around 4-5 days after surgery you should begin decreasing the frequency of your pain medication to every 4-5 hours. You may also cut your pills in half to decrease the dose as you begin the weaning process. Create a weaning schedule of your pain medication when you get home from the hospital, you may be expected to make the prescription  last until your next appointment. Call your surgical team during business hours to discuss your pain medication if you have questions or concerns about the weaning process.    4. Tylenol and pain medications: If your pain pill contains Tylenol (i.e. Percocet, Norco, Tylenol #3) and you are also taking additional Tylenol/acetaminophen as a pain medication, ensure that you are not taking too much tylenol. Prescription narcotic medications that contain Tylenol usually have 325mg of Tylenol per pill and over-the-counter medications have variable doses of Tylenol. You should not exceed 4,000mg of Tylenol in a 24-hour period. Tylenol should be used in combination with your pain medication, if able, to help reduce the amount of pain medication you are taking.    5. NSAIDs (anti inflammatory medications): You may take NSAIDs to help control your pain at home.  NSAIDs are Ibuprofen, Motrin, Advil, Aleve, Naprosyn etc. You should use over the counter medications such as NSAIDs and Tylenol to wean off narcotics. If you are also taking Aspirin for blood clot prevention, you should use caution with NSAIDs as this may cause issues such as GI bleeding, upset stomach, and dark stools. Recommended doses of NSAIDs after surgery are 1-2 tabs every 6-8 hours, not to exceed 600 mg per dose. It is best to rotate Tylenol and NSAIDs if needed every 4 hours. Use these medications in between your narcotics to help reduce the amount of pain medication needed.      6. How to take over the counter medications with pain medications:  Sample medication schedule:   8 am: Pain medication  10 am: Tylenol 500-650 mg (skip if your pain medication contains tylenol, refer to #4)  12 pm: Pain medication  2 pm: NSAIDs 1-2 tabs, not to exceeded 600 mg  4 pm: Pain medication  6 pm: Tylenol 500-650 mg (skip if your pain medication contains tylenol, refer to #4)  8 pm: Pain medication    7. Applying ice to your incision: ice can provide additional pain relief  at home. Apply  ice in 20 minute intervals. Allow your skin to warm up to room temperature, approximately 40 minutes, before applying another ice pack.    8. Incision instructions:  Keep your incision clean, covered and dry until your post op appointment.  You may shower and get the incision wet if no drainage is present.     9. Physical therapy:  Continue physical therapy as soon as possible.  You will need to call a therapy department of your choice to arrange future appointments.  Your order for physical therapy is included in your discharge paperwork.     10. Blood Clot Prevention: Take Aspirin 325 mg  daily  for 42 days for anticoagulation. If you develop abdominal pain or have signs of bleeding - blood in stool or black stools, stop taking the medication and seek medical care. Walk often at home, walking will help reduce the risk of blood clots. If you have been prescribed nino stockings or compression stockings, wear them during the day until you follow up with your orthopedic team in clinic. If you were not given Nino Stockings in the hospital but would like them, they can be purchased over the counter at your local pharmacy.     11. Call the office if you have any questions/concerns or are experiencing the following:  - increasing drainage from the incision  - foul-smelling or malodorous drainage from the incision  - sudden increase or change in pain that is not controlled by your prescribed medications  - inability to urinate  - new onset of weakness, numbness or severe pain in the extremities  - bowel/bladder incontinence  - Fever greater than 101.5 degrees  - Nausea/vomitting causing inability to eat food or medications    12. Total hip precautions: After your total hip replacement, you have been given hip precautions. Your surgical team will give you clearance when to return to normal activity at your follow up appointment. Hip precautions include: no bending at the waist greater than 90 degrees or more  than what is necessary to sit in a chair or on the toilet. Avoid crossing your legs while sitting or lying in bed. Sleep with a pillow between your legs at night for the first two weeks. No deep squatting or bending, and avoid heavy lifting.

## 2020-09-30 NOTE — PROGRESS NOTES
"[facility] Orthopa[facility] Progress Note      Post-operative Day: 2 Days Post-Op    Procedure(s):  TOTAL Hip Arthroplasty      Subjective:    Pain: minimal, seated in chair during our visit.  Said her head is much clearer than yesterday.  Denies any n/v this morning.  Struggled with nausea all day yesterday, but that all cleared when she quit taking narcotic.    Chest pain, SOB:  No      Objective:  Blood pressure 123/61, pulse 64, temperature 98.4  F (36.9  C), temperature source Oral, resp. rate 16, height 1.6 m (5' 3\"), weight 67.1 kg (148 lb), SpO2 94 %, not currently breastfeeding.    Patient Vitals for the past 24 hrs:   BP Temp Temp src Pulse Resp SpO2   09/30/20 0515 123/61 98.4  F (36.9  C) Oral 64 16 94 %   09/29/20 1945 120/52 98.6  F (37  C) Oral 58 16 94 %   09/29/20 1510 103/51 97.9  F (36.6  C) Oral 51 16 97 %   09/29/20 1206 114/53 97.8  F (36.6  C) Oral 55 16 96 %   09/29/20 0900 110/47 -- -- 51 -- --   09/29/20 0749 -- 98.2  F (36.8  C) Oral -- -- --   09/29/20 0735 96/44 -- -- 54 16 97 %       Wt Readings from Last 4 Encounters:   09/28/20 67.1 kg (148 lb)   09/23/20 67.1 kg (148 lb)   08/11/20 67.1 kg (148 lb)   07/21/20 66.7 kg (147 lb)         Motor function, sensation, and circulation intact   No  Wound status: incisions are clean dry and intact. Yes  Calf tenderness: Bilateral  No    Pertinent Labs   Lab Results: personally reviewed.     Recent Labs   Lab Test 09/30/20  0417 09/29/20  0411 09/23/20  0842 08/28/20  0959  08/11/20  1111  02/15/18  0928  10/30/14  0814 10/27/14  0807 10/24/14  1010   INR  --   --   --   --   --   --   --   --   --  2.52* 2.14* 2.28*   HGB  --  11.2* 13.9  --   --  13.9  --  14.5   < >  --   --   --    HCT  --   --  41.9  --   --  42.6  --  44.3   < >  --   --   --    MCV  --   --  94  --   --  94  --  96   < >  --   --   --    PLT  --   --  256  --   --  230  --  280   < >  --   --   --      --  134 132*   < > 134   < > 139   < >  --   --   --    CRP  --   " --   --   --   --   --   --  <2.9  --   --   --   --     < > = values in this interval not displayed.       Plan: Anticoagulation protocol: Lovenox inpatient and then  mg daily at discharge  x 42  days            Pain medications:  tylenol            Weight bearing status:  WBAT            Disposition:  Patient feels ready to go home today with supportive family.               Continue cares and rehabilitation     Report completed by:  Joel Michel PA-C  Date: 9/30/2020  Time: 7:05 AM

## 2020-09-30 NOTE — DISCHARGE SUMMARY
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]    Post-Operative Day: 2 Days Post-Op     Reason for admission:  The patient was admitted for the following:Procedure(s) (LRB):  TOTAL Hip Arthroplasty (Right)    Principal Problem:    Hip arthrosis  Active Problems:    S/P total hip arthroplasty      Allergies:  Codeine; Cortisone; Diclofenac sodium; Hydrocodone; Oxycodone; and Tramadol    Following the procedure noted above the patient was transferred to the post-op floor and started on:    Therapy:  physical therapy  Anticoagulation Plan: Lovenox inpatient and then  mg daily at discharge  for 42 days  Pain Management: tylenol  Weight bearing status: Weight bearing as tolerated     The patient was followed and co-managed by the hospitalist service during the inpatient treatment course  Complications:  None  Consultations:  None     Pertinent Labs   Lab Results: personally reviewed.     Recent Labs   Lab Test 09/30/20  0417 09/29/20  0411 09/23/20  0842 08/28/20  0959  08/11/20  1111  02/15/18  0928  10/30/14  0814 10/27/14  0807 10/24/14  1010   INR  --   --   --   --   --   --   --   --   --  2.52* 2.14* 2.28*   HGB  --  11.2* 13.9  --   --  13.9  --  14.5   < >  --   --   --    HCT  --   --  41.9  --   --  42.6  --  44.3   < >  --   --   --    MCV  --   --  94  --   --  94  --  96   < >  --   --   --    PLT  --   --  256  --   --  230  --  280   < >  --   --   --      --  134 132*   < > 134   < > 139   < >  --   --   --    CRP  --   --   --   --   --   --   --  <2.9  --   --   --   --     < > = values in this interval not displayed.           Discharge Information:  Condition at discharge: Stable  Discharge destination:  Discharged to home     Medications at discharge:  Current Discharge Medication List      START taking these medications    Details   acetaminophen (TYLENOL) 325 MG tablet Take 3 tablets (975 mg) by mouth every 8 hours  Qty:      Associated Diagnoses: Hip arthrosis      aspirin (ASA) 325 MG EC tablet Take 1 tablet (325 mg) by mouth daily  Qty: 42 tablet, Refills: 0    Associated Diagnoses: Hip arthrosis         CONTINUE these medications which have NOT CHANGED    Details   acetaminophen-caffeine (EXCEDRIN TENSION HEADACHE) 500-65 MG TABS Take 2 tablets by mouth every 6 hours as needed for mild pain      atenolol (TENORMIN) 50 MG tablet Take 0.5 tablets (25 mg) by mouth daily  Qty: 90 tablet, Refills: 1    Associated Diagnoses: Essential hypertension      chlorthalidone (HYGROTON) 25 MG tablet Take 2 tablets (50 mg) by mouth daily Needs labs before next refill  Qty: 90 tablet, Refills: 1    Associated Diagnoses: Essential hypertension      ipratropium (ATROVENT) 0.03 % nasal spray Spray 2 sprays in nostril daily as needed for rhinitis  Qty: 2 Box, Refills: 3    Associated Diagnoses: Chronic non-seasonal allergic rhinitis      potassium chloride ER (K-TAB) 20 MEQ CR tablet Take 1 tablet (20 mEq) by mouth daily  Qty: 90 tablet, Refills: 1    Associated Diagnoses: Essential hypertension      psyllium (METAMUCIL) 28.3 % POWD Take 2 teaspoonful by mouth daily in water                        Follow-Up Care:  Patient should be seen in the office in 11-14 days by the Orthopedic Surgeon/Physician Assistant.  Call [phone] for appointment or questions.    Joel Michel PA-C

## 2020-09-30 NOTE — PROGRESS NOTES
[facility] Medicine Progress Note  Date of Service: 09/30/2020    Assessment & Plan   Caitlyn Wasserman is a 84 year old female who presented on 9/28/2020 for scheduled Procedure(s):  TOTAL Hip Arthroplasty by Askhan Shah MD and is being followed by the hospital medicine service for co-management of acute and/or chronic perioperative medical problems.    Right hip degenerative joint disease   S/p Procedure(s):  Right TOTAL Hip Arthroplasty on 9/28/2020   2 Days Post-Op    - pain control, wound cares, physical therapy, occupational therapy and DVT prophylaxis per orthopedic surgery service    Hypotension post op - improving  Post op-nausea & vomiting  Blood pressure running low and lightheadedness with standing POD 0 & 1. Likely related to anesthesia and narcotics for pain. Nausea/vomiting also likely due to narcotics. Received fluid bolus 9/28, blood pressure meds held. Symptoms and blood pressure improving on POD 2.   - Restart atenolol and chlorthalidone and monitor blood pressure   - Encouraged patient to avoid the narcotics if pain control tolerable without it - doing well with just acetaminophen   - Antiemetics available      Hypertension   Managed prior to admission with chlorthalidone and atenolol.   - As above, resuming atenolol and chlorthalidone POD 2    DVT Prophylaxis: as per orthopedic surgery service - Defer to primary service  Code Status: Full Code    Lines: PIV   Corral catheter: None    Discussion: Medically, the patient's previous hypotension, lightheadedness, and nausea all appear to be improving and patient is making progress with post-op recovery.    Disposition: Anticipate discharge later today vs tomorrow based on blood pressure measurements after restarting atenolol. No barrier to discharging later today from internal medicine standpoint if blood pressure remains stable.    Attestation:  I have reviewed today's vital signs, notes,  medications, labs and imaging.  Discussed with Dr. Carpenter.    Mindy Camp PA-C  [facility]         Interval History   Patient feeling improvement today compared to yesterday. Pain rated 1/10 on just acetaminophen, has not needed narcotics. Denies numbness or tingling.     Tolerating oral intake today, previous nausea and vomiting are improving. Denies abdominal pain. No bowel movement since surgery, passing flatus. Urinating without difficulty, no dysuria or feelings of incomplete emptying.    Lightheadedness improving.     Denies chest pain, palpitations, cough, wheeze, shortness of breath, headache, vision changes, or other complaints.    Physical Exam   Temp:  [97.7  F (36.5  C)-98.6  F (37  C)] 97.7  F (36.5  C)  Pulse:  [51-64] 62  Resp:  [16] 16  BP: (103-140)/(51-61) 140/52  SpO2:  [91 %-97 %] 91 %    Weights:   Vitals:    09/28/20 1128   Weight: 67.1 kg (148 lb)    Body mass index is 26.22 kg/m .    General appearance: Awake, alert, and in no apparent distress. Pleasant and conversational, speaking in full sentences.  CV: Regular rhythm & rate, no murmurs. Bilateral +1 edema, compression socks present. Peripheral pulses intact.  Respiratory: Moving air well bilaterally, no wheezing or rhonchi. Fine crackles at bilateral bases.   GI: Non-distended, soft, nontender to palpation. No rebound or guarding. Normoactive bowel sounds.  Skin: Warm, dry, no rashes or ecchymoses. No mottling of skin. Dressing present on right hip, clean and dry.  Musculoskeletal / extremities: Moves all extremities equally, no obvious abnormalities.  Distal CMS intact  Neurologic: No focal deficits.      Data   Recent Labs   Lab 09/30/20  0417 09/29/20  0411 09/28/20  1745   HGB  --  11.2*  --      --   --    POTASSIUM 3.4  --   --    CHLORIDE 98  --   --    CO2 33*  --   --    BUN 10  --   --    CR 0.80  --  0.84   ANIONGAP 2*  --   --    SACHI 8.5  --   --    *  --   --        Recent Labs    Lab 09/30/20  0417   *        Unresulted Labs Ordered in the Past 30 Days of this Admission     No orders found from 8/29/2020 to 9/29/2020.           Imaging  No results found for this or any previous visit (from the past 24 hour(s)).     I reviewed all new labs and imaging results over the last 24 hours. I personally reviewed no images or EKG's today.    Medications     dextrose 5% and 0.45% NaCl + KCl 20 mEq/L Stopped (09/29/20 0701)       acetaminophen  975 mg Oral Q8H     atenolol  25 mg Oral Daily     [Held by provider] chlorthalidone  50 mg Oral Daily     enoxaparin ANTICOAGULANT  40 mg Subcutaneous Q24H     ketamine  0.25 mcg/kg/min Intravenous Once     sodium chloride (PF)  3 mL Intracatheter Q8H       Mindy Camp PA-C  [facility]

## 2020-10-01 ENCOUNTER — TELEPHONE (OUTPATIENT)
Dept: FAMILY MEDICINE | Facility: CLINIC | Age: 85
End: 2020-10-01

## 2020-10-01 NOTE — TELEPHONE ENCOUNTER
ED/UC/IP follow up phone call:[facility] 9/30 Hi Arthrosis    RN please call to follow up.    Number of ED visits in past 12 months = 0/0  Chantal Tucker  [facility] [place]

## 2020-10-02 NOTE — TELEPHONE ENCOUNTER
"ED/Discharge Protocol    \"Hi, my name is Kirstie Hall RN and I am calling on behalf of [facility].      I am calling to follow up and see how things are going; do you have just a minute?  For privacy purposes, can you please verify your last name and date of birth?  Thank you.\"    (Update EHR demographics when appropriate)    Initial Outreach    \"I see that you were in the hospital on 9/30.  How are you doing now that you are home?\"  Pt. response: I am doing really well.  I am getting up and walking quite a bit with the walker.  My daughters are helping me out.    Is patient experiencing symptoms that may require a hospital visit?  None    \"Let's review your discharge instructions.  What is/are the follow-up recommendations?  Pt. Response: follow up with ortho    \"Were you instructed to make a follow-up appointment?\"  (If the recommendation is to see the PCP / Specialist, review the EHR appointments and confirm (see chart review protocol))  Pt. Response Yes.  Has appointment been made?   Yes.   With Ortho    \"If you have questions or things don't continue to improve, we encourage that you contact your primary clinic.  Through the clinic number, you can reach a nurse or the triage line 24 hours a day for advice.  For less severe, non-life threatening issues, we offer urgent care facilities with evening and weekend hours (provide details on location, hours, contact number, etc).  If you have any questions, please contact the clinic directly and they can advise you based on symptoms you may be experiencing.    \"When you see your primary doctor, I would recommend that you bring your discharge instructions with you to that appointment and discuss your recent hospital visit.  Your primary provider knows your medical history the best, and will be able to help you address any future concerns related to the visit.\"    Discharge Instructions  (Patient has instructions- have patient read them and identify gaps in knowledge, " "checking for the following:)    \"Do you have any of the following signs and symptoms of infection?\"  none  \"Were you instructed to check your temperature?\"  No  \"Are you experiencing any of the following?\"  (If YES to any of the following, refer call to clinic RN or [facility])    \"Increasing pain beyond what is expected?\"  No  UTI (water):  \"Do you have of the following with urination?\"  Urgency: No  Frequency: No  Burning: No  Pneumonia (wind): \"Do you have:\"  Trouble breathing: No  Cough more: No  Incision care (wound):  \"Are you having any of the following?\"  Redness:No  Swelling:No  Drainage with pus: No  Any unexpected fluid from wound? No    (If patient does not have instructions, inquire as to what was told to them- then continue with call.  If there are any concerns refer call to clinic RN or [facility])    Medications    \"Were there any changes made to your current medications?\"   No. (Continue with call)  \"Were you given any new prescriptions?  Yes. Has the prescription been filled?   Yes. \"Did the pharmacist review the medication(s)?\"   Yes.  (Reinforce reading the pharmacy insert that is provided with the medications)  Are you taking the medication as directed?  Yes      Call Summary    \"Do you have any questions or concerns at the moment?\"  (Connect to appropriate resource based on response)  No    \"Okay, well thank you for your time today.  I hope you call the clinic if you have any questions or concerns arise.\"                    "

## 2020-10-09 ENCOUNTER — APPOINTMENT (OUTPATIENT)
Dept: ULTRASOUND IMAGING | Facility: CLINIC | Age: 85
End: 2020-10-09
Attending: FAMILY MEDICINE
Payer: COMMERCIAL

## 2020-10-09 ENCOUNTER — HOSPITAL ENCOUNTER (EMERGENCY)
Facility: CLINIC | Age: 85
Discharge: HOME | End: 2020-10-09
Attending: FAMILY MEDICINE | Admitting: FAMILY MEDICINE
Payer: COMMERCIAL

## 2020-10-09 ENCOUNTER — TRANSFERRED RECORDS (OUTPATIENT)
Dept: HEALTH INFORMATION MANAGEMENT | Facility: CLINIC | Age: 85
End: 2020-10-09

## 2020-10-09 VITALS
OXYGEN SATURATION: 98 % | SYSTOLIC BLOOD PRESSURE: 150 MMHG | RESPIRATION RATE: 16 BRPM | HEART RATE: 72 BPM | WEIGHT: 148 LBS | TEMPERATURE: 98.7 F | BODY MASS INDEX: 26.22 KG/M2 | DIASTOLIC BLOOD PRESSURE: 65 MMHG

## 2020-10-09 DIAGNOSIS — M79.89 LEG SWELLING: ICD-10-CM

## 2020-10-09 LAB
ANION GAP SERPL CALCULATED.3IONS-SCNC: 4 MMOL/L (ref 3–14)
BASOPHILS # BLD AUTO: 0.1 10E9/L (ref 0–0.2)
BASOPHILS NFR BLD AUTO: 0.6 %
BUN SERPL-MCNC: 17 MG/DL (ref 7–30)
CALCIUM SERPL-MCNC: 9 MG/DL (ref 8.5–10.1)
CHLORIDE SERPL-SCNC: 98 MMOL/L (ref 94–109)
CO2 SERPL-SCNC: 31 MMOL/L (ref 20–32)
CREAT SERPL-MCNC: 0.9 MG/DL (ref 0.52–1.04)
DIFFERENTIAL METHOD BLD: ABNORMAL
EOSINOPHIL # BLD AUTO: 0.2 10E9/L (ref 0–0.7)
EOSINOPHIL NFR BLD AUTO: 2 %
ERYTHROCYTE [DISTWIDTH] IN BLOOD BY AUTOMATED COUNT: 13.8 % (ref 10–15)
GFR SERPL CREATININE-BSD FRML MDRD: 59 ML/MIN/{1.73_M2}
GLUCOSE SERPL-MCNC: 96 MG/DL (ref 70–99)
HCT VFR BLD AUTO: 31.1 % (ref 35–47)
HGB BLD-MCNC: 10.5 G/DL (ref 11.7–15.7)
IMM GRANULOCYTES # BLD: 0.1 10E9/L (ref 0–0.4)
IMM GRANULOCYTES NFR BLD: 0.6 %
INR PPP: 1.16 (ref 0.86–1.14)
LYMPHOCYTES # BLD AUTO: 1.7 10E9/L (ref 0.8–5.3)
LYMPHOCYTES NFR BLD AUTO: 17.5 %
MCH RBC QN AUTO: 32.2 PG (ref 26.5–33)
MCHC RBC AUTO-ENTMCNC: 33.8 G/DL (ref 31.5–36.5)
MCV RBC AUTO: 95 FL (ref 78–100)
MONOCYTES # BLD AUTO: 0.9 10E9/L (ref 0–1.3)
MONOCYTES NFR BLD AUTO: 9.3 %
NEUTROPHILS # BLD AUTO: 6.9 10E9/L (ref 1.6–8.3)
NEUTROPHILS NFR BLD AUTO: 70 %
NRBC # BLD AUTO: 0 10*3/UL
NRBC BLD AUTO-RTO: 0 /100
PLATELET # BLD AUTO: 373 10E9/L (ref 150–450)
POTASSIUM SERPL-SCNC: 3.4 MMOL/L (ref 3.4–5.3)
RBC # BLD AUTO: 3.26 10E12/L (ref 3.8–5.2)
SODIUM SERPL-SCNC: 133 MMOL/L (ref 133–144)
WBC # BLD AUTO: 9.8 10E9/L (ref 4–11)

## 2020-10-09 PROCEDURE — 99284 EMERGENCY DEPT VISIT MOD MDM: CPT | Mod: 25 | Performed by: FAMILY MEDICINE

## 2020-10-09 PROCEDURE — 99284 EMERGENCY DEPT VISIT MOD MDM: CPT | Performed by: FAMILY MEDICINE

## 2020-10-09 PROCEDURE — 93971 EXTREMITY STUDY: CPT | Mod: RT

## 2020-10-09 PROCEDURE — 85610 PROTHROMBIN TIME: CPT | Performed by: FAMILY MEDICINE

## 2020-10-09 PROCEDURE — 80048 BASIC METABOLIC PNL TOTAL CA: CPT | Performed by: FAMILY MEDICINE

## 2020-10-09 PROCEDURE — 85025 COMPLETE CBC W/AUTO DIFF WBC: CPT | Performed by: FAMILY MEDICINE

## 2020-10-09 NOTE — ED AVS SNAPSHOT
[facility] Emergency Dept  [address] [address]  Phone: [phone]  Fax: [phone]                                    Caitlyn Wasserman   MRN: [id number]    Department: [facility] Emergency Dept   Date of Visit: 10/9/2020           After Visit Summary Signature Page    I have received my discharge instructions, and my questions have been answered. I have discussed any challenges I see with this plan with the nurse or doctor.    ..........................................................................................................................................  Patient/Patient Representative Signature      ..........................................................................................................................................  Patient Representative Print Name and Relationship to Patient    ..................................................               ................................................  Date                                   Time    ..........................................................................................................................................  Reviewed by Signature/Title    ...................................................              ..............................................  Date                                               Time          22EPIC Rev 08/18

## 2020-10-10 NOTE — ED NOTES
Pt had right hip replacement in September and was doing well until this week when she started having swelling in that leg. She is also having more stiffness but no pain. Called ortho who told her to come in for an US.

## 2020-10-10 NOTE — DISCHARGE INSTRUCTIONS
Continue with rehab after your hip replacement as previously directed.  Gentle compressive wrap at bedtime off in the a.m.  If this is not improving over the course of the next 1 week contact your provider for repeat ultrasound evaluation.

## 2020-10-10 NOTE — ED PROVIDER NOTES
History     Chief Complaint   Patient presents with     Leg Swelling     rt leg swelling, started on Sunday and has been getting worse with today being the worst. she had her rt hip replaced on Sept 28th, worried about a clot     HPI  Caitlyn Wasserman is a 85 year old female, past medical history is significant for recent total hip arthroplasty on 9/30/2020, COPD, osteoarthritis, chronic rhinitis, hypertension, essential tremor, migraine, hypercholesterolemia, hypertension, urinary incontinence, presents the emergency department concerns of right leg swelling of approximately 5 days duration getting worse after right hip replacement on 9/30/20.  History is obtained from the patient who states that she has been doing relatively well up until this past Sunday after her right total hip replacement, on Sunday, following the exercise recommendation of her surgeon, she has noticed progressive increased swelling to the right lower extremity without evidence of redness warmth or really any tenderness.  There is been no trauma.  She is concerned acutely about the possibility of a blood clot given the behavior of symptoms postoperatively.  She notes no chest pain shortness of air.      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 Pennington in 2004       Osteoarthritis of both hands  05/07/2019     Priority: Medium     Chronic obstructive pulmonary disease, unspecified COPD type (H) 01/10/2017     Priority: Medium     Chronic rhinitis 12/04/2015     Priority: Medium     Essential hypertension 12/04/2015     Priority: Medium     Dysuria 12/04/2015     Priority: Medium     Osteoarthritis, right knee 10/08/2014     Priority: Medium     Pedal edema 12/03/2013     Priority: Medium     Essential tremor 11/15/2013     Priority: Medium     Health Care Home 12/03/2012     Priority: Medium     Roseann Obando RN-PHN  [facility] / [facility]   [phone]    DX V65.8 REPLACED WITH [id number] CARE HOME (04/08/2013)       Kyphosis of cervical region 11/28/2011     Priority: Medium     Neck pain, chronic 11/28/2011     Priority: Medium     HYPERLIPIDEMIA LDL GOAL <130 10/31/2010     Priority: Medium     Recurrent UTI 09/20/2010     Priority: Medium     Cataract 07/06/2009     Priority: Medium     Utility update for deleted IMO code  [employer] Update utility       Right shoulder pain 10/27/2008     Priority: Medium     Numbness in right leg 10/27/2008     Priority: Medium     LEFT HIP PAIN 03/23/2006     Priority: Medium     Urinary frequency 06/09/2005     Priority: Medium        Past Medical History:    Past Medical History:   Diagnosis Date     COPD (chronic obstructive pulmonary disease) (H)      Migraine, unspecified, without mention of intractable migraine without mention of status migrainosus      Other urinary incontinence      Pure hypercholesterolemia      Unspecified essential hypertension        Past Surgical History:    Past Surgical History:   Procedure Laterality Date     ARTHROPLASTY HIP Right 9/28/2020    Procedure: TOTAL Hip Arthroplasty;  Surgeon: Ashkan Shah MD;  Location: [facility] OR     AS TOTAL KNEE ARTHROPLASTY Right 2011     HC ANTER COLPORRHAPHY,BLAD/VAGINA  6/04    Cystocele Repair,rectocele repair.     JOINT REPLACEMENT, HIP RT/LT  3/07, 4/07    Joint Replacement  Hip /LT- dislocated in front repeat surgery 1 week later       Family History:    Family History   Problem Relation Age of Onset     Cancer Mother         Bladder     Neurologic Disorder Mother         heriditary tremor     C.A.D. Father      Cerebrovascular Disease Father      Allergies Maternal Grandfather      Respiratory Maternal Grandfather         Asthma     Diabetes Paternal Grandmother         Type II     Cancer Brother         Multiple myloma     C.A.D. Brother      Cancer Sister         kidney cancer     Neurofibromatosis Son      Breast Cancer No family hx of        Social History:  Marital Status:   [2]  Social History     Tobacco Use     Smoking status: Former Smoker     Packs/day: 1.00     Years: 20.00     Pack years: 20.00     Types: Cigarettes     Quit date: 1971     Years since quittin.8     Smokeless tobacco: Never Used   Substance Use Topics     Alcohol use: Yes     Comment: 1 wine a month maybe     Drug use: No        Medications:         acetaminophen (TYLENOL) 325 MG tablet       acetaminophen-caffeine (EXCEDRIN TENSION HEADACHE) 500-65 MG TABS       aspirin (ASA) 325 MG EC tablet       atenolol (TENORMIN) 50 MG tablet       chlorthalidone (HYGROTON) 25 MG tablet       ipratropium (ATROVENT) 0.03 % nasal spray       potassium chloride ER (K-TAB) 20 MEQ CR tablet       psyllium (METAMUCIL) 28.3 % POWD          Review of Systems   All other systems reviewed and are negative.      Physical Exam   BP: 134/62  Pulse: 73  Temp: 98.7  F (37.1  C)  Resp: 16  Weight: 67.1 kg (148 lb)  SpO2: 99 %      Physical Exam  Vitals signs and nursing note reviewed.   Constitutional:       General: She is not in acute distress.     Appearance: Normal appearance. She is normal weight. She is not ill-appearing.   Cardiovascular:      Rate and Rhythm: Normal rate and regular rhythm.      Pulses: Normal pulses.      Heart sounds: Normal heart sounds.   Pulmonary:      Effort: Pulmonary effort is normal.       Breath sounds: Normal breath sounds.   Abdominal:      General: Abdomen is flat. Bowel sounds are normal.   Musculoskeletal:      Comments: Acute swelling with gross asymmetry with the right lower extremity.  No erythema or warmth.  Mild calf tenderness negative Homans sign.   Skin:     General: Skin is warm and dry.      Capillary Refill: Capillary refill takes less than 2 seconds.   Neurological:      General: No focal deficit present.      Mental Status: She is alert and oriented to person, place, and time.   Psychiatric:         Mood and Affect: Mood normal.         Behavior: Behavior normal.         ED Course        Procedures               Critical Care time:  none               Results for orders placed or performed during the hospital encounter of 10/09/20 (from the past 24 hour(s))   CBC with platelets, differential   Result Value Ref Range    WBC 9.8 4.0 - 11.0 10e9/L    RBC Count 3.26 (L) 3.8 - 5.2 10e12/L    Hemoglobin 10.5 (L) 11.7 - 15.7 g/dL    Hematocrit 31.1 (L) 35.0 - 47.0 %    MCV 95 78 - 100 fl    MCH 32.2 26.5 - 33.0 pg    MCHC 33.8 31.5 - 36.5 g/dL    RDW 13.8 10.0 - 15.0 %    Platelet Count 373 150 - 450 10e9/L    Diff Method Automated Method     % Neutrophils 70.0 %    % Lymphocytes 17.5 %    % Monocytes 9.3 %    % Eosinophils 2.0 %    % Basophils 0.6 %    % Immature Granulocytes 0.6 %    Nucleated RBCs 0 0 /100    Absolute Neutrophil 6.9 1.6 - 8.3 10e9/L    Absolute Lymphocytes 1.7 0.8 - 5.3 10e9/L    Absolute Monocytes 0.9 0.0 - 1.3 10e9/L    Absolute Eosinophils 0.2 0.0 - 0.7 10e9/L    Absolute Basophils 0.1 0.0 - 0.2 10e9/L    Abs Immature Granulocytes 0.1 0 - 0.4 10e9/L    Absolute Nucleated RBC 0.0    Basic metabolic panel   Result Value Ref Range    Sodium 133 133 - 144 mmol/L    Potassium 3.4 3.4 - 5.3 mmol/L    Chloride 98 94 - 109 mmol/L    Carbon Dioxide 31 20 - 32 mmol/L    Anion Gap 4 3 - 14 mmol/L    Glucose 96 70 - 99 mg/dL    Urea Nitrogen 17 7 - 30 mg/dL    Creatinine  0.90 0.52 - 1.04 mg/dL    GFR Estimate 59 (L) >60 mL/min/[1.73_m2]    GFR Estimate If Black 68 >60 mL/min/[1.73_m2]    Calcium 9.0 8.5 - 10.1 mg/dL   INR   Result Value Ref Range    INR 1.16 (H) 0.86 - 1.14   US Lower Extremity Venous Duplex Right    Narrative    US RIGHT LOWER EXTREMITY VENOUS DUPLEX ULTRASOUND  10/9/2020 9:38 PM    CLINICAL HISTORY: Right leg swelling, postoperative context, rule out  deep vein thrombosis (DVT).    TECHNIQUE: Venous Duplex ultrasound of the right lower extremity with  and without compression, augmentation and duplex. Color flow and  spectral Doppler with waveform analysis performed.    COMPARISON: None.    FINDINGS: Exam includes the common femoral, femoral, popliteal, and  contralateral common femoral veins as well as segmentally visualized  deep calf veins and greater saphenous vein.     RIGHT: No deep vein thrombosis. No superficial thrombophlebitis. No  popliteal cyst.      Impression    IMPRESSION: No deep venous thrombosis in the right lower extremity.    EMELINA ALBRIGHT MD     10:40 PM  Results reviewed in the room with the patient.  Medications - No data to display    Assessments & Plan (with Medical Decision Making)   85-year-old female past medical history reviewed as above who presents for evaluation of right lower extremity swelling and suspicion of DVT in the postoperative context as discussed in the HPI and documented with respect to abnormal swelling on exam.  Lab diagnostics are reviewed as above and ultrasound is acutely negative for evidence of DVT.  Recommended leg elevation at night with gentle compressive wrap off in the a.m.  She will continue her rehab as previously directed.  Turn to the emergency department if further concerns acutely.  If no improvement over the course of the next 1 week recheck ultrasound through primary care provider.      Disclaimer: This note consists of symbols derived from keyboarding, dictation and/or voice recognition software.  As a result, there may be errors in the script that have gone undetected. Please consider this when interpreting information found in this chart.      I have reviewed the nursing notes.    I have reviewed the findings, diagnosis, plan and need for follow up with the patient.          New Prescriptions    No medications on file       Final diagnoses:   Leg swelling - Right lower extremity status post right hip replacement; negative evaluation for DVT       10/9/2020   [facility] EMERGENCY DEPT     Jose Johnson MD  10/09/20 1930

## 2020-10-12 ENCOUNTER — TRANSFERRED RECORDS (OUTPATIENT)
Dept: HEALTH INFORMATION MANAGEMENT | Facility: CLINIC | Age: 85
End: 2020-10-12

## 2020-10-22 ENCOUNTER — TELEPHONE (OUTPATIENT)
Dept: FAMILY MEDICINE | Facility: CLINIC | Age: 85
End: 2020-10-22

## 2020-10-22 DIAGNOSIS — I10 ESSENTIAL HYPERTENSION: ICD-10-CM

## 2020-10-22 RX ORDER — POTASSIUM CHLORIDE 1500 MG/1
20 TABLET, EXTENDED RELEASE ORAL DAILY
Qty: 90 TABLET | Refills: 0 | Status: SHIPPED | OUTPATIENT
Start: 2020-10-22 | End: 2021-01-14

## 2020-10-22 RX ORDER — CHLORTHALIDONE 25 MG/1
50 TABLET ORAL DAILY
Qty: 180 TABLET | Refills: 0 | Status: SHIPPED | OUTPATIENT
Start: 2020-10-22 | End: 2021-01-08

## 2020-10-22 NOTE — TELEPHONE ENCOUNTER
Reason for Call:  Other prescription    Detailed comments: Patient is calling asking for refills Pending Prescriptions:                       Disp   Refills    chlorthalidone (HYGROTON) 25 MG tablet    90 tab*1            Sig: Take 2 tablets (50 mg) by mouth daily Needs labs           before next refill    potassium chloride ER (K-TAB) 20 MEQ CR t*90 tab*1            Sig: Take 1 tablet (20 mEq) by mouth daily    BUT the Potassium she has questions on she has been having blood work sone and is noticing that her potassium has been up she is asking if it she be decreased?    [facility] - [place], [place] - [address] [place] [id number]  Phone: [phone] Fax: [phone]    Phone Number Patient can be reached at: Home number on file [phone] (home)    Best Time: any    Can we leave a detailed message on this number? YES    Call taken on 10/22/2020 at 8:27 AM by Jasmina Huynh

## 2020-11-09 ENCOUNTER — TRANSFERRED RECORDS (OUTPATIENT)
Dept: HEALTH INFORMATION MANAGEMENT | Facility: CLINIC | Age: 85
End: 2020-11-09

## 2021-01-08 DIAGNOSIS — I10 ESSENTIAL HYPERTENSION: ICD-10-CM

## 2021-01-08 RX ORDER — CHLORTHALIDONE 25 MG/1
50 TABLET ORAL DAILY
Qty: 180 TABLET | Refills: 0 | Status: SHIPPED | OUTPATIENT
Start: 2021-01-08 | End: 2021-04-19

## 2021-01-08 RX ORDER — ATENOLOL 25 MG/1
25 TABLET ORAL DAILY
Qty: 90 TABLET | Refills: 1 | Status: SHIPPED | OUTPATIENT
Start: 2021-01-08 | End: 2021-05-24

## 2021-01-08 NOTE — TELEPHONE ENCOUNTER
Reason for Call: Request for an order or referral:    Order or referral being requested: Caitlyn states that she is suppose to have labs drawn before she can get a refill on her medication.  No orders are in chart.  Could you please review and place orders if appropriate.  Thank you..Rosa Maria Muir      Date needed: as soon as possible    Has the patient been seen by the PCP for this problem? Not Applicable    Additional comments: She would like to be called when order has been placed.      Phone number Patient can be reached at:  Home number on file [phone] (home)    Best Time:  Any time    Can we leave a detailed message on this number?  YES    Call taken on 1/8/2021 at 1:49 PM by Rosa Maria Muir

## 2021-01-08 NOTE — TELEPHONE ENCOUNTER
"BP Readings from Last 3 Encounters:   10/09/20 (!) 150/65   09/30/20 115/53   09/23/20 120/70     BP was elevated in the ED but runs \"good\" most of the time.  Patient would like prescription sent to pharmacy.      She is not sure if you wanted blood work again since you increased the chlorthalidone last time?  Please advise.    Kirstie Hall RN          "

## 2021-01-13 DIAGNOSIS — I10 ESSENTIAL HYPERTENSION: ICD-10-CM

## 2021-01-13 NOTE — TELEPHONE ENCOUNTER
"Requested Prescriptions   Pending Prescriptions Disp Refills     potassium chloride ER (K-TAB) 20 MEQ CR tablet [Pharmacy Med Name: POT CHLOR ER (WAX) TABS 20MEQ] 90 tablet 3     Sig: TAKE 1 TABLET DAILY       Potassium Supplements Protocol Passed - 1/13/2021  7:02 AM        Passed - Recent (12 mo) or future (30 days) visit within the authorizing provider's department     Patient has had an office visit with the authorizing provider or a provider within the authorizing providers department within the previous 12 mos or has a future within next 30 days. See \"Patient Info\" tab in inbasket, or \"Choose Columns\" in Meds & Orders section of the refill encounter.              Passed - Medication is active on med list        Passed - Patient is age 18 or older        Passed - Normal serum potassium in past 12 months     Recent Labs   Lab Test 10/09/20  2056   POTASSIUM 3.4                         "

## 2021-01-14 ENCOUNTER — TELEPHONE (OUTPATIENT)
Dept: FAMILY MEDICINE | Facility: CLINIC | Age: 86
End: 2021-01-14

## 2021-01-14 RX ORDER — POTASSIUM CHLORIDE 1500 MG/1
TABLET, EXTENDED RELEASE ORAL
Qty: 90 TABLET | Refills: 1 | Status: SHIPPED | OUTPATIENT
Start: 2021-01-14 | End: 2021-07-13

## 2021-01-14 NOTE — TELEPHONE ENCOUNTER
Patient having trouble getting on DoYouRemembert, reset password for her.  Questions about COVID vaccine were answered.      Kirstie Hall RN

## 2021-01-14 NOTE — TELEPHONE ENCOUNTER
Reason for Call:  Other call back    Detailed comments: Patient called and wants information on the COVID vaccine.    Phone Number Patient can be reached at: Home number on file [phone] (home)    Best Time:     Can we leave a detailed message on this number? YES    Call taken on 1/14/2021 at 7:54 AM by Oralia Kim

## 2021-04-15 DIAGNOSIS — I10 ESSENTIAL HYPERTENSION: ICD-10-CM

## 2021-04-19 ENCOUNTER — ANCILLARY PROCEDURE (OUTPATIENT)
Dept: GENERAL RADIOLOGY | Facility: CLINIC | Age: 86
End: 2021-04-19
Attending: NURSE PRACTITIONER
Payer: COMMERCIAL

## 2021-04-19 ENCOUNTER — OFFICE VISIT (OUTPATIENT)
Dept: FAMILY MEDICINE | Facility: CLINIC | Age: 86
End: 2021-04-19
Payer: COMMERCIAL

## 2021-04-19 VITALS
BODY MASS INDEX: 27.82 KG/M2 | WEIGHT: 157 LBS | HEIGHT: 63 IN | RESPIRATION RATE: 18 BRPM | TEMPERATURE: 96.5 F | OXYGEN SATURATION: 97 % | DIASTOLIC BLOOD PRESSURE: 88 MMHG | SYSTOLIC BLOOD PRESSURE: 138 MMHG | HEART RATE: 62 BPM

## 2021-04-19 DIAGNOSIS — J44.9 CHRONIC OBSTRUCTIVE PULMONARY DISEASE, UNSPECIFIED COPD TYPE (H): ICD-10-CM

## 2021-04-19 DIAGNOSIS — N39.0 URINARY TRACT INFECTION WITHOUT HEMATURIA, SITE UNSPECIFIED: ICD-10-CM

## 2021-04-19 DIAGNOSIS — R82.90 NONSPECIFIC FINDING ON EXAMINATION OF URINE: ICD-10-CM

## 2021-04-19 DIAGNOSIS — M54.50 BILATERAL LOW BACK PAIN WITHOUT SCIATICA, UNSPECIFIED CHRONICITY: ICD-10-CM

## 2021-04-19 DIAGNOSIS — M54.50 BILATERAL LOW BACK PAIN WITHOUT SCIATICA, UNSPECIFIED CHRONICITY: Primary | ICD-10-CM

## 2021-04-19 DIAGNOSIS — R10.9 FLANK PAIN: ICD-10-CM

## 2021-04-19 DIAGNOSIS — I10 ESSENTIAL HYPERTENSION: ICD-10-CM

## 2021-04-19 LAB
ALBUMIN UR-MCNC: NEGATIVE MG/DL
ANION GAP SERPL CALCULATED.3IONS-SCNC: 3 MMOL/L (ref 3–14)
APPEARANCE UR: ABNORMAL
BACTERIA #/AREA URNS HPF: ABNORMAL /HPF
BILIRUB UR QL STRIP: NEGATIVE
BUN SERPL-MCNC: 13 MG/DL (ref 7–30)
CALCIUM SERPL-MCNC: 8.7 MG/DL (ref 8.5–10.1)
CHLORIDE SERPL-SCNC: 102 MMOL/L (ref 94–109)
CO2 SERPL-SCNC: 33 MMOL/L (ref 20–32)
COLOR UR AUTO: YELLOW
CREAT SERPL-MCNC: 0.86 MG/DL (ref 0.52–1.04)
GFR SERPL CREATININE-BSD FRML MDRD: 62 ML/MIN/{1.73_M2}
GLUCOSE SERPL-MCNC: 106 MG/DL (ref 70–99)
GLUCOSE UR STRIP-MCNC: NEGATIVE MG/DL
HGB UR QL STRIP: ABNORMAL
KETONES UR STRIP-MCNC: NEGATIVE MG/DL
LEUKOCYTE ESTERASE UR QL STRIP: ABNORMAL
NITRATE UR QL: POSITIVE
PH UR STRIP: 6 PH (ref 5–7)
POTASSIUM SERPL-SCNC: 3.2 MMOL/L (ref 3.4–5.3)
RBC #/AREA URNS AUTO: ABNORMAL /HPF
SODIUM SERPL-SCNC: 138 MMOL/L (ref 133–144)
SOURCE: ABNORMAL
SP GR UR STRIP: 1.02 (ref 1–1.03)
URNS CMNT MICRO: ABNORMAL
UROBILINOGEN UR STRIP-ACNC: 0.2 EU/DL (ref 0.2–1)
WBC #/AREA URNS AUTO: ABNORMAL /HPF

## 2021-04-19 PROCEDURE — 99214 OFFICE O/P EST MOD 30 MIN: CPT | Performed by: NURSE PRACTITIONER

## 2021-04-19 PROCEDURE — 87086 URINE CULTURE/COLONY COUNT: CPT | Performed by: NURSE PRACTITIONER

## 2021-04-19 PROCEDURE — 81001 URINALYSIS AUTO W/SCOPE: CPT | Performed by: NURSE PRACTITIONER

## 2021-04-19 PROCEDURE — 36415 COLL VENOUS BLD VENIPUNCTURE: CPT | Performed by: NURSE PRACTITIONER

## 2021-04-19 PROCEDURE — 80048 BASIC METABOLIC PNL TOTAL CA: CPT | Performed by: NURSE PRACTITIONER

## 2021-04-19 PROCEDURE — 72100 X-RAY EXAM L-S SPINE 2/3 VWS: CPT | Mod: FY | Performed by: RADIOLOGY

## 2021-04-19 RX ORDER — SULFAMETHOXAZOLE/TRIMETHOPRIM 800-160 MG
1 TABLET ORAL 2 TIMES DAILY
Qty: 14 TABLET | Refills: 0 | Status: SHIPPED | OUTPATIENT
Start: 2021-04-19 | End: 2021-07-02

## 2021-04-19 RX ORDER — CHLORTHALIDONE 25 MG/1
TABLET ORAL
Qty: 180 TABLET | Refills: 3 | Status: SHIPPED | OUTPATIENT
Start: 2021-04-19 | End: 2022-03-28

## 2021-04-19 ASSESSMENT — MIFFLIN-ST. JEOR: SCORE: 1136.28

## 2021-04-19 NOTE — PROGRESS NOTES
"    Assessment & Plan     Bilateral low back pain without sciatica, unspecified chronicity    - XR Lumbar Spine 2/3 Views; Future  She declined PT.    Flank pain    - UA reflex to Microscopic and Culture  Doubt it is a UTI, will await UA results as patient had difficulty urinating before exam.    Chronic obstructive pulmonary disease, unspecified COPD type (H)    Stable, no concerns at all.    Essential hypertension    - Basic metabolic panel  (Ca, Cl, CO2, Creat, Gluc, K, Na, BUN)  Was elevated at first but came down within parameters, continue same medications and will be notified of labs.           BMI:   Estimated body mass index is 27.81 kg/m  as calculated from the following:    Height as of this encounter: 1.6 m (5' 3\").    Weight as of this encounter: 71.2 kg (157 lb).       See Patient Instructions  Patient Instructions   Will be notified of pending labs and x ray results.  Continue with walking, ice or heat and massage as able for low back.      Our Clinic hours are:  Mondays    7:20 am - 7 pm  Tues -  Fri  7:20 am - 5 pm    Clinic Phone: [phone]    The clinic lab opens at 7:30 am Mon - Fri and appointments are required.    [facility]  Ph. [phone]  Monday  8 am - 7pm  Tues - Fri 8 am - 5:30 pm             Return in about 2 weeks (around 5/3/2021) for or sooner if symptoms persist or worsen.    SUDHAKAR Yung CNP  [facility]    Werner Brady is a 85 year old who presents for the following health issues  accompanied by her self:    HPI     Back Pain  Onset/Duration: off and on   Description:   Location of pain: low back left  Character of pain: dull ache and irritating   Pain radiation: none  New numbness or weakness in legs, not attributed to pain: no   Intensity: Currently 0/10, At its worst 1or 2/10 when it comes   Progression of Symptoms: same  History:   Specific cause: none that she knows but possible from sitting   Pain interferes " with job:  no   History of back problems: no prior back problems  Any previous MRI or X-rays: None  Sees a specialist for back pain: No  Alleviating factors:   Improved by: massage    Precipitating factors:  Worsened by: Sitting  Therapies tried and outcome: patient has just tried messaging  massage    Accompanying Signs & Symptoms:  Risk of Fracture: Age >64 and no recent trauma  Risk of Cauda Equina: None  Risk of Infection: None  Risk of Cancer: None  Risk of Ankylosing Spondylitis: Onset at age <35, male, AND morning back stiffness  no         Abdominal/Flank Pain  Onset/Duration: on and off   Description: Patient is unsure if it is back pain or flank pain but is on and off dull irritating pain   Character: Dull ache and irritating   Location: Lower L flank  Radiation: None  Intensity: mild  Progression of Symptoms:  same  Accompanying Signs & Symptoms:  Fever/Chills: no  Gas/Bloating: no  Nausea: no  Vomitting: no  Diarrhea: no  Constipation: no  Dysuria or Hematuria: no  History:   Trauma: no  Previous similar pain: no  Previous tests done: none  Precipitating factors:   Does the pain change with:     Food: no    Bowel Movement: no    Urination: no   Other factors:  YES- Patient sister past from Kidney ca and she has had several UTI's in the past   Therapies tried and outcome: None        No LMP recorded (lmp unknown). Patient is postmenopausal.    Current Outpatient Medications   Medication     acetaminophen (TYLENOL) 325 MG tablet     acetaminophen-caffeine (EXCEDRIN TENSION HEADACHE) 500-65 MG TABS     atenolol (TENORMIN) 25 MG tablet     chlorthalidone (HYGROTON) 25 MG tablet     ipratropium (ATROVENT) 0.03 % nasal spray     potassium chloride ER (K-TAB) 20 MEQ CR tablet     psyllium (METAMUCIL) 28.3 % POWD     sulfamethoxazole-trimethoprim (BACTRIM DS) 800-160 MG tablet     aspirin (ASA) 325 MG EC tablet     No current facility-administered medications for this visit.      Past Medical History:  "  Diagnosis Date     COPD (chronic obstructive pulmonary disease) (H)      Migraine, unspecified, without mention of intractable migraine without mention of status migrainosus      Other urinary incontinence      Pure hypercholesterolemia      Unspecified essential hypertension          Review of Systems   See above      Objective    /88   Pulse 62   Temp 96.5  F (35.8  C) (Tympanic)   Resp 18   Ht 1.6 m (5' 3\")   Wt 71.2 kg (157 lb)   LMP  (LMP Unknown)   SpO2 97%   BMI 27.81 kg/m    Body mass index is 27.81 kg/m .  Physical Exam   GENERAL: healthy, alert and no distress  NECK: no adenopathy, no asymmetry  RESP: lungs clear to auscultation - no rales, rhonchi or wheezes  CV: regular rate and rhythm, normal S1 S2, no S3 or S4, no murmur  ABDOMEN: soft, nontender, no hepatosplenomegaly, no masses and bowel sounds normal, no CVA tenderness  MS: no gross musculoskeletal defects noted, FROM of lumbar spine, negative SLR, pain left lower back, could be muscular                  "

## 2021-04-19 NOTE — TELEPHONE ENCOUNTER
"Has a visit scheduled for today.      Requested Prescriptions   Pending Prescriptions Disp Refills     chlorthalidone (HYGROTON) 25 MG tablet [Pharmacy Med Name: CHLORTHALIDONE TABS 25MG] 180 tablet 3     Sig: TAKE 2 TABLETS DAILY (NEED LABS BEFORE NEXT REFILL)       Diuretics (Including Combos) Protocol Failed - 4/15/2021  1:20 PM        Failed - Blood pressure under 140/90 in past 12 months     BP Readings from Last 3 Encounters:   10/09/20 (!) 150/65   09/30/20 115/53   09/23/20 120/70                 Passed - Recent (12 mo) or future (30 days) visit within the authorizing provider's specialty     Patient has had an office visit with the authorizing provider or a provider within the authorizing providers department within the previous 12 mos or has a future within next 30 days. See \"Patient Info\" tab in inbasket, or \"Choose Columns\" in Meds & Orders section of the refill encounter.              Passed - Medication is active on med list        Passed - Patient is age 18 or older        Passed - No active pregancy on record        Passed - Normal serum creatinine on file in past 12 months     Recent Labs   Lab Test 10/09/20  2056   CR 0.90              Passed - Normal serum potassium on file in past 12 months     Recent Labs   Lab Test 10/09/20  2056   POTASSIUM 3.4                    Passed - Normal serum sodium on file in past 12 months     Recent Labs   Lab Test 10/09/20  2056                 Passed - No positive pregnancy test in past 12 months                 "

## 2021-04-19 NOTE — PATIENT INSTRUCTIONS
Will be notified of pending labs and x ray results.  Continue with walking, ice or heat and massage as able for low back.      Our Clinic hours are:  Mondays    7:20 am - 7 pm  Tues -  Fri  7:20 am - 5 pm    Clinic Phone: [phone]    The clinic lab opens at 7:30 am Mon - Fri and appointments are required.    [facility]. [phone]  Monday  8 am - 7pm  Tues - Fri 8 am - 5:30 pm

## 2021-04-20 ENCOUNTER — TELEPHONE (OUTPATIENT)
Dept: FAMILY MEDICINE | Facility: CLINIC | Age: 86
End: 2021-04-20

## 2021-04-20 DIAGNOSIS — I10 ESSENTIAL HYPERTENSION: Primary | ICD-10-CM

## 2021-04-20 NOTE — TELEPHONE ENCOUNTER
Cindy ,    Patient is wondering if she should reduce her Hygroton to 1 tablet daily because of her lab results of low K+ .  She will recheck labs 5/4/21     Gely Reyna CMA

## 2021-04-20 NOTE — TELEPHONE ENCOUNTER
Cindy Flor APRN CNP  Cl Provider Careteam Pool 28 minutes ago (8:55 AM)     No, not at this time as blood pressure was high initially at visit yesterday.   Will see if potassium will go back up with dietary changes first.   Thanks.   GRACE Davison    Message text      Pt given C.Vossen,NP msg to not reduce her Hygroton dose due to her high B/P yesterday. Pt has BMP recheck in 2 weeks.  KPavelRN

## 2021-04-21 LAB
BACTERIA SPEC CULT: NORMAL
BACTERIA SPEC CULT: NORMAL
SPECIMEN SOURCE: NORMAL

## 2021-05-04 DIAGNOSIS — I10 ESSENTIAL HYPERTENSION: ICD-10-CM

## 2021-05-04 LAB
ANION GAP SERPL CALCULATED.3IONS-SCNC: 2 MMOL/L (ref 3–14)
BUN SERPL-MCNC: 14 MG/DL (ref 7–30)
CALCIUM SERPL-MCNC: 9 MG/DL (ref 8.5–10.1)
CHLORIDE SERPL-SCNC: 96 MMOL/L (ref 94–109)
CO2 SERPL-SCNC: 34 MMOL/L (ref 20–32)
CREAT SERPL-MCNC: 0.8 MG/DL (ref 0.52–1.04)
GFR SERPL CREATININE-BSD FRML MDRD: 68 ML/MIN/{1.73_M2}
GLUCOSE SERPL-MCNC: 107 MG/DL (ref 70–99)
POTASSIUM SERPL-SCNC: 3.7 MMOL/L (ref 3.4–5.3)
SODIUM SERPL-SCNC: 132 MMOL/L (ref 133–144)

## 2021-05-04 PROCEDURE — 36415 COLL VENOUS BLD VENIPUNCTURE: CPT | Performed by: NURSE PRACTITIONER

## 2021-05-04 PROCEDURE — 80048 BASIC METABOLIC PNL TOTAL CA: CPT | Performed by: NURSE PRACTITIONER

## 2021-05-24 ENCOUNTER — NURSE TRIAGE (OUTPATIENT)
Dept: FAMILY MEDICINE | Facility: CLINIC | Age: 86
End: 2021-05-24

## 2021-05-24 ENCOUNTER — OFFICE VISIT (OUTPATIENT)
Dept: FAMILY MEDICINE | Facility: CLINIC | Age: 86
End: 2021-05-24
Payer: COMMERCIAL

## 2021-05-24 VITALS
BODY MASS INDEX: 26.98 KG/M2 | OXYGEN SATURATION: 96 % | SYSTOLIC BLOOD PRESSURE: 116 MMHG | RESPIRATION RATE: 18 BRPM | TEMPERATURE: 97 F | HEART RATE: 67 BPM | DIASTOLIC BLOOD PRESSURE: 54 MMHG | HEIGHT: 64 IN | WEIGHT: 158 LBS

## 2021-05-24 DIAGNOSIS — R60.0 LOCALIZED EDEMA: ICD-10-CM

## 2021-05-24 DIAGNOSIS — I10 ESSENTIAL HYPERTENSION: ICD-10-CM

## 2021-05-24 DIAGNOSIS — L03.115 CELLULITIS OF RIGHT LOWER EXTREMITY: Primary | ICD-10-CM

## 2021-05-24 PROCEDURE — 99214 OFFICE O/P EST MOD 30 MIN: CPT | Performed by: NURSE PRACTITIONER

## 2021-05-24 RX ORDER — FUROSEMIDE 40 MG
TABLET ORAL
Qty: 4 TABLET | Refills: 0 | Status: SHIPPED | OUTPATIENT
Start: 2021-05-24 | End: 2021-07-13

## 2021-05-24 RX ORDER — ATENOLOL 25 MG/1
TABLET ORAL
Qty: 90 TABLET | Refills: 2 | Status: SHIPPED | OUTPATIENT
Start: 2021-05-24 | End: 2022-01-17

## 2021-05-24 RX ORDER — CEPHALEXIN 500 MG/1
500 CAPSULE ORAL 3 TIMES DAILY
Qty: 21 CAPSULE | Refills: 0 | Status: SHIPPED | OUTPATIENT
Start: 2021-05-24 | End: 2021-05-31

## 2021-05-24 ASSESSMENT — MIFFLIN-ST. JEOR: SCORE: 1156.68

## 2021-05-24 NOTE — PROGRESS NOTES
"    Assessment & Plan     Cellulitis of right lower extremity    - cephALEXin (KEFLEX) 500 MG capsule; Take 1 capsule (500 mg) by mouth 3 times daily for 7 days  Discussed how to take the medication(s), expected outcomes, potential side effects.  Will treat infection and have her take a few doses of lasix to off load swelling. See other instructions below.  Recheck labs and ankle next week, will seek emergent care if worsening.    Localized edema    - furosemide (LASIX) 40 MG tablet; Take 1 tablet by mouth every other day for one week.  Discussed how to take the medication(s), expected outcomes, potential side effects.             BMI:   Estimated body mass index is 27.12 kg/m  as calculated from the following:    Height as of this encounter: 1.626 m (5' 4\").    Weight as of this encounter: 71.7 kg (158 lb).       See Patient Instructions  Patient Instructions   Continue to elevate lower legs and wear compression stockings as able.  Take lasix and antibiotic as directed.  Follow up with me in one week, seek emergent care if worsening.      Our Clinic hours are:  Mondays    7:20 am - 7 pm  Tues -  Fri  7:20 am - 5 pm    Clinic Phone: [phone]    The clinic lab opens at 7:30 am Mon - Fri and appointments are required.    [facility] [place]  Ph. [phone]  Monday  8 am - 7pm  Tues - Fri 8 am - 5:30 pm             Return in about 1 week (around 5/31/2021) for or sooner if symptoms persist or worsen.    SUDHAKAR Yung CNP  [facility]    Werner Brady is a 85 year old who presents for the following health issues     HPI   Chief Complaint   Patient presents with     Leg Swelling     R ankle and leg  swelling and pain is new          Concern - swollen RR leg and ankle   Onset: has had a while but increased 5-21-21  Description: Patient has had swelling in her R leg ever since her hip in 12/2020 but is getting worse since 5-21-21 with swelling and pain   Intensity: " severe and dose get worse at night   Progression of Symptoms:  worsening  Accompanying Signs & Symptoms: hip surgery the swelling in legs never went down but has increased since Friday   Previous history of similar problem: not like this   Precipitating factors:        Worsened by: at night   Alleviating factors:        Improved by: better in the AM with elevation   Therapies tried and outcome: Patient has been using the compression stocking and elevating her leg     States swelling is better in am, she's been wearing compression stockings, worse if on her feet.  This has been happening since her last hip surgery on the right.  She was low on her potassium so has been taking supplement daily.  Feels well otherwise, not feverish or fatigued. No shortness of breath, weight stable.  Past Medical History:   Diagnosis Date     COPD (chronic obstructive pulmonary disease) (H)      Migraine, unspecified, without mention of intractable migraine without mention of status migrainosus      Other urinary incontinence      Pure hypercholesterolemia      Unspecified essential hypertension      Current Outpatient Medications   Medication     acetaminophen (TYLENOL) 325 MG tablet     acetaminophen-caffeine (EXCEDRIN TENSION HEADACHE) 500-65 MG TABS     atenolol (TENORMIN) 25 MG tablet     cephALEXin (KEFLEX) 500 MG capsule     chlorthalidone (HYGROTON) 25 MG tablet     furosemide (LASIX) 40 MG tablet     ipratropium (ATROVENT) 0.03 % nasal spray     potassium chloride ER (K-TAB) 20 MEQ CR tablet     psyllium (METAMUCIL) 28.3 % POWD     aspirin (ASA) 325 MG EC tablet     sulfamethoxazole-trimethoprim (BACTRIM DS) 800-160 MG tablet     No current facility-administered medications for this visit.            Review of Systems   Constitutional, HEENT, cardiovascular, pulmonary, gi and gu systems are negative, except as otherwise noted.      Objective    LMP  (LMP Unknown)   There is no height or weight on file to calculate  BMI.  Physical Exam   GENERAL: healthy, alert and no distress  NECK: no adenopathy, no asymmetry  RESP: lungs clear to auscultation - no rales, rhonchi or wheezes  CV: regular rate and rhythm, normal S1 S2, no S3 or S4, no murmur  ABDOMEN: soft, nontender  MS: bilateral ankle edema, slightly worse on right, 1-2 pitting, she has redness around right lower ankle, c/w mild cellulitis

## 2021-05-24 NOTE — TELEPHONE ENCOUNTER
"Call came through central scheduling with red flag symptom of single leg redness and swelling.  was able to make an appointment for patient this afternoon in clinic. Writer feels it is appropriate for patient to wait until that appointment to be seen based on triage below.     Writer did inform patient that if redness or swelling was to spread, or leg becomes warm to the touch, it would be better for patient to go to UC or ER. Pt agreeable.    Pt reported having right hip replacement surgery in sept 2020. Reports ongoing swelling since the surgery and wears compression socks daily (removes at night).     Initial Assessment Questions  1. ONSET: \"When did the swelling start?\" (e.g., minutes, hours, days) - ongoing since surgery (hip replacement) last September 2020.    2. LOCATION: \"What part of the leg is swollen?\" \"Are both legs swollen or just one leg?\" - Right leg between ankle and calf, the same location it has been since surgery. Pt denies it spreading up her calf or down her foot.    3. SEVERITY: \"How bad is the swelling?\" (e.g., localized; mild, moderate, severe) - localized swelling, increased from normal.     4. REDNESS: \"Does the swelling look red or infected?\" - redness is unchanged from normal, pt stated it has been red since surgery.    5. PAIN: \"Is the swelling painful to touch?\" If so, ask: \"How painful is it?\" (Scale 1-10; mild, moderate or severe) - Started to feel more of a \"burning\" now and more than usual. Once in a while she said pain is \"sharp\" inside of the lower leg between ankle and low calf.    6. FEVER: \"Do you have a fever?\" If so, ask: \"What is it, how was it measured, and when did it start?\" - denies feeling feverish, has not checked temp.    7. CAUSE: \"What do you think is causing the leg swelling?\" - contributing to ongoing swelling from hip replacement surgery Sept. 2020.    8. MEDICAL HISTORY: \"Do you have a history of heart failure, kidney disease, liver failure, or " "cancer?\"    9. RECURRENT SYMPTOM: \"Have you had leg swelling before?\" If so, ask: \"When was the last time?\" \"What happened that time?\" - yes, ongoing since Sept 2020.    10. OTHER SYMPTOMS: \"Do you have any other symptoms?\" (e.g., chest pain, difficulty breathing) - denies feeling warm to touch but reported it felt warm inside.      Reason for Disposition    Patient wants to be seen    Additional Information    Negative: Swelling of face, arm or hands (Exception: slight puffiness of fingers during hot weather)    Negative: Pregnant > 20 weeks and sudden weight gain (i.e., > 2 lbs, 1 kg in one week)    Negative: Looks like a boil, infected sore, deep ulcer, or other infected rash (spreading redness, pus)    Negative: Difficulty breathing with exertion AND worsening or new onset    Negative: MODERATE swelling of both ankles (e.g., swelling extends up to the knees) AND new onset or worsening    Negative: SEVERE swelling (e.g., swelling extends above knee, entire leg is swollen, weeping fluid)    Negative: Thigh or calf pain and only 1 side and present > 1 hour    Negative: Thigh, calf, or ankle swelling in only one leg    Negative: Thigh, calf, or ankle swelling in both legs, but one side is definitely more swollen (Exception: longstanding difference between legs)    Negative: Cast on leg or ankle and has increasing pain    Negative: Can't walk or can barely stand (new onset)    Negative: Fever and red area (or area very tender to touch)    Negative: Patient sounds very sick or weak to the triager    Negative: Difficulty breathing at rest    Negative: Entire foot is cool or blue in comparison to other side    Negative: Chest pain    Negative: Small area of swelling and followed an insect bite to the area    Negative: Followed a knee injury    Negative: Ankle or foot injury    Negative: Pregnant with leg swelling or edema    Negative: Sounds like a life-threatening emergency to the triager    Protocols used: LEG " SWELLING AND EDEMA-A-OH    Gabriela HOLDER RN, BSN  [facility]

## 2021-05-24 NOTE — PATIENT INSTRUCTIONS
Continue to elevate lower legs and wear compression stockings as able.  Take lasix and antibiotic as directed.  Follow up with me in one week, seek emergent care if worsening.      Our Clinic hours are:  Mondays    7:20 am - 7 pm  Tues -  Fri  7:20 am - 5 pm    Clinic Phone: [phone]    The clinic lab opens at 7:30 am Mon - Fri and appointments are required.    [facility]  Ph. [phone]  Monday  8 am - 7pm  Tues - Fri 8 am - 5:30 pm

## 2021-06-02 ENCOUNTER — OFFICE VISIT (OUTPATIENT)
Dept: FAMILY MEDICINE | Facility: CLINIC | Age: 86
End: 2021-06-02
Payer: COMMERCIAL

## 2021-06-02 VITALS
SYSTOLIC BLOOD PRESSURE: 122 MMHG | HEIGHT: 64 IN | HEART RATE: 62 BPM | TEMPERATURE: 98.8 F | RESPIRATION RATE: 16 BRPM | WEIGHT: 153 LBS | BODY MASS INDEX: 26.12 KG/M2 | OXYGEN SATURATION: 98 % | DIASTOLIC BLOOD PRESSURE: 60 MMHG

## 2021-06-02 DIAGNOSIS — R60.0 PEDAL EDEMA: Primary | ICD-10-CM

## 2021-06-02 PROCEDURE — 99213 OFFICE O/P EST LOW 20 MIN: CPT | Performed by: NURSE PRACTITIONER

## 2021-06-02 ASSESSMENT — MIFFLIN-ST. JEOR: SCORE: 1130.03

## 2021-06-02 NOTE — PROGRESS NOTES
"    Assessment & Plan     Pedal edema    Improved, will continue to monitor and do other suggestions outlined below in patient instructions.               BMI:   Estimated body mass index is 26.47 kg/m  as calculated from the following:    Height as of this encounter: 1.619 m (5' 3.75\").    Weight as of this encounter: 69.4 kg (153 lb).       See Patient Instructions  Patient Instructions   Legs look a lot better, continue with monitoring of symptoms, swelling or redness.  Elevate, compression stockings, limiting salt.  Return if worsening.  Return in one month for annual Medicare visit.      Our Clinic hours are:  Mondays    7:20 am - 7 pm  Tues -  Fri  7:20 am - 5 pm    Clinic Phone: [phone]    The clinic lab opens at 7:30 am Mon - Fri and appointments are required.    [facility]. [phone]  Monday  8 am - 7pm  Tues - Fri 8 am - 5:30 pm             Return in about 4 weeks (around 6/30/2021) for or sooner if symptoms persist or worsen, Physical Exam.    SUDHAKAR Yung CNP  [facility]    Werner Brady is a 85 year old who presents for the following health issues  accompanied by her self :    HPI         Chief Complaint   Patient presents with     Cellulitis     follow up right ankle - patient states the swelling has gone down,but it is still painful and warm to the touch - patient would rate her pain at a 2 today .       She states it's much better but still has some mild swelling and redness around right ankle. Denies pain.  Denies any other acute concerns, is otherwise feeling well.        Review of Systems   Constitutional, HEENT, cardiovascular, pulmonary, gi and gu systems are negative, except as otherwise noted.      Objective    /60 (BP Location: Right arm, Patient Position: Chair, Cuff Size: Adult Large)   Pulse 62   Temp 98.8  F (37.1  C)   Resp 16   Ht 1.619 m (5' 3.75\")   Wt 69.4 kg (153 lb)   LMP  (LMP Unknown)   SpO2 " 98%   BMI 26.47 kg/m    Body mass index is 26.47 kg/m .  Physical Exam   GENERAL: healthy, alert and no distress  NECK: no asymmetry  RESP: lungs clear   CV: regular rate and rhythm  ABDOMEN: soft, nontender  MS: no gross musculoskeletal defects noted, much improved pedal/ankle swelling bilaterally, improved redness around right ankle, varicose veins, good pedal pulses

## 2021-06-02 NOTE — PATIENT INSTRUCTIONS
Legs look a lot better, continue with monitoring of symptoms, swelling or redness.  Elevate, compression stockings, limiting salt.  Return if worsening.  Return in one month for annual Medicare visit.      Our Clinic hours are:  Mondays    7:20 am - 7 pm  Tues -  Fri  7:20 am - 5 pm    Clinic Phone: [phone]    The clinic lab opens at 7:30 am Mon - Fri and appointments are required.    [facility]  Ph. [phone]  Monday  8 am - 7pm  Tues - Fri 8 am - 5:30 pm

## 2021-06-09 ENCOUNTER — VIRTUAL VISIT (OUTPATIENT)
Dept: FAMILY MEDICINE | Facility: CLINIC | Age: 86
End: 2021-06-09
Payer: COMMERCIAL

## 2021-06-09 DIAGNOSIS — R30.0 DYSURIA: Primary | ICD-10-CM

## 2021-06-09 DIAGNOSIS — R30.0 DYSURIA: ICD-10-CM

## 2021-06-09 DIAGNOSIS — R82.90 NONSPECIFIC FINDING ON EXAMINATION OF URINE: Primary | ICD-10-CM

## 2021-06-09 DIAGNOSIS — N39.0 URINARY TRACT INFECTION WITHOUT HEMATURIA, SITE UNSPECIFIED: ICD-10-CM

## 2021-06-09 LAB
ALBUMIN UR-MCNC: NEGATIVE MG/DL
APPEARANCE UR: ABNORMAL
BACTERIA #/AREA URNS HPF: ABNORMAL /HPF
BILIRUB UR QL STRIP: NEGATIVE
COLOR UR AUTO: YELLOW
GLUCOSE UR STRIP-MCNC: NEGATIVE MG/DL
HGB UR QL STRIP: ABNORMAL
KETONES UR STRIP-MCNC: NEGATIVE MG/DL
LEUKOCYTE ESTERASE UR QL STRIP: ABNORMAL
NITRATE UR QL: POSITIVE
NON-SQ EPI CELLS #/AREA URNS LPF: ABNORMAL /LPF
PH UR STRIP: 7.5 PH (ref 5–7)
RBC #/AREA URNS AUTO: ABNORMAL /HPF
SOURCE: ABNORMAL
SP GR UR STRIP: 1.02 (ref 1–1.03)
UROBILINOGEN UR STRIP-ACNC: 0.2 EU/DL (ref 0.2–1)
WBC #/AREA URNS AUTO: ABNORMAL /HPF

## 2021-06-09 PROCEDURE — 81001 URINALYSIS AUTO W/SCOPE: CPT | Performed by: NURSE PRACTITIONER

## 2021-06-09 PROCEDURE — 99213 OFFICE O/P EST LOW 20 MIN: CPT | Mod: 95 | Performed by: NURSE PRACTITIONER

## 2021-06-09 PROCEDURE — 87086 URINE CULTURE/COLONY COUNT: CPT | Performed by: NURSE PRACTITIONER

## 2021-06-09 PROCEDURE — 87088 URINE BACTERIA CULTURE: CPT | Performed by: NURSE PRACTITIONER

## 2021-06-09 PROCEDURE — 87186 SC STD MICRODIL/AGAR DIL: CPT | Performed by: NURSE PRACTITIONER

## 2021-06-09 RX ORDER — SULFAMETHOXAZOLE/TRIMETHOPRIM 800-160 MG
1 TABLET ORAL 2 TIMES DAILY
Qty: 14 TABLET | Refills: 0 | Status: SHIPPED | OUTPATIENT
Start: 2021-06-09 | End: 2021-07-02

## 2021-06-09 NOTE — PATIENT INSTRUCTIONS
Come to clinic today to do UA to ensure we are treating appropriately.  Will be notified of those results.  Push fluids.  Seek emergent care if worsening.      Our Clinic hours are:  Mondays    7:20 am - 7 pm  Tues -  Fri  7:20 am - 5 pm    Clinic Phone: [phone]    The clinic lab opens at 7:30 am Mon - Fri and appointments are required.    [facility]  Ph. [phone]  Monday  8 am - 7pm  Tues - Fri 8 am - 5:30 pm

## 2021-06-10 LAB
BACTERIA SPEC CULT: ABNORMAL
BACTERIA SPEC CULT: ABNORMAL
Lab: ABNORMAL
SPECIMEN SOURCE: ABNORMAL

## 2021-07-02 ENCOUNTER — OFFICE VISIT (OUTPATIENT)
Dept: FAMILY MEDICINE | Facility: CLINIC | Age: 86
End: 2021-07-02
Payer: COMMERCIAL

## 2021-07-02 VITALS
HEART RATE: 51 BPM | TEMPERATURE: 97.1 F | BODY MASS INDEX: 26.55 KG/M2 | HEIGHT: 64 IN | OXYGEN SATURATION: 98 % | WEIGHT: 155.5 LBS | SYSTOLIC BLOOD PRESSURE: 146 MMHG | DIASTOLIC BLOOD PRESSURE: 70 MMHG

## 2021-07-02 DIAGNOSIS — N30.00 ACUTE CYSTITIS WITHOUT HEMATURIA: Primary | ICD-10-CM

## 2021-07-02 DIAGNOSIS — R82.90 NONSPECIFIC FINDING ON EXAMINATION OF URINE: ICD-10-CM

## 2021-07-02 LAB
ALBUMIN UR-MCNC: NEGATIVE MG/DL
APPEARANCE UR: ABNORMAL
BACTERIA #/AREA URNS HPF: ABNORMAL /HPF
BILIRUB UR QL STRIP: NEGATIVE
COLOR UR AUTO: YELLOW
GLUCOSE UR STRIP-MCNC: NEGATIVE MG/DL
HGB UR QL STRIP: ABNORMAL
KETONES UR STRIP-MCNC: NEGATIVE MG/DL
LEUKOCYTE ESTERASE UR QL STRIP: ABNORMAL
NITRATE UR QL: POSITIVE
NON-SQ EPI CELLS #/AREA URNS LPF: ABNORMAL /LPF
PH UR STRIP: 6 PH (ref 5–7)
RBC #/AREA URNS AUTO: ABNORMAL /HPF
SOURCE: ABNORMAL
SP GR UR STRIP: 1.01 (ref 1–1.03)
UROBILINOGEN UR STRIP-ACNC: 0.2 EU/DL (ref 0.2–1)
WBC #/AREA URNS AUTO: ABNORMAL /HPF
WBC CLUMPS #/AREA URNS HPF: PRESENT /HPF

## 2021-07-02 PROCEDURE — 81001 URINALYSIS AUTO W/SCOPE: CPT | Performed by: NURSE PRACTITIONER

## 2021-07-02 PROCEDURE — 99213 OFFICE O/P EST LOW 20 MIN: CPT | Performed by: NURSE PRACTITIONER

## 2021-07-02 PROCEDURE — 87086 URINE CULTURE/COLONY COUNT: CPT | Performed by: NURSE PRACTITIONER

## 2021-07-02 RX ORDER — CEPHALEXIN 500 MG/1
500 CAPSULE ORAL 2 TIMES DAILY
Qty: 14 CAPSULE | Refills: 0 | Status: SHIPPED | OUTPATIENT
Start: 2021-07-02 | End: 2021-07-09

## 2021-07-02 ASSESSMENT — MIFFLIN-ST. JEOR: SCORE: 1141.37

## 2021-07-02 NOTE — PROGRESS NOTES
"    Assessment & Plan     Acute cystitis without hematuria  UA positive. Nothing to suggest pyelonephritis, urosepsis, nephrolithiasis. 3rd UTI in 3 months. Offered urology referral, she declines this, states will discuss with PCP.  - *UA reflex to Microscopic and Culture ([place] [facility] (except [place] and [place])  - Urine Microscopic  - cephALEXin (KEFLEX) 500 MG capsule; Take 1 capsule (500 mg) by mouth 2 times daily for 7 days    Nonspecific finding on examination of urine    - Urine Culture Aerobic Bacterial       BMI:   Estimated body mass index is 26.9 kg/m  as calculated from the following:    Height as of this encounter: 1.619 m (5' 3.75\").    Weight as of this encounter: 70.5 kg (155 lb 8 oz).       Patient Instructions   Your urine test shows evidence of a urinary tract infection.    We will treat you with an antibiotic, Keflex.  I sent this to your pharmacy. Please take as directed for 7 days. Please take a probiotic or eat a daily Greek yogurt while you are on the antibiotic.    A urine culture is still in process, it usually takes about 2 days and identifies the bacteria causing the infection.  It also tests antibiotics to make sure what we use will be effective for your infection.  We will only call you if the results of this test show a need to change your treatment plan.    If developing high fevers, vomiting, abdominal pain, or any other new, concerning symptoms, come back immediately. If no improvement in symptoms by the end of your antibiotic treatment, follow up with your primary care doctor.    Otherwise, simply follow up as needed.        Urinary Tract Infections in Women  Urinary tract infections (UTIs) are most often caused by bacteria (germs). These bacteria enter the urinary tract. The bacteria may come from outside the body. Or they may travel from the skin outside the rectum or vagina into the urethra. Female anatomy makes it easier for bacteria from the bowel to enter a " woman's urinary tract, which is the most common source of UTI. This means women develop UTIs more often than men. Pain in or around the urinary tract is a common UTI symptom. But the only way to know for sure if you have a UTI for the health care provider to test your urine. The two tests that may be done are the urinalysis and urine culture.  Types of UTIs    Cystitis: A bladder infection (cystitis) is the most common UTI in women. You may have urgent or frequent urination. You may also have pain, burning when you urinate, and bloody urine.    Urethritis: This is an inflamed urethra, which is the tube that carries urine from the bladder to outside the body. You may have lower stomach or back pain. You may also have urgent or frequent urination.    Pyelonephritis: This is a kidney infection. If not treated, it can be serious and damage your kidneys. In severe cases, you may be hospitalized. You may have a fever and lower back pain.  Medications to treat a UTI  Most UTIs are treated with antibiotics. These kill the bacteria. The length of time you need to take them depends on the type of infection. It may be as short as 3 days. If you have repeated UTIs, a low-dose antibiotic may be needed for several months. Take antibiotics exactly as directed. Don't stop taking them until all of the medication is gone. If you stop taking the antibiotic too soon, the infection may not go away, and you may develop a resistance to the antibiotic. This can make it much harder to treat.  Lifestyle changes to treat and prevent UTIs  The lifestyle changes below will help get rid of your UTI. They may also help prevent future UTIs.    Drink plenty of fluids. This includes water, juice, or other caffeine-free drinks. Fluids help flush bacteria out of your body.    Empty your bladder. Always empty your bladder when you feel the urge to urinate. And always urinate before going to sleep. Urine that stays in your bladder can lead to infection.  Try to urinate before and after sex as well.    Practice good personal hygiene. Wipe yourself from front to back after using the toilet. This helps keep bacteria from getting into the urethra.    Use condoms during sex. These help prevent UTIs caused by sexually transmitted bacteria. Also, avoid using spermicides during sex. These can increase the risk of UTIs. Choose other forms of birth control instead. For women who tend to get UTIs after sex, a low-dose of a preventive antibiotic may be used. Be sure to discuss this option with your health care provider.    Follow up with your health care provider as directed. He or she may test to make sure the infection has cleared. If necessary, additional treatment may be started.    8772-9106 [employer]. [address] [place] [address], [place] [address]. All rights reserved. This information is not intended as a substitute for professional medical care. Always follow your healthcare professional's instructions.          Return in about 1 week (around 7/9/2021) for worsening or continued symptoms.    SUDHAKAR [facility]    Werner Brady is a 85 year old who presents for the following health issues     HPI     Genitourinary - Female  Onset/Duration: Few days   Description:   Painful urination (Dysuria): no           Frequency: YES  Blood in urine (Hematuria): no  Delay in urine (Hesitency): YES- sometimes   Intensity: moderate  Progression of Symptoms:  waxing and waning  Accompanying Signs & Symptoms:  Fever/chills: no  Flank pain: no  Nausea and vomiting: no  Vaginal symptoms: none  Abdominal/Pelvic Pain: no  History:   History of frequent UTI s: YES  History of kidney stones: no  Sexually Active: no  Possibility of pregnancy: No  Precipitating or alleviating factors: None  Therapies tried and outcome:  None   Recently dx with UTI on  6/9/21 and received Bactrim. Pt finished antibiotic.     Above HPI reviewed.  "Additionally, no fever, back pain, abd pain. Does feel symptoms resolved after Bactrim. Last culture pansensitive.      Review of Systems   Constitutional, HEENT, cardiovascular, pulmonary, gi and gu systems are negative, except as otherwise noted.      Objective    BP (!) 146/70   Pulse 51   Temp 97.1  F (36.2  C) (Tympanic)   Ht 1.619 m (5' 3.75\")   Wt 70.5 kg (155 lb 8 oz)   LMP  (LMP Unknown)   SpO2 98%   BMI 26.90 kg/m    Body mass index is 26.9 kg/m .  Physical Exam  Vitals signs and nursing note reviewed.   Constitutional:       General: She is not in acute distress.     Appearance: Normal appearance.   HENT:      Head: Normocephalic and atraumatic.      Mouth/Throat:      Mouth: Mucous membranes are moist.   Neck:      Musculoskeletal: Neck supple.   Cardiovascular:      Rate and Rhythm: Normal rate.   Pulmonary:      Effort: Pulmonary effort is normal.   Abdominal:      Tenderness: There is no right CVA tenderness or left CVA tenderness.   Skin:     General: Skin is warm and dry.   Neurological:      General: No focal deficit present.      Mental Status: She is alert.   Psychiatric:         Mood and Affect: Mood normal.         Behavior: Behavior normal.            Results for orders placed or performed in visit on 07/02/21 (from the past 24 hour(s))   *UA reflex to Microscopic and Culture ([place] [facility] (except [place] and [place])    Specimen: Midstream Urine   Result Value Ref Range    Color Urine Yellow     Appearance Urine Cloudy     Glucose Urine Negative NEG^Negative mg/dL    Bilirubin Urine Negative NEG^Negative    Ketones Urine Negative NEG^Negative mg/dL    Specific Gravity Urine 1.015 1.003 - 1.035    Blood Urine Trace (A) NEG^Negative    pH Urine 6.0 5.0 - 7.0 pH    Protein Albumin Urine Negative NEG^Negative mg/dL    Urobilinogen Urine 0.2 0.2 - 1.0 EU/dL    Nitrite Urine Positive (A) NEG^Negative    Leukocyte Esterase Urine Moderate (A) NEG^Negative    Source Midstream " Urine    Urine Microscopic   Result Value Ref Range    WBC Urine 25-50 (A) OTO5^0 - 5 /HPF    RBC Urine O - 2 OTO2^O - 2 /HPF    WBC Clumps Present (A) NEG^Negative /HPF    Squamous Epithelial /LPF Urine Few FEW^Few /LPF    Bacteria Urine Moderate (A) NEG^Negative /HPF

## 2021-07-02 NOTE — PATIENT INSTRUCTIONS
Your urine test shows evidence of a urinary tract infection.    We will treat you with an antibiotic, Keflex.  I sent this to your pharmacy. Please take as directed for 7 days. Please take a probiotic or eat a daily Greek yogurt while you are on the antibiotic.    A urine culture is still in process, it usually takes about 2 days and identifies the bacteria causing the infection.  It also tests antibiotics to make sure what we use will be effective for your infection.  We will only call you if the results of this test show a need to change your treatment plan.    If developing high fevers, vomiting, abdominal pain, or any other new, concerning symptoms, come back immediately. If no improvement in symptoms by the end of your antibiotic treatment, follow up with your primary care doctor.    Otherwise, simply follow up as needed.        Urinary Tract Infections in Women  Urinary tract infections (UTIs) are most often caused by bacteria (germs). These bacteria enter the urinary tract. The bacteria may come from outside the body. Or they may travel from the skin outside the rectum or vagina into the urethra. Female anatomy makes it easier for bacteria from the bowel to enter a woman's urinary tract, which is the most common source of UTI. This means women develop UTIs more often than men. Pain in or around the urinary tract is a common UTI symptom. But the only way to know for sure if you have a UTI for the health care provider to test your urine. The two tests that may be done are the urinalysis and urine culture.  Types of UTIs    Cystitis: A bladder infection (cystitis) is the most common UTI in women. You may have urgent or frequent urination. You may also have pain, burning when you urinate, and bloody urine.    Urethritis: This is an inflamed urethra, which is the tube that carries urine from the bladder to outside the body. You may have lower stomach or back pain. You may also have urgent or frequent  urination.    Pyelonephritis: This is a kidney infection. If not treated, it can be serious and damage your kidneys. In severe cases, you may be hospitalized. You may have a fever and lower back pain.  Medications to treat a UTI  Most UTIs are treated with antibiotics. These kill the bacteria. The length of time you need to take them depends on the type of infection. It may be as short as 3 days. If you have repeated UTIs, a low-dose antibiotic may be needed for several months. Take antibiotics exactly as directed. Don't stop taking them until all of the medication is gone. If you stop taking the antibiotic too soon, the infection may not go away, and you may develop a resistance to the antibiotic. This can make it much harder to treat.  Lifestyle changes to treat and prevent UTIs  The lifestyle changes below will help get rid of your UTI. They may also help prevent future UTIs.    Drink plenty of fluids. This includes water, juice, or other caffeine-free drinks. Fluids help flush bacteria out of your body.    Empty your bladder. Always empty your bladder when you feel the urge to urinate. And always urinate before going to sleep. Urine that stays in your bladder can lead to infection. Try to urinate before and after sex as well.    Practice good personal hygiene. Wipe yourself from front to back after using the toilet. This helps keep bacteria from getting into the urethra.    Use condoms during sex. These help prevent UTIs caused by sexually transmitted bacteria. Also, avoid using spermicides during sex. These can increase the risk of UTIs. Choose other forms of birth control instead. For women who tend to get UTIs after sex, a low-dose of a preventive antibiotic may be used. Be sure to discuss this option with your health care provider.    Follow up with your health care provider as directed. He or she may test to make sure the infection has cleared. If necessary, additional treatment may be started.    7423-5615  [employer]. [address] [place] [address], [place] [address]. All rights reserved. This information is not intended as a substitute for professional medical care. Always follow your healthcare professional's instructions.

## 2021-07-04 LAB
BACTERIA SPEC CULT: NORMAL
Lab: NORMAL
SPECIMEN SOURCE: NORMAL

## 2021-07-13 ENCOUNTER — OFFICE VISIT (OUTPATIENT)
Dept: FAMILY MEDICINE | Facility: CLINIC | Age: 86
End: 2021-07-13
Payer: COMMERCIAL

## 2021-07-13 VITALS
BODY MASS INDEX: 26.46 KG/M2 | HEIGHT: 64 IN | WEIGHT: 155 LBS | OXYGEN SATURATION: 99 % | RESPIRATION RATE: 16 BRPM | HEART RATE: 49 BPM | DIASTOLIC BLOOD PRESSURE: 60 MMHG | SYSTOLIC BLOOD PRESSURE: 138 MMHG | TEMPERATURE: 98.3 F

## 2021-07-13 DIAGNOSIS — E55.9 VITAMIN D DEFICIENCY, UNSPECIFIED: ICD-10-CM

## 2021-07-13 DIAGNOSIS — K58.0 IRRITABLE BOWEL SYNDROME WITH DIARRHEA: ICD-10-CM

## 2021-07-13 DIAGNOSIS — Z13.220 SCREENING FOR LIPOID DISORDERS: ICD-10-CM

## 2021-07-13 DIAGNOSIS — M79.662 PAIN OF LEFT LOWER LEG: ICD-10-CM

## 2021-07-13 DIAGNOSIS — I10 ESSENTIAL HYPERTENSION: Primary | ICD-10-CM

## 2021-07-13 LAB
ANION GAP SERPL CALCULATED.3IONS-SCNC: 2 MMOL/L (ref 3–14)
BUN SERPL-MCNC: 15 MG/DL (ref 7–30)
CALCIUM SERPL-MCNC: 9 MG/DL (ref 8.5–10.1)
CHLORIDE BLD-SCNC: 101 MMOL/L (ref 94–109)
CHOLEST SERPL-MCNC: 211 MG/DL
CO2 SERPL-SCNC: 34 MMOL/L (ref 20–32)
CREAT SERPL-MCNC: 0.86 MG/DL (ref 0.52–1.04)
FASTING STATUS PATIENT QL REPORTED: YES
GFR SERPL CREATININE-BSD FRML MDRD: 63 ML/MIN/1.73M2
GLUCOSE BLD-MCNC: 87 MG/DL (ref 70–99)
HDLC SERPL-MCNC: 71 MG/DL
LDLC SERPL CALC-MCNC: 119 MG/DL
NONHDLC SERPL-MCNC: 140 MG/DL
POTASSIUM BLD-SCNC: 3.4 MMOL/L (ref 3.4–5.3)
SODIUM SERPL-SCNC: 137 MMOL/L (ref 133–144)
TRIGL SERPL-MCNC: 105 MG/DL

## 2021-07-13 PROCEDURE — 82306 VITAMIN D 25 HYDROXY: CPT | Performed by: NURSE PRACTITIONER

## 2021-07-13 PROCEDURE — 99214 OFFICE O/P EST MOD 30 MIN: CPT | Performed by: NURSE PRACTITIONER

## 2021-07-13 PROCEDURE — 83516 IMMUNOASSAY NONANTIBODY: CPT | Performed by: NURSE PRACTITIONER

## 2021-07-13 PROCEDURE — 80048 BASIC METABOLIC PNL TOTAL CA: CPT | Performed by: NURSE PRACTITIONER

## 2021-07-13 PROCEDURE — 36415 COLL VENOUS BLD VENIPUNCTURE: CPT | Performed by: NURSE PRACTITIONER

## 2021-07-13 PROCEDURE — 80061 LIPID PANEL: CPT | Performed by: NURSE PRACTITIONER

## 2021-07-13 RX ORDER — POTASSIUM CHLORIDE 1500 MG/1
20 TABLET, EXTENDED RELEASE ORAL DAILY
Qty: 90 TABLET | Refills: 1 | Status: SHIPPED | OUTPATIENT
Start: 2021-07-13 | End: 2022-01-17

## 2021-07-13 ASSESSMENT — ENCOUNTER SYMPTOMS
HEMATURIA: 0
NAUSEA: 0
SHORTNESS OF BREATH: 0
JOINT SWELLING: 0
FEVER: 0
CHILLS: 0
DIZZINESS: 0
PARESTHESIAS: 0
CONSTIPATION: 0
ARTHRALGIAS: 1
COUGH: 0
DIARRHEA: 1
ABDOMINAL PAIN: 0
HEARTBURN: 0
PALPITATIONS: 0
SORE THROAT: 0
EYE PAIN: 0
HEADACHES: 0
WEAKNESS: 0
NERVOUS/ANXIOUS: 0
MYALGIAS: 1
HEMATOCHEZIA: 0
FREQUENCY: 1
DYSURIA: 0

## 2021-07-13 ASSESSMENT — MIFFLIN-ST. JEOR: SCORE: 1139.11

## 2021-07-13 ASSESSMENT — ACTIVITIES OF DAILY LIVING (ADL): CURRENT_FUNCTION: HOUSEWORK REQUIRES ASSISTANCE

## 2021-07-13 NOTE — PATIENT INSTRUCTIONS
Will be notified of pending labs.  Conitnue with watching your diet and avoiding foods that irriatate your colon.      Our Clinic hours are:  Mondays    7:20 am - 7 pm  Tues -  Fri  7:20 am - 5 pm    Clinic Phone: [phone]    The clinic lab opens at 7:30 am Mon - Fri and appointments are required.    [facility]. [phone]  Monday  8 am - 7pm  Tues - Fri 8 am - 5:30 pm

## 2021-07-13 NOTE — PROGRESS NOTES
"    Assessment & Plan     Essential hypertension    - potassium chloride ER (K-TAB) 20 MEQ CR tablet; Take 1 tablet (20 mEq) by mouth daily  - Basic metabolic panel  (Ca, Cl, CO2, Creat, Gluc, K, Na, BUN); Future  - Basic metabolic panel  (Ca, Cl, CO2, Creat, Gluc, K, Na, BUN)  Controlled, continue to monitor and take current medications.    Screening for lipoid disorders    - Lipid panel reflex to direct LDL Fasting; Future  - Lipid panel reflex to direct LDL Fasting  She would like to know how cholesterol numbers are doing but would refuse restaring a statin.    Pain of left lower leg    Discussed arthritic and circulatory changes with age as likely contributing factor. Will await vitamin D.      Vitamin D deficiency, unspecified     - Vitamin D Deficiency; Future  - Vitamin D Deficiency    Irritable bowel syndrome with diarrhea    - Tissue transglutaminase maynor IgA and IgG; Future  - Tissue transglutaminase maynor IgA and IgG  This has been a chronic problem since adolescence. Continue with dietary investigations and will check for celiac although low likelihood. Discussed it's not routine to do colonoscopy at her age.             BMI:   Estimated body mass index is 26.81 kg/m  as calculated from the following:    Height as of this encounter: 1.619 m (5' 3.75\").    Weight as of this encounter: 70.3 kg (155 lb).       See Patient Instructions  Patient Instructions   Will be notified of pending labs.  Conitnue with watching your diet and avoiding foods that irriatate your colon.      Our Clinic hours are:  Mondays    7:20 am - 7 pm  Tues -  Fri  7:20 am - 5 pm    Clinic Phone: [phone]    The clinic lab opens at 7:30 am Mon - Fri and appointments are required.    [facility] [place]  Ph. [phone]  Monday  8 am - 7pm  Tues - Fri 8 am - 5:30 pm             Return in about 2 weeks (around 7/27/2021) for or sooner if symptoms persist or worsen, Physical Exam.    Cindy Flor, SUDHAKAR [facility] " "[facility]   Caitlyn is a 85 year old who presents for the following health issues  accompanied by her self :    HPI     Chief Complaint   Patient presents with     Bowel Problems     patient would like to discuss IBS - also patient would like her Vitamin D checked and labs for PE       Constipation  Onset/Duration: on and off for many years   Description:  Frequency of bowel movements: on a daily basis  Consistency of stool: soft  Progression of Symptoms: intermittent  Accompanying signs and symptoms:    Abdominal pain: YES   Rectal pain: no   Blood in stool: no   Nausea/Vomiting: YES   Weight loss or gain: no  History:   Similar problems in past: YES  History of abdominal surgery: no  Chronic laxative use: no  New medications: no  Precipitating or alleviating factors: none  Therapies tried and outcome: None    She states she has diarrhea not constipation for \"years\" since she was a teenager. She knows certain foods such as DQ ice cream trigger it.  No pain, mucous or blood in stool.  Would like vitamin D level checked as her  recently started that and \"is a new person\"    Past Medical History:   Diagnosis Date     COPD (chronic obstructive pulmonary disease) (H)      Migraine, unspecified, without mention of intractable migraine without mention of status migrainosus      Other urinary incontinence      Pure hypercholesterolemia      Unspecified essential hypertension      Current Outpatient Medications   Medication     acetaminophen (TYLENOL) 325 MG tablet     acetaminophen-caffeine (EXCEDRIN TENSION HEADACHE) 500-65 MG TABS     atenolol (TENORMIN) 25 MG tablet     chlorthalidone (HYGROTON) 25 MG tablet     ipratropium (ATROVENT) 0.03 % nasal spray     potassium chloride ER (K-TAB) 20 MEQ CR tablet     psyllium (METAMUCIL) 28.3 % POWD     aspirin (ASA) 325 MG EC tablet     No current facility-administered medications for this visit.       Review of Systems   Constitutional, " "HEENT, cardiovascular, pulmonary, gi and gu systems are negative, except as otherwise noted.      Objective    /60   Pulse (!) 49   Temp 98.3  F (36.8  C)   Resp 16   Ht 1.619 m (5' 3.75\")   Wt 70.3 kg (155 lb)   LMP  (LMP Unknown)   SpO2 99%   BMI 26.81 kg/m    Body mass index is 26.81 kg/m .  Physical Exam   GENERAL: healthy, alert and no distress  NECK: no adenopathy, no asymmetry  RESP: lungs clear to auscultation - no rales, rhonchi or wheezes  CV: regular rate and rhythm, normal S1 S2, no S3 or S4, no murmur  ABDOMEN: soft, nontender, no hepatosplenomegaly, no masses and bowel sounds normal  MS: no gross musculoskeletal defects noted      No results found for this or any previous visit (from the past 24 hour(s)).            "

## 2021-07-14 LAB
DEPRECATED CALCIDIOL+CALCIFEROL SERPL-MC: 15 UG/L (ref 20–75)
TTG IGA SER-ACNC: 0.3 U/ML
TTG IGG SER-ACNC: <0.6 U/ML

## 2021-07-14 RX ORDER — ERGOCALCIFEROL 1.25 MG/1
50000 CAPSULE, LIQUID FILLED ORAL WEEKLY
Qty: 12 CAPSULE | Refills: 0 | Status: SHIPPED | OUTPATIENT
Start: 2021-07-14 | End: 2021-07-16

## 2021-07-15 ENCOUNTER — TELEPHONE (OUTPATIENT)
Dept: FAMILY MEDICINE | Facility: CLINIC | Age: 86
End: 2021-07-15

## 2021-07-15 DIAGNOSIS — E55.9 VITAMIN D DEFICIENCY, UNSPECIFIED: ICD-10-CM

## 2021-07-15 RX ORDER — ERGOCALCIFEROL 1.25 MG/1
50000 CAPSULE, LIQUID FILLED ORAL WEEKLY
Qty: 12 CAPSULE | Refills: 0 | Status: CANCELLED | OUTPATIENT
Start: 2021-07-15

## 2021-07-15 NOTE — TELEPHONE ENCOUNTER
Patient was seen in clinic 7/13/21.  Do you want this sent to the prior auth pool? Routed to provider.  Jessica Garcias RN

## 2021-07-15 NOTE — TELEPHONE ENCOUNTER
Patient called stating that her rx for vitamin d2  For express script requires a PA, she prefers to use local [facility] to pick it [facility].   Seen yesterday.     [place]

## 2021-07-16 RX ORDER — ERGOCALCIFEROL 1.25 MG/1
50000 CAPSULE, LIQUID FILLED ORAL WEEKLY
Qty: 12 CAPSULE | Refills: 0 | Status: SHIPPED | OUTPATIENT
Start: 2021-07-16 | End: 2023-05-05

## 2021-07-16 NOTE — TELEPHONE ENCOUNTER
Prior Authorization Retail Medication Request    Medication/Dose: Vitamin D2  ICD code (if different than what is on RX):    Previously Tried and Failed:    Rationale:      Insurance Name:  [facility]  Insurance ID:  [id number]      Pharmacy Information (if different than what is on RX)  Name:    Phone:

## 2021-07-16 NOTE — TELEPHONE ENCOUNTER
Central Prior Authorization Team   Phone: [phone]    PA Initiation    Medication: vitamin D2  Insurance Company: [employer] - Phone [phone] Fax [phone]  Pharmacy Filling the Rx: [facility] - [address]  Filling Pharmacy Phone: [phone]  Filling Pharmacy Fax: [phone]  Start Date: 7/16/2021

## 2021-07-16 NOTE — TELEPHONE ENCOUNTER
Patient calling in again, wants this sent to local pharmacy chosen, pt does not care what this costs, just wants it sent to [facility], no need to call pt unless there are questions.

## 2021-07-27 ENCOUNTER — OFFICE VISIT (OUTPATIENT)
Dept: FAMILY MEDICINE | Facility: CLINIC | Age: 86
End: 2021-07-27
Payer: COMMERCIAL

## 2021-07-27 VITALS
HEART RATE: 56 BPM | SYSTOLIC BLOOD PRESSURE: 122 MMHG | HEIGHT: 64 IN | WEIGHT: 154 LBS | RESPIRATION RATE: 16 BRPM | TEMPERATURE: 98.6 F | OXYGEN SATURATION: 98 % | BODY MASS INDEX: 26.29 KG/M2 | DIASTOLIC BLOOD PRESSURE: 60 MMHG

## 2021-07-27 DIAGNOSIS — R35.0 URINARY FREQUENCY: ICD-10-CM

## 2021-07-27 DIAGNOSIS — N81.10 VAGINAL PROLAPSE: ICD-10-CM

## 2021-07-27 DIAGNOSIS — E78.5 HYPERLIPIDEMIA LDL GOAL <130: ICD-10-CM

## 2021-07-27 DIAGNOSIS — Z00.00 ENCOUNTER FOR MEDICARE ANNUAL WELLNESS EXAM: Primary | ICD-10-CM

## 2021-07-27 DIAGNOSIS — I10 ESSENTIAL HYPERTENSION: ICD-10-CM

## 2021-07-27 PROCEDURE — 99397 PER PM REEVAL EST PAT 65+ YR: CPT | Performed by: NURSE PRACTITIONER

## 2021-07-27 PROCEDURE — 99214 OFFICE O/P EST MOD 30 MIN: CPT | Mod: 25 | Performed by: NURSE PRACTITIONER

## 2021-07-27 ASSESSMENT — MIFFLIN-ST. JEOR: SCORE: 1134.57

## 2021-07-27 NOTE — PATIENT INSTRUCTIONS
Will let you know about urine test, follow up with Dr. Cee.  Can get shingles vaccination at pharmacy.    Patient Education   Personalized Prevention Plan  You are due for the preventive services outlined below.  Your care team is available to assist you in scheduling these services.  If you have already completed any of these items, please share that information with your care team to update in your medical record.  Health Maintenance Due   Topic Date Due     Zoster (Shingles) Vaccine (2 of 3) 11/05/2009     Osteoporosis Screening  12/17/2016     Annual Wellness Visit  07/21/2021

## 2021-07-27 NOTE — PROGRESS NOTES
"  SUBJECTIVE:   Caitlyn Wasserman is a 85 year old female who presents for Preventive Visit.      Patient has been advised of split billing requirements and indicates understanding: Yes  Are you in the first 12 months of your Medicare Part B coverage?  No    Physical Health:    In general, how would you rate your overall physical health? good    Outside of work, how many days during the week do you exercise? none    Outside of work, approximately how many minutes a day do you exercise?not applicable    If you drink alcohol do you typically have >3 drinks per day or >7 drinks per week? No    Do you usually eat at least 4 servings of fruit and vegetables a day, include whole grains & fiber and avoid regularly eating high fat or \"junk\" foods? Yes    Do you have any problems taking medications regularly?  No    Do you have any side effects from medications? none    Needs assistance for the following daily activities: no assistance needed    Which of the following safety concerns are present in your home?  none identified     Hearing impairment: No    In the past 6 months, have you been bothered by leaking of urine? yes    Mental Health:    In general, how would you rate your overall mental or emotional health? excellent  PHQ-2 Score:  0    Do you feel safe in your environment? Yes    Have you ever done Advance Care Planning? (For example, a Health Directive, POLST, or a discussion with a medical provider or your loved ones about your wishes): No, advance care planning information given to patient to review.  Advanced care planning was discussed at today's visit.    Additional concerns to address?  YES - urine frequency and pressure   She had similar symptoms recently and was treated with antibiotic; UC was negative.  She has had an anterior cystocele repair in the past and did see Dr. Cee in 2020 for these concerns. She has tried pessary and was told surgery was likely next option then COVID happened. She is not too " excited about having to have another surgery.    Fall risk:  Fallen 2 or more times in the past year?: No  Any fall with injury in the past year?: No    Cognitive Screenin) Repeat 3 items (Leader, Season, Table)    2) Clock draw: NORMAL  3) 3 item recall: Recalls 3 objects  Results: 3 items recalled: COGNITIVE IMPAIRMENT LESS LIKELY    Mini-CogTM Copyright MARIBEL Rodríguez. Licensed by the author for use in [facility]; reprinted with permission (soob@[facility]). All rights reserved.      Do you have sleep apnea, excessive snoring or daytime drowsiness?: no        URINARY TRACT SYMPTOMS  Onset: on and off for years     Description:   Painful urination (Dysuria): no            Frequency: YES  Blood in urine (Hematuria): no   Delay in urine (Hesitency): YES    Intensity: severe    Progression of Symptoms:  same    Accompanying Signs & Symptoms:  Fever/chills: no   Flank pain no   Nausea and vomiting: no   Any vaginal symptoms: none  Abdominal/Pelvic Pain: no     History:   History of frequent UTI's: YES  History of kidney stones: no   Sexually Active: no   Possibility of pregnancy: No    Precipitating factors:   none    Therapies Tried and outcome: 3 weeks ago she was treated for UTI    Reviewed and updated as needed this visit by clinical staff  Tobacco  Allergies  Meds   Med Hx  Surg Hx  Fam Hx  Soc Hx        Reviewed and updated as needed this visit by Provider                Social History     Tobacco Use     Smoking status: Former Smoker     Packs/day: 1.00     Years: 20.00     Pack years: 20.00     Types: Cigarettes     Quit date: 1971     Years since quittin.6     Smokeless tobacco: Never Used   Substance Use Topics     Alcohol use: Yes     Comment: 1 wine a month maybe                           Current providers sharing in care for this patient include:   Patient Care Team:  Cindy Flor APRN CNP as PCP - General (Nurse Practitioner - Family)  Ashkan Shah MD as MD  (Orthopaedic Surgery)  Nieves Cee MD as MD (OB/Gyn)  Cindy Flor APRN CNP as Assigned PCP    The following health maintenance items are reviewed in Epic and correct as of today:  Health Maintenance   Topic Date Due     ZOSTER IMMUNIZATION (2 of 3) 11/05/2009     DEXA  12/17/2016     INFLUENZA VACCINE (1) 09/01/2021     ANNUAL REVIEW OF HM ORDERS  06/02/2022     BMP  07/13/2022     FALL RISK ASSESSMENT  07/13/2022     MEDICARE ANNUAL WELLNESS VISIT  07/27/2022     ADVANCE CARE PLANNING  07/27/2026     DTAP/TDAP/TD IMMUNIZATION (3 - Td or Tdap) 05/07/2029     SPIROMETRY  Completed     COPD ACTION PLAN  Completed     PHQ-2  Completed     Pneumococcal Vaccine: 65+ Years  Completed     COVID-19 Vaccine  Completed     IPV IMMUNIZATION  Aged Out     MENINGITIS IMMUNIZATION  Aged Out     HEPATITIS B IMMUNIZATION  Aged Out     BP Readings from Last 3 Encounters:   07/27/21 122/60   07/13/21 138/60   07/02/21 (!) 146/70    Wt Readings from Last 3 Encounters:   07/27/21 69.9 kg (154 lb)   07/13/21 70.3 kg (155 lb)   07/02/21 70.5 kg (155 lb 8 oz)                  Patient Active Problem List   Diagnosis     Urinary frequency     LEFT HIP PAIN     Right shoulder pain     Numbness in right leg     Cataract     Recurrent UTI     HYPERLIPIDEMIA LDL GOAL <130     Kyphosis of cervical region     Neck pain, chronic     Health Care Home     Essential tremor     Pedal edema     Osteoarthritis, right knee     Chronic rhinitis     Essential hypertension     Dysuria     Chronic obstructive pulmonary disease, unspecified COPD type (H)     Osteoarthritis of both hands     Vaginal prolapse     Hip arthrosis     S/P total hip arthroplasty     Past Surgical History:   Procedure Laterality Date     ARTHROPLASTY HIP Right 9/28/2020    Procedure: TOTAL Hip Arthroplasty;  Surgeon: Ashkan Shah MD;  Location: [facility] OR     AS TOTAL KNEE ARTHROPLASTY Right 2011     HC ANTER COLPORRHAPHY,BLAD/VAGINA  6/04    Cystocele  Repair,rectocele repair.     JOINT REPLACEMENT, HIP RT/LT  3/07,     Joint Replacement Hip /LT- dislocated in front repeat surgery 1 week later       Social History     Tobacco Use     Smoking status: Former Smoker     Packs/day: 1.00     Years: 20.00     Pack years: 20.00     Types: Cigarettes     Quit date: 1971     Years since quittin.6     Smokeless tobacco: Never Used   Substance Use Topics     Alcohol use: Yes     Comment: 1 wine a month maybe     Family History   Problem Relation Age of Onset     Cancer Mother         Bladder     Neurologic Disorder Mother         heriditary tremor     C.A.D. Father      Cerebrovascular Disease Father      Allergies Maternal Grandfather      Respiratory Maternal Grandfather         Asthma     Diabetes Paternal Grandmother         Type II     Cancer Brother         Multiple myloma     C.A.D. Brother      Cancer Sister         kidney cancer     Neurofibromatosis Son      Breast Cancer No family hx of          Current Outpatient Medications   Medication Sig Dispense Refill     acetaminophen (TYLENOL) 325 MG tablet Take 3 tablets (975 mg) by mouth every 8 hours       acetaminophen-caffeine (EXCEDRIN TENSION HEADACHE) 500-65 MG TABS Take 2 tablets by mouth every 6 hours as needed for mild pain       atenolol (TENORMIN) 25 MG tablet TAKE 1 TABLET DAILY 90 tablet 2     chlorthalidone (HYGROTON) 25 MG tablet TAKE 2 TABLETS DAILY (NEED LABS BEFORE NEXT REFILL) 180 tablet 3     ipratropium (ATROVENT) 0.03 % nasal spray Spray 2 sprays in nostril daily as needed for rhinitis 2 Box 3     potassium chloride ER (K-TAB) 20 MEQ CR tablet Take 1 tablet (20 mEq) by mouth daily 90 tablet 1     psyllium (METAMUCIL) 28.3 % POWD Take 1 teaspoonful by mouth daily 2 times daily       vitamin D2 (ERGOCALCIFEROL) 31140 units (1250 mcg) capsule Take 1 capsule (50,000 Units) by mouth once a week 12 capsule 0     Allergies   Allergen Reactions     Codeine Nausea and Vomiting     Vomited 1.5  "days     Cortisone Other (See Comments)     Couldn't walk well even after 2 weeks     Diclofenac Sodium Other (See Comments)     Took one tab in 2008, no reaction in note     Hydrocodone Nausea and Vomiting     Oxycodone Nausea and Vomiting     Tramadol Nausea and Vomiting     Recent Labs   Lab Test 07/13/21  0932 05/04/21  0846 04/19/21  1707 02/15/18  0928 11/08/16  1334   *  --   --  154*  --    HDL 71  --   --  62  --    TRIG 105  --   --  98  --    ALT  --   --   --  23  --    CR 0.86 0.80 0.86 0.88 0.93   GFRESTIMATED 63 68 62 61 58*   GFRESTBLACK  --  79 72 74 70   POTASSIUM 3.4 3.7 3.2* 4.0 4.0   TSH  --   --   --   --  1.83          ROS:  Constitutional, HEENT, cardiovascular, pulmonary, gi and gu systems are negative, except as otherwise noted.    OBJECTIVE:   /60   Pulse 56   Temp 98.6  F (37  C)   Resp 16   Ht 1.619 m (5' 3.75\")   Wt 69.9 kg (154 lb)   LMP  (LMP Unknown)   SpO2 98%   BMI 26.64 kg/m   Estimated body mass index is 26.64 kg/m  as calculated from the following:    Height as of this encounter: 1.619 m (5' 3.75\").    Weight as of this encounter: 69.9 kg (154 lb).  EXAM:   GENERAL: healthy, alert and no distress  EYES: Eyes grossly normal to inspection and conjunctivae and sclerae normal  HENT: ear canals and TM's normal, nose and mouth without ulcers or lesions  NECK: no adenopathy, no asymmetry, masses, or scars and thyroid normal to palpation  RESP: lungs clear to auscultation - no rales, rhonchi or wheezes  CV: regular rate and rhythm, normal S1 S2, no S3 or S4, no murmur, click or rub, trace ankle edema and peripheral pulses strong  ABDOMEN: soft, nontender, no hepatosplenomegaly, no masses and bowel sounds normal  MS: no gross musculoskeletal defects noted, no edema  SKIN: no suspicious lesions or rashes  PSYCH: mentation appears normal, affect normal/bright    Diagnostic Test Results:  Labs reviewed in Epic  No results found for this or any previous visit (from the " "past 24 hour(s)).    ASSESSMENT / PLAN:   1. Encounter for Medicare annual wellness exam      2. Urinary frequency    - UA macro with reflex to Microscopic and Culture - Clinc Collect  Reassurance provided, she did not have an infection previously and likely does not currently have one. Her symptoms are from vaginal prolapse and she will follow through with ob/gyn.  She would like to repeat UA today \"to make sure\"    3. Hyperlipidemia LDL goal <130      4. Essential hypertension    Controlled, continue same medications and monitoring.    5. Vaginal prolapse    She needs to follow up with Dr. Cee.       Patient Instructions     Will let you know about urine test, follow up with Dr. Cee.  Can get shingles vaccination at pharmacy.    Patient Education   Personalized Prevention Plan  You are due for the preventive services outlined below.  Your care team is available to assist you in scheduling these services.  If you have already completed any of these items, please share that information with your care team to update in your medical record.  Health Maintenance Due   Topic Date Due     Zoster (Shingles) Vaccine (2 of 3) 11/05/2009     Osteoporosis Screening  12/17/2016     Annual Wellness Visit  07/21/2021            COUNSELING:  Reviewed preventive health counseling, as reflected in patient instructions       Regular exercise       Healthy diet/nutrition    Estimated body mass index is 26.64 kg/m  as calculated from the following:    Height as of this encounter: 1.619 m (5' 3.75\").    Weight as of this encounter: 69.9 kg (154 lb).        She reports that she quit smoking about 50 years ago. Her smoking use included cigarettes. She has a 20.00 pack-year smoking history. She has never used smokeless tobacco.    Appropriate preventive services were discussed with this patient, including applicable screening as appropriate for cardiovascular disease, diabetes, osteopenia/osteoporosis, and glaucoma.  As appropriate " for age/gender, discussed screening for colorectal cancer, prostate cancer, breast cancer, and cervical cancer. Checklist reviewing preventive services available has been given to the patient.    Reviewed patients plan of care and provided an AVS. The Basic Care Plan (routine screening as documented in Health Maintenance) for Caitlyn meets the Care Plan requirement. This Care Plan has been established and reviewed with the Patient.    Counseling Resources:  ATP IV Guidelines  Pooled Cohorts Equation Calculator  Breast Cancer Risk Calculator  BRCA-Related Cancer Risk Assessment: FHS-7 Tool  FRAX Risk Assessment  ICSI Preventive Guidelines  Dietary Guidelines for Americans, 2010  USDA's MyPlate  ASA Prophylaxis  Lung CA Screening    SUDHAKAR [facility]

## 2021-08-25 NOTE — TELEPHONE ENCOUNTER
PRIOR AUTHORIZATION DENIED    Medication: vitamin D2-DENIED    Denial Date: 7/28/2021    Denial Rational: MEDICATION IS EXCLUDED FROM COVERAGE.  ALTERNATIVES NOT GIVEN ON EXCLUDED MEDICATIONS.        Appeal Information: N/A

## 2021-09-14 ENCOUNTER — IMMUNIZATION (OUTPATIENT)
Dept: FAMILY MEDICINE | Facility: CLINIC | Age: 86
End: 2021-09-14
Payer: COMMERCIAL

## 2021-09-14 DIAGNOSIS — Z23 NEED FOR PROPHYLACTIC VACCINATION AND INOCULATION AGAINST INFLUENZA: Primary | ICD-10-CM

## 2021-09-14 PROCEDURE — G0008 ADMIN INFLUENZA VIRUS VAC: HCPCS

## 2021-09-14 PROCEDURE — 99207 PR NO CHARGE NURSE ONLY: CPT

## 2021-09-14 PROCEDURE — 90662 IIV NO PRSV INCREASED AG IM: CPT

## 2021-10-13 ENCOUNTER — LAB (OUTPATIENT)
Dept: LAB | Facility: CLINIC | Age: 86
End: 2021-10-13
Payer: COMMERCIAL

## 2021-10-13 DIAGNOSIS — E55.9 VITAMIN D DEFICIENCY, UNSPECIFIED: ICD-10-CM

## 2021-10-13 LAB — DEPRECATED CALCIDIOL+CALCIFEROL SERPL-MC: 27 UG/L (ref 20–75)

## 2021-10-13 PROCEDURE — 36415 COLL VENOUS BLD VENIPUNCTURE: CPT

## 2021-10-13 PROCEDURE — 82306 VITAMIN D 25 HYDROXY: CPT

## 2021-10-20 ENCOUNTER — TELEPHONE (OUTPATIENT)
Dept: FAMILY MEDICINE | Facility: CLINIC | Age: 86
End: 2021-10-20

## 2021-10-20 NOTE — TELEPHONE ENCOUNTER
Scheduled appt for 10/22/21 to discuss urgency frequency and to clarify how much Vit D to take OTC.  IsaacRetienne

## 2021-10-20 NOTE — TELEPHONE ENCOUNTER
Reason for call:  Patient reporting a symptom    Symptom or request: Pt is calling for 2 issues today:  1.  Pt rec'd normal Vit D level results and wants to know if she should be on an OTC dose of Vit D now dialy?    2.  Pt is having an issue with urination and wonders if it could be from her prolapsed bladder?    Please call patient and advise.      Duration (how long have symptoms been present): ongoing    Have you been treated for this before? Yes    Additional comments:     Phone Number patient can be reached at:  Home number on file [phone] (home)    Best Time:  any    Can we leave a detailed message on this number:  YES    Call taken on 10/20/2021 at 3:23 PM by Becky Boyd

## 2021-10-22 ENCOUNTER — OFFICE VISIT (OUTPATIENT)
Dept: FAMILY MEDICINE | Facility: CLINIC | Age: 86
End: 2021-10-22
Payer: COMMERCIAL

## 2021-10-22 VITALS
BODY MASS INDEX: 26.7 KG/M2 | TEMPERATURE: 97.5 F | WEIGHT: 156.4 LBS | HEIGHT: 64 IN | OXYGEN SATURATION: 98 % | SYSTOLIC BLOOD PRESSURE: 136 MMHG | RESPIRATION RATE: 16 BRPM | HEART RATE: 58 BPM | DIASTOLIC BLOOD PRESSURE: 72 MMHG

## 2021-10-22 DIAGNOSIS — Z91.81 AT RISK FOR INJURY RELATED TO FALL: ICD-10-CM

## 2021-10-22 DIAGNOSIS — M17.0 PRIMARY OSTEOARTHRITIS OF BOTH KNEES: ICD-10-CM

## 2021-10-22 DIAGNOSIS — N30.00 ACUTE CYSTITIS WITHOUT HEMATURIA: ICD-10-CM

## 2021-10-22 DIAGNOSIS — N39.0 RECURRENT UTI: Primary | ICD-10-CM

## 2021-10-22 DIAGNOSIS — R26.9 ABNORMAL GAIT: ICD-10-CM

## 2021-10-22 DIAGNOSIS — R35.0 URINARY FREQUENCY: ICD-10-CM

## 2021-10-22 DIAGNOSIS — M16.9 HIP ARTHROSIS: ICD-10-CM

## 2021-10-22 LAB
ALBUMIN UR-MCNC: NEGATIVE MG/DL
APPEARANCE UR: ABNORMAL
BACTERIA #/AREA URNS HPF: ABNORMAL /HPF
BILIRUB UR QL STRIP: NEGATIVE
COLOR UR AUTO: YELLOW
GLUCOSE UR STRIP-MCNC: NEGATIVE MG/DL
HGB UR QL STRIP: ABNORMAL
KETONES UR STRIP-MCNC: NEGATIVE MG/DL
LEUKOCYTE ESTERASE UR QL STRIP: ABNORMAL
NITRATE UR QL: POSITIVE
PH UR STRIP: 7 [PH] (ref 5–7)
RBC #/AREA URNS AUTO: ABNORMAL /HPF
SP GR UR STRIP: 1.01 (ref 1–1.03)
UROBILINOGEN UR STRIP-ACNC: 0.2 E.U./DL
WBC #/AREA URNS AUTO: >100 /HPF

## 2021-10-22 PROCEDURE — 87086 URINE CULTURE/COLONY COUNT: CPT | Performed by: FAMILY MEDICINE

## 2021-10-22 PROCEDURE — 81001 URINALYSIS AUTO W/SCOPE: CPT | Performed by: FAMILY MEDICINE

## 2021-10-22 PROCEDURE — 99214 OFFICE O/P EST MOD 30 MIN: CPT | Performed by: FAMILY MEDICINE

## 2021-10-22 RX ORDER — NITROFURANTOIN 25; 75 MG/1; MG/1
100 CAPSULE ORAL 2 TIMES DAILY
Qty: 14 CAPSULE | Refills: 0 | Status: SHIPPED | OUTPATIENT
Start: 2021-10-22 | End: 2021-10-29

## 2021-10-22 RX ORDER — NITROFURANTOIN MACROCRYSTALS 50 MG/1
50 CAPSULE ORAL DAILY
Qty: 90 CAPSULE | Refills: 3 | Status: SHIPPED | OUTPATIENT
Start: 2021-10-22 | End: 2023-05-05

## 2021-10-22 ASSESSMENT — MIFFLIN-ST. JEOR: SCORE: 1140.46

## 2021-10-22 NOTE — PROGRESS NOTES
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 Elects not to have surgical intervention at this time due to uncertain outcomes. Shared decisionmaking that she wanted to use prophylactic dosing due to frequent urination  - nitroFURantoin macrocrystal (MACRODANTIN) 50 MG capsule  Dispense: 90 capsule; Refill: 3    Abnormal gait and risk for falls  Osteoarthritis of knees and hips b/l  Has had a lot of pain on repeated attempts at PT and pool exercising so now avoids, having worsening shuffling. Reviewed get-up-and-go exercises and trying chair-sittin exercises at local center she has expressed interest in attending, reviewed that fall risk is worse if overlying cautious. Pt requests handicap placard which I said should be appropriate for at least a year, try exercises, can re-eval for longer prn    Return if symptoms worsen or fail to improve.    Ale Valdez MD  [facility]   Caitlyn is a 86 year old who presents for the following health issues     HPI     Genitourinary - Female  Onset/Duration: 1 week  Description:   Painful urination (Dysuria): YES- with initial flow            Frequency: YES, urgency   Blood in urine (Hematuria): no  Delay in urine (Hesitency): YES  Intensity: moderate  Progression of Symptoms:  same  Accompanying Signs & Symptoms:  Fever/chills: no  Flank pain: no  Nausea and vomiting: no  Vaginal symptoms: none  Abdominal/Pelvic Pain: no  History:   History of frequent UTI s: YES  History of kidney stones: no  Sexually Active: no  Possibility of pregnancy: No  Precipitating or  "alleviating factors: none   Therapies tried and outcome:  none      Sometimes she has a hard time telling if she has a UTI at the time or not because of bladder prolapse  Has already tried a lot of strategies and they were even considering surgical intervention but then covid happened and 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      Objective    /72   Pulse 58   Temp 97.5  F (36.4  C) (Tympanic)   Resp 16   Ht 1.619 m (5' 3.75\")   Wt 70.9 kg (156 lb 6.4 oz)   LMP  (LMP Unknown)   SpO2 98%   BMI 27.06 kg/m    Body mass index is 27.06 kg/m .  Physical Exam   GENERAL: healthy, alert and no distress  RESP: normal respiratory effort, speaking in complete sentences  ABD: soft, non-tender. Mild pressure suprapubic but not pain  MS: no gross musculoskeletal defects noted, no edema  PSYCH: mentation appears normal, affect normal/bright            "

## 2021-10-23 LAB — BACTERIA UR CULT: NORMAL

## 2021-11-01 ENCOUNTER — TELEPHONE (OUTPATIENT)
Dept: FAMILY MEDICINE | Facility: CLINIC | Age: 86
End: 2021-11-01

## 2021-11-01 NOTE — TELEPHONE ENCOUNTER
Reason for Call:  Form, our goal is to have forms completed with 72 hours, however, some forms may require a visit or additional information.    Type of letter, form or note:  handicap    Who is the form from?: Patient    Where did the form come from: Patient or family brought in       What clinic location was the form placed at?: [facility]    Where the form was placed: Given to physician    What number is listed as a contact on the form?: [phone]       Additional comments:     Call taken on 11/1/2021 at 1:18 PM by Becky Boyd

## 2021-12-07 ENCOUNTER — OFFICE VISIT (OUTPATIENT)
Dept: FAMILY MEDICINE | Facility: CLINIC | Age: 86
End: 2021-12-07
Payer: COMMERCIAL

## 2021-12-07 VITALS
DIASTOLIC BLOOD PRESSURE: 78 MMHG | OXYGEN SATURATION: 97 % | TEMPERATURE: 98.4 F | HEIGHT: 64 IN | HEART RATE: 78 BPM | WEIGHT: 158 LBS | BODY MASS INDEX: 26.98 KG/M2 | RESPIRATION RATE: 16 BRPM | SYSTOLIC BLOOD PRESSURE: 134 MMHG

## 2021-12-07 DIAGNOSIS — R35.0 URINARY FREQUENCY: ICD-10-CM

## 2021-12-07 DIAGNOSIS — N39.0 URINARY TRACT INFECTION WITHOUT HEMATURIA, SITE UNSPECIFIED: Primary | ICD-10-CM

## 2021-12-07 DIAGNOSIS — N39.0 RECURRENT UTI: ICD-10-CM

## 2021-12-07 LAB
ALBUMIN UR-MCNC: ABNORMAL MG/DL
APPEARANCE UR: ABNORMAL
BACTERIA #/AREA URNS HPF: ABNORMAL /HPF
BILIRUB UR QL STRIP: NEGATIVE
COLOR UR AUTO: YELLOW
GLUCOSE UR STRIP-MCNC: NEGATIVE MG/DL
HGB UR QL STRIP: ABNORMAL
KETONES UR STRIP-MCNC: NEGATIVE MG/DL
LEUKOCYTE ESTERASE UR QL STRIP: ABNORMAL
NITRATE UR QL: POSITIVE
PH UR STRIP: 7.5 [PH] (ref 5–7)
RBC #/AREA URNS AUTO: ABNORMAL /HPF
SP GR UR STRIP: 1.01 (ref 1–1.03)
UROBILINOGEN UR STRIP-ACNC: 0.2 E.U./DL
WBC #/AREA URNS AUTO: >100 /HPF

## 2021-12-07 PROCEDURE — 99214 OFFICE O/P EST MOD 30 MIN: CPT | Performed by: NURSE PRACTITIONER

## 2021-12-07 PROCEDURE — 81001 URINALYSIS AUTO W/SCOPE: CPT | Performed by: NURSE PRACTITIONER

## 2021-12-07 PROCEDURE — 87086 URINE CULTURE/COLONY COUNT: CPT | Performed by: NURSE PRACTITIONER

## 2021-12-07 RX ORDER — SULFAMETHOXAZOLE/TRIMETHOPRIM 800-160 MG
1 TABLET ORAL 2 TIMES DAILY
Qty: 14 TABLET | Refills: 0 | Status: SHIPPED | OUTPATIENT
Start: 2021-12-07 | End: 2021-12-07

## 2021-12-07 RX ORDER — SULFAMETHOXAZOLE/TRIMETHOPRIM 800-160 MG
1 TABLET ORAL 2 TIMES DAILY
Qty: 14 TABLET | Refills: 0 | Status: SHIPPED | OUTPATIENT
Start: 2021-12-07 | End: 2022-08-16

## 2021-12-07 ASSESSMENT — MIFFLIN-ST. JEOR: SCORE: 1151.68

## 2021-12-07 NOTE — PROGRESS NOTES
Assessment & Plan     Urinary tract infection without hematuria, site unspecified    - sulfamethoxazole-trimethoprim (BACTRIM DS) 800-160 MG tablet; Take 1 tablet by mouth 2 times daily  Discussed how to take the medication(s), expected outcomes, potential side effects.    Urinary frequency    - UA macro with reflex to Microscopic and Culture - Clinc Collect  - Urine Microscopic Exam  - Urine Culture    Recurrent UTI    - Adult Urology Referral; Future  Will have her evaluated at urology for further evaluation of recurrent symptoms of UTI.             See Patient Instructions  Patient Instructions   Take antibiotic as prescribed, push fluids.  Follow up with urology/gyn for further evaluation.      Our Clinic hours are:  Mondays    7:20 am - 7 pm  Tues -  Fri  7:20 am - 5 pm    Clinic Phone: [phone]    The clinic lab opens at 7:30 am Mon - Fri and appointments are required.    [facility] [place]  Ph. [phone]  Monday  8 am - 7pm  Tues - Fri 8 am - 5:30 pm             Return in about 7 months (around 7/7/2022) for or sooner if symptoms persist or worsen, Med Check, Physical Exam, Lab Work.    SUDHAKAR Yung CNP  [facility]    Werner Brady is a 86 year old who presents for the following health issues  accompanied by her self .    HPI       Chief Complaint   Patient presents with     Urinary Problem     patient would like to discuss next steps of treament for vaginal prolapse.       Genitourinary - Female  Onset/Duration: years on and off   Description:   Painful urination (Dysuria): YES           Frequency: YES  Blood in urine (Hematuria): no  Delay in urine (Hesitency): YES  Intensity: severe  Progression of Symptoms:  worsening  Accompanying Signs & Symptoms:  Fever/chills: no  Flank pain: no  Nausea and vomiting: no  Vaginal symptoms: none  Abdominal/Pelvic Pain: YES  History:   History of frequent UTI s: YES  History of kidney stones: no  Sexually  "Active: no  Possibility of pregnancy: No  Precipitating or alleviating factors: None  Therapies tried and outcome: antibiotics  and Increase fluid intake    She has had recurrent concerns with UTI's. Nitrite positive but UC's come back benign.  She denies fever or hematuria. Feels \"uncomfortable\"    Current Outpatient Medications   Medication     acetaminophen (TYLENOL) 325 MG tablet     acetaminophen-caffeine (EXCEDRIN TENSION HEADACHE) 500-65 MG TABS     atenolol (TENORMIN) 25 MG tablet     chlorthalidone (HYGROTON) 25 MG tablet     ipratropium (ATROVENT) 0.03 % nasal spray     potassium chloride ER (K-TAB) 20 MEQ CR tablet     psyllium (METAMUCIL) 28.3 % POWD     sulfamethoxazole-trimethoprim (BACTRIM DS) 800-160 MG tablet     vitamin D2 (ERGOCALCIFEROL) 88104 units (1250 mcg) capsule     nitroFURantoin macrocrystal (MACRODANTIN) 50 MG capsule     No current facility-administered medications for this visit.     Past Medical History:   Diagnosis Date     COPD (chronic obstructive pulmonary disease) (H)      Migraine, unspecified, without mention of intractable migraine without mention of status migrainosus      Other urinary incontinence      Pure hypercholesterolemia      Unspecified essential hypertension        Review of Systems   Constitutional, HEENT, cardiovascular, pulmonary, gi and gu systems are negative, except as otherwise noted.      Objective    /78   Pulse 78   Temp 98.4  F (36.9  C)   Resp 16   Ht 1.626 m (5' 4\")   Wt 71.7 kg (158 lb)   LMP  (LMP Unknown)   SpO2 97%   BMI 27.12 kg/m    Body mass index is 27.12 kg/m .  Physical Exam   GENERAL: healthy, alert and no distress  NECK: no adenopathy, no asymmetry  RESP: lungs clear to auscultation - no rales, rhonchi or wheezes  CV: regular rate and rhythm, normal S1 S2, no S3 or S4, no murmur  ABDOMEN: soft, nontender, no hepatosplenomegaly, no masses and bowel sounds normal, no CVA tenderness  MS: no gross musculoskeletal defects " noted      Results for orders placed or performed in visit on 12/07/21 (from the past 24 hour(s))   UA macro with reflex to Microscopic and Culture - Clinc Collect    Specimen: Urine, Midstream   Result Value Ref Range    Color Urine Yellow Colorless, Straw, Light Yellow, Yellow    Appearance Urine Cloudy (A) Clear    Glucose Urine Negative Negative mg/dL    Bilirubin Urine Negative Negative    Ketones Urine Negative Negative mg/dL    Specific Gravity Urine 1.015 1.003 - 1.035    Blood Urine Small (A) Negative    pH Urine 7.5 (H) 5.0 - 7.0    Protein Albumin Urine Trace (A) Negative mg/dL    Urobilinogen Urine 0.2 0.2, 1.0 E.U./dL    Nitrite Urine Positive (A) Negative    Leukocyte Esterase Urine Large (A) Negative   Urine Microscopic Exam   Result Value Ref Range    Bacteria Urine Many (A) None Seen /HPF    RBC Urine 2-5 (A) 0-2 /HPF /HPF    WBC Urine >100 (A) 0-5 /HPF /HPF

## 2021-12-07 NOTE — LETTER
December 14, 2021      Caitlyn [place]  [id number] [address] [id number]        Dear ,    We are writing to inform you of your test results.    Test results indicate you may require additional follow up, see comment below.    Resulted Orders   UA macro with reflex to Microscopic and Culture - Clinc Collect   Result Value Ref Range    Color Urine Yellow Colorless, Straw, Light Yellow, Yellow    Appearance Urine Cloudy (A) Clear    Glucose Urine Negative Negative mg/dL    Bilirubin Urine Negative Negative    Ketones Urine Negative Negative mg/dL    Specific Gravity Urine 1.015 1.003 - 1.035    Blood Urine Small (A) Negative    pH Urine 7.5 (H) 5.0 - 7.0    Protein Albumin Urine Trace (A) Negative mg/dL    Urobilinogen Urine 0.2 0.2, 1.0 E.U./dL    Nitrite Urine Positive (A) Negative    Leukocyte Esterase Urine Large (A) Negative   Urine Microscopic Exam   Result Value Ref Range    Bacteria Urine Many (A) None Seen /HPF    RBC Urine 2-5 (A) 0-2 /HPF /HPF    WBC Urine >100 (A) 0-5 /HPF /HPF   Urine Culture   Result Value Ref Range    Culture >100,000 CFU/mL Mixture of urogenital anushka     Narrative    Multiple morphotypes present with no predominant organism.  Growth consistent with probable contamination during collection.  Suggest repeat specimen if clinically indicated.      UC showed likely contamination not an infection.   Finish antibiotics and follow up with urology, seek urgent follow up if worsening symptoms.     If you have any questions or concerns, please call the clinic at the number listed above.       Sincerely,      Cindy Flor, APRN CNP/mh

## 2021-12-07 NOTE — PATIENT INSTRUCTIONS
Take antibiotic as prescribed, push fluids.  Follow up with urology/gyn for further evaluation.      Our Clinic hours are:  Mondays    7:20 am - 7 pm  Tues -  Fri  7:20 am - 5 pm    Clinic Phone: [phone]    The clinic lab opens at 7:30 am Mon - Fri and appointments are required.    [facility] [place]  Ph. [phone]  Monday  8 am - 7pm  Tues - Fri 8 am - 5:30 pm

## 2021-12-08 LAB — BACTERIA UR CULT: NORMAL

## 2022-04-08 ENCOUNTER — IMMUNIZATION (OUTPATIENT)
Dept: FAMILY MEDICINE | Facility: CLINIC | Age: 87
End: 2022-04-08
Payer: COMMERCIAL

## 2022-04-08 DIAGNOSIS — Z23 HIGH PRIORITY FOR 2019-NCOV VACCINE: Primary | ICD-10-CM

## 2022-04-08 PROCEDURE — 0054A COVID-19,PF,PFIZER (12+ YRS): CPT

## 2022-04-08 PROCEDURE — 91305 COVID-19,PF,PFIZER (12+ YRS): CPT

## 2022-06-26 DIAGNOSIS — I10 ESSENTIAL HYPERTENSION: ICD-10-CM

## 2022-06-27 RX ORDER — CHLORTHALIDONE 25 MG/1
TABLET ORAL
Qty: 180 TABLET | Refills: 3 | Status: SHIPPED | OUTPATIENT
Start: 2022-06-27 | End: 2023-06-22

## 2022-06-27 NOTE — TELEPHONE ENCOUNTER
"Do you still need a lab?    Requested Prescriptions   Pending Prescriptions Disp Refills    chlorthalidone (HYGROTON) 25 MG tablet [Pharmacy Med Name: CHLORTHALIDONE TABS 25MG] 180 tablet 3     Sig: TAKE 2 TABLETS DAILY (NEED LABS BEFORE NEXT REFILL)        Diuretics (Including Combos) Protocol Passed - 6/26/2022 11:42 PM        Passed - Blood pressure under 140/90 in past 12 months       BP Readings from Last 3 Encounters:   12/07/21 134/78   10/22/21 136/72   07/27/21 122/60                 Passed - Recent (12 mo) or future (30 days) visit within the authorizing provider's specialty     Patient has had an office visit with the authorizing provider or a provider within the authorizing providers department within the previous 12 mos or has a future within next 30 days. See \"Patient Info\" tab in inbasket, or \"Choose Columns\" in Meds & Orders section of the refill encounter.              Passed - Medication is active on med list        Passed - Patient is age 18 or older        Passed - No active pregancy on record        Passed - Normal serum creatinine on file in past 12 months       Recent Labs   Lab Test 07/13/21  0932   CR 0.86              Passed - Normal serum potassium on file in past 12 months       Recent Labs   Lab Test 07/13/21  0932   POTASSIUM 3.4                    Passed - Normal serum sodium on file in past 12 months       Recent Labs   Lab Test 07/13/21  0932                 Passed - No positive pregnancy test in past 12 months              "

## 2022-08-16 ENCOUNTER — OFFICE VISIT (OUTPATIENT)
Dept: FAMILY MEDICINE | Facility: CLINIC | Age: 87
End: 2022-08-16
Payer: COMMERCIAL

## 2022-08-16 VITALS
OXYGEN SATURATION: 94 % | DIASTOLIC BLOOD PRESSURE: 60 MMHG | WEIGHT: 159 LBS | TEMPERATURE: 98 F | BODY MASS INDEX: 27.14 KG/M2 | HEIGHT: 64 IN | SYSTOLIC BLOOD PRESSURE: 118 MMHG | HEART RATE: 59 BPM

## 2022-08-16 DIAGNOSIS — I10 ESSENTIAL HYPERTENSION: ICD-10-CM

## 2022-08-16 DIAGNOSIS — Z00.00 ENCOUNTER FOR MEDICARE ANNUAL WELLNESS EXAM: Primary | ICD-10-CM

## 2022-08-16 DIAGNOSIS — J44.9 CHRONIC OBSTRUCTIVE PULMONARY DISEASE, UNSPECIFIED COPD TYPE (H): ICD-10-CM

## 2022-08-16 DIAGNOSIS — L98.9 BENIGN SKIN LESION: ICD-10-CM

## 2022-08-16 DIAGNOSIS — M79.672 LEFT FOOT PAIN: ICD-10-CM

## 2022-08-16 LAB
ANION GAP SERPL CALCULATED.3IONS-SCNC: 7 MMOL/L (ref 3–14)
BUN SERPL-MCNC: 15 MG/DL (ref 7–30)
CALCIUM SERPL-MCNC: 9.1 MG/DL (ref 8.5–10.1)
CHLORIDE BLD-SCNC: 102 MMOL/L (ref 94–109)
CO2 SERPL-SCNC: 31 MMOL/L (ref 20–32)
CREAT SERPL-MCNC: 0.9 MG/DL (ref 0.52–1.04)
GFR SERPL CREATININE-BSD FRML MDRD: 63 ML/MIN/1.73M2
GLUCOSE BLD-MCNC: 119 MG/DL (ref 70–99)
POTASSIUM BLD-SCNC: 3.4 MMOL/L (ref 3.4–5.3)
SODIUM SERPL-SCNC: 140 MMOL/L (ref 133–144)

## 2022-08-16 PROCEDURE — 80048 BASIC METABOLIC PNL TOTAL CA: CPT | Performed by: NURSE PRACTITIONER

## 2022-08-16 PROCEDURE — G0439 PPPS, SUBSEQ VISIT: HCPCS | Performed by: NURSE PRACTITIONER

## 2022-08-16 PROCEDURE — 36415 COLL VENOUS BLD VENIPUNCTURE: CPT | Performed by: NURSE PRACTITIONER

## 2022-08-16 PROCEDURE — 99213 OFFICE O/P EST LOW 20 MIN: CPT | Mod: 25 | Performed by: NURSE PRACTITIONER

## 2022-08-16 RX ORDER — MULTIPLE VITAMINS W/ MINERALS TAB 9MG-400MCG
1 TAB ORAL DAILY
COMMUNITY

## 2022-08-16 RX ORDER — ALBUTEROL SULFATE 90 UG/1
2 AEROSOL, METERED RESPIRATORY (INHALATION) EVERY 6 HOURS
Qty: 18 G | Refills: 1 | Status: SHIPPED | OUTPATIENT
Start: 2022-08-16

## 2022-08-16 RX ORDER — CAPSAICIN 0.025 %
CREAM (GRAM) TOPICAL
Qty: 25 G | Refills: 1 | Status: SHIPPED | OUTPATIENT
Start: 2022-08-16 | End: 2023-05-05

## 2022-08-16 ASSESSMENT — PAIN SCALES - GENERAL: PAINLEVEL: NO PAIN (0)

## 2022-08-16 NOTE — PROGRESS NOTES
SUBJECTIVE:   Caitlyn Wasserman is a 86 year old female who presents for Preventive Visit.      Patient has been advised of split billing requirements and indicates understanding: Yes  Are you in the first 12 months of your Medicare coverage?  No    History of Present Illness       Reason for visit:  A raised spot on skin  Symptom onset:  More than a month  Symptom intensity:  Mild  Symptom progression:  Worsening  Had these symptoms before:  No  What makes it worse:  No  What makes it better:  No    She eats 2-3 servings of fruits and vegetables daily.She consumes 0 sweetened beverage(s) daily.She exercises with enough effort to increase her heart rate 9 or less minutes per day.  She exercises with enough effort to increase her heart rate 3 or less days per week.   She is taking medications regularly.      States she's generally been doing well. She reports some increase in her shortness of breath. She has a history of COPD but is not on any inhalers. She thought she was prescribed one many years ago and didn't think it helped. No chest pain or difficulty breathing. No changes in chronic edema. She wears compression stockings.  She has a mole on upper left arm that has changed to being more crusty and brown in color.  Some foot pain noted in left forefoot, she feels it's nerves.            Do you feel safe in your environment? Yes    Have you ever done Advance Care Planning? (For example, a Health Directive, POLST, or a discussion with a medical provider or your loved ones about your wishes): Yes, advance care planning is on file.       Fall risk  Fallen 2 or more times in the past year?: No  Any fall with injury in the past year?: No    Cognitive Screening   1) Repeat 3 items (Leader, Season, Table)    2) Clock draw: NORMAL  3) 3 item recall: Recalls 3 objects  Results: 3 items recalled: COGNITIVE IMPAIRMENT LESS LIKELY    Mini-CogTM Copyright S Anne. Licensed by the author for use in [facility];  reprinted with permission ([email or web address]). All rights reserved.      Do you have sleep apnea, excessive snoring or daytime drowsiness?: no    Reviewed and updated as needed this visit by clinical staff   Tobacco  Allergies  Meds  Problems               Reviewed and updated as needed this visit by Provider                   Social History     Tobacco Use     Smoking status: Former Smoker     Packs/day: 1.00     Years: 20.00     Pack years: 20.00     Types: Cigarettes     Quit date: 1971     Years since quittin.6     Smokeless tobacco: Never Used   Substance Use Topics     Alcohol use: Yes     Comment: 1 wine a month maybe     If you drink alcohol do you typically have >3 drinks per day or >7 drinks per week? No    Alcohol Use 2021   Prescreen: >3 drinks/day or >7 drinks/week? No               Current providers sharing in care for this patient include:   Patient Care Team:  Cindy Flor APRN CNP as PCP - General (Nurse Practitioner - Family)  Ashkan Shah MD as MD (Orthopaedic Surgery)  Nieves Cee MD as MD (OB/Gyn)  Cindy Flor APRN CNP as Assigned PCP    The following health maintenance items are reviewed in Epic and correct as of today:  Health Maintenance Due   Topic Date Due     DEXA  2016     ANNUAL REVIEW OF HM ORDERS  2022     BMP  2022     MEDICARE ANNUAL WELLNESS VISIT  2022     BP Readings from Last 3 Encounters:   22 118/60   21 134/78   10/22/21 136/72    Wt Readings from Last 3 Encounters:   22 72.1 kg (159 lb)   21 71.7 kg (158 lb)   10/22/21 70.9 kg (156 lb 6.4 oz)                  Patient Active Problem List   Diagnosis     Urinary frequency     LEFT HIP PAIN     Right shoulder pain     Numbness in right leg     Cataract     Recurrent UTI     HYPERLIPIDEMIA LDL GOAL <130     Kyphosis of cervical region     Neck pain, chronic     Health Care Home     Essential tremor     Pedal edema     Primary  osteoarthritis of both knees     Chronic rhinitis     Essential hypertension     Dysuria     Chronic obstructive pulmonary disease, unspecified COPD type (H)     Osteoarthritis of both hands     Vaginal prolapse     Hip arthrosis     S/P total hip arthroplasty     Abnormal gait     Past Surgical History:   Procedure Laterality Date     ARTHROPLASTY HIP Right 2020    Procedure: TOTAL Hip Arthroplasty;  Surgeon: Ashkan Shah MD;  Location: [facility] OR     AS TOTAL KNEE ARTHROPLASTY Right      HC ANTER COLPORRHAPHY,BLAD/VAGINA      Cystocele Repair,rectocele repair.     JOINT REPLACEMENT, HIP RT/LT  3/07,     Joint Replacement Hip /LT- dislocated in front repeat surgery 1 week later       Social History     Tobacco Use     Smoking status: Former Smoker     Packs/day: 1.00     Years: 20.00     Pack years: 20.00     Types: Cigarettes     Quit date: 1971     Years since quittin.6     Smokeless tobacco: Never Used   Substance Use Topics     Alcohol use: Yes     Comment: 1 wine a month maybe     Family History   Problem Relation Age of Onset     Cancer Mother         Bladder     Neurologic Disorder Mother         heriditary tremor     C.A.D. Father      Cerebrovascular Disease Father      Allergies Maternal Grandfather      Respiratory Maternal Grandfather         Asthma     Diabetes Paternal Grandmother         Type II     Cancer Brother         Multiple myloma     C.A.D. Brother      Cancer Sister         kidney cancer     Neurofibromatosis Son      Breast Cancer No family hx of          Current Outpatient Medications   Medication Sig Dispense Refill     acetaminophen (TYLENOL) 325 MG tablet Take 3 tablets (975 mg) by mouth every 8 hours       acetaminophen-caffeine (EXCEDRIN TENSION HEADACHE) 500-65 MG TABS Take 2 tablets by mouth every 6 hours as needed for mild pain       albuterol (PROAIR HFA/PROVENTIL HFA/VENTOLIN HFA) 108 (90 Base) MCG/ACT inhaler Inhale 2 puffs into the lungs every 6  hours 18 g 1     atenolol (TENORMIN) 25 MG tablet TAKE 1 TABLET DAILY 90 tablet 3     capsaicin (ZOSTRIX) 0.025 % external cream Apply to foot four times a day as needed. 25 g 1     chlorthalidone (HYGROTON) 25 MG tablet TAKE 2 TABLETS DAILY (NEED LABS BEFORE NEXT REFILL) 180 tablet 3     ipratropium (ATROVENT) 0.03 % nasal spray USE 2 SPRAYS NASALLY DAILY AS NEEDED FOR RHINITIS 60 mL 1     multivitamin w/minerals (MULTI-VITAMIN) tablet Take 1 tablet by mouth daily       potassium chloride ER (K-TAB) 20 MEQ CR tablet TAKE 1 TABLET DAILY 90 tablet 3     psyllium 28.3 % POWD Take 1 teaspoonful by mouth daily 2 times daily       nitroFURantoin macrocrystal (MACRODANTIN) 50 MG capsule Take 1 capsule (50 mg) by mouth daily (Patient not taking: No sig reported) 90 capsule 3     vitamin D2 (ERGOCALCIFEROL) 52852 units (1250 mcg) capsule Take 1 capsule (50,000 Units) by mouth once a week (Patient not taking: Reported on 8/16/2022) 12 capsule 0     Allergies   Allergen Reactions     Codeine Nausea and Vomiting     Vomited 1.5 days     Cortisone Other (See Comments)     Couldn't walk well even after 2 weeks     Diclofenac Sodium Other (See Comments)     Took one tab in 2008, no reaction in note     Hydrocodone Nausea and Vomiting     Oxycodone Nausea and Vomiting     Tramadol Nausea and Vomiting     Recent Labs   Lab Test 07/13/21  0932 05/04/21  0846 04/19/21  1707 07/21/20  1020 02/15/18  0928 01/05/17  0440 11/08/16  1334   *  --   --   --  154*  --   --    HDL 71  --   --   --  62  --   --    TRIG 105  --   --   --  98  --   --    ALT  --   --   --   --  23  --   --    CR 0.86 0.80 0.86   < > 0.88   < > 0.93   GFRESTIMATED 63 68 62   < > 61   < > 58*   GFRESTBLACK  --  79 72   < > 74   < > 70   POTASSIUM 3.4 3.7 3.2*   < > 4.0   < > 4.0   TSH  --   --   --   --   --   --  1.83    < > = values in this interval not displayed.            Pertinent mammograms are reviewed under the imaging tab.    Review of  "Systems  Constitutional, HEENT, cardiovascular, pulmonary, gi and gu systems are negative, except as otherwise noted.    OBJECTIVE:   /60   Pulse 59   Temp 98  F (36.7  C)   Ht 1.613 m (5' 3.5\")   Wt 72.1 kg (159 lb)   LMP  (LMP Unknown)   SpO2 94%   BMI 27.72 kg/m   Estimated body mass index is 27.72 kg/m  as calculated from the following:    Height as of this encounter: 1.613 m (5' 3.5\").    Weight as of this encounter: 72.1 kg (159 lb).  Physical Exam  GENERAL: healthy, alert and no distress  HENT: pharynx without erythema  NECK: no adenopathy, no asymmetry  RESP: lungs clear to auscultation - no rales, rhonchi or wheezes  CV: regular rate and rhythm, normal S1 S2, no S3 or S4, no murmur  ABDOMEN: soft, nontender, no hepatosplenomegaly, no masses and bowel sounds normal  MS: no gross musculoskeletal defects noted, bilateral edema, wearing compression stockings, worse on left, no pain with palpation of left foot, good CMS  SKIN: benign keratosis left upper arm    Diagnostic Test Results:  Labs reviewed in Epic  No results found for this or any previous visit (from the past 24 hour(s)).    ASSESSMENT / PLAN:       ICD-10-CM    1. Encounter for Medicare annual wellness exam  Z00.00    2. Chronic obstructive pulmonary disease, unspecified COPD type (H)  J44.9 albuterol (PROAIR HFA/PROVENTIL HFA/VENTOLIN HFA) 108 (90 Base) MCG/ACT inhaler   3. Benign skin lesion  L98.9    4. Essential hypertension  I10 BASIC METABOLIC PANEL   5. Left foot pain  M79.672 capsaicin (ZOSTRIX) 0.025 % external cream     Discussed how to take the medication(s), expected outcomes, potential side effects.  Blood pressure is controlled, continue same medications, monitoring and lab pending.  Will try capsaicin for her concerns with neuroma type pain on bottom of left forefoot.  Albuterol inhaler to use as needed. Will recheck in 4 weeks to reassess. May need chest x ray, BNP or a different inhaler.  UTD on other health " "maintenance.    Patient Instructions     Watch skin for ABCD's and notify if any changes.  Try the inhaler as needed. Let's touch base on that in 4 weeks.  Try the cream for your foot pain.  Will be notified of pending labs.      Patient Education   Personalized Prevention Plan  You are due for the preventive services outlined below.  Your care team is available to assist you in scheduling these services.  If you have already completed any of these items, please share that information with your care team to update in your medical record.  Health Maintenance Due   Topic Date Due     Osteoporosis Screening  12/17/2016     ANNUAL REVIEW OF HM ORDERS  06/02/2022     Basic Metabolic Panel  07/13/2022     FALL RISK ASSESSMENT  07/27/2022     Annual Wellness Visit  07/27/2022                COUNSELING:  Reviewed preventive health counseling, as reflected in patient instructions       Regular exercise       Healthy diet/nutrition    Estimated body mass index is 27.72 kg/m  as calculated from the following:    Height as of this encounter: 1.613 m (5' 3.5\").    Weight as of this encounter: 72.1 kg (159 lb).        She reports that she quit smoking about 51 years ago. Her smoking use included cigarettes. She has a 20.00 pack-year smoking history. She has never used smokeless tobacco.      Appropriate preventive services were discussed with this patient, including applicable screening as appropriate for cardiovascular disease, diabetes, osteopenia/osteoporosis, and glaucoma.  As appropriate for age/gender, discussed screening for colorectal cancer, prostate cancer, breast cancer, and cervical cancer. Checklist reviewing preventive services available has been given to the patient.    Reviewed patients plan of care and provided an AVS. The Basic Care Plan (routine screening as documented in Health Maintenance) for Caitlyn meets the Care Plan requirement. This Care Plan has been established and reviewed with the " Patient.    Counseling Resources:  ATP IV Guidelines  Pooled Cohorts Equation Calculator  Breast Cancer Risk Calculator  Breast Cancer: Medication to Reduce Risk  FRAX Risk Assessment  ICSI Preventive Guidelines  Dietary Guidelines for Americans, 2010  USDA's MyPlate  ASA Prophylaxis  Lung CA Screening    SUDHAKAR Yung CNP  [facility]    Identified Health Risks:

## 2022-08-16 NOTE — LETTER
August 17, 2022      Caitlyn Wasserman  [id number] [address]        Dear ,    We are writing to inform you of your test results.    Your test results fall within the expected range(s) or remain unchanged from previous results.  Please continue with current treatment plan.    Resulted Orders   BASIC METABOLIC PANEL   Result Value Ref Range    Sodium 140 133 - 144 mmol/L    Potassium 3.4 3.4 - 5.3 mmol/L    Chloride 102 94 - 109 mmol/L    Carbon Dioxide (CO2) 31 20 - 32 mmol/L    Anion Gap 7 3 - 14 mmol/L    Urea Nitrogen 15 7 - 30 mg/dL    Creatinine 0.90 0.52 - 1.04 mg/dL    Calcium 9.1 8.5 - 10.1 mg/dL    Glucose 119 (H) 70 - 99 mg/dL    GFR Estimate 63 >60 mL/min/1.73m2      Comment:      Effective December 21, 2021 eGFRcr in adults is calculated using the 2021 CKD-EPI creatinine equation which includes age and gender (Zachary et al., NEJM, DOI: 10.1056/IQBRdd9963686)     Lab work was ok.     If you have any questions or concerns, please call the clinic at the number listed above.       Sincerely,      SUDHAKAR Yung CNP

## 2022-08-16 NOTE — LETTER
August 17, 2022      Caitlyn Wasserman  [address] [address] [address]        Dear ,    We are writing to inform you of your test results.    Your test results fall within the expected range(s) or remain unchanged from previous results.  Please continue with current treatment plan.    Resulted Orders   BASIC METABOLIC PANEL   Result Value Ref Range    Sodium 140 133 - 144 mmol/L    Potassium 3.4 3.4 - 5.3 mmol/L    Chloride 102 94 - 109 mmol/L    Carbon Dioxide (CO2) 31 20 - 32 mmol/L    Anion Gap 7 3 - 14 mmol/L    Urea Nitrogen 15 7 - 30 mg/dL    Creatinine 0.90 0.52 - 1.04 mg/dL    Calcium 9.1 8.5 - 10.1 mg/dL    Glucose 119 (H) 70 - 99 mg/dL    GFR Estimate 63 >60 mL/min/1.73m2      Comment:      Effective December 21, 2021 eGFRcr in adults is calculated using the 2021 CKD-EPI creatinine equation which includes age and gender (Zachary et al., NEJM, DOI: 10.1056/IBDImt1019310)       If you have any questions or concerns, please call the clinic at the number listed above.       Sincerely,      SUDHAKAR Yung CNP

## 2022-09-13 ENCOUNTER — OFFICE VISIT (OUTPATIENT)
Dept: FAMILY MEDICINE | Facility: CLINIC | Age: 87
End: 2022-09-13
Payer: COMMERCIAL

## 2022-09-13 VITALS
HEART RATE: 78 BPM | DIASTOLIC BLOOD PRESSURE: 68 MMHG | TEMPERATURE: 98.3 F | OXYGEN SATURATION: 97 % | SYSTOLIC BLOOD PRESSURE: 130 MMHG | RESPIRATION RATE: 16 BRPM | HEIGHT: 64 IN | BODY MASS INDEX: 27.14 KG/M2 | WEIGHT: 159 LBS

## 2022-09-13 DIAGNOSIS — J44.9 CHRONIC OBSTRUCTIVE PULMONARY DISEASE, UNSPECIFIED COPD TYPE (H): Primary | ICD-10-CM

## 2022-09-13 DIAGNOSIS — M54.2 NECK PAIN: ICD-10-CM

## 2022-09-13 DIAGNOSIS — N89.8 VAGINAL ITCHING: ICD-10-CM

## 2022-09-13 PROCEDURE — 90662 IIV NO PRSV INCREASED AG IM: CPT | Performed by: NURSE PRACTITIONER

## 2022-09-13 PROCEDURE — G0008 ADMIN INFLUENZA VIRUS VAC: HCPCS | Performed by: NURSE PRACTITIONER

## 2022-09-13 PROCEDURE — 99213 OFFICE O/P EST LOW 20 MIN: CPT | Mod: 25 | Performed by: NURSE PRACTITIONER

## 2022-09-13 ASSESSMENT — PAIN SCALES - GENERAL: PAINLEVEL: NO PAIN (1)

## 2022-09-13 NOTE — PROGRESS NOTES
"  Assessment & Plan     Chronic obstructive pulmonary disease, unspecified COPD type (H)    - XR Chest 2 Views; Future  Will be notified of x ray results.  Flu shot given today.    Neck pain    - Physical Therapy Referral; Future    Vaginal itching    Try OTC monistat.               BMI:   Estimated body mass index is 27.51 kg/m  as calculated from the following:    Height as of this encounter: 1.619 m (5' 3.75\").    Weight as of this encounter: 72.1 kg (159 lb).       See Patient Instructions  Patient Instructions   Will be notified of pending x ray result.  Use inhaler only if needed.  Try PT for your neck.  Try OTC Monistat for itching .      Our Clinic hours are:  Mondays    7:20 am - 7 pm  Tues -  Fri  7:20 am - 5 pm    Clinic Phone: [phone]    The clinic lab opens at 7:30 am Mon - Fri and appointments are required.    [facility]. [phone]  Monday  8 am - 7pm  Tues - Fri 8 am - 5:30 pm           Return in about 2 weeks (around 9/27/2022) for or sooner if symptoms persist or worsen.    SUDHAKAR Yung CNP  [facility]    Werner Brady is a 86 year old, presenting for the following health issues:  Recheck Medication      History of Present Illness       Reason for visit:  Follow UP on last visit    She eats 2-3 servings of fruits and vegetables daily.She consumes 0 sweetened beverage(s) daily.She exercises with enough effort to increase her heart rate 9 or less minutes per day.  She exercises with enough effort to increase her heart rate 3 or less days per week.   She is taking medications regularly.       States the albuterol inhaler did not really help her feeling of shortness of breath. She states that is much worse when it is humid outside. It's much better now, only happens when she bends over or exerts herself.  She's had right sided neck pain for years, would like to try PT.  Also reports vaginal itching on and off for years. Is not " "sexually active and has not tried anything for this in the past.  Otherwise well.  Current Outpatient Medications   Medication     acetaminophen (TYLENOL) 325 MG tablet     acetaminophen-caffeine (EXCEDRIN TENSION HEADACHE) 500-65 MG TABS     albuterol (PROAIR HFA/PROVENTIL HFA/VENTOLIN HFA) 108 (90 Base) MCG/ACT inhaler     atenolol (TENORMIN) 25 MG tablet     capsaicin (ZOSTRIX) 0.025 % external cream     chlorthalidone (HYGROTON) 25 MG tablet     ipratropium (ATROVENT) 0.03 % nasal spray     multivitamin w/minerals (THERA-VIT-M) tablet     potassium chloride ER (K-TAB) 20 MEQ CR tablet     psyllium 28.3 % POWD     nitroFURantoin macrocrystal (MACRODANTIN) 50 MG capsule     vitamin D2 (ERGOCALCIFEROL) 32472 units (1250 mcg) capsule     No current facility-administered medications for this visit.     Past Medical History:   Diagnosis Date     COPD (chronic obstructive pulmonary disease) (H)      Migraine, unspecified, without mention of intractable migraine without mention of status migrainosus      Other urinary incontinence      Pure hypercholesterolemia      Unspecified essential hypertension            Review of Systems   Constitutional, HEENT, cardiovascular, pulmonary, gi and gu systems are negative, except as otherwise noted.      Objective    /68   Pulse 78   Temp 98.3  F (36.8  C)   Resp 16   Ht 1.619 m (5' 3.75\")   Wt 72.1 kg (159 lb)   LMP  (LMP Unknown)   SpO2 97%   BMI 27.51 kg/m    Body mass index is 27.51 kg/m .  Physical Exam   GENERAL: healthy, alert and no distress  NECK: no adenopathy, no asymmetry  RESP: lungs clear to auscultation - quiet, late inspiratory crackles bibasilar  CV: regular rate and rhythm  ABDOMEN: soft, nontender  MS: no gross musculoskeletal defects noted, tight neck muscles                      "

## 2022-09-13 NOTE — PATIENT INSTRUCTIONS
Will be notified of pending x ray result.  Use inhaler only if needed.  Try PT for your neck.  Try OTC Monistat for itching .      Our Clinic hours are:  Mondays    7:20 am - 7 pm  Tues -  Fri  7:20 am - 5 pm    Clinic Phone: [phone]    The clinic lab opens at 7:30 am Mon - Fri and appointments are required.    [facility]  Ph. [phone]  Monday  8 am - 7pm  Tues - Fri 8 am - 5:30 pm

## 2022-09-16 ENCOUNTER — HOSPITAL ENCOUNTER (OUTPATIENT)
Dept: PHYSICAL THERAPY | Facility: CLINIC | Age: 87
Setting detail: THERAPIES SERIES
Discharge: HOME | End: 2022-09-16
Attending: NURSE PRACTITIONER
Payer: COMMERCIAL

## 2022-09-16 DIAGNOSIS — M54.2 NECK PAIN: ICD-10-CM

## 2022-09-16 PROCEDURE — 97110 THERAPEUTIC EXERCISES: CPT | Mod: GP | Performed by: PHYSICAL THERAPIST

## 2022-09-16 PROCEDURE — 97140 MANUAL THERAPY 1/> REGIONS: CPT | Mod: GP | Performed by: PHYSICAL THERAPIST

## 2022-09-16 PROCEDURE — 97161 PT EVAL LOW COMPLEX 20 MIN: CPT | Mod: GP | Performed by: PHYSICAL THERAPIST

## 2022-09-16 NOTE — PROGRESS NOTES
09/16/22 1100   General Information   Type of Visit Initial OP Ortho PT Evaluation   Start of Care Date 09/16/22   Referring Physician Cindy Clark, APRN CNP   Patient/Family Goals Statement decrease neck pain in morning, improve mobility   Orders Evaluate and Treat   Date of Order 09/13/22   Certification Required? Yes   Medical Diagnosis Neck pain (M54.2)   Surgical/Medical history reviewed Yes   Precautions/Limitations no known precautions/limitations   Body Part(s)   Body Part(s) Cervical Spine   Presentation and Etiology   Pertinent history of current problem (include personal factors and/or comorbidities that impact the POC) Patient states that she has chronic neck pain and difficulty with turning her head while driving. Lately has been getting worse and noticing more muscle pain on the right side of the neck that shoots up into the base of skull. Does have intermittent headaches in the morning, decreases once she continues to move around.   Impairments A. Pain;E. Decreased flexibility   Functional Limitations perform activities of daily living;perform desired leisure / sports activities   Symptom Location right side of the neck along levator scap/SCM   How/Where did it occur From insidious onset   Onset date of current episode/exacerbation 09/13/22   Chronicity Chronic   Pain rating (0-10 point scale) Best (/10);Worst (/10)   Best (/10) 0   Worst (/10) 4   Pain quality B. Dull;C. Aching;E. Shooting   Frequency of pain/symptoms C. With activity  (occurs daily)   Pain/symptoms are: The same all the time   Pain/symptoms exacerbated by G. Certain positions   Pain/symptoms eased by E. Changing positions   Progression of symptoms since onset: Worsened   Prior Level of Function   Prior Level of Function-Mobility ind   Prior Level of Function-ADLs ind   Functional Level Prior Comment ind, does not [place]   Current Level of Function   Current Community Support Family/friend caregiver   Patient  role/employment history F. Retired   [facility]   Home/community accessibility no stairs to enter, small step without difficulty   Fall Risk Screen   Fall screen completed by PT   Have you fallen 2 or more times in the past year? No   Have you fallen and had an injury in the past year? No   Is patient a fall risk? No   Abuse Screen (yes response referral indicated)   Feels Unsafe at Home or Work/School no   Feels Threatened by Someone no   Does Anyone Try to Keep You From Having Contact with Others or Doing Things Outside Your Home? no   Physical Signs of Abuse Present no   System Outcome Measures   Outcome Measures   (SPADI: 22.22%)   Cervical Spine   Cervical Left Side Bending ROM 5*   Cervical Right Rotation ROM 20* with pain at end range   Cervical Left Rotation ROM 30* with pain/stretching at end range   Cervical Flexion ROM WNL   Cervical Extension ROM 15*   Cervical Right Side Bending ROM 5*   Thoracic Right Rotation 25% limited   Thoracic Left Rotation WNL   Shoulder AROM Screen WNL   Shoulder Abd (C5) Strength 4+   Shoulder ER (C5, C6) Strength 4+   Shoulder IR (C5, C6) Strength 4_   Elbow Flexion (C5, C6) Strength 5   Elbow Extension (C7) Strength 5   Upper Trapezius Flexibility moderately limited bilaterally   Levator Scapula Flexibility limited R > L   Scalene Flexibility moderately limited   Pectoralis Minor Flexibility 4 fingers from table   Alar Ligament Test -   Transverse Ligament Test -   Spurling Test -   Cervical Distraction Test -   Cervical Rotation/Lateral Flexion Test -   Segmental Mobility-Cervical decreased lateral C spine mobility bilaterally   Segmental Mobility-Thoracic decreased CPA mobility throughout T spine   Palpation TTP suboccipitals on R, R UT/levator, medial border of R scap   Observation forward head and rounded shoulders   Planned Therapy Interventions   Planned Therapy Interventions joint mobilization;manual therapy;ROM;strengthening;stretching    Clinical Impression   Criteria for Skilled Therapeutic Interventions Met yes, treatment indicated   PT Diagnosis neck pain   Influenced by the following impairments pain, decreased ROM   Functional limitations due to impairments driving, turning head, sleeping   Clinical Presentation Stable/Uncomplicated   Clinical Presentation Rationale clinical judgement   Clinical Decision Making (Complexity) Low complexity   Therapy Frequency 1 time/week   Predicted Duration of Therapy Intervention (days/wks) 12 weeks   Risk & Benefits of therapy have been explained Yes   Patient, Family & other staff in agreement with plan of care Yes   Clinical Impression Comments Patient presents to therapy with chronic neck pain, R>L, with significant decrease in cervical ROM throughout. Patient would benefit from continued skilled therapy to address functional limitations and improve QOL.   Education Assessment   Preferred Learning Style Listening;Reading;Demonstration;Pictures/video   Barriers to Learning No barriers   ORTHO GOALS   PT Ortho Eval Goals 1;2;3   Ortho Goal 1   Goal Identifier 1.   Goal Description Patient will report neck pain <2/10 upon waking in the morning.   Target Date 10/14/22   Ortho Goal 2   Goal Identifier 2.   Goal Description Patient will improve cervical rotation bilaterally >30* in order to improve safety with driving.   Target Date 10/28/22   Ortho Goal 3   Goal Identifier 3.   Goal Description Patient will note a 50% decrease in headaches throughout the day.   Target Date 11/11/22   Total Evaluation Time   PT Eval, Low Complexity Minutes (91537) 25   Therapy Certification   Certification date from 09/16/22   Certification date to 12/09/22   Medical Diagnosis Neck pain (M54.2)     Thank you,    Saira Fernandez, PT, DPT

## 2022-09-16 NOTE — PROGRESS NOTES
[facility]    OUTPATIENT PHYSICAL THERAPY ORTHOPEDIC EVALUATION  PLAN OF TREATMENT FOR OUTPATIENT REHABILITATION  (COMPLETE FOR INITIAL CLAIMS ONLY)  Patient's Last Name, First Name, M.I.  YOB: 1935  Caitlyn Wasserman    Provider s Name:  [facility]   Medical Record No.  [id number]   Start of Care Date:  09/16/22   Onset Date:  09/13/22   Type:     _X__PT   ___OT   ___SLP Medical Diagnosis:  Neck pain (M54.2)     PT Diagnosis:  neck pain   Visits from SOC:  1      _________________________________________________________________________________  Plan of Treatment/Functional Goals:  joint mobilization, manual therapy, ROM, strengthening, stretching           Goals  Goal Identifier: 1.  Goal Description: Patient will report neck pain <2/10 upon waking in the morning.  Target Date: 10/14/22    Goal Identifier: 2.  Goal Description: Patient will improve cervical rotation bilaterally >30* in order to improve safety with driving.  Target Date: 10/28/22    Goal Identifier: 3.  Goal Description: Patient will note a 50% decrease in headaches throughout the day.  Target Date: 11/11/22      Therapy Frequency:  1 time/week  Predicted Duration of Therapy Intervention:  12 weeks    Saira Fernandez, PT                 I CERTIFY THE NEED FOR THESE SERVICES FURNISHED UNDER        THIS PLAN OF TREATMENT AND WHILE UNDER MY CARE     (Physician co-signature of this document indicates review and certification of the therapy plan).                       Certification Date From:  09/16/22   Certification Date To:  12/09/22    Referring Provider:  SUDHAKAR Smith CNP    Initial Assessment        See Epic Evaluation Start of Care Date: 09/16/22

## 2022-09-30 ENCOUNTER — HOSPITAL ENCOUNTER (OUTPATIENT)
Dept: PHYSICAL THERAPY | Facility: CLINIC | Age: 87
Setting detail: THERAPIES SERIES
Discharge: HOME | End: 2022-09-30
Attending: NURSE PRACTITIONER
Payer: COMMERCIAL

## 2022-09-30 PROCEDURE — 97110 THERAPEUTIC EXERCISES: CPT | Mod: GP | Performed by: PHYSICAL THERAPIST

## 2022-09-30 PROCEDURE — 97140 MANUAL THERAPY 1/> REGIONS: CPT | Mod: GP | Performed by: PHYSICAL THERAPIST

## 2022-10-05 ENCOUNTER — HOSPITAL ENCOUNTER (OUTPATIENT)
Dept: PHYSICAL THERAPY | Facility: CLINIC | Age: 87
Setting detail: THERAPIES SERIES
Discharge: HOME | End: 2022-10-05
Attending: NURSE PRACTITIONER
Payer: COMMERCIAL

## 2022-10-05 PROCEDURE — 97140 MANUAL THERAPY 1/> REGIONS: CPT | Mod: GP | Performed by: PHYSICAL THERAPIST

## 2022-10-05 PROCEDURE — 97110 THERAPEUTIC EXERCISES: CPT | Mod: GP | Performed by: PHYSICAL THERAPIST

## 2022-10-14 ENCOUNTER — HOSPITAL ENCOUNTER (OUTPATIENT)
Dept: PHYSICAL THERAPY | Facility: CLINIC | Age: 87
Setting detail: THERAPIES SERIES
Discharge: HOME | End: 2022-10-14
Attending: NURSE PRACTITIONER
Payer: COMMERCIAL

## 2022-10-14 PROCEDURE — 97110 THERAPEUTIC EXERCISES: CPT | Mod: GP | Performed by: PHYSICAL THERAPIST

## 2022-10-14 PROCEDURE — 97140 MANUAL THERAPY 1/> REGIONS: CPT | Mod: GP | Performed by: PHYSICAL THERAPIST

## 2022-10-21 ENCOUNTER — HOSPITAL ENCOUNTER (OUTPATIENT)
Dept: PHYSICAL THERAPY | Facility: CLINIC | Age: 87
Setting detail: THERAPIES SERIES
Discharge: HOME | End: 2022-10-21
Attending: NURSE PRACTITIONER
Payer: COMMERCIAL

## 2022-10-21 PROCEDURE — 97110 THERAPEUTIC EXERCISES: CPT | Mod: GP | Performed by: PHYSICAL THERAPIST

## 2022-10-21 PROCEDURE — 97140 MANUAL THERAPY 1/> REGIONS: CPT | Mod: GP | Performed by: PHYSICAL THERAPIST

## 2022-11-04 ENCOUNTER — HOSPITAL ENCOUNTER (OUTPATIENT)
Dept: PHYSICAL THERAPY | Facility: CLINIC | Age: 87
Setting detail: THERAPIES SERIES
Discharge: HOME | End: 2022-11-04
Attending: NURSE PRACTITIONER
Payer: COMMERCIAL

## 2022-11-04 PROCEDURE — 97110 THERAPEUTIC EXERCISES: CPT | Mod: GP | Performed by: PHYSICAL THERAPIST

## 2022-11-04 PROCEDURE — 97140 MANUAL THERAPY 1/> REGIONS: CPT | Mod: GP | Performed by: PHYSICAL THERAPIST

## 2022-12-26 DIAGNOSIS — I10 ESSENTIAL HYPERTENSION: ICD-10-CM

## 2022-12-26 RX ORDER — ATENOLOL 25 MG/1
TABLET ORAL
Qty: 90 TABLET | Refills: 1 | Status: SHIPPED | OUTPATIENT
Start: 2022-12-26 | End: 2023-06-26

## 2022-12-26 RX ORDER — POTASSIUM CHLORIDE 1500 MG/1
TABLET, EXTENDED RELEASE ORAL
Qty: 90 TABLET | Refills: 1 | Status: SHIPPED | OUTPATIENT
Start: 2022-12-26 | End: 2023-06-26

## 2022-12-27 ENCOUNTER — OFFICE VISIT (OUTPATIENT)
Dept: FAMILY MEDICINE | Facility: CLINIC | Age: 87
End: 2022-12-27
Payer: COMMERCIAL

## 2022-12-27 VITALS
SYSTOLIC BLOOD PRESSURE: 138 MMHG | DIASTOLIC BLOOD PRESSURE: 84 MMHG | HEIGHT: 64 IN | BODY MASS INDEX: 27.14 KG/M2 | OXYGEN SATURATION: 96 % | HEART RATE: 57 BPM | WEIGHT: 159 LBS | RESPIRATION RATE: 16 BRPM | TEMPERATURE: 97.3 F

## 2022-12-27 DIAGNOSIS — N89.8 PRURITUS OF VAGINA: Primary | ICD-10-CM

## 2022-12-27 DIAGNOSIS — N76.0 BV (BACTERIAL VAGINOSIS): ICD-10-CM

## 2022-12-27 DIAGNOSIS — B96.89 BV (BACTERIAL VAGINOSIS): ICD-10-CM

## 2022-12-27 LAB
CLUE CELLS: PRESENT
TRICHOMONAS, WET PREP: ABNORMAL
WBC'S/HIGH POWER FIELD, WET PREP: ABNORMAL
YEAST, WET PREP: ABNORMAL

## 2022-12-27 PROCEDURE — 99213 OFFICE O/P EST LOW 20 MIN: CPT | Performed by: NURSE PRACTITIONER

## 2022-12-27 PROCEDURE — 87210 SMEAR WET MOUNT SALINE/INK: CPT | Performed by: NURSE PRACTITIONER

## 2022-12-27 RX ORDER — FLUCONAZOLE 150 MG/1
150 TABLET ORAL ONCE
Qty: 1 TABLET | Refills: 0 | Status: SHIPPED | OUTPATIENT
Start: 2022-12-27 | End: 2022-12-27

## 2022-12-27 RX ORDER — METRONIDAZOLE 500 MG/1
500 TABLET ORAL 2 TIMES DAILY
Qty: 14 TABLET | Refills: 0 | Status: SHIPPED | OUTPATIENT
Start: 2022-12-27 | End: 2023-01-03

## 2022-12-27 ASSESSMENT — PAIN SCALES - GENERAL: PAINLEVEL: NO PAIN (0)

## 2022-12-27 NOTE — PROGRESS NOTES
"  Assessment & Plan     Pruritus of vagina    - Wet prep - Clinic Collect  - fluconazole (DIFLUCAN) 150 MG tablet; Take 1 tablet (150 mg) by mouth once for 1 dose Take this after done with antibiotics in one week.  Discussed how to take the medication(s), expected outcomes, potential side effects.    BV (bacterial vaginosis)    - metroNIDAZOLE (FLAGYL) 500 MG tablet; Take 1 tablet (500 mg) by mouth 2 times daily for 7 days  Discussed how to take the medication(s), expected outcomes, potential side effects.             BMI:   Estimated body mass index is 27.51 kg/m  as calculated from the following:    Height as of this encounter: 1.619 m (5' 3.75\").    Weight as of this encounter: 72.1 kg (159 lb).       See Patient Instructions  Patient Instructions   Try the antibiotics as prescribed and when done with antibiotics take the anti-fungal x 1.  Follow up if no improvement or any worsening.      Our Clinic hours are:  Mondays    7:20 am - 7 pm  Tues -  Fri  7:20 am - 5 pm    Clinic Phone: [phone]    The clinic lab opens at 7:30 am Mon - Fri and appointments are required.    [facility] [place]  Ph. [phone]  Monday  8 am - 7pm  Tues - Fri 8 am - 5:30 pm           Return in about 1 week (around 1/3/2023) for or sooner if symptoms persist or worsen.    SUDHAKAR Yung CNP  [facility]    Werner Brady is a 87 year old, presenting for the following health issues:  Vaginal Problem (Itching )      Vaginal Problem     History of Present Illness       Reason for visit:  Vaginal itching  Symptom onset:  More than a month  Symptoms include:  Itching and burning  Symptom intensity:  Moderate  Symptom progression:  Worsening  Had these symptoms before:  No  What makes it worse:  Wipe with toilet paper  What makes it better:  No    She eats 2-3 servings of fruits and vegetables daily.She consumes 0 sweetened beverage(s) daily.She exercises with enough effort to increase " "her heart rate 9 or less minutes per day.  She exercises with enough effort to increase her heart rate 3 or less days per week.   She is taking medications regularly.             Review of Systems   Genitourinary: Positive for vaginal discharge.      Constitutional, HEENT, cardiovascular, pulmonary, gi and gu systems are negative, except as otherwise noted.      Objective    /84   Pulse 57   Temp 97.3  F (36.3  C) (Tympanic)   Resp 16   Ht 1.619 m (5' 3.75\")   Wt 72.1 kg (159 lb)   LMP  (LMP Unknown)   SpO2 96%   BMI 27.51 kg/m    Body mass index is 27.51 kg/m .  Physical Exam   GENERAL: healthy, alert and no distress  NECK: no adenopathy, no asymmetry  RESP: lungs clear to auscultation - no rales, rhonchi or wheezes  CV: regular rate and rhythm, normal S1 S2, no S3 or S4, no murmur  ABDOMEN: soft, nontender, no hepatosplenomegaly, no masses and bowel sounds normal, no CVA tenderness  MS: no gross musculoskeletal defects noted      Results for orders placed or performed in visit on 12/27/22   Wet prep - Clinic Collect     Status: Abnormal    Specimen: Vagina; Swab   Result Value Ref Range    Trichomonas Absent Absent    Yeast Absent Absent    Clue Cells Present (A) Absent    WBCs/high power field 1+ (A) None                     "

## 2022-12-27 NOTE — PATIENT INSTRUCTIONS
Try the antibiotics as prescribed and when done with antibiotics take the anti-fungal x 1.  Follow up if no improvement or any worsening.      Our Clinic hours are:  Mondays    7:20 am - 7 pm  Tues -  Fri  7:20 am - 5 pm    Clinic Phone: [phone]    The clinic lab opens at 7:30 am Mon - Fri and appointments are required.    [facility]. [phone]  Monday  8 am - 7pm  Tues - Fri 8 am - 5:30 pm

## 2023-01-17 ENCOUNTER — OFFICE VISIT (OUTPATIENT)
Dept: FAMILY MEDICINE | Facility: CLINIC | Age: 88
End: 2023-01-17
Payer: COMMERCIAL

## 2023-01-17 VITALS
BODY MASS INDEX: 26.46 KG/M2 | HEIGHT: 64 IN | DIASTOLIC BLOOD PRESSURE: 68 MMHG | RESPIRATION RATE: 16 BRPM | HEART RATE: 59 BPM | SYSTOLIC BLOOD PRESSURE: 138 MMHG | OXYGEN SATURATION: 94 % | WEIGHT: 155 LBS | TEMPERATURE: 97.7 F

## 2023-01-17 DIAGNOSIS — R35.0 URINARY FREQUENCY: ICD-10-CM

## 2023-01-17 DIAGNOSIS — N95.2 ATROPHIC VAGINITIS: ICD-10-CM

## 2023-01-17 DIAGNOSIS — J44.9 CHRONIC OBSTRUCTIVE PULMONARY DISEASE, UNSPECIFIED COPD TYPE (H): ICD-10-CM

## 2023-01-17 DIAGNOSIS — N39.0 URINARY TRACT INFECTION WITHOUT HEMATURIA, SITE UNSPECIFIED: Primary | ICD-10-CM

## 2023-01-17 DIAGNOSIS — J30.89 CHRONIC NON-SEASONAL ALLERGIC RHINITIS: ICD-10-CM

## 2023-01-17 LAB
ALBUMIN UR-MCNC: NEGATIVE MG/DL
APPEARANCE UR: ABNORMAL
BACTERIA #/AREA URNS HPF: ABNORMAL /HPF
BILIRUB UR QL STRIP: NEGATIVE
COLOR UR AUTO: YELLOW
GLUCOSE UR STRIP-MCNC: NEGATIVE MG/DL
HGB UR QL STRIP: ABNORMAL
KETONES UR STRIP-MCNC: NEGATIVE MG/DL
LEUKOCYTE ESTERASE UR QL STRIP: ABNORMAL
NITRATE UR QL: NEGATIVE
PH UR STRIP: 7 [PH] (ref 5–7)
RBC #/AREA URNS AUTO: ABNORMAL /HPF
SP GR UR STRIP: 1.01 (ref 1–1.03)
SQUAMOUS #/AREA URNS AUTO: ABNORMAL /LPF
UROBILINOGEN UR STRIP-ACNC: 0.2 E.U./DL
WBC #/AREA URNS AUTO: >100 /HPF

## 2023-01-17 PROCEDURE — 81001 URINALYSIS AUTO W/SCOPE: CPT | Performed by: NURSE PRACTITIONER

## 2023-01-17 PROCEDURE — 99214 OFFICE O/P EST MOD 30 MIN: CPT | Performed by: NURSE PRACTITIONER

## 2023-01-17 PROCEDURE — 87086 URINE CULTURE/COLONY COUNT: CPT | Performed by: NURSE PRACTITIONER

## 2023-01-17 PROCEDURE — 87088 URINE BACTERIA CULTURE: CPT | Performed by: NURSE PRACTITIONER

## 2023-01-17 RX ORDER — SULFAMETHOXAZOLE/TRIMETHOPRIM 800-160 MG
1 TABLET ORAL 2 TIMES DAILY
Qty: 14 TABLET | Refills: 0 | Status: SHIPPED | OUTPATIENT
Start: 2023-01-17 | End: 2023-05-05

## 2023-01-17 RX ORDER — IPRATROPIUM BROMIDE 21 UG/1
SPRAY, METERED NASAL
Qty: 60 ML | Refills: 1 | Status: CANCELLED | OUTPATIENT
Start: 2023-01-17

## 2023-01-17 ASSESSMENT — PAIN SCALES - GENERAL: PAINLEVEL: NO PAIN (0)

## 2023-01-17 NOTE — PATIENT INSTRUCTIONS
Try the estrogen cream, if no relief, will send to ob/gyn for further evaluation.  Take antibiotic as prescribed for urinary tract infection while we wait for the results of the urine culture.  Push fluids.  Seek emergent care if worsening.      Our Clinic hours are:  Mondays    7:20 am - 7 pm  Tues -  Fri  7:20 am - 5 pm    Clinic Phone: [phone]    The clinic lab opens at 7:30 am Mon - Fri and appointments are required.    [facility] [place]  Ph. [phone]  Monday  8 am - 7pm  Tues - Fri 8 am - 5:30 pm

## 2023-01-17 NOTE — PROGRESS NOTES
"  Assessment & Plan     Urinary tract infection without hematuria, site unspecified    - sulfamethoxazole-trimethoprim (BACTRIM DS) 800-160 MG tablet; Take 1 tablet by mouth 2 times daily  Discussed how to take the medication(s), expected outcomes, potential side effects.    Atrophic vaginitis    - conjugated estrogens (PREMARIN) 0.625 MG/GM vaginal cream; Place 1 g vaginally twice a week  Discussed how to take the medication(s), expected outcomes, potential side effects.  Will try this and have her follow up with ob/gyn if no help in about one month.    Urinary frequency    - UA macro with reflex to Microscopic and Culture - Clinc Collect  - Urine Microscopic Exam  - Urine Culture    Chronic non-seasonal allergic rhinitis    No concerns, continue to monitor.    Chronic obstructive pulmonary disease, unspecified COPD type (H)    Denies any concerns, uses her inhaler sparingly, usually in the summer if walking.               BMI:   Estimated body mass index is 26.81 kg/m  as calculated from the following:    Height as of this encounter: 1.619 m (5' 3.75\").    Weight as of this encounter: 70.3 kg (155 lb).       See Patient Instructions  Patient Instructions   Try the estrogen cream, if no relief, will send to ob/gyn for further evaluation.  Take antibiotic as prescribed for urinary tract infection while we wait for the results of the urine culture.  Push fluids.  Seek emergent care if worsening.      Our Clinic hours are:  Mondays    7:20 am - 7 pm  Tues -  Fri  7:20 am - 5 pm    Clinic Phone: [phone]    The clinic lab opens at 7:30 am Mon - Fri and appointments are required.    [facility] [place]  Ph. [phone]  Monday  8 am - 7pm  Tues - Fri 8 am - 5:30 pm             No follow-ups on file.    SUDHAKAR Yung CNP  M [facility]    Werner Brady is a 87 year old, presenting for the following health issues:  Vaginal Problem (Recheck ), UTI, and Recheck " "Medication (refill)      Vaginal Problem     UTI    History of Present Illness       Reason for visit:  Last medication did not work    She eats 2-3 servings of fruits and vegetables daily.She consumes 0 sweetened beverage(s) daily.She exercises with enough effort to increase her heart rate 9 or less minutes per day.  She exercises with enough effort to increase her heart rate 3 or less days per week.   She is taking medications regularly.         Recurrent concerns of urinary frequency and vaginal itchiness.  Recently treated for BACTERIAL VAGINOSIS and yeast vaginitis and states it helped a little but itchiness is back.  She used a magnifying glass to inspect vaginal area and states it looked fine, denies any rashes or lesions.      Review of Systems   Genitourinary: Positive for vaginal discharge.      Constitutional, HEENT, cardiovascular, pulmonary, gi and gu systems are negative, except as otherwise noted.      Objective    /68   Pulse 59   Temp 97.7  F (36.5  C) (Tympanic)   Resp 16   Ht 1.619 m (5' 3.75\")   Wt 70.3 kg (155 lb)   LMP  (LMP Unknown)   SpO2 94%   BMI 26.81 kg/m    Body mass index is 26.81 kg/m .  Physical Exam   GENERAL: healthy, alert and no distress  NECK: no adenopathy, no asymmetry  RESP: lungs clear to auscultation - no rales, rhonchi or wheezes  CV: regular rate and rhythm  G/U: refusing exam  MS: no gross musculoskeletal defects noted      No results found for this or any previous visit (from the past 24 hour(s)).                "

## 2023-01-18 LAB — BACTERIA UR CULT: ABNORMAL

## 2023-01-18 RX ORDER — CEPHALEXIN 500 MG/1
500 CAPSULE ORAL 2 TIMES DAILY
Qty: 14 CAPSULE | Refills: 0 | Status: SHIPPED | OUTPATIENT
Start: 2023-01-18 | End: 2023-01-25

## 2023-01-30 ENCOUNTER — MEDICAL CORRESPONDENCE (OUTPATIENT)
Dept: HEALTH INFORMATION MANAGEMENT | Facility: CLINIC | Age: 88
End: 2023-01-30

## 2023-03-13 ENCOUNTER — OFFICE VISIT (OUTPATIENT)
Dept: FAMILY MEDICINE | Facility: CLINIC | Age: 88
End: 2023-03-13
Payer: COMMERCIAL

## 2023-03-13 VITALS
RESPIRATION RATE: 16 BRPM | HEART RATE: 60 BPM | SYSTOLIC BLOOD PRESSURE: 138 MMHG | OXYGEN SATURATION: 98 % | DIASTOLIC BLOOD PRESSURE: 72 MMHG | TEMPERATURE: 98.4 F | BODY MASS INDEX: 26.8 KG/M2 | HEIGHT: 64 IN | WEIGHT: 157 LBS

## 2023-03-13 DIAGNOSIS — R19.5 LOOSE STOOLS: Primary | ICD-10-CM

## 2023-03-13 PROCEDURE — 99213 OFFICE O/P EST LOW 20 MIN: CPT | Performed by: NURSE PRACTITIONER

## 2023-03-13 ASSESSMENT — PAIN SCALES - GENERAL: PAINLEVEL: NO PAIN (0)

## 2023-03-13 NOTE — PATIENT INSTRUCTIONS
Push fluids, keep diet bland. Continue with fiber daily.  Continue to monitor.      Our Clinic hours are:  Mondays    7:20 am - 7 pm  Tues -  Fri  7:20 am - 5 pm    Clinic Phone: [phone]    The clinic lab opens at 7:30 am Mon - Fri and appointments are required.    [facility] [place]  Ph. [phone]  Monday  8 am - 7pm  Tues - Fri 8 am - 5:30 pm

## 2023-03-13 NOTE — PROGRESS NOTES
"  Assessment & Plan     Loose stools    This has resolved, see below for ongoing recommendations.  Likely a virus which has resolved.             BMI:   Estimated body mass index is 27.16 kg/m  as calculated from the following:    Height as of this encounter: 1.619 m (5' 3.75\").    Weight as of this encounter: 71.2 kg (157 lb).       See Patient Instructions  Patient Instructions   Push fluids, keep diet bland. Continue with fiber daily.  Continue to monitor.      Our Clinic hours are:  Mondays    7:20 am - 7 pm  Tues -  Fri  7:20 am - 5 pm    Clinic Phone: [phone]    The clinic lab opens at 7:30 am Mon - Fri and appointments are required.    [facility]. [phone]  Monday  8 am - 7pm  Tues - Fri 8 am - 5:30 pm           Return in about 4 weeks (around 4/10/2023) for or sooner if symptoms persist or worsen, Med Check, Physical Exam, Lab Work.    SUDHAKAR Yung CNP  [facility]    Werner Brady is a 87 year old, presenting for the following health issues:  Bowel Problems (Change in bowel pattern x 3 days  better now .)      History of Present Illness       Reason for visit:  Discus  Symptom onset:  1-2 weeks ago  Symptoms include:  Frequent bowel movements  Symptom intensity:  Severe  Symptom progression:  Improving  Had these symptoms before:  No  What makes it worse:  No  What makes it better:  Feel bett    She eats 2-3 servings of fruits and vegetables daily.She consumes 0 sweetened beverage(s) daily.She exercises with enough effort to increase her heart rate 10 to 19 minutes per day.    She is taking medications regularly.     She had one week of looser stools and that has resolved for the past week. Thought she would keep appointment for any further considerations.  At present, she is feeling well and is without any concerns with her stools.          Review of Systems   Constitutional, HEENT, cardiovascular, pulmonary, gi and gu systems are " "negative, except as otherwise noted.      Objective    /72   Pulse 60   Temp 98.4  F (36.9  C) (Tympanic)   Resp 16   Ht 1.619 m (5' 3.75\")   Wt 71.2 kg (157 lb)   LMP  (LMP Unknown)   SpO2 98%   BMI 27.16 kg/m    Body mass index is 27.16 kg/m .  Physical Exam   GENERAL: healthy, alert and no distress  NECK: no adenopathy, no asymmetry  RESP: lungs clear to auscultation - no rales, rhonchi or wheezes  CV: regular rate and rhythm, normal S1 S2, no S3 or S4, no murmur  ABDOMEN: soft, nontender, no hepatosplenomegaly, no masses and bowel sounds normal, no CVA tenderness  MS: no gross musculoskeletal defects noted                      "

## 2023-03-25 ENCOUNTER — HOSPITAL ENCOUNTER (EMERGENCY)
Facility: CLINIC | Age: 88
Discharge: HOME | End: 2023-03-25
Attending: NURSE PRACTITIONER | Admitting: NURSE PRACTITIONER
Payer: COMMERCIAL

## 2023-03-25 VITALS
BODY MASS INDEX: 26.58 KG/M2 | HEART RATE: 62 BPM | TEMPERATURE: 97.8 F | WEIGHT: 150 LBS | SYSTOLIC BLOOD PRESSURE: 169 MMHG | RESPIRATION RATE: 18 BRPM | OXYGEN SATURATION: 100 % | HEIGHT: 63 IN | DIASTOLIC BLOOD PRESSURE: 63 MMHG

## 2023-03-25 DIAGNOSIS — N39.0 URINARY TRACT INFECTION: ICD-10-CM

## 2023-03-25 LAB
ALBUMIN UR-MCNC: 30 MG/DL
APPEARANCE UR: ABNORMAL
BACTERIA #/AREA URNS HPF: ABNORMAL /HPF
BILIRUB UR QL STRIP: NEGATIVE
COLOR UR AUTO: YELLOW
GLUCOSE UR STRIP-MCNC: NEGATIVE MG/DL
HGB UR QL STRIP: NEGATIVE
KETONES UR STRIP-MCNC: NEGATIVE MG/DL
LEUKOCYTE ESTERASE UR QL STRIP: ABNORMAL
NITRATE UR QL: NEGATIVE
PH UR STRIP: 7 [PH] (ref 5–7)
RBC URINE: 7 /HPF
SP GR UR STRIP: 1.01 (ref 1–1.03)
SQUAMOUS EPITHELIAL: 7 /HPF
UROBILINOGEN UR STRIP-MCNC: NORMAL MG/DL
WBC CLUMPS #/AREA URNS HPF: PRESENT /HPF
WBC URINE: >182 /HPF

## 2023-03-25 PROCEDURE — 87088 URINE BACTERIA CULTURE: CPT | Performed by: NURSE PRACTITIONER

## 2023-03-25 PROCEDURE — 99213 OFFICE O/P EST LOW 20 MIN: CPT | Performed by: NURSE PRACTITIONER

## 2023-03-25 PROCEDURE — 81001 URINALYSIS AUTO W/SCOPE: CPT | Performed by: NURSE PRACTITIONER

## 2023-03-25 PROCEDURE — G0463 HOSPITAL OUTPT CLINIC VISIT: HCPCS | Performed by: NURSE PRACTITIONER

## 2023-03-25 RX ORDER — CEPHALEXIN 500 MG/1
500 CAPSULE ORAL 4 TIMES DAILY
Qty: 28 CAPSULE | Refills: 0 | Status: SHIPPED | OUTPATIENT
Start: 2023-03-25 | End: 2023-04-01

## 2023-03-25 ASSESSMENT — ENCOUNTER SYMPTOMS
HEMATURIA: 0
CHILLS: 0
FLANK PAIN: 0
SHORTNESS OF BREATH: 0
ABDOMINAL PAIN: 0
MYALGIAS: 0
FREQUENCY: 1
SORE THROAT: 0
FEVER: 0
DYSURIA: 1

## 2023-03-25 NOTE — ED TRIAGE NOTES
Pt reports concern for UTI, pt reports frequency, urgency with some dysuria. Pt denies fevers at this time

## 2023-03-25 NOTE — ED PROVIDER NOTES
History     Chief Complaint   Patient presents with     Urinary Frequency     Pt reports concern for UTI, pt reports frequency, urgency with some dysuria. Pt denies fevers at this time      HPI  Caitlyn Wasserman is a 87 year old female who presents to the urgent care for complaints of urinary frequency, urgency and dysuria.  She states that this has been going on for a few days now and she is going out of town and wanted to get this checked before leaving.  She denies any fevers or chills.  She denies flank pain.  Review of systems otherwise negative.  She did have a prior UTI back in January.    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 Pennington in 2004       Osteoarthritis of both hands 05/07/2019     Priority: Medium     Chronic obstructive pulmonary disease, unspecified COPD type (H) 01/10/2017     Priority: Medium     Chronic rhinitis 12/04/2015     Priority: Medium     Essential hypertension 12/04/2015     Priority: Medium     Dysuria 12/04/2015     Priority: Medium     Primary osteoarthritis of both knees 10/08/2014     Priority: Medium     Pedal edema 12/03/2013     Priority: Medium     Essential tremor 11/15/2013     Priority: Medium     Health Care Home 12/03/2012     Priority: Medium     Roseann Obando RN-PHN  A  / [facility]   [phone]    DX V65.8 REPLACED WITH [id number] C[facility] HOME (04/08/2013)       Kyphosis of cervical region 11/28/2011     Priority: Medium     Neck pain, chronic 11/28/2011     Priority: Medium     HYPERLIPIDEMIA LDL GOAL <130 10/31/2010     Priority: Medium     Recurrent UTI 09/20/2010     Priority: Medium     Cataract 07/06/2009     Priority: Medium     Utility update for deleted IMO code  [employer] Update utility       Right shoulder pain 10/27/2008     Priority: Medium     Numbness in right leg 10/27/2008     Priority: Medium     LEFT HIP PAIN 03/23/2006     Priority: Medium     Urinary frequency 06/09/2005     Priority: Medium        Past Medical History:    Past Medical History:   Diagnosis Date     COPD (chronic obstructive pulmonary disease) (H)      Migraine, unspecified, without mention of intractable migraine without mention of status migrainosus      Other urinary incontinence      Pure hypercholesterolemia      Unspecified essential hypertension        Past Surgical History:    Past Surgical History:   Procedure Laterality Date     ARTHROPLASTY HIP Right 9/28/2020    Procedure: TOTAL Hip Arthroplasty;  Surgeon: Ashkan Shah MD;  Location: [facility] OR     AS TOTAL KNEE ARTHROPLASTY Right 2011     HC ANTER COLPORRHAPHY,BLAD/VAGINA  6/04    Cystocele Repair,rectocele repair.     JOINT REPLACEMENT, HIP RT/LT  3/07, 4/07    Joint Replacement Hip /LT- dislocated in front repeat surgery 1 week later       Family History:    Family History   Problem Relation Age of Onset     Cancer Mother         Bladder     Neurologic Disorder Mother         heriditary tremor     C.A.D. Father      Cerebrovascular Disease Father      Allergies Maternal Grandfather      Respiratory Maternal Grandfather         Asthma     Diabetes Paternal Grandmother         Type II     Cancer Brother         Multiple myloma     C.A.D. Brother      Cancer Sister         kidney cancer     Neurofibromatosis  "Son      Breast Cancer No family hx of        Social History:  Marital Status:   [2]  Social History     Tobacco Use     Smoking status: Former     Packs/day: 1.00     Years: 20.00     Pack years: 20.00     Types: Cigarettes     Quit date: 1971     Years since quittin.2     Smokeless tobacco: Never   Vaping Use     Vaping Use: Never used   Substance Use Topics     Alcohol use: Yes     Comment: 1 wine a month maybe     Drug use: No        Medications:    acetaminophen (TYLENOL) 325 MG tablet  acetaminophen-caffeine (EXCEDRIN TENSION HEADACHE) 500-65 MG TABS  albuterol (PROAIR HFA/PROVENTIL HFA/VENTOLIN HFA) 108 (90 Base) MCG/ACT inhaler  atenolol (TENORMIN) 25 MG tablet  capsaicin (ZOSTRIX) 0.025 % external cream  chlorthalidone (HYGROTON) 25 MG tablet  conjugated estrogens (PREMARIN) 0.625 MG/GM vaginal cream  ipratropium (ATROVENT) 0.03 % nasal spray  multivitamin w/minerals (THERA-VIT-M) tablet  nitroFURantoin macrocrystal (MACRODANTIN) 50 MG capsule  potassium chloride ER (K-TAB) 20 MEQ CR tablet  psyllium 28.3 % POWD  sulfamethoxazole-trimethoprim (BACTRIM DS) 800-160 MG tablet  vitamin D2 (ERGOCALCIFEROL) 20732 units (1250 mcg) capsule          Review of Systems   Constitutional: Negative for chills and fever.   HENT: Negative for ear pain and sore throat.    Respiratory: Negative for shortness of breath.    Cardiovascular: Negative for chest pain.   Gastrointestinal: Negative for abdominal pain.   Genitourinary: Positive for dysuria, frequency and urgency. Negative for flank pain, hematuria, vaginal bleeding and vaginal pain.   Musculoskeletal: Negative for myalgias.   Skin: Negative for rash.       Physical Exam   BP: (!) 169/63  Pulse: 62  Temp: 97.8  F (36.6  C)  Resp: 18  Height: 160 cm (5' 3\")  Weight: 68 kg (150 lb)  SpO2: 100 %      Physical Exam  Constitutional:       General: She is not in acute distress.     Appearance: Normal appearance.   HENT:      Nose: Nose normal.      " Mouth/Throat:      Mouth: Mucous membranes are moist.   Cardiovascular:      Rate and Rhythm: Normal rate and regular rhythm.      Heart sounds: Normal heart sounds. No murmur heard.  Pulmonary:      Effort: Pulmonary effort is normal. No respiratory distress.      Breath sounds: Normal breath sounds. No wheezing.   Abdominal:      Palpations: Abdomen is soft.      Tenderness: There is no right CVA tenderness, left CVA tenderness, guarding or rebound.   Neurological:      Mental Status: She is alert.         ED Course       Results for orders placed or performed during the hospital encounter of 03/25/23 (from the past 24 hour(s))   UA with Microscopic reflex to Culture    Specimen: Urine, Midstream   Result Value Ref Range    Color Urine Yellow Colorless, Straw, Light Yellow, Yellow    Appearance Urine Cloudy (A) Clear    Glucose Urine Negative Negative mg/dL    Bilirubin Urine Negative Negative    Ketones Urine Negative Negative mg/dL    Specific Gravity Urine 1.012 1.003 - 1.035    Blood Urine Negative Negative    pH Urine 7.0 5.0 - 7.0    Protein Albumin Urine 30 (A) Negative mg/dL    Urobilinogen Urine Normal Normal, 2.0 mg/dL    Nitrite Urine Negative Negative    Leukocyte Esterase Urine Moderate (A) Negative    Bacteria Urine Few (A) None Seen /HPF    WBC Clumps Urine Present (A) None Seen /HPF    RBC Urine 7 (H) <=2 /HPF    WBC Urine >182 (H) <=5 /HPF    Squamous Epithelials Urine 7 (H) <=1 /HPF    Narrative    Urine Culture ordered based on laboratory criteria       Medications - No data to display    Assessments & Plan (with Medical Decision Making)     Caitlyn is an 87-year-old female who was seen in the urgent care today for complaints of urinary urgency, frequency, dysuria.  She denies any fever, chills, nausea, vomiting, flank pain.  She had a urinalysis done today which was positive for a UTI.  She was instructed to take the medication as prescribed.  A culture will be sent and she will be called if the  antibiotic is not sensitive.  All questions answered.    Discharge Medication List as of 3/25/2023 12:21 PM      START taking these medications    Details   cephALEXin (KEFLEX) 500 MG capsule Take 1 capsule (500 mg) by mouth 4 times daily for 7 days, Disp-28 capsule, R-0, E-Prescribe             Final diagnoses:   Urinary tract infection       3/25/2023   [facility] EMERGENCY DEPT     Nayeli Leon APRN CNP  03/25/23 2540

## 2023-03-26 NOTE — RESULT ENCOUNTER NOTE
[facility] Emergency Dept discharge antibiotic (if prescribed): Cephalexin (Keflex) 500 mg capsule, 1 capsule (500 mg) by mouth 4 times daily for 7 days.   Date of Rx (if applicable):  3/25/23  No changes in treatment per [facility] ED Lab Result Urine culture protocol.

## 2023-03-28 ENCOUNTER — TELEPHONE (OUTPATIENT)
Dept: EMERGENCY MEDICINE | Facility: CLINIC | Age: 88
End: 2023-03-28

## 2023-03-28 LAB — BACTERIA UR CULT: ABNORMAL

## 2023-03-28 NOTE — TELEPHONE ENCOUNTER
[facility] Emergency Department/Urgent Care Lab result notification  [Note:  ED Lab Results RN will reference the [facility] Emergency Dept visit note prior to contacting patient AND/OR prior to consulting Emergency Dept Provider.  Highlights of Emergency Dept visit in information summary at the bottom of this telephone note]    1. Reason for call    Notify the patient/parent of lab results    Assess patient symptoms    Review ED providers recommendations/discharge instructions (if necessary)    Advise per [facility] ED lab result protocol    2. Lab Result (including Rx patient on, if applicable).  Copy of lab result from teextee at bottom of charting  Final Urine Culture Report on 3/28/23  [facility] Emergency Dept discharge antibiotic prescribed:  Cephalexin (Keflex) 500 mg capsule, 1 capsule (500 mg) by mouth 4 times daily for 7 days.  #1. Bacteria, Enterococcus faecalis,  is [NOT TESTED] to antibiotic.   Recommendations in treatment per [facility] ED lab result Urine Culture protocol.    3. RN Assessment (Patient's current Symptoms):    Time of call: 11:08a    Assessment:  Relayed to patient results of culture. She reports feeling better in terms of her UTI. She notices less cramping with passing urine. She is agreeable to change of treatment so will stop Keflex and start Augmentin today.    4. RN Recommendations/Instructions per [place] ED lab result protocol    [facility] ED lab result protocol used: Urine Culture    RN will consult with [facility] Emergency Dept Provider and then call patient back with recommendations (Yes/No/NA): NA    Patient/parent notified of lab result and treatment recommendations (Yes/No): Yes    Advised to discontinue current antibiotic Yes    Start or switch to Rx for Amoxicillin-Clavulanate (Augmentin) 875-125 mg PO tablet, 1 tablet by mouth 2 times daily for 3 days sent to [Pharmacy - [facility]].      RN reviewed  information about     RN consultation note with [facility] Emergency Dept Provider   Why consultation necessary, See SBAR below:    S (situation, reason for consult):     B (background):     A (assessment): See RN assessment    R (RN's recommendations):       [facility] Emergency Department Provider:     Consultation Time:     Provider Recommendation:      Patient/parent notified of Providers recommendations (YES/NO):        5. Please Contact your PCP clinic or return to the Emergency department if your:    Symptoms return.    Symptoms do not improve after 3 days on antibiotic.    Symptoms do not resolve after completing antibiotic.    Symptoms worsen or other concerning symptoms.    6. Information summary from Emergency Dept/Urgent Care visit on 3/25/23  Symptoms reported at ED visit (Chief complaint, HPI)   Urinary Frequency       Pt reports concern for UTI, pt reports frequency, urgency with some dysuria. Pt denies fevers at this time       HPI  Caitlyn Wasserman is a 87 year old female who presents to the urgent care for complaints of urinary frequency, urgency and dysuria.  She states that this has been going on for a few days now and she is going out of town and wanted to get this checked before leaving.  She denies any fevers or chills.  She denies flank pain.  Review of systems otherwise negative.  She did have a prior UTI back in January.     Significant Medical hx, if applicable (i.e. CKD, diabetes) Reviewed   Allergies Allergies   Allergen Reactions     Codeine Nausea and Vomiting     Vomited 1.5 days     Cortisone Other (See Comments)     Couldn't walk well even after 2 weeks     Diclofenac Sodium Other (See Comments)     Took one tab in 2008, no reaction in note     Hydrocodone Nausea and Vomiting     Oxycodone Nausea and Vomiting     Tramadol Nausea and Vomiting      Weight, if applicable Wt Readings from Last 2 Encounters:   03/25/23 68 kg (150 lb)   03/13/23 71.2 kg (157 lb)      Coumadin/Warfarin  [Yes /No] No   Creatinine Level (mg/dl) Creatinine   Date Value Ref Range Status   08/16/2022 0.90 0.52 - 1.04 mg/dL Final   05/04/2021 0.80 0.52 - 1.04 mg/dL Final      Creatinine clearance (ml/min), if applicable Creatinine clearance cannot be calculated (Patient's most recent lab result is older than the maximum 30 days allowed.)   Pregnant (Yes/No/NA) NA   Breastfeeding (Yes/No/NA) NA   ED providers Impression and Plan (applicable information) Caitlyn is an 87-year-old female who was seen in the urgent care today for complaints of urinary urgency, frequency, dysuria.  She denies any fever, chills, nausea, vomiting, flank pain.  She had a urinalysis done today which was positive for a UTI.  She was instructed to take the medication as prescribed.  A culture will be sent and she will be called if the antibiotic is not sensitive.  All questions answered.   ED diagnosis Urinary tract infection   ED provider Nayeli Leon, APRN CNP        7. Copy of Lab result   Urine Culture  Order: [id number] - Reflex for Order [id number]   Status: Final result      Visible to patient: No (inaccessible in MyChart)     Specimen Information: Urine, Midstream    3 Result Notes  Culture >100,000 CFU/mL Enterococcus faecalis Abnormal             Resulting Agency: [facility]     Susceptibility    Enterococcus faecalis (1)    Antibiotic Interpretation Sensitivity  Method Status    Penicillin Susceptible 4 ug/mL WM Final    Ampicillin Susceptible <=2 ug/mL WM Final    Vancomycin Susceptible 1 ug/mL WM Final    Nitrofurantoin Susceptible <=16 ug/mL WM Final          Specimen Collected: 03/25/23 11:56 AM Last Resulted: 03/28/23  6:43 AM               Katie Markham, ERNESTO  [facility]  Emergency Dept Lab Result RN  Ph# [phone]

## 2023-04-11 ENCOUNTER — VIRTUAL VISIT (OUTPATIENT)
Dept: FAMILY MEDICINE | Facility: CLINIC | Age: 88
End: 2023-04-11
Payer: COMMERCIAL

## 2023-04-11 ENCOUNTER — LAB (OUTPATIENT)
Dept: LAB | Facility: CLINIC | Age: 88
End: 2023-04-11
Payer: COMMERCIAL

## 2023-04-11 DIAGNOSIS — R30.0 DYSURIA: Primary | ICD-10-CM

## 2023-04-11 DIAGNOSIS — N39.0 RECURRENT UTI: ICD-10-CM

## 2023-04-11 DIAGNOSIS — R30.0 DYSURIA: ICD-10-CM

## 2023-04-11 DIAGNOSIS — N39.0 URINARY TRACT INFECTION WITHOUT HEMATURIA, SITE UNSPECIFIED: ICD-10-CM

## 2023-04-11 LAB
ALBUMIN UR-MCNC: 30 MG/DL
APPEARANCE UR: ABNORMAL
BACTERIA #/AREA URNS HPF: ABNORMAL /HPF
BILIRUB UR QL STRIP: NEGATIVE
COLOR UR AUTO: YELLOW
GLUCOSE UR STRIP-MCNC: NEGATIVE MG/DL
HGB UR QL STRIP: ABNORMAL
KETONES UR STRIP-MCNC: NEGATIVE MG/DL
LEUKOCYTE ESTERASE UR QL STRIP: ABNORMAL
NITRATE UR QL: POSITIVE
PH UR STRIP: 6.5 [PH] (ref 5–7)
RBC #/AREA URNS AUTO: ABNORMAL /HPF
SP GR UR STRIP: 1.02 (ref 1–1.03)
SQUAMOUS #/AREA URNS AUTO: ABNORMAL /LPF
UROBILINOGEN UR STRIP-ACNC: 0.2 E.U./DL
WBC #/AREA URNS AUTO: >100 /HPF

## 2023-04-11 PROCEDURE — 87086 URINE CULTURE/COLONY COUNT: CPT

## 2023-04-11 PROCEDURE — 81001 URINALYSIS AUTO W/SCOPE: CPT

## 2023-04-11 PROCEDURE — 87186 SC STD MICRODIL/AGAR DIL: CPT

## 2023-04-11 PROCEDURE — 99213 OFFICE O/P EST LOW 20 MIN: CPT | Mod: 93 | Performed by: NURSE PRACTITIONER

## 2023-04-11 RX ORDER — NITROFURANTOIN 25; 75 MG/1; MG/1
100 CAPSULE ORAL 2 TIMES DAILY
Qty: 14 CAPSULE | Refills: 0 | Status: SHIPPED | OUTPATIENT
Start: 2023-04-11 | End: 2023-05-05

## 2023-04-11 NOTE — PATIENT INSTRUCTIONS
Come to clinic to do a UA, will be notified of those results and treated with antibiotics if needed.  Push fluids.      Our Clinic hours are:  Mondays    7:20 am - 7 pm  Tues -  Fri  7:20 am - 5 pm    Clinic Phone: [phone]    The clinic lab opens at 7:30 am Mon - Fri and appointments are required.    [facility]  Ph. [phone]  Monday  8 am - 7pm  Tues - Fri 8 am - 5:30 pm

## 2023-04-11 NOTE — PROGRESS NOTES
"Caitlyn is a 87 year old who is being evaluated via a billable telephone visit.      What phone number would you like to be contacted at? [phone]  How would you like to obtain your AVS? Jez  Distant Location (provider location):  On-site    Assessment & Plan     Dysuria    - UA with Microscopic reflex to Culture - lab collect; Future    This would be her 3rd  UTI this year if it's positive.  Will consider referral with urology.           BMI:   Estimated body mass index is 26.57 kg/m  as calculated from the following:    Height as of 3/25/23: 1.6 m (5' 3\").    Weight as of 3/25/23: 68 kg (150 lb).       See Patient Instructions  Patient Instructions   Come to clinic to do a UA, will be notified of those results and treated with antibiotics if needed.  Push fluids.      Our Clinic hours are:  Mondays    7:20 am - 7 pm  Tues -  Fri  7:20 am - 5 pm    Clinic Phone: [phone]    The clinic lab opens at 7:30 am Mon - Fri and appointments are required.    [facility]  Ph. [phone]  Monday  8 am - 7pm  Tues - Fri 8 am - 5:30 pm           SUDHAKAR Yung CNP  [facility]    Subjective   Caitlyn is a 87 year old, presenting for the following health issues:  No chief complaint on file.         View : No data to display.              HPI     Genitourinary - Female  Onset/Duration: in March 25 th - see in U.C.   Description:   Painful urination (Dysuria): No           Frequency: No  Blood in urine (Hematuria): No  Delay in urine (Hesitency): YES  Intensity: severe  Progression of Symptoms:  worsening  Accompanying Signs & Symptoms:  Fever/chills: No  Flank pain: No  Nausea and vomiting: No  Vaginal symptoms: itching  Abdominal/Pelvic Pain: No  History:   History of frequent UTI s: YES  History of kidney stones: No  Sexually Active: No  Possibility of pregnancy: No  Precipitating or alleviating factors: None  Therapies tried and outcome: Increase fluid " intake    Feels same symptoms of UTI.  Was in UC but unable to urinate.        Review of Systems   Constitutional, HEENT, cardiovascular, pulmonary, gi and gu systems are negative, except as otherwise noted.      Objective           Vitals:  No vitals were obtained today due to virtual visit.    Physical Exam   healthy, alert and no distress  PSYCH: Alert and oriented times 3; coherent speech, normal   rate and volume, able to articulate logical thoughts, able   to abstract reason, no tangential thoughts, no hallucinations   or delusions  Her affect is normal  RESP: No cough, no audible wheezing, able to talk in full sentences  Remainder of exam unable to be completed due to telephone visits                Phone call duration: 11 minutes

## 2023-04-13 LAB — BACTERIA UR CULT: ABNORMAL

## 2023-04-29 ENCOUNTER — APPOINTMENT (OUTPATIENT)
Dept: CT IMAGING | Facility: CLINIC | Age: 88
End: 2023-04-29
Attending: FAMILY MEDICINE
Payer: COMMERCIAL

## 2023-04-29 ENCOUNTER — HOSPITAL ENCOUNTER (EMERGENCY)
Facility: CLINIC | Age: 88
Discharge: HOME | End: 2023-04-29
Attending: FAMILY MEDICINE | Admitting: FAMILY MEDICINE
Payer: COMMERCIAL

## 2023-04-29 VITALS
OXYGEN SATURATION: 100 % | SYSTOLIC BLOOD PRESSURE: 135 MMHG | HEART RATE: 65 BPM | BODY MASS INDEX: 27.46 KG/M2 | RESPIRATION RATE: 14 BRPM | TEMPERATURE: 98 F | WEIGHT: 155 LBS | DIASTOLIC BLOOD PRESSURE: 84 MMHG

## 2023-04-29 DIAGNOSIS — R10.9 FLANK PAIN: ICD-10-CM

## 2023-04-29 LAB
ALBUMIN SERPL BCG-MCNC: 4.4 G/DL (ref 3.5–5.2)
ALBUMIN UR-MCNC: NEGATIVE MG/DL
ALP SERPL-CCNC: 80 U/L (ref 35–104)
ALT SERPL W P-5'-P-CCNC: 17 U/L (ref 10–35)
ANION GAP SERPL CALCULATED.3IONS-SCNC: 9 MMOL/L (ref 7–15)
APPEARANCE UR: CLEAR
AST SERPL W P-5'-P-CCNC: 24 U/L (ref 10–35)
BASOPHILS # BLD AUTO: 0.1 10E3/UL (ref 0–0.2)
BASOPHILS NFR BLD AUTO: 1 %
BILIRUB SERPL-MCNC: 0.5 MG/DL
BILIRUB UR QL STRIP: NEGATIVE
BUN SERPL-MCNC: 14.4 MG/DL (ref 8–23)
CALCIUM SERPL-MCNC: 9.9 MG/DL (ref 8.8–10.2)
CHLORIDE SERPL-SCNC: 97 MMOL/L (ref 98–107)
COLOR UR AUTO: YELLOW
CREAT SERPL-MCNC: 0.92 MG/DL (ref 0.51–0.95)
DEPRECATED HCO3 PLAS-SCNC: 31 MMOL/L (ref 22–29)
EOSINOPHIL # BLD AUTO: 0.3 10E3/UL (ref 0–0.7)
EOSINOPHIL NFR BLD AUTO: 3 %
ERYTHROCYTE [DISTWIDTH] IN BLOOD BY AUTOMATED COUNT: 11.9 % (ref 10–15)
GFR SERPL CREATININE-BSD FRML MDRD: 61 ML/MIN/1.73M2
GLUCOSE SERPL-MCNC: 104 MG/DL (ref 70–99)
GLUCOSE UR STRIP-MCNC: NEGATIVE MG/DL
HCT VFR BLD AUTO: 40.6 % (ref 35–47)
HGB BLD-MCNC: 13.7 G/DL (ref 11.7–15.7)
HGB UR QL STRIP: NEGATIVE
IMM GRANULOCYTES # BLD: 0 10E3/UL
IMM GRANULOCYTES NFR BLD: 0 %
KETONES UR STRIP-MCNC: NEGATIVE MG/DL
LEUKOCYTE ESTERASE UR QL STRIP: ABNORMAL
LIPASE SERPL-CCNC: 30 U/L (ref 13–60)
LYMPHOCYTES # BLD AUTO: 2.2 10E3/UL (ref 0.8–5.3)
LYMPHOCYTES NFR BLD AUTO: 26 %
MCH RBC QN AUTO: 32 PG (ref 26.5–33)
MCHC RBC AUTO-ENTMCNC: 33.7 G/DL (ref 31.5–36.5)
MCV RBC AUTO: 95 FL (ref 78–100)
MONOCYTES # BLD AUTO: 0.8 10E3/UL (ref 0–1.3)
MONOCYTES NFR BLD AUTO: 9 %
MUCOUS THREADS #/AREA URNS LPF: PRESENT /LPF
NEUTROPHILS # BLD AUTO: 5 10E3/UL (ref 1.6–8.3)
NEUTROPHILS NFR BLD AUTO: 61 %
NITRATE UR QL: NEGATIVE
NRBC # BLD AUTO: 0 10E3/UL
NRBC BLD AUTO-RTO: 0 /100
PH UR STRIP: 7 [PH] (ref 5–7)
PLATELET # BLD AUTO: 262 10E3/UL (ref 150–450)
POTASSIUM SERPL-SCNC: 3.5 MMOL/L (ref 3.4–5.3)
PROT SERPL-MCNC: 7.5 G/DL (ref 6.4–8.3)
RBC # BLD AUTO: 4.28 10E6/UL (ref 3.8–5.2)
RBC URINE: 1 /HPF
SODIUM SERPL-SCNC: 137 MMOL/L (ref 136–145)
SP GR UR STRIP: 1.01 (ref 1–1.03)
SQUAMOUS EPITHELIAL: 5 /HPF
UROBILINOGEN UR STRIP-MCNC: NORMAL MG/DL
WBC # BLD AUTO: 8.3 10E3/UL (ref 4–11)
WBC URINE: 17 /HPF

## 2023-04-29 PROCEDURE — 80053 COMPREHEN METABOLIC PANEL: CPT | Performed by: FAMILY MEDICINE

## 2023-04-29 PROCEDURE — 74176 CT ABD & PELVIS W/O CONTRAST: CPT

## 2023-04-29 PROCEDURE — 99284 EMERGENCY DEPT VISIT MOD MDM: CPT | Mod: 25 | Performed by: FAMILY MEDICINE

## 2023-04-29 PROCEDURE — 85025 COMPLETE CBC W/AUTO DIFF WBC: CPT | Performed by: FAMILY MEDICINE

## 2023-04-29 PROCEDURE — 99283 EMERGENCY DEPT VISIT LOW MDM: CPT | Performed by: FAMILY MEDICINE

## 2023-04-29 PROCEDURE — 87086 URINE CULTURE/COLONY COUNT: CPT | Performed by: FAMILY MEDICINE

## 2023-04-29 PROCEDURE — 81001 URINALYSIS AUTO W/SCOPE: CPT | Performed by: FAMILY MEDICINE

## 2023-04-29 PROCEDURE — 36415 COLL VENOUS BLD VENIPUNCTURE: CPT | Performed by: FAMILY MEDICINE

## 2023-04-29 PROCEDURE — 83690 ASSAY OF LIPASE: CPT | Performed by: FAMILY MEDICINE

## 2023-04-29 ASSESSMENT — ENCOUNTER SYMPTOMS
DYSURIA: 0
CONSTIPATION: 0
BLOOD IN STOOL: 0
WHEEZING: 0
SINUS PRESSURE: 0
ABDOMINAL PAIN: 0
VOMITING: 0
CHILLS: 0
COUGH: 0
DIAPHORESIS: 0
FREQUENCY: 0
FEVER: 0
SORE THROAT: 0
NAUSEA: 0
PALPITATIONS: 0
SHORTNESS OF BREATH: 0
HEADACHES: 0
FLANK PAIN: 1
DIARRHEA: 0

## 2023-04-29 ASSESSMENT — ACTIVITIES OF DAILY LIVING (ADL): ADLS_ACUITY_SCORE: 35

## 2023-04-29 NOTE — ED TRIAGE NOTES
Pt states she has a pain R flank pain starting yesterday.  It comes and goes today.  Pt has a long h/o UTI, but just had one and is questioning if it is gone.  Pt has urology apt next week.

## 2023-04-30 NOTE — ED PROVIDER NOTES
History     Chief Complaint   Patient presents with     Flank Pain     HPI  Caitlyn Wasserman is a 87 year old female who presents with a history of urinary frequency, essential tremor hypertension COPD.  She has had recurrent urinary tract infections.  She thought she might have another urinary tract infection.  She developed right flank pain starting yesterday no obvious injury no rash.  She is pending a urology follow-up next week.  She has no associated fever.  No lower abdominal pain.  No history of ureteral stone.    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 Pennington in 2004       Osteoarthritis of both hands 05/07/2019     Priority: Medium     Chronic obstructive pulmonary disease, unspecified COPD type (H) 01/10/2017     Priority: Medium     Chronic rhinitis 12/04/2015     Priority: Medium     Essential hypertension 12/04/2015     Priority: Medium     Dysuria 12/04/2015     Priority: Medium     Primary osteoarthritis of both knees 10/08/2014     Priority: Medium     Pedal edema 12/03/2013     Priority: Medium     Essential tremor 11/15/2013     Priority: Medium     Health Care Home 12/03/2012     Priority: Medium     Roseann Obando RN-PHN  [facility] / [facility]   [phone]    DX V65.8   Anesthesia Evaluation     Patient summary reviewed and Nursing notes reviewed   history of anesthetic complications: PONV               Airway   Mallampati: III  TM distance: >3 FB  Neck ROM: full  Dental      Pulmonary    (+) a smoker Current,   Cardiovascular     ECG reviewed  PT is on anticoagulation therapy  Rhythm: regular  Rate: normal    (+) hypertension, past MI , CAD, cardiac stents Drug eluting stent more than 12 months ago hyperlipidemia,       Neuro/Psych  (+) psychiatric history Anxiety,     GI/Hepatic/Renal/Endo    (+)   diabetes mellitus,     Musculoskeletal (-) negative ROS    Abdominal    Substance History - negative use     OB/GYN negative ob/gyn ROS         Other                        Anesthesia Plan    ASA 4     regional   (Left SCBx PSR for primary anesthetic    I have reviewed the patient's history with the patient and the chart, including all pertinent laboratory results and imaging. I have explained the risks of anesthesia including but not limited to dental damage, corneal abrasion, nerve injury, MI, stroke, and death. Questions asked and answered. Anesthetic plan discussed with patient and team as indicated. Patient expressed understanding of the above.    IV sedation as needed )  intravenous induction     Anesthetic plan, all risks, benefits, and alternatives have been provided, discussed and informed consent has been obtained with: patient.    Plan discussed with CRNA.       REPLACED WITH [id number] (04/08/2013)       Kyphosis of cervical region 11/28/2011     Priority: Medium     Neck pain, chronic 11/28/2011     Priority: Medium     HYPERLIPIDEMIA LDL GOAL <130 10/31/2010     Priority: Medium     Recurrent UTI 09/20/2010     Priority: Medium     Cataract 07/06/2009     Priority: Medium     Utility update for deleted IMO code  Imo Update utility       Right shoulder pain 10/27/2008     Priority: Medium     Numbness in right leg 10/27/2008     Priority: Medium     LEFT HIP PAIN 03/23/2006     Priority: Medium     Urinary frequency 06/09/2005     Priority: Medium        Past Medical History:    Past Medical History:   Diagnosis Date     COPD (chronic obstructive pulmonary disease) (H)      Migraine, unspecified, without mention of intractable migraine without mention of status migrainosus      Other urinary incontinence      Pure hypercholesterolemia      Unspecified essential hypertension        Past Surgical History:    Past Surgical History:   Procedure Laterality Date     ARTHROPLASTY HIP Right 9/28/2020    Procedure: TOTAL Hip Arthroplasty;  Surgeon: Ashkan Shah MD;  Location: [facility] OR     AS TOTAL KNEE ARTHROPLASTY Right 2011     HC ANTER COLPORRHAPHY,BLAD/VAGINA  6/04    Cystocele Repair,rectocele repair.     JOINT REPLACEMENT, HIP RT/LT  3/07, 4/07    Joint Replacement Hip /LT- dislocated in front repeat surgery 1 week later       Family History:    Family History   Problem Relation Age of Onset     Cancer Mother         Bladder     Neurologic Disorder Mother         heriditary tremor     C.A.D. Father      Cerebrovascular Disease Father      Allergies Maternal Grandfather      Respiratory Maternal Grandfather         Asthma     Diabetes Paternal Grandmother         Type II     Cancer Brother         Multiple myloma     C.A.D. Brother      Cancer Sister         kidney cancer     Neurofibromatosis Son      Breast Cancer No family hx of        Social  History:  Marital Status:   [2]  Social History     Tobacco Use     Smoking status: Former     Packs/day: 1.00     Years: 20.00     Pack years: 20.00     Types: Cigarettes     Quit date: 1971     Years since quittin.3     Smokeless tobacco: Never   Vaping Use     Vaping status: Never Used   Substance Use Topics     Alcohol use: Yes     Comment: 1 wine a month maybe     Drug use: No        Medications:    acetaminophen (TYLENOL) 325 MG tablet  acetaminophen-caffeine (EXCEDRIN TENSION HEADACHE) 500-65 MG TABS  albuterol (PROAIR HFA/PROVENTIL HFA/VENTOLIN HFA) 108 (90 Base) MCG/ACT inhaler  atenolol (TENORMIN) 25 MG tablet  capsaicin (ZOSTRIX) 0.025 % external cream  chlorthalidone (HYGROTON) 25 MG tablet  conjugated estrogens (PREMARIN) 0.625 MG/GM vaginal cream  ipratropium (ATROVENT) 0.03 % nasal spray  multivitamin w/minerals (THERA-VIT-M) tablet  nitroFURantoin macrocrystal (MACRODANTIN) 50 MG capsule  nitroFURantoin macrocrystal-monohydrate (MACROBID) 100 MG capsule  potassium chloride ER (K-TAB) 20 MEQ CR tablet  psyllium 28.3 % POWD  sulfamethoxazole-trimethoprim (BACTRIM DS) 800-160 MG tablet  vitamin D2 (ERGOCALCIFEROL) 62576 units (1250 mcg) capsule          Review of Systems   Constitutional: Negative for chills, diaphoresis and fever.   HENT: Negative for ear pain, sinus pressure and sore throat.    Eyes: Negative for visual disturbance.   Respiratory: Negative for cough, shortness of breath and wheezing.    Cardiovascular: Negative for chest pain and palpitations.   Gastrointestinal: Negative for abdominal pain, blood in stool, constipation, diarrhea, nausea and vomiting.   Genitourinary: Positive for flank pain. Negative for dysuria, frequency and urgency.   Skin: Negative for rash.   Neurological: Negative for headaches.   All other systems reviewed and are negative.      Physical Exam   BP: (!) 172/84  Pulse: 68  Temp: 98  F (36.7  C)  Resp: 14  Weight: 70.3 kg (155 lb)  SpO2: 100  %      Physical Exam  Constitutional:       General: She is in acute distress.      Appearance: She is not diaphoretic.   Eyes:      Conjunctiva/sclera: Conjunctivae normal.   Cardiovascular:      Rate and Rhythm: Normal rate and regular rhythm.      Heart sounds: No murmur heard.  Pulmonary:      Effort: Pulmonary effort is normal. No respiratory distress.      Breath sounds: Normal breath sounds. No stridor. No wheezing or rhonchi.   Abdominal:      General: Abdomen is flat. There is no distension.      Palpations: Abdomen is soft. There is no mass.      Tenderness: There is no abdominal tenderness. There is right CVA tenderness. There is no guarding.   Musculoskeletal:      Cervical back: Neck supple.      Right lower leg: No edema.      Left lower leg: No edema.   Skin:     Coloration: Skin is not pale.      Findings: No rash.   Neurological:      Mental Status: She is alert.      Motor: No weakness.         ED Course                 Procedures              Critical Care time:  none               Results for orders placed or performed during the hospital encounter of 04/29/23 (from the past 24 hour(s))   UA with Microscopic reflex to Culture    Specimen: Urine, Midstream   Result Value Ref Range    Color Urine Yellow Colorless, Straw, Light Yellow, Yellow    Appearance Urine Clear Clear    Glucose Urine Negative Negative mg/dL    Bilirubin Urine Negative Negative    Ketones Urine Negative Negative mg/dL    Specific Gravity Urine 1.015 1.003 - 1.035    Blood Urine Negative Negative    pH Urine 7.0 5.0 - 7.0    Protein Albumin Urine Negative Negative mg/dL    Urobilinogen Urine Normal Normal, 2.0 mg/dL    Nitrite Urine Negative Negative    Leukocyte Esterase Urine Trace (A) Negative    Mucus Urine Present (A) None Seen /LPF    RBC Urine 1 <=2 /HPF    WBC Urine 17 (H) <=5 /HPF    Squamous Epithelials Urine 5 (H) <=1 /HPF    Narrative    Urine Culture ordered based on laboratory criteria   CBC with platelets  differential    Narrative    The following orders were created for panel order CBC with platelets differential.  Procedure                               Abnormality         Status                     ---------                               -----------         ------                     CBC with platelets and d...[[id number]]                      Final result                 Please view results for these tests on the individual orders.   Comprehensive metabolic panel   Result Value Ref Range    Sodium 137 136 - 145 mmol/L    Potassium 3.5 3.4 - 5.3 mmol/L    Chloride 97 (L) 98 - 107 mmol/L    Carbon Dioxide (CO2) 31 (H) 22 - 29 mmol/L    Anion Gap 9 7 - 15 mmol/L    Urea Nitrogen 14.4 8.0 - 23.0 mg/dL    Creatinine 0.92 0.51 - 0.95 mg/dL    Calcium 9.9 8.8 - 10.2 mg/dL    Glucose 104 (H) 70 - 99 mg/dL    Alkaline Phosphatase 80 35 - 104 U/L    AST 24 10 - 35 U/L    ALT 17 10 - 35 U/L    Protein Total 7.5 6.4 - 8.3 g/dL    Albumin 4.4 3.5 - 5.2 g/dL    Bilirubin Total 0.5 <=1.2 mg/dL    GFR Estimate 61 >60 mL/min/1.73m2   Lipase   Result Value Ref Range    Lipase 30 13 - 60 U/L   CBC with platelets and differential   Result Value Ref Range    WBC Count 8.3 4.0 - 11.0 10e3/uL    RBC Count 4.28 3.80 - 5.20 10e6/uL    Hemoglobin 13.7 11.7 - 15.7 g/dL    Hematocrit 40.6 35.0 - 47.0 %    MCV 95 78 - 100 fL    MCH 32.0 26.5 - 33.0 pg    MCHC 33.7 31.5 - 36.5 g/dL    RDW 11.9 10.0 - 15.0 %    Platelet Count 262 150 - 450 10e3/uL    % Neutrophils 61 %    % Lymphocytes 26 %    % Monocytes 9 %    % Eosinophils 3 %    % Basophils 1 %    % Immature Granulocytes 0 %    NRBCs per 100 WBC 0 <1 /100    Absolute Neutrophils 5.0 1.6 - 8.3 10e3/uL    Absolute Lymphocytes 2.2 0.8 - 5.3 10e3/uL    Absolute Monocytes 0.8 0.0 - 1.3 10e3/uL    Absolute Eosinophils 0.3 0.0 - 0.7 10e3/uL    Absolute Basophils 0.1 0.0 - 0.2 10e3/uL    Absolute Immature Granulocytes 0.0 <=0.4 10e3/uL    Absolute NRBCs 0.0 10e3/uL   Abd/pelvis CT - no contrast -  Stone Protocol    Narrative    EXAM: CT ABDOMEN PELVIS W/O CONTRAST  LOCATION: [facility]  DATE/TIME: 4/29/2023 8:07 PM CDT    INDICATION: flank pain rt  COMPARISON: 09/28/2010  TECHNIQUE: CT scan of the abdomen and pelvis was performed without IV contrast. Multiplanar reformats were obtained. Dose reduction techniques were used.  CONTRAST: None.    FINDINGS:   LOWER CHEST: Normal.    HEPATOBILIARY: Normal.    PANCREAS: Mild fatty infiltration involving pancreatic head unchanged.    SPLEEN: Normal.    ADRENAL GLANDS: Normal.    KIDNEYS/BLADDER: Normal, no stones or hydronephrosis. Distal ureters and bladder base mostly obscured by streak artifact.    BOWEL: No obstruction or inflammatory change.    LYMPH NODES: Normal.    VASCULATURE: Unremarkable.    PELVIC ORGANS: No adnexal lesions evident. No free fluid.    MUSCULOSKELETAL: Bilateral hip arthroplasties with associated streak artifact. Degenerative changes low lumbar spine.      Impression    IMPRESSION:   1.  No etiology for symptoms evident. No hydronephrosis or urinary tract stone identified. Distal ureters and bladder base obscured by streak artifact.         Medications - No data to display    Assessments & Plan (with Medical Decision Making)     MDM:Caitlyn Wasserman is a 87 year old female who presents with flank pain right side history recurrent urinary tract infections.  Urinalysis is relatively benign and will be sent for culture.  Doubtful of UTI.  Plan CT of the abdomen pelvis to evaluate for stone.    Imaging findings are reassuring.  No signs of ureteral stone or other intra-abdominal process radiating to this region.  Could be musculoskeletal in cause.  We discussed symptomatic management and send precautions given for return.    I have reviewed the nursing notes.    I have reviewed the findings, diagnosis, plan and need for follow up with the patient.           Medical Decision Making  The patient's presentation was of  moderate complexity (an undiagnosed new problem with uncertain diagnosis).    The patient's evaluation involved:  ordering and/or review of 3+ test(s) in this encounter (see separate area of note for details)    The patient's management necessitated only low risk treatment.        New Prescriptions    No medications on file       Final diagnoses:   Flank pain - no serious findings.  await urine culture.   ressuring CT.  stay hydrated.  return for  worserning.       4/29/2023   [facility] EMERGENCY DEPT     Hector Hernandez MD  04/29/23 [id number]

## 2023-04-30 NOTE — DISCHARGE INSTRUCTIONS
ICD-10-CM    1. Flank pain  R10.9     no serious findings.  await urine culture.   ressuring CT.  stay hydrated.  return for  worserning.

## 2023-05-01 ENCOUNTER — PATIENT OUTREACH (OUTPATIENT)
Dept: FAMILY MEDICINE | Facility: CLINIC | Age: 88
End: 2023-05-01
Payer: COMMERCIAL

## 2023-05-01 LAB — BACTERIA UR CULT: NO GROWTH

## 2023-05-01 NOTE — TELEPHONE ENCOUNTER
Hospital/TCU/ED for chronic condition Discharge Protocol    Has follow-up with urology scheduled.    Cindy Marino RN  l

## 2023-05-05 ENCOUNTER — OFFICE VISIT (OUTPATIENT)
Dept: UROLOGY | Facility: CLINIC | Age: 88
End: 2023-05-05
Attending: STUDENT IN AN ORGANIZED HEALTH CARE EDUCATION/TRAINING PROGRAM
Payer: COMMERCIAL

## 2023-05-05 VITALS — DIASTOLIC BLOOD PRESSURE: 71 MMHG | OXYGEN SATURATION: 99 % | SYSTOLIC BLOOD PRESSURE: 132 MMHG | HEART RATE: 58 BPM

## 2023-05-05 DIAGNOSIS — R39.15 URINARY URGENCY: Primary | ICD-10-CM

## 2023-05-05 DIAGNOSIS — N39.0 RECURRENT UTI: ICD-10-CM

## 2023-05-05 LAB
ALBUMIN UR-MCNC: NEGATIVE MG/DL
APPEARANCE UR: CLEAR
BACTERIA #/AREA URNS HPF: ABNORMAL /HPF
BILIRUB UR QL STRIP: NEGATIVE
COLOR UR AUTO: YELLOW
GLUCOSE UR STRIP-MCNC: NEGATIVE MG/DL
HGB UR QL STRIP: NEGATIVE
KETONES UR STRIP-MCNC: NEGATIVE MG/DL
LEUKOCYTE ESTERASE UR QL STRIP: NEGATIVE
MUCOUS THREADS #/AREA URNS LPF: PRESENT /LPF
NITRATE UR QL: NEGATIVE
PH UR STRIP: 7.5 [PH] (ref 5–7)
RBC #/AREA URNS AUTO: ABNORMAL /HPF
SP GR UR STRIP: 1.01 (ref 1–1.03)
SQUAMOUS #/AREA URNS AUTO: ABNORMAL /LPF
UROBILINOGEN UR STRIP-ACNC: 0.2 E.U./DL
WBC #/AREA URNS AUTO: ABNORMAL /HPF

## 2023-05-05 PROCEDURE — 87086 URINE CULTURE/COLONY COUNT: CPT | Performed by: STUDENT IN AN ORGANIZED HEALTH CARE EDUCATION/TRAINING PROGRAM

## 2023-05-05 PROCEDURE — 81001 URINALYSIS AUTO W/SCOPE: CPT | Performed by: STUDENT IN AN ORGANIZED HEALTH CARE EDUCATION/TRAINING PROGRAM

## 2023-05-05 PROCEDURE — 51798 US URINE CAPACITY MEASURE: CPT | Performed by: STUDENT IN AN ORGANIZED HEALTH CARE EDUCATION/TRAINING PROGRAM

## 2023-05-05 PROCEDURE — 99203 OFFICE O/P NEW LOW 30 MIN: CPT | Mod: 25 | Performed by: STUDENT IN AN ORGANIZED HEALTH CARE EDUCATION/TRAINING PROGRAM

## 2023-05-05 RX ORDER — NITROFURANTOIN MACROCRYSTALS 50 MG/1
50 CAPSULE ORAL DAILY
Qty: 90 CAPSULE | Refills: 1 | Status: SHIPPED | OUTPATIENT
Start: 2023-05-05 | End: 2023-10-31

## 2023-05-05 NOTE — PATIENT INSTRUCTIONS
Restart vaginal estrogen cream two nights per week. Ok to use finger for application if this is easier. Once you run out of this prescription, let me know and I can try to find you a cheaper option.   Start Macrodantin 50 mg once daily for UTI prevention for six months.   Someone will call you to schedule an appointment with one of our urogynecologists to discuss surgical options for your prolapse.      If you have questions or concerns, call [place] urology clinic at [phone].

## 2023-05-05 NOTE — PROGRESS NOTES
Chief Complaint:   Recurrent UTIs         History of Present Illness:   Caitlyn Wasserman is a 87 year old female with a history of COPD, HTN, essential tremor, and HLD who presents for evaluation of recurrent UTIs.    In the last year, the patient had positive urine cultures on 4/11/2023, 3/25/2023, and 1/17/2023. She had a CT abdomen pelvis without contrast on 4/29/2023 for right flank pain, which was unremarkable.     The patient had a history of anterior posterior prolapse repair in 2004. She was seen by gynecology in January 2020 for recurrent anterior prolapse and was fit for a pessary. Per chart review, patient was able to expel all pessaries with cough. She elected to proceed with observation.     The patient has a prescription for vaginal estrogen cream. She has not been using this consistently.          Past Medical History:     Past Medical History:   Diagnosis Date     COPD (chronic obstructive pulmonary disease) (H)      Migraine, unspecified, without mention of intractable migraine without mention of status migrainosus      Other urinary incontinence      Pure hypercholesterolemia      Unspecified essential hypertension             Past Surgical History:     Past Surgical History:   Procedure Laterality Date     ARTHROPLASTY HIP Right 9/28/2020    Procedure: TOTAL Hip Arthroplasty;  Surgeon: Ashkan Shah MD;  Location: [facility]     AS TOTAL KNEE ARTHROPLASTY Right 2011     HC ANTER COLPORRHAPHY,BLAD/VAGINA  6/04    Cystocele Repair,rectocele repair.     JOINT REPLACEMENT, HIP RT/LT  3/07, 4/07    Joint Replacement Hip /LT- dislocated in front repeat surgery 1 week later            Medications     Current Outpatient Medications   Medication     acetaminophen (TYLENOL) 325 MG tablet     acetaminophen-caffeine (EXCEDRIN TENSION HEADACHE) 500-65 MG TABS     albuterol (PROAIR HFA/PROVENTIL HFA/VENTOLIN HFA) 108 (90 Base) MCG/ACT inhaler     atenolol (TENORMIN) 25 MG tablet     capsaicin (ZOSTRIX)  0.025 % external cream     chlorthalidone (HYGROTON) 25 MG tablet     conjugated estrogens (PREMARIN) 0.625 MG/GM vaginal cream     ipratropium (ATROVENT) 0.03 % nasal spray     multivitamin w/minerals (THERA-VIT-M) tablet     nitroFURantoin macrocrystal (MACRODANTIN) 50 MG capsule     nitroFURantoin macrocrystal-monohydrate (MACROBID) 100 MG capsule     potassium chloride ER (K-TAB) 20 MEQ CR tablet     psyllium 28.3 % POWD     sulfamethoxazole-trimethoprim (BACTRIM DS) 800-160 MG tablet     vitamin D2 (ERGOCALCIFEROL) 37160 units (1250 mcg) capsule     No current facility-administered medications for this visit.            Allergies:   Cortisone, Diclofenac sodium, Hydrocodone, Morphine and related, Oxycodone, and Tramadol         Review of Systems:  From intake questionnaire   Negative 14 system review except as noted on HPI, nurse's note.         Physical Exam:   Patient is a 87 year old  female   Vitals: Blood pressure 132/71, pulse 58, SpO2 99 %, not currently breastfeeding.  General Appearance Adult: Alert, no acute distress, oriented.  Lungs: Non-labored breathing.  Heart: No obvious jugular venous distension present.  Neuro: Alert, oriented, speech and mentation normal  : Declined, defer today.    PVR: 297 mL      Labs and Pathology:    I personally reviewed all applicable laboratory data and went over findings with patient  Significant for:    BMP RESULTS:  Recent Labs   Lab Test 04/29/23  1945 08/16/22  1537 07/13/21  0932 05/04/21  0846 04/19/21  1707 10/09/20  2056 09/30/20  0417    140 137 132* 138 133 133   POTASSIUM 3.5 3.4 3.4 3.7 3.2* 3.4 3.4   CHLORIDE 97* 102 101 96 102 98 98   CO2 31* 31 34* 34* 33* 31 33*   ANIONGAP 9 7 2* 2* 3 4 2*   * 119* 87 107* 106* 96 101*   BUN 14.4 15 15 14 13 17 10   CR 0.92 0.90 0.86 0.80 0.86 0.90 0.80   GFRESTIMATED 61 63 63 68 62 59* 68   GFRESTBLACK  --   --   --  79 72 68 79   SACHI 9.9 9.1 9.0 9.0 8.7 9.0 8.5       UA RESULTS:   Recent Labs   Lab  Test 04/29/23  1857 04/11/23  1437 03/25/23  1156   SG 1.015 1.020 1.012   URINEPH 7.0 6.5 7.0   NITRITE Negative Positive* Negative   RBCU 1 0-2 7*   WBCU 17* >100* >182*           Imaging:    I personally reviewed all applicable imaging and went over findings with patient.  Significant for:    Results for orders placed or performed during the hospital encounter of 04/29/23   Abd/pelvis CT - no contrast - Stone Protocol    Narrative    EXAM: CT ABDOMEN PELVIS W/O CONTRAST  LOCATION: [facility]  DATE/TIME: 4/29/2023 8:07 PM CDT    INDICATION: flank pain rt  COMPARISON: 09/28/2010  TECHNIQUE: CT scan of the abdomen and pelvis was performed without IV contrast. Multiplanar reformats were obtained. Dose reduction techniques were used.  CONTRAST: None.    FINDINGS:   LOWER CHEST: Normal.    HEPATOBILIARY: Normal.    PANCREAS: Mild fatty infiltration involving pancreatic head unchanged.    SPLEEN: Normal.    ADRENAL GLANDS: Normal.    KIDNEYS/BLADDER: Normal, no stones or hydronephrosis. Distal ureters and bladder base mostly obscured by streak artifact.    BOWEL: No obstruction or inflammatory change.    LYMPH NODES: Normal.    VASCULATURE: Unremarkable.    PELVIC ORGANS: No adnexal lesions evident. No free fluid.    MUSCULOSKELETAL: Bilateral hip arthroplasties with associated streak artifact. Degenerative changes low lumbar spine.      Impression    IMPRESSION:   1.  No etiology for symptoms evident. No hydronephrosis or urinary tract stone identified. Distal ureters and bladder base obscured by streak artifact.              Assessment and Plan:     Assessment: 87 year old female seen in evaluation for recurrent UTIs.     The patient had a history of anterior posterior prolapse repair in 2004. She was seen by gynecology in January 2020 for recurrent anterior prolapse and was fit for a pessary. Per chart review, patient was able to expel all pessaries with cough. She elected to proceed with  observation. Her PVR today was 297 mL today, increased from 196 mL in 2013. We discussed a consult with one of our urogynecologists to discuss the risks and benefits of a repeat surgery. The patient is in agreement.    We discussed preventative measures for UTIs including hydration, management of irregular bowel movements, proper hygiene, supplementation with vitamin C or cranberry, vaginal estrogen cream, methenamine with vitamin C, post-coital antibiotic prophylaxis, and daily antibiotic prophylaxis. The patient used daily Macrobid previously and would like to restart this. We discussed a limited course of Macrobid, likely 3-6 months, and then we will revisit symptoms.     Plan:  1. Refer to urogynecology for surgical consideration of recurrent prolapse and urinary retention.   2. Marcodantin 50 mg daily for UTI prophylaxis x 6 months.   3. Restart vaginal estrogen cream two nights per week.  4. Follow up in 6 months to discuss discontinuing daily antibiotics.     EDGARDO PEDERSEN PA-C  Department of Urology

## 2023-05-05 NOTE — NURSING NOTE
"Initial /71 (BP Location: Right arm, Patient Position: Chair, Cuff Size: Adult Regular)   Pulse 58   LMP  (LMP Unknown)   SpO2 99%  Estimated body mass index is 27.46 kg/m  as calculated from the following:    Height as of 3/25/23: 1.6 m (5' 3\").    Weight as of 4/29/23: 70.3 kg (155 lb). .    Active order to obtain bladder scan? Yes   Name of ordering provider:  Neli Winn  Bladder scan preformed post void Yes.  Bladder scan reveled 297ML  Provider notified?  Yes    Ashley Velasquez CMA          "

## 2023-05-06 LAB — BACTERIA UR CULT: NORMAL

## 2023-05-08 ENCOUNTER — TELEPHONE (OUTPATIENT)
Dept: UROLOGY | Facility: CLINIC | Age: 88
End: 2023-05-08
Payer: COMMERCIAL

## 2023-06-22 DIAGNOSIS — I10 ESSENTIAL HYPERTENSION: ICD-10-CM

## 2023-06-22 RX ORDER — CHLORTHALIDONE 25 MG/1
50 TABLET ORAL DAILY
Qty: 180 TABLET | Refills: 2 | Status: SHIPPED | OUTPATIENT
Start: 2023-06-22 | End: 2024-04-02

## 2023-06-26 DIAGNOSIS — I10 ESSENTIAL HYPERTENSION: ICD-10-CM

## 2023-06-26 RX ORDER — POTASSIUM CHLORIDE 1500 MG/1
TABLET, EXTENDED RELEASE ORAL
Qty: 90 TABLET | Refills: 2 | Status: SHIPPED | OUTPATIENT
Start: 2023-06-26 | End: 2024-04-02

## 2023-06-26 RX ORDER — ATENOLOL 25 MG/1
TABLET ORAL
Qty: 90 TABLET | Refills: 2 | Status: SHIPPED | OUTPATIENT
Start: 2023-06-26 | End: 2024-04-02

## 2023-07-17 ENCOUNTER — PATIENT OUTREACH (OUTPATIENT)
Dept: CARE COORDINATION | Facility: CLINIC | Age: 88
End: 2023-07-17
Payer: COMMERCIAL

## 2023-07-31 ENCOUNTER — PATIENT OUTREACH (OUTPATIENT)
Dept: CARE COORDINATION | Facility: CLINIC | Age: 88
End: 2023-07-31
Payer: COMMERCIAL

## 2023-09-10 ENCOUNTER — HOSPITAL ENCOUNTER (EMERGENCY)
Facility: CLINIC | Age: 88
Discharge: HOME | End: 2023-09-10
Attending: EMERGENCY MEDICINE | Admitting: EMERGENCY MEDICINE
Payer: COMMERCIAL

## 2023-09-10 ENCOUNTER — APPOINTMENT (OUTPATIENT)
Dept: ULTRASOUND IMAGING | Facility: CLINIC | Age: 88
End: 2023-09-10
Attending: EMERGENCY MEDICINE
Payer: COMMERCIAL

## 2023-09-10 ENCOUNTER — APPOINTMENT (OUTPATIENT)
Dept: CT IMAGING | Facility: CLINIC | Age: 88
End: 2023-09-10
Attending: EMERGENCY MEDICINE
Payer: COMMERCIAL

## 2023-09-10 VITALS
OXYGEN SATURATION: 99 % | SYSTOLIC BLOOD PRESSURE: 147 MMHG | RESPIRATION RATE: 18 BRPM | TEMPERATURE: 98.1 F | BODY MASS INDEX: 27.46 KG/M2 | HEART RATE: 54 BPM | DIASTOLIC BLOOD PRESSURE: 80 MMHG | WEIGHT: 155 LBS

## 2023-09-10 DIAGNOSIS — I83.892 BLEEDING FROM VARICOSE VEINS OF LEFT LOWER EXTREMITY: ICD-10-CM

## 2023-09-10 LAB
ABO/RH(D): NORMAL
ANION GAP SERPL CALCULATED.3IONS-SCNC: 9 MMOL/L (ref 7–15)
ANTIBODY SCREEN: NEGATIVE
BASOPHILS # BLD AUTO: 0.1 10E3/UL (ref 0–0.2)
BASOPHILS NFR BLD AUTO: 1 %
BUN SERPL-MCNC: 13.4 MG/DL (ref 8–23)
CALCIUM SERPL-MCNC: 9.4 MG/DL (ref 8.8–10.2)
CHLORIDE SERPL-SCNC: 96 MMOL/L (ref 98–107)
CREAT SERPL-MCNC: 0.82 MG/DL (ref 0.51–0.95)
DEPRECATED HCO3 PLAS-SCNC: 28 MMOL/L (ref 22–29)
EGFRCR SERPLBLD CKD-EPI 2021: 70 ML/MIN/1.73M2
EOSINOPHIL # BLD AUTO: 0.3 10E3/UL (ref 0–0.7)
EOSINOPHIL NFR BLD AUTO: 5 %
ERYTHROCYTE [DISTWIDTH] IN BLOOD BY AUTOMATED COUNT: 12.1 % (ref 10–15)
GLUCOSE SERPL-MCNC: 96 MG/DL (ref 70–99)
HCT VFR BLD AUTO: 38.3 % (ref 35–47)
HGB BLD-MCNC: 13.1 G/DL (ref 11.7–15.7)
IMM GRANULOCYTES # BLD: 0 10E3/UL
IMM GRANULOCYTES NFR BLD: 0 %
LYMPHOCYTES # BLD AUTO: 1.8 10E3/UL (ref 0.8–5.3)
LYMPHOCYTES NFR BLD AUTO: 30 %
MCH RBC QN AUTO: 31.6 PG (ref 26.5–33)
MCHC RBC AUTO-ENTMCNC: 34.2 G/DL (ref 31.5–36.5)
MCV RBC AUTO: 93 FL (ref 78–100)
MONOCYTES # BLD AUTO: 0.6 10E3/UL (ref 0–1.3)
MONOCYTES NFR BLD AUTO: 10 %
NEUTROPHILS # BLD AUTO: 3.3 10E3/UL (ref 1.6–8.3)
NEUTROPHILS NFR BLD AUTO: 54 %
PLATELET # BLD AUTO: 252 10E3/UL (ref 150–450)
POTASSIUM SERPL-SCNC: 3.5 MMOL/L (ref 3.4–5.3)
RBC # BLD AUTO: 4.14 10E6/UL (ref 3.8–5.2)
SODIUM SERPL-SCNC: 133 MMOL/L (ref 136–145)
SPECIMEN EXPIRATION DATE: NORMAL
WBC # BLD AUTO: 6.1 10E3/UL (ref 4–11)

## 2023-09-10 PROCEDURE — 99285 EMERGENCY DEPT VISIT HI MDM: CPT | Mod: 25

## 2023-09-10 PROCEDURE — 86850 RBC ANTIBODY SCREEN: CPT | Performed by: EMERGENCY MEDICINE

## 2023-09-10 PROCEDURE — 82310 ASSAY OF CALCIUM: CPT | Performed by: EMERGENCY MEDICINE

## 2023-09-10 PROCEDURE — 85025 COMPLETE CBC W/AUTO DIFF WBC: CPT | Performed by: EMERGENCY MEDICINE

## 2023-09-10 PROCEDURE — 36415 COLL VENOUS BLD VENIPUNCTURE: CPT | Performed by: EMERGENCY MEDICINE

## 2023-09-10 PROCEDURE — 96360 HYDRATION IV INFUSION INIT: CPT | Mod: 59

## 2023-09-10 PROCEDURE — 250N000009 HC RX 250: Performed by: EMERGENCY MEDICINE

## 2023-09-10 PROCEDURE — 96361 HYDRATE IV INFUSION ADD-ON: CPT

## 2023-09-10 PROCEDURE — 76830 TRANSVAGINAL US NON-OB: CPT

## 2023-09-10 PROCEDURE — 86901 BLOOD TYPING SEROLOGIC RH(D): CPT | Performed by: EMERGENCY MEDICINE

## 2023-09-10 PROCEDURE — 258N000003 HC RX IP 258 OP 636: Performed by: EMERGENCY MEDICINE

## 2023-09-10 PROCEDURE — 250N000011 HC RX IP 250 OP 636: Performed by: EMERGENCY MEDICINE

## 2023-09-10 PROCEDURE — 74177 CT ABD & PELVIS W/CONTRAST: CPT

## 2023-09-10 PROCEDURE — 99284 EMERGENCY DEPT VISIT MOD MDM: CPT | Performed by: EMERGENCY MEDICINE

## 2023-09-10 RX ORDER — IOPAMIDOL 755 MG/ML
76 INJECTION, SOLUTION INTRAVASCULAR ONCE
Status: COMPLETED | OUTPATIENT
Start: 2023-09-10 | End: 2023-09-10

## 2023-09-10 RX ADMIN — IOPAMIDOL 76 ML: 755 INJECTION, SOLUTION INTRAVENOUS at 08:08

## 2023-09-10 RX ADMIN — SODIUM CHLORIDE 59 ML: 9 INJECTION, SOLUTION INTRAVENOUS at 08:08

## 2023-09-10 RX ADMIN — SODIUM CHLORIDE 500 ML: 9 INJECTION, SOLUTION INTRAVENOUS at 06:58

## 2023-09-10 ASSESSMENT — ENCOUNTER SYMPTOMS
ALLERGIC/IMMUNOLOGIC NEGATIVE: 1
HEMATOLOGIC/LYMPHATIC NEGATIVE: 1
NEUROLOGICAL NEGATIVE: 1
PSYCHIATRIC NEGATIVE: 1
CARDIOVASCULAR NEGATIVE: 1
RESPIRATORY NEGATIVE: 1
GASTROINTESTINAL NEGATIVE: 1
MUSCULOSKELETAL NEGATIVE: 1
ENDOCRINE NEGATIVE: 1
EYES NEGATIVE: 1

## 2023-09-10 ASSESSMENT — ACTIVITIES OF DAILY LIVING (ADL)
ADLS_ACUITY_SCORE: 35

## 2023-09-10 NOTE — ED TRIAGE NOTES
Pt reports vaginal bleeding when she was in the shower this morning. Blood was bright red, no clots.      Triage Assessment       Row Name 09/10/23 0639       Triage Assessment (Adult)    Airway WDL WDL       Respiratory WDL    Respiratory WDL WDL       Skin Circulation/Temperature WDL    Skin Circulation/Temperature WDL WDL       Cardiac WDL    Cardiac WDL WDL       Peripheral/Neurovascular WDL    Peripheral Neurovascular WDL WDL       Cognitive/Neuro/Behavioral WDL    Cognitive/Neuro/Behavioral WDL WDL       [place] Coma Scale    Best Eye Response 4-->(E4) spontaneous    Best Motor Response 6-->(M6) obeys commands    Best Verbal Response 5-->(V5) oriented    [place] Coma Scale Score 15

## 2023-09-10 NOTE — DISCHARGE INSTRUCTIONS
1) Your evaluation today revealed that the bleeding while taking a shower this morning prior to arrival was likely from varicose veins about the left foot on the medial ankle.  Imaging did not reveal any bleeding sources from the abdomen vagina or rectum.  After period of care you appear stable for discharge to home with plan to have a compression dressing in place for at least the next 24 hours.    2) If you develop bleeding after removing the compressive dressing that does not stop with pressure after 30 minutes you may need to return to be reevaluated.

## 2023-09-10 NOTE — ED PROVIDER NOTES
History     Chief Complaint   Patient presents with    Vaginal Bleeding     HPI  Caitlyn Wasserman is a 87 year old female who presents by EMS for evaluation with concern about an episode of bleeding while in the shower prior to arrival.    Patient's medical records show history of essential tremor, chronic neck pain with a history of cervical kyphosis, history of osteoarthritis in both knees, known history of vaginal prolapse and prior hip arthroplasty.  She is also known to have a history of COPD but not oxygen dependent. Patient has a history of hypertension and has been treated for pedal edema and recurrent UTIs.  Patient's prescribed medications were reviewed.  On arrival on examination patient reports just prior to arrival she was in the shower when she noted sudden onset of brisk bleeding from her lower part of her body.  She was not certain if it was from the vagina or rectum and did not notice any bleeding from her extremities.  The bleeding was profuse and brisk but no clots.  Bleeding seem to cease prior to EMS arrival at her home.  She lives in [facility] with her .  She reports no abdominal pain or back pain or flank pain.  She reports prior  surgery about 20 years ago for prolapse.  Patient is not on anticoagulants or antiplatelet therapy.    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 Pennington in 2004      Osteoarthritis of both hands 05/07/2019     Priority: Medium    Chronic obstructive pulmonary disease, unspecified COPD type (H) 01/10/2017     Priority: Medium    Chronic rhinitis 12/04/2015     Priority: Medium    Essential hypertension 12/04/2015     Priority: Medium    Dysuria 12/04/2015     Priority: Medium    Primary osteoarthritis of both knees 10/08/2014     Priority: Medium    Pedal edema 12/03/2013     Priority: Medium    Essential tremor 11/15/2013     Priority: Medium    Health Care Home 12/03/2012     Priority: Medium     Roseann Obando RN-PHN  FPA / [facility]   [phone]    DX V65.8 REPLACED WITH [id number] [facility] C[facility] (04/08/2013)      Kyphosis of cervical region 11/28/2011     Priority: Medium    Neck pain, chronic 11/28/2011     Priority: Medium    HYPERLIPIDEMIA LDL GOAL <130 10/31/2010     Priority: Medium    Recurrent UTI 09/20/2010     Priority: Medium    Cataract 07/06/2009     Priority: Medium     Utility update for deleted IMO code  Im[employer] Update utility      Right shoulder pain 10/27/2008     Priority: Medium    Numbness in right leg 10/27/2008     Priority: Medium    LEFT HIP PAIN 03/23/2006     Priority: Medium    Urinary frequency 06/09/2005     Priority: Medium        Past Medical History:    Past Medical History:   Diagnosis Date    COPD (chronic obstructive pulmonary disease) (H)     Migraine, unspecified, without mention of intractable migraine without mention of status migrainosus     Other urinary incontinence     Pure hypercholesterolemia     Unspecified essential hypertension        Past Surgical History:    Past Surgical History:   Procedure Laterality Date    ARTHROPLASTY HIP Right 9/28/2020    Procedure: TOTAL Hip Arthroplasty;  Surgeon: Ashkan Shah MD;  Location: [facility] OR    AS TOTAL KNEE ARTHROPLASTY Right 2011     ANTER  COLPORRHAPHY,BLAD/VAGINA      Cystocele Repair,rectocele repair.    JOINT REPLACEMENT, HIP RT/LT  3/07,     Joint Replacement Hip /LT- dislocated in front repeat surgery 1 week later       Family History:    Family History   Problem Relation Age of Onset    Cancer Mother         Bladder    Neurologic Disorder Mother         heriditary tremor    C.A.D. Father     Cerebrovascular Disease Father     Allergies Maternal Grandfather     Respiratory Maternal Grandfather         Asthma    Diabetes Paternal Grandmother         Type II    Cancer Brother         Multiple myloma    C.A.D. Brother     Cancer Sister         kidney cancer    Neurofibromatosis Son     Breast Cancer No family hx of        Social History:  Marital Status:   [2]  Social History     Tobacco Use    Smoking status: Former     Packs/day: 1.00     Years: 20.00     Pack years: 20.00     Types: Cigarettes     Quit date: 1971     Years since quittin.7    Smokeless tobacco: Never   Vaping Use    Vaping Use: Never used   Substance Use Topics    Alcohol use: Yes     Comment: 1 wine a month maybe    Drug use: No        Medications:    acetaminophen (TYLENOL) 325 MG tablet  acetaminophen-caffeine (EXCEDRIN TENSION HEADACHE) 500-65 MG TABS  albuterol (PROAIR HFA/PROVENTIL HFA/VENTOLIN HFA) 108 (90 Base) MCG/ACT inhaler  atenolol (TENORMIN) 25 MG tablet  chlorthalidone (HYGROTON) 25 MG tablet  conjugated estrogens (PREMARIN) 0.625 MG/GM vaginal cream  ipratropium (ATROVENT) 0.03 % nasal spray  multivitamin w/minerals (THERA-VIT-M) tablet  nitroFURantoin macrocrystal (MACRODANTIN) 50 MG capsule  potassium chloride ER (K-TAB) 20 MEQ CR tablet  psyllium 28.3 % POWD          Review of Systems   Constitutional:         Bleeding from a lower body while in the shower   HENT: Negative.     Eyes: Negative.    Respiratory: Negative.     Cardiovascular: Negative.    Gastrointestinal: Negative.    Endocrine: Negative.    Genitourinary: Negative.     Musculoskeletal: Negative.    Skin: Negative.    Allergic/Immunologic: Negative.    Neurological: Negative.    Hematological: Negative.    Psychiatric/Behavioral: Negative.     All other systems reviewed and are negative.      Physical Exam   BP: (!) 141/73  Pulse: 65  Temp: 98.1  F (36.7  C)  Resp: 18  Weight: 70.3 kg (155 lb)  SpO2: 97 %      Physical Exam  Exam conducted with a chaperone present.   Constitutional:       General: She is not in acute distress.     Appearance: Normal appearance. She is not ill-appearing, toxic-appearing or diaphoretic.   HENT:      Head: Normocephalic and atraumatic.      Nose: Nose normal.   Eyes:      Extraocular Movements: Extraocular movements intact.      Pupils: Pupils are equal, round, and reactive to light.   Neck:      Vascular: No carotid bruit.   Cardiovascular:      Rate and Rhythm: Normal rate.   Pulmonary:      Effort: Pulmonary effort is normal. No respiratory distress.      Breath sounds: Normal breath sounds. No stridor. No wheezing, rhonchi or rales.   Chest:      Chest wall: No tenderness.   Genitourinary:     Exam position: Lithotomy position.      Vagina: No vaginal discharge.      Rectum: Normal.       Musculoskeletal:         General: No swelling. Normal range of motion.      Cervical back: Normal range of motion and neck supple. No rigidity or tenderness.        Legs:    Lymphadenopathy:      Cervical: No cervical adenopathy.   Skin:     Capillary Refill: Capillary refill takes less than 2 seconds.   Neurological:      General: No focal deficit present.      Mental Status: She is alert and oriented to person, place, and time.      Cranial Nerves: No cranial nerve deficit.      Sensory: No sensory deficit.      Motor: No weakness.      Coordination: Coordination normal.      Gait: Gait normal.      Deep Tendon Reflexes: Reflexes normal.   Psychiatric:         Mood and Affect: Mood normal.         Behavior: Behavior normal.         Thought Content: Thought  content normal.         Judgment: Judgment normal.         ED Course                 Procedures              Critical Care time:  none             ED medications:  Medications   0.9% sodium chloride BOLUS (0 mLs Intravenous Stopped 9/10/23 0906)   iopamidol (ISOVUE-370) solution 76 mL (76 mLs Intravenous $Given 9/10/23 0808)   sodium chloride 0.9 % bag 500mL for CT scan flush use (59 mLs As instructed $Given 9/10/23 0808)       ED Vitals:  Vitals:    09/10/23 0753 09/10/23 0838 09/10/23 0930 09/10/23 1401   BP:   137/65 (!) 147/80   Pulse:   56 54   Resp:    18   Temp:    98.1  F (36.7  C)   TempSrc:    Oral   SpO2: 97% 100% 99% 99%   Weight:          ED labs and imaging:  Results for orders placed or performed during the hospital encounter of 09/10/23   CT Abdomen Pelvis w Contrast     Status: None    Narrative    EXAM: CT ABDOMEN PELVIS W CONTRAST  LOCATION: [facility]  DATE: 9/10/2023    INDICATION: History of vaginal bladder prolapse. Sudden onset of bleeding while showering prior to ED arrival.  No visible evidence of bleeding vagina or rectum. Advanced age. Fever and chills. Evaluate for intra abdominal or vascular process.  COMPARISON: 04/29/2023  TECHNIQUE: CT scan of the abdomen and pelvis was performed following injection of IV contrast. Multiplanar reformats were obtained. Dose reduction techniques were used.  CONTRAST: 76mL isovue 370    FINDINGS:   LOWER CHEST: Normal.    HEPATOBILIARY: Cholelithiasis.    PANCREAS: Normal.    SPLEEN: Normal.    ADRENAL GLANDS: Normal.    KIDNEYS/BLADDER: Normal.    BOWEL: Mild sigmoid diverticulosis without active inflammation. No rectal wall thickening. The anus is not well visualized.    LYMPH NODES: Normal.    VASCULATURE: Mild calcified plaque in the abdominal aorta. No aneurysm.    PELVIC ORGANS: Artifact from bilateral hip replacements limits evaluation of the pelvic organs. Nonetheless, there appears to be wall thickening of the lower  vagina and/or perineum. This is otherwise not well evaluated by CT. Inflammatory changes or a   mass cannot be excluded. Findings may also represent soft tissue prominence in the setting of prolapse. No active bleeding is identified. No discrete hematoma.    MUSCULOSKELETAL: Bilateral hip replacements. Degenerative changes in the lumbar spine.      Impression    IMPRESSION:   1.  Artifact from bilateral hip replacements limits evaluation of the pelvic organs. Nonetheless, there appears to be wall thickening or soft tissue prominence of the lower vagina and/or perineum. This is otherwise not well evaluated by CT. Findings may   represent a mass, inflammatory changes, or prolapse. Recommend correlation with any evidence of inflammation or mass on physical exam.  2.  Cholelithiasis.  3.  Mild sigmoid diverticulosis without active inflammation.     US Pelvic Complete with Transvaginal     Status: None    Narrative    EXAM: US PELVIC TRANSABDOMINAL AND TRANSVAGINAL  LOCATION: [facility]  DATE: 9/10/2023    INDICATION: Advanced age. Hx of prolapse by report s p repair,  Episode of bleeding pre ED arrival. Abnormal CT. No evidence of bleeding on physical exam.  Evaluate for  source for bleeding  COMPARISON: None.  TECHNIQUE: Transabdominal scans were performed. Endovaginal ultrasound was performed to better visualize the adnexa.    FINDINGS:    UTERUS: 7.3 x 4.6 x 2.5 cm. Normal in size and position with no masses.    ENDOMETRIUM: 2 mm. Normal smooth endometrium. Small amount of fluid seen in the region of the cervix.    Ovaries are obscured by bowel gas and not visualized. No significant free fluid.      Impression    IMPRESSION:  1.  Ovaries obscured by bowel gas.  2.  Small amount of endometrial fluid. No evidence of recurrent uterine prolapse.         Basic metabolic panel     Status: Abnormal   Result Value Ref Range    Sodium 133 (L) 136 - 145 mmol/L    Potassium 3.5 3.4 - 5.3 mmol/L     Chloride 96 (L) 98 - 107 mmol/L    Carbon Dioxide (CO2) 28 22 - 29 mmol/L    Anion Gap 9 7 - 15 mmol/L    Urea Nitrogen 13.4 8.0 - 23.0 mg/dL    Creatinine 0.82 0.51 - 0.95 mg/dL    Calcium 9.4 8.8 - 10.2 mg/dL    Glucose 96 70 - 99 mg/dL    GFR Estimate 70 >60 mL/min/1.73m2   CBC with platelets and differential     Status: None   Result Value Ref Range    WBC Count 6.1 4.0 - 11.0 10e3/uL    RBC Count 4.14 3.80 - 5.20 10e6/uL    Hemoglobin 13.1 11.7 - 15.7 g/dL    Hematocrit 38.3 35.0 - 47.0 %    MCV 93 78 - 100 fL    MCH 31.6 26.5 - 33.0 pg    MCHC 34.2 31.5 - 36.5 g/dL    RDW 12.1 10.0 - 15.0 %    Platelet Count 252 150 - 450 10e3/uL    % Neutrophils 54 %    % Lymphocytes 30 %    % Monocytes 10 %    % Eosinophils 5 %    % Basophils 1 %    % Immature Granulocytes 0 %    Absolute Neutrophils 3.3 1.6 - 8.3 10e3/uL    Absolute Lymphocytes 1.8 0.8 - 5.3 10e3/uL    Absolute Monocytes 0.6 0.0 - 1.3 10e3/uL    Absolute Eosinophils 0.3 0.0 - 0.7 10e3/uL    Absolute Basophils 0.1 0.0 - 0.2 10e3/uL    Absolute Immature Granulocytes 0.0 <=0.4 10e3/uL   Adult Type and Screen     Status: None   Result Value Ref Range    ABO/RH(D) O NEG     Antibody Screen Negative Negative    SPECIMEN EXPIRATION DATE [id number]    ABO/Rh type and screen     Status: None    Narrative    The following orders were created for panel order ABO/Rh type and screen.  Procedure                               Abnormality         Status                     ---------                               -----------         ------                     Adult Type and Screen[[id number]]                            Final result                 Please view results for these tests on the individual orders.   CBC with platelets differential     Status: None    Narrative    The following orders were created for panel order CBC with platelets differential.  Procedure                               Abnormality         Status                     ---------                                -----------         ------                     CBC with platelets and d...[[id number]]                      Final result                 Please view results for these tests on the individual orders.          Assessments & Plan (with Medical Decision Making)   Assessment Summary and Clinical Impression: 87-year-old female who presented by EMS with concern about bleeding. Patient has multiple comorbidities including history of vaginal prolapse, recurrent UTIs, prior diagnosis of hypertension, COPD not oxygen dependent, history of peripheral edema, tremor and chronic neck pain due to cervical kyphosis.  Bleeding had ceased prior to arrival and prior to EMS arrival at her home.  The source of her bleeding was not found on chaperoned exam.  She has varicose veins and I suspect potential continuous hemorrhage from a varicose vein lower extremity likely site left medial ankle just inferior to the medial malleolus on gross inspection examination of the lower extremity. Chaperoned perineal and speculum exam revealed no evidence of prolapse, some erythema around the labia and perirectal area.  No abnormal vaginal bleeding and normal rectal exam.  She was observed for period of time to ensure there was no rebleeding.  Given normal and reassuring vaginal, perineal rectal exam there is no clear indication for imaging although considered.  Normal abdominal exam and serial examination.  Imaging did not reveal any evidence for intra-abdominal or genitourinary source for bleeding.  Ultimately able to isolate spontaneous bleeding from varicose vein over the inferior aspect of the left foot just medial to the medial malleolus.  No recurrence of bleeding and a compressive dressing was placed.  I Vilma remove the compressive dressing was reviewed with family and patient were expressed understanding and comfort with plan of care.    ED course and plan:  Reviewed the medical record.  ED visit on 4/29/2023.  Chaperoned perineal  rectal exam did not reveal identify any potential sources for bleeding.  Given no evidence of bleeding and no abdominal pain although given her advanced age she was observed for a period of time serial abdominal examination.  We discussed my suspicion that the breast bleeding was likely from varicose vein in the extremity with spontaneous cessation given extent of varicosity on exam.  Work-up revealed mild hyponatremia.  Normal renal function and BUN.  Hemoglobin    Patient reported feeling chilled and reexamination she had no abdominal pain.  After further discussion with spouse who later arrived there expressed concern about the degree of bleeding prearrival.  Given history of vaginal bladder prolapse without visible bleeding on chaperoned perineal vaginal and rectal exam with advanced age abdominal imaging with contrast was obtained.  There is no acute vascular or intra-abdominal process.  Radiology did note some wall thickening soft tissue prominence in the lower vagina perineum not well evaluated by CT and they felt that this could be related to her prolapse mass or inflammatory change.  See details outlined in the radiology report.  This was not appreciated on chaperoned perineal pelvic exam.  No discrete source for bleeding comprehensive pelvic ultrasounds obtained for interval follow-up.  Ultrasound revealed revealed a small amount of endometrial fluid but no evidence of prolapse.  See details outlined the radiology report  After period of care was able to trigger bleeding from varicose vein in the medial left ankle which was my suspicion for the likely source of bleeding prior to patient's arrival.  Patient and spouse expressed comfort going home as bleeding did not recur.  A compressive dressing was placed about the left medial ankle.  I recommended that they leave the dressing in place for least 24 hours.  We discussed the risk for rebleeding and sentinel bleeding and reasons to return for evaluation          Disclaimer: This note consists of symbols derived from keyboarding, dictation and/or voice recognition software. As a result, there may be errors in the script that have gone undetected. Please consider this when interpreting information found in this chart.   I have reviewed the nursing notes.    I have reviewed the findings, diagnosis, plan and need for follow up with the patient.           Medical Decision Making  The patient's presentation was of moderate complexity (an acute illness with systemic symptoms).    The patient's evaluation involved:  review of 2 test result(s) ordered prior to this encounter (diagnostic imaging and labs)    The patient's management necessitated moderate risk (prescription drug management including medications given in the ED).        Discharge Medication List as of 9/10/2023  1:57 PM          Final diagnoses:   Bleeding from varicose veins of left lower extremity       9/10/2023   [facility] EMERGENCY DEPT       Tashi Johnson MD  09/10/23 0915

## 2023-09-10 NOTE — ED NOTES
Writer called to room by MD to help apply pressure to bleeding site on medial aspect of L ankle. Pt provided with clean clothes to wear home.

## 2023-09-11 ENCOUNTER — PATIENT OUTREACH (OUTPATIENT)
Dept: FAMILY MEDICINE | Facility: CLINIC | Age: 88
End: 2023-09-11

## 2023-09-11 ENCOUNTER — ANCILLARY PROCEDURE (OUTPATIENT)
Dept: GENERAL RADIOLOGY | Facility: CLINIC | Age: 88
End: 2023-09-11
Attending: NURSE PRACTITIONER
Payer: COMMERCIAL

## 2023-09-11 ENCOUNTER — OFFICE VISIT (OUTPATIENT)
Dept: FAMILY MEDICINE | Facility: CLINIC | Age: 88
End: 2023-09-11
Payer: COMMERCIAL

## 2023-09-11 VITALS
RESPIRATION RATE: 16 BRPM | SYSTOLIC BLOOD PRESSURE: 138 MMHG | BODY MASS INDEX: 26.46 KG/M2 | HEART RATE: 58 BPM | DIASTOLIC BLOOD PRESSURE: 80 MMHG | OXYGEN SATURATION: 99 % | HEIGHT: 64 IN | WEIGHT: 155 LBS | TEMPERATURE: 97.7 F

## 2023-09-11 DIAGNOSIS — J44.9 CHRONIC OBSTRUCTIVE PULMONARY DISEASE, UNSPECIFIED COPD TYPE (H): ICD-10-CM

## 2023-09-11 DIAGNOSIS — G62.9 NEUROPATHY: ICD-10-CM

## 2023-09-11 DIAGNOSIS — M54.2 NECK PAIN: ICD-10-CM

## 2023-09-11 DIAGNOSIS — Z00.00 ENCOUNTER FOR MEDICARE ANNUAL WELLNESS EXAM: Primary | ICD-10-CM

## 2023-09-11 DIAGNOSIS — I10 ESSENTIAL HYPERTENSION: ICD-10-CM

## 2023-09-11 DIAGNOSIS — I83.93 VARICOSE VEINS OF BOTH LOWER EXTREMITIES, UNSPECIFIED WHETHER COMPLICATED: ICD-10-CM

## 2023-09-11 PROCEDURE — G0439 PPPS, SUBSEQ VISIT: HCPCS | Performed by: NURSE PRACTITIONER

## 2023-09-11 PROCEDURE — G0008 ADMIN INFLUENZA VIRUS VAC: HCPCS | Performed by: NURSE PRACTITIONER

## 2023-09-11 PROCEDURE — 90662 IIV NO PRSV INCREASED AG IM: CPT | Performed by: NURSE PRACTITIONER

## 2023-09-11 PROCEDURE — 99214 OFFICE O/P EST MOD 30 MIN: CPT | Mod: 25 | Performed by: NURSE PRACTITIONER

## 2023-09-11 PROCEDURE — 72040 X-RAY EXAM NECK SPINE 2-3 VW: CPT | Mod: TC | Performed by: RADIOLOGY

## 2023-09-11 ASSESSMENT — ENCOUNTER SYMPTOMS
HEMATURIA: 0
EYE PAIN: 0
DIARRHEA: 0
SHORTNESS OF BREATH: 1
MYALGIAS: 1
DYSURIA: 0
PALPITATIONS: 0
CONSTIPATION: 0
HEARTBURN: 0
PARESTHESIAS: 0
HEADACHES: 1
SORE THROAT: 0
BREAST MASS: 0
WEAKNESS: 0
NAUSEA: 0
CHILLS: 0
COUGH: 1
DIZZINESS: 0
NERVOUS/ANXIOUS: 0
HEMATOCHEZIA: 0
JOINT SWELLING: 1
FREQUENCY: 1
ARTHRALGIAS: 1
ABDOMINAL PAIN: 0

## 2023-09-11 ASSESSMENT — PAIN SCALES - GENERAL: PAINLEVEL: NO PAIN (0)

## 2023-09-11 ASSESSMENT — ACTIVITIES OF DAILY LIVING (ADL): CURRENT_FUNCTION: HOUSEWORK REQUIRES ASSISTANCE

## 2023-09-11 NOTE — TELEPHONE ENCOUNTER
"ED/Discharge Protocol    \"Hi, my name is Ania Abdi RN, a registered nurse, and I am calling on behalf of Cindy Varghese's office at [place].  I am calling to follow up and see how things are going for you after your recent visit.\"    \"I see that you were in the (ER/UC/IP) on 9/10/23.    How are you doing now that you are home?\" Have appointment today with PCP    Ania Abdi RN on 9/11/2023 at 12:58 PM    "

## 2023-09-11 NOTE — PATIENT INSTRUCTIONS
Try the new inhaler as directed plus the albuterol as needed.  See vascular surgery for varicose veins. Change dressing tonight.  See neurology for concerns.  Will be notified of pending neck x ray.  Flu shot given.        Patient Education   Personalized Prevention Plan  You are due for the preventive services outlined below.  Your care team is available to assist you in scheduling these services.  If you have already completed any of these items, please share that information with your care team to update in your medical record.  Health Maintenance Due   Topic Date Due    Osteoporosis Screening  12/17/2016    COVID-19 Vaccine (6 - Pfizer series) 01/13/2023    ANNUAL REVIEW OF HM ORDERS  08/16/2023    Flu Vaccine (1) 09/01/2023    Annual Wellness Visit  08/16/2023

## 2023-09-11 NOTE — PROGRESS NOTES
"SUBJECTIVE:   Caitlyn is a 87 year old who presents for Preventive Visit.      9/11/2023     2:29 PM   Additional Questions   Roomed by        Are you in the first 12 months of your Medicare coverage?  No    Healthy Habits:     In general, how would you rate your overall health?  Good    Frequency of exercise:  None    Do you usually eat at least 4 servings of fruit and vegetables a day, include whole grains    & fiber and avoid regularly eating high fat or \"junk\" foods?  Yes    Taking medications regularly:  Yes    Medication side effects:  Other    Ability to successfully perform activities of daily living:  Housework requires assistance    Home Safety:  No safety concerns identified    Hearing Impairment:  Difficulty understanding soft or whispered speech and difficulty understanding speech on the telephone    In the past 6 months, have you been bothered by leaking of urine? Yes    In general, how would you rate your overall mental or emotional health?  Good    She is generally doing well. Was in ED yesterday with concerns regarding bleeding while taking a shower yesterday. She feared it was vaginal or bladder. After a very thorough work up it was determined the bleeding was from varicose vein left ankle.   She has had some worsening shortness of breath with poor air quality this summer, didn't think Albuterol helps much at all.  Ongoing neuropathy, hands numb, shakiness, chronic but she would like to see neurology about that.  Neck pain, previous PT made it worse.      Have you ever done Advance Care Planning? (For example, a Health Directive, POLST, or a discussion with a medical provider or your loved ones about your wishes): Yes, advance care planning is on file.       Fall risk  Fallen 2 or more times in the past year?: No  Any fall with injury in the past year?: No    Cognitive Screening   1) Repeat 3 items (Leader, Season, Table)    2) Clock draw: NORMAL  3) 3 item recall:   Recalls 3 objects  Results: 3 " items recalled: COGNITIVE IMPAIRMENT LESS LIKELY    Mini-CogTM Copyright MARIBEL Rodríguez. Licensed by the author for use in [facility]; reprinted with permission (adolfo@[facility]). All rights reserved.      Do you have sleep apnea, excessive snoring or daytime drowsiness? : no    Reviewed and updated as needed this visit by clinical staff   Tobacco  Allergies  Meds              Reviewed and updated as needed this visit by Provider                 Social History     Tobacco Use     Smoking status: Former     Packs/day: 1.00     Years: 20.00     Pack years: 20.00     Types: Cigarettes     Quit date: 1971     Years since quittin.7     Smokeless tobacco: Never   Substance Use Topics     Alcohol use: Yes     Comment: 1 wine a month maybe             2023     2:16 PM   Alcohol Use   Prescreen: >3 drinks/day or >7 drinks/week? No     Do you have a current opioid prescription? No  Do you use any other controlled substances or medications that are not prescribed by a provider? None              Current providers sharing in care for this patient include:   Patient Care Team:  Cindy Flor APRN CNP as PCP - General (Nurse Practitioner - Family)  Ashkan Shah MD as MD (Orthopaedic Surgery)  Nieves Cee MD as MD (OB/Gyn)  Cindy Flor APRN CNP as Assigned PCP  Neli Winn PA-C as Physician Assistant (Urology)  Neli Winn PA-C as Assigned Surgical Provider    The following health maintenance items are reviewed in Epic and correct as of today:  Health Maintenance   Topic Date Due     DEXA  2016     COVID-19 Vaccine (6 - Pfizer series) 2023     ANNUAL REVIEW OF HM ORDERS  2023     INFLUENZA VACCINE (1) 2023     MEDICARE ANNUAL WELLNESS VISIT  2023     BMP  09/10/2024     FALL RISK ASSESSMENT  2024     ADVANCE CARE PLANNING  2027     DTAP/TDAP/TD IMMUNIZATION (2 - Td or Tdap) 2029     SPIROMETRY  Completed     COPD  ACTION PLAN  Completed     PHQ-2 (once per calendar year)  Completed     Pneumococcal Vaccine: 65+ Years  Completed     ZOSTER IMMUNIZATION  Completed     IPV IMMUNIZATION  Aged Out     HPV IMMUNIZATION  Aged Out     MENINGITIS IMMUNIZATION  Aged Out     BP Readings from Last 3 Encounters:   23 138/80   09/10/23 (!) 147/80   23 132/71    Wt Readings from Last 3 Encounters:   23 70.3 kg (155 lb)   09/10/23 70.3 kg (155 lb)   23 70.3 kg (155 lb)                  Patient Active Problem List   Diagnosis     Urinary frequency     LEFT HIP PAIN     Right shoulder pain     Numbness in right leg     Cataract     Recurrent UTI     HYPERLIPIDEMIA LDL GOAL <130     Kyphosis of cervical region     Neck pain, chronic     Health Care Home     Essential tremor     Pedal edema     Primary osteoarthritis of both knees     Chronic rhinitis     Essential hypertension     Dysuria     Chronic obstructive pulmonary disease, unspecified COPD type (H)     Osteoarthritis of both hands     Vaginal prolapse     Hip arthrosis     S/P total hip arthroplasty     Abnormal gait     Past Surgical History:   Procedure Laterality Date     ARTHROPLASTY HIP Right 2020    Procedure: TOTAL Hip Arthroplasty;  Surgeon: Ashkan Shah MD;  Location: [facility] OR     AS TOTAL KNEE ARTHROPLASTY Right      HC ANTER COLPORRHAPHY,BLAD/VAGINA      Cystocele Repair,rectocele repair.     JOINT REPLACEMENT, HIP RT/LT  3/07,     Joint Replacement Hip /LT- dislocated in front repeat surgery 1 week later       Social History     Tobacco Use     Smoking status: Former     Packs/day: 1.00     Years: 20.00     Pack years: 20.00     Types: Cigarettes     Quit date: 1971     Years since quittin.7     Smokeless tobacco: Never   Substance Use Topics     Alcohol use: Yes     Comment: 1 wine a month maybe     Family History   Problem Relation Age of Onset     Cancer Mother         Bladder     Neurologic Disorder Mother          heriditary tremor     C.A.D. Father      Cerebrovascular Disease Father      Allergies Maternal Grandfather      Respiratory Maternal Grandfather         Asthma     Diabetes Paternal Grandmother         Type II     Cancer Brother         Multiple myloma     C.A.D. Brother      Cancer Sister         kidney cancer     Neurofibromatosis Son      Breast Cancer No family hx of          Current Outpatient Medications   Medication Sig Dispense Refill     acetaminophen (TYLENOL) 325 MG tablet Take 3 tablets (975 mg) by mouth every 8 hours       acetaminophen-caffeine (EXCEDRIN TENSION HEADACHE) 500-65 MG TABS Take 2 tablets by mouth every 6 hours as needed for mild pain       albuterol (PROAIR HFA/PROVENTIL HFA/VENTOLIN HFA) 108 (90 Base) MCG/ACT inhaler Inhale 2 puffs into the lungs every 6 hours 18 g 1     atenolol (TENORMIN) 25 MG tablet TAKE 1 TABLET DAILY 90 tablet 2     chlorthalidone (HYGROTON) 25 MG tablet Take 2 tablets (50 mg) by mouth daily 180 tablet 2     conjugated estrogens (PREMARIN) 0.625 MG/GM vaginal cream Place 1 g vaginally twice a week 30 g 1     ipratropium (ATROVENT HFA) 17 MCG/ACT inhaler Inhale 2 puffs into the lungs every 6 hours 12.9 g 3     ipratropium (ATROVENT) 0.03 % nasal spray USE 2 SPRAYS NASALLY DAILY AS NEEDED FOR RHINITIS 60 mL 0     multivitamin w/minerals (THERA-VIT-M) tablet Take 1 tablet by mouth daily       nitroFURantoin macrocrystal (MACRODANTIN) 50 MG capsule Take 1 capsule (50 mg) by mouth daily 90 capsule 1     potassium chloride ER (K-TAB) 20 MEQ CR tablet TAKE 1 TABLET DAILY 90 tablet 2     psyllium 28.3 % POWD Take 1 teaspoonful by mouth daily 2 times daily       Allergies   Allergen Reactions     Cortisone Other (See Comments)     Couldn't walk well even after 2 weeks     Diclofenac Sodium Other (See Comments)     Took one tab in 2008, no reaction in note     Hydrocodone Nausea and Vomiting     Morphine And Related Nausea and Vomiting     Vomited 1.5 days     Oxycodone  Nausea and Vomiting     Tramadol Nausea and Vomiting     Recent Labs   Lab Test 09/10/23  0657 04/29/23  1945 08/16/22  1537 07/13/21  0932 05/04/21  0846 04/19/21  1707 07/21/20  1020 02/15/18  0928 01/05/17  0440 11/08/16  1334   LDL  --   --   --  119*  --   --   --  154*  --   --    HDL  --   --   --  71  --   --   --  62  --   --    TRIG  --   --   --  105  --   --   --  98  --   --    ALT  --  17  --   --   --   --   --  23  --   --    CR 0.82 0.92   < > 0.86 0.80 0.86   < > 0.88   < > 0.93   GFRESTIMATED 70 61   < > 63 68 62   < > 61   < > 58*   GFRESTBLACK  --   --   --   --  79 72   < > 74   < > 70   POTASSIUM 3.5 3.5   < > 3.4 3.7 3.2*   < > 4.0   < > 4.0   TSH  --   --   --   --   --   --   --   --   --  1.83    < > = values in this interval not displayed.          Mammogram Screening - Patient over age 75, has elected to discontinue screenings.  Pertinent mammograms are reviewed under the imaging tab.    Review of Systems   Constitutional:  Negative for chills.   HENT:  Negative for congestion, ear pain, hearing loss and sore throat.    Eyes:  Negative for pain and visual disturbance.   Respiratory:  Positive for cough and shortness of breath.    Cardiovascular:  Positive for peripheral edema. Negative for chest pain and palpitations.   Gastrointestinal:  Negative for abdominal pain, constipation, diarrhea, heartburn, hematochezia and nausea.   Breasts:  Negative for tenderness, breast mass and discharge.   Genitourinary:  Positive for frequency and urgency. Negative for dysuria, genital sores, hematuria, pelvic pain, vaginal bleeding and vaginal discharge.   Musculoskeletal:  Positive for arthralgias, joint swelling and myalgias.   Skin:  Negative for rash.   Neurological:  Positive for headaches. Negative for dizziness, weakness and paresthesias.   Psychiatric/Behavioral:  Negative for mood changes. The patient is not nervous/anxious.          OBJECTIVE:   /80   Pulse 58   Temp 97.7  F (36.5  " C)   Resp 16   Ht 1.613 m (5' 3.5\")   Wt 70.3 kg (155 lb)   LMP  (LMP Unknown)   SpO2 99%   BMI 27.03 kg/m   Estimated body mass index is 27.03 kg/m  as calculated from the following:    Height as of this encounter: 1.613 m (5' 3.5\").    Weight as of this encounter: 70.3 kg (155 lb).  Physical Exam  GENERAL APPEARANCE: healthy, alert and no distress  EYES: Eyes grossly normal to inspection, PERRL and conjunctivae and sclerae normal  HENT: ear canals and TM's normal, nose and mouth without ulcers or lesions, oropharynx clear and oral mucous membranes moist  NECK: no adenopathy, no asymmetry, masses, or scars and thyroid normal to palpation  RESP: lungs clear to auscultation - no rales, rhonchi or wheezes  CV: regular rate and rhythm, normal S1 S2, no S3 or S4, no murmur, click or rub, trace bilateral ankle edema and peripheral pulses strong  ABDOMEN: soft, nontender, no hepatosplenomegaly, no masses and bowel sounds normal  MS: no musculoskeletal defects are noted and gait is age appropriate without ataxia, varicose vein bilateral lower extremities, worse around ankle, left ankle has compression dressing on  SKIN: no suspicious lesions or rashes  NEURO: Normal strength and tone, sensory exam grossly normal, mentation intact and speech normal  PSYCH: mentation appears normal and affect normal/bright    Diagnostic Test Results:  Labs reviewed in Epic  none     ASSESSMENT / PLAN:       ICD-10-CM    1. Encounter for Medicare annual wellness exam  Z00.00       2. Essential hypertension  I10       3. Chronic obstructive pulmonary disease, unspecified COPD type (H)  J44.9 ipratropium (ATROVENT HFA) 17 MCG/ACT inhaler      4. Varicose veins of both lower extremities, unspecified whether complicated  I83.93 Vascular Surgery Referral      5. Neck pain  M54.2 XR Cervical Spine 2/3 Views      6. Neuropathy  G62.9 Adult Neurology  Referral        She is generally doing really well.  Recent ED visit was without " "concerning findings other than bleeding from varicose vein. Change compression stocking tonight. Referral placed for evaluation for varicosities.  She didn't feel Albuterol helps much. Will try adding Atrovent.  Blood pressure controlled, continue monitoring and medications.  PT made neck pain worse, will image and consider spine specialist. She will continue with massage.  For neuropathy, see neurology.  Recent lab work obtained in ED and was stable.  Flu shot given.      Patient Instructions   Try the new inhaler as directed plus the albuterol as needed.  See vascular surgery for varicose veins. Change dressing tonight.  See neurology for concerns.  Will be notified of pending neck x ray.  Flu shot given.        Patient Education  Personalized Prevention Plan  You are due for the preventive services outlined below.  Your care team is available to assist you in scheduling these services.  If you have already completed any of these items, please share that information with your care team to update in your medical record.  Health Maintenance Due   Topic Date Due     Osteoporosis Screening  12/17/2016     COVID-19 Vaccine (6 - Pfizer series) 01/13/2023     ANNUAL REVIEW OF HM ORDERS  08/16/2023     Flu Vaccine (1) 09/01/2023     Annual Wellness Visit  08/16/2023            MED REC REQUIRED  Post Medication Reconciliation Status:     COUNSELING:  Reviewed preventive health counseling, as reflected in patient instructions       Regular exercise       Healthy diet/nutrition      BMI:   Estimated body mass index is 27.03 kg/m  as calculated from the following:    Height as of this encounter: 1.613 m (5' 3.5\").    Weight as of this encounter: 70.3 kg (155 lb).         She reports that she quit smoking about 52 years ago. Her smoking use included cigarettes. She has a 20.00 pack-year smoking history. She has never used smokeless tobacco.      Appropriate preventive services were discussed with this patient, including " applicable screening as appropriate for cardiovascular disease, diabetes, osteopenia/osteoporosis, and glaucoma.  As appropriate for age/gender, discussed screening for colorectal cancer, prostate cancer, breast cancer, and cervical cancer. Checklist reviewing preventive services available has been given to the patient.    Reviewed patients plan of care and provided an AVS. The Basic Care Plan (routine screening as documented in Health Maintenance) for Caitlyn meets the Care Plan requirement. This Care Plan has been established and reviewed with the Patient.          SUDHAKAR Yung [facility]    Identified Health Risks:

## 2023-09-11 NOTE — TELEPHONE ENCOUNTER
What type of discharge? Emergency Department  Risk of Hospital admission or ED visit: 9%  Is a TCM episode required? No  When should the patient follow up with PCP? within 30 days of discharge.    Margi Mayers RN on 9/11/2023 at 11:53 AM

## 2023-09-26 NOTE — PROGRESS NOTES
DISCHARGE  Reason for Discharge: No further expectation of progress.  Patient chooses to discontinue therapy.    Equipment Issued: HEP    Discharge Plan: Patient to continue home program.    Referring Provider:  Cindy Flor

## 2023-09-26 NOTE — PATIENT INSTRUCTIONS
Caitlyn    Thank you for entrusting your care with us at the [facility].      We are prescribing some compression stockings for you. I have included different suppliers that should help you get measured and fitting to ensure proper fitting socks. You should wear these stockings as much as you can. It is especially important to wear them with long periods of standing, sitting, long car rides or if you will be flying. Compression socks should get refilled every 4-6 months. They do not need to be worn at night while in bed. It is recommended to wear compression level of 20-30mmhg or higher from toes to knees.       Varicose Veins    Varicose veins are twisted, enlarged veins near the surface of the skin. They develop most often in the legs and ankles.    Some people may be more likely than others to get varicose veins because of several things. These include aging, pregnancy, being overweight, or because a parent has them. Standing or sitting for long periods of time can also increase risk of varicose veins.    Follow-up care is a key part of your treatment and safety. Be sure to make and go to all appointments, and call your doctor if you are having problems. It's also a good idea to know your test results and keep a list of the medicines you take.      Varicose veins are caused by weakened valves and veins in your legs. Normally, one-way valves in your veins keep blood flowing from your legs up toward your heart. When these valves don't work as they should, blood collects in your legs, and pressure builds up. The veins become weak, large, and twisted.    How can you care for yourself at home?  Wear compression stockings during the day to help relieve symptoms and improve blood flow. Talk to your doctor about which ones to get and where to get them.  Prop up your legs at or above the level of your heart when possible. Try to do this for about 30 minutes at a time, about 3 times a day. This helps  keep the blood from pooling in your lower legs and improves blood flow to the rest of your body.  Avoid sitting and standing for long periods. This puts added stress on your veins.  Stay at a healthy weight. Lose weight if you need to.  Try to take several short walks every day.  Get at least 30 minutes of exercise on most days of the week. Walking is a good choice. You also may want to do other activities, such as running, swimming, cycling, or playing tennis or team sports.  Do calf muscle exercises every day. When you are sitting down, rotate your feet at the ankles in both directions, making small circles. Extend your legs, and point and flex your feet.  Avoid crossing your legs at the knees when sitting.  Take good care of your skin. Treat cuts and scrapes on your legs right away. Keep your legs clean and moisturized to prevent drying and cracking. Prevent sunburns.  Do not smoke. Smoking can make varicose veins worse. If you need help quitting, talk to your doctor about stop-smoking programs and medicines. These can increase your chances of quitting for good.  If you bump your leg so hard that you know it is likely to bruise, prop up your leg and apply ice or cold packs right away. Apply the ice or cold pack for 10 to 20 minutes, 3 or more times a day. Put a thin cloth between the ice and your skin.  If you cut or scratch the skin over a vein, it may bleed a lot. Prop up your leg and apply firm pressure for a full 15 minutes.  If you have a blood clot in a varicose vein, you may have tenderness and swelling over the vein. The vein may feel firm. Be sure to call your doctor right away if you have these symptoms. If your doctor has told you how to care for the clot, follow the instructions.     Care may include the following:    Prop up your leg and apply a damp cloth that is warm or cool.  Ask your doctor if you can take an over-the-counter pain medicine, such as acetaminophen (Tylenol), ibuprofen (Advil,  Motrin), or naproxen (Aleve). Be safe with medicines. Read and follow all instructions on the label.    When should you call for help?     Call 911 anytime you think you may need emergency care. For example, call if:    You have sudden chest pain and shortness of breath, or you cough up blood.    Call your doctor now or seek immediate medical care if:    You have signs of a blood clot in your leg (called a deep vein thrombosis), such as:  Pain in your calf, back of the knee, thigh, or groin.  Swelling in the leg or groin.  A color change on the leg or groin. The skin may be reddish or purplish, depending on your usual skin color.  A varicose vein begins to bleed and you cannot stop it.  You have a tender lump in your leg.  You get an open sore.    Watch closely for changes in your health, and be sure to contact your doctor if:    Your varicose vein symptoms do not improve with home treatment.    Current as of: December 19, 2022  Author: Healthwise Staff  Medical Review:Yeison Zheng MD - Family Medicine & RENUKA Linn MD - Internal Medicine & Lucio Quarles MD - Family Medicine & Gonzalo Felix MD - Family Medicine & Nahid Delong MD - Diagnostic Radiology    Please bring your compression prescription to a home medical supply store. Here is a list of locations but not limited to.     [facility]  [address]  [address] [place] [address]  Phone: [phone]  Fax: [phone] [facility]  [address] [place] [address]  Phone: [phone]  Fax: [phone]   [facility]  [address] [place] [address]  Phone: [phone]  Fax: [phone] [facility]  [address]  [address] [place] [address]  Phone: [phone]  Fax: [phone]   [facility]  [place]  [address] [place] [address]  Phone: [phone] [facility]   [address] ([facility] [place] [address]  Phone: [phone]   [facility]  [address] [place] [address]      Phone: [phone]  Fax: [phone] [place]  [address] [place] [address]      Phone: [phone]  Fax: [phone]     [facility] [email or web address]  [address], [place] [address]  [phone]    [facility]  [email or web address]  [address] [place] [address]  Phone: [phone]     [address] [place] [address]  Phone: [phone]    [address] [place] [address]  Phone: [phone]    [address], [place] [address]  Phone: [phone] [address], [place] [address]  Phone: [phone]    [facility] [email or web address]  [address] [place] [address]  Phone: [phone]    [address], [place] [address]  Phone: [phone]    [address], [place] [address]  Phone: [phone]    Annika Rahman  [email or web address]  [phone]     Pre-Procedure Instructions for Varicose Vein Ablation   We look forward to scheduling a varicose vein procedure for you. First, we will submit a prior authorization to your insurance company if required. This typically takes 10-14 days. We will contact you once we have gotten the approval to schedule the procedure.  The following is helpful information for you regarding your treatment:  **Important: A  will be needed post procedure.  (Unable to use Taxi or Uber).  Please allow 1-2 hours for your vein procedure appointment.  Take your routine medications as you normally would except for blood thinners. Aspirin is ok to continue.  If you take Warfarin, Xarelto, or Eliquis this will need to be HELD prior to procedure according to  primary care provider or cardiology who prescribes this medication. Please notify us if you take this medication.  We have thigh high compression stocking sizes medium to X-large that will be applied immediately after the procedure. You will need to provide your own if one of these sizes will not fit. Another option is to bring in knee high compression and biker compression shorts.   Please wear comfortable clothing.  We recommend that you bring a change of undergarment in case it gets stained by the cleansing solution.  Feel free to bring a personal music player or a CD to listen to during your procedure.  It is advised not to fly within 3 weeks after the procedure.  You do not have to fast prior to this procedure.  For any questions regarding your procedure please call [phone] to speak with the nurse.  If you would like a Good Nicole Estimate for your upcoming procedure contact Cost of Care Estimates at [phone], advocates are available Monday through Friday 8am - 4:30pm.    Radiofrequency Ablation Codes:   - 81004 for first vein in either legx2  Varicose veins may be a sign of something more severe - venous reflux disease.  Healthy leg veins have valves that keep blood flowing to the heart. Venous reflux disease develops when the valves stop working properly and allow blood to flow backward (i.e., reflux) and pool in the lower leg veins.   If venous reflux disease is left untreated, symptoms can worsen over time. Your doctor can help you understand if you have this condition.     Superficial venous reflux disease may cause the following signs and symptoms in your legs:  Varicose veins  Aching  Swelling  Cramping  Heaviness or tiredness  Itching  Restlessness  Open skin sores    Superficial venous reflux disease treatment aims to reduce or stop the backward flow of blood. The following may be prescribed to treat your superficial venous reflux disease. Your doctor can help you decide which treatment is best  for you:   Compression stockings   Removing diseased vein   Closing diseased vein (through thermal or non-thermal treatment)     Radiofrequency Ablation (RFA) Treatment for Varicose Veins  Radiofrequency ablation (RFA) is a thermal procedure to treat varicose veins. It uses heat created from radiofrequency (RF). During RFA treatment, RF heat is sent into your vein through a thin, flexible tube (catheter). This closes off blood flow in the main problem vein.           Getting ready for your treatment  Tell your provider if you:  Are pregnant or think you may be pregnant  Are breastfeeding  Smoke, use alcohol or street drugs on a regular basis  Have any allergies or intolerances to certain medicines. Explain what reaction you have had to these medicines in the past.      The day of your treatment  The treatment takes 45 to 60 minutes. The entire treatment (including time to prepare and recover) takes about 1 to 3 hours. You can go home the same day. For the treatment:   You'll lie down on a hospital bed.  An imaging method, such as ultrasound, is used to guide the procedure.  The leg to be treated is injected with numbing medicine.  Once your leg is numb, a needle makes a small hole (puncture) in the vein to be treated.  The catheter with the RF heat source is inserted into your vein.  More numbing medicine may be injected around your vein.  Once the catheter is in the right position, it is then slowly drawn backward. As the catheter sends out heat, the vein is closed off.  In some cases, other side branch varicose veins may be removed or tied off through a few small cuts (incisions).  When the treatment is done, the catheter is removed. Pressure is applied to the insertion site to stop any bleeding. An elastic compression stocking or a bandage may then be put on your leg.       Recovering at home  Once at home, follow all the instructions you've been given. Be sure to:  Check for signs of infection at the catheter  insertion sites (see below)  Wear thigh high compressions as directed  Keep your legs raised (elevated) as directed  Walk a few times a day  Avoid heavy exercise, lifting, and standing for long periods as advised. No lifting >20lbs for 2 weeks.   Avoid air travel, hot baths, saunas, or whirlpools as advised  Do not drive or operate heavy machinery for 24 hours after the procedure  Leakage from the numbing medications the first few hours is normal.          Call your healthcare provider if you have any of the following:  Fever of 100.4 F (38 C) or higher, or as directed by your provider  Chest pain or trouble breathing  Signs of infection at the catheter insertion site. These include increased redness or swelling (inflammation), warmth, increasing pain, bleeding, or bad-smelling discharge.  Severe numbness or tingling in the treated leg  Severe pain or swelling in the treated leg      Follow-up  You'll have an ultrasound within the same week as the procedure to check for problems, such as blood clots. You will follow up with the provider after 6 weeks.   Risks and possible complications   These include the following:  Bleeding, Infection, Blood clots, Damage to the nerves in the treated area, Irritation or burning of the skin over the treated vein. Treatment doesn't improve the look or the symptoms of the problem veins  Risks of any medicines used during the treatment

## 2023-09-27 ENCOUNTER — OFFICE VISIT (OUTPATIENT)
Dept: VASCULAR SURGERY | Facility: CLINIC | Age: 88
End: 2023-09-27
Attending: NURSE PRACTITIONER
Payer: COMMERCIAL

## 2023-09-27 VITALS
OXYGEN SATURATION: 99 % | TEMPERATURE: 97.5 F | SYSTOLIC BLOOD PRESSURE: 165 MMHG | DIASTOLIC BLOOD PRESSURE: 77 MMHG | RESPIRATION RATE: 16 BRPM | HEART RATE: 55 BPM

## 2023-09-27 DIAGNOSIS — I83.893 SYMPTOMATIC VARICOSE VEINS OF BOTH LOWER EXTREMITIES: Primary | ICD-10-CM

## 2023-09-27 DIAGNOSIS — I83.93 VARICOSE VEINS OF BOTH LOWER EXTREMITIES, UNSPECIFIED WHETHER COMPLICATED: ICD-10-CM

## 2023-09-27 PROCEDURE — 99203 OFFICE O/P NEW LOW 30 MIN: CPT | Performed by: SPECIALIST

## 2023-09-27 PROCEDURE — G0463 HOSPITAL OUTPT CLINIC VISIT: HCPCS | Performed by: SPECIALIST

## 2023-09-27 ASSESSMENT — PAIN SCALES - GENERAL: PAINLEVEL: NO PAIN (0)

## 2023-09-27 NOTE — PROGRESS NOTES
"[facility]        Patient is here for a consult to discuss varicose vein(s). The patient has varicose veins that are problematic in Bilateral but more in L legs. Symptoms patient has been experiencing are  neuopathy and shaky per pt . Patient has not been using pain medication or anti-inflammatory's. Patient has had recent imaging done- at [facility]. Patient wears knee high compression 15-20mmhg- pt reports being consistent for most part with daily use. Educated patient to work on conservative treatments.      2 wks ago- Pt started to bleed behind L ankle in the shower. Went to the ER following bleeding. Scab was in place. Pt feels \"stinging in veins all over\"                   "

## 2023-10-03 ENCOUNTER — ANCILLARY PROCEDURE (OUTPATIENT)
Dept: VASCULAR ULTRASOUND | Facility: CLINIC | Age: 88
End: 2023-10-03
Attending: SPECIALIST
Payer: COMMERCIAL

## 2023-10-03 DIAGNOSIS — I83.893 SYMPTOMATIC VARICOSE VEINS OF BOTH LOWER EXTREMITIES: ICD-10-CM

## 2023-10-03 PROCEDURE — 93970 EXTREMITY STUDY: CPT

## 2023-10-10 NOTE — PROGRESS NOTES
Dr Rausch reviewed Ultrasound results and options of bilateral GSV RFA  with patient over the phone Will mail AVS to patient and preauth through [facility].

## 2023-10-11 ENCOUNTER — IMMUNIZATION (OUTPATIENT)
Dept: FAMILY MEDICINE | Facility: CLINIC | Age: 88
End: 2023-10-11
Payer: COMMERCIAL

## 2023-10-11 PROCEDURE — 91320 SARSCV2 VAC 30MCG TRS-SUC IM: CPT

## 2023-10-11 PROCEDURE — 90480 ADMN SARSCOV2 VAC 1/ONLY CMP: CPT

## 2023-10-14 NOTE — PROGRESS NOTES
[facility] Vein Consult      Assessment:     1. varicose veins, bilateral   2. spider veins, bilateral     Plan:     1. Treatment options of conservative therapy of stockings use, exercise, weight loss, elevating legs when possible.    2. Script for compression stockings 20-30 mm hg  3. Ultrasound to evaluate legs for incompetency of both deep and superficial system .   4. Surgical treatment, discussed briefly today  5. Follow up:  will wait for US results to discuss options .   6. Call for any questions concerns or issues    Subjective:      Caitlyn Wasserman is a 88 year old female  who was referred by Cindy Flor  for evaluation of varicose veins. Symptoms include pain, aching, fatigue, burning, edema, and dermatitis. Patient has history of leg selling, pain and vein issues that have progressed. Pain and symptoms have affected daily living and work activities needing medications. Here for evaluation today. Stocking use with compression stockings of 20-30 mm hg or greater for greater then 3 months    Allergies:Cortisone, Diclofenac sodium, Hydrocodone, Morphine and related, Oxycodone, and Tramadol    Past Medical History:   Diagnosis Date    COPD (chronic obstructive pulmonary disease) (H)     Migraine, unspecified, without mention of intractable migraine without mention of status migrainosus     Other urinary incontinence     Pure hypercholesterolemia     Unspecified essential hypertension        Past Surgical History:   Procedure Laterality Date    ARTHROPLASTY HIP Right 9/28/2020    Procedure: TOTAL Hip Arthroplasty;  Surgeon: Ashkan Shah MD;  Location: [facility] OR    AS TOTAL KNEE ARTHROPLASTY Right 2011     ANTER COLPORRHAPHY,BLAD/VAGINA  6/04    Cystocele Repair,rectocele repair.    JOINT REPLACEMENT, HIP RT/LT  3/07, 4/07    Joint Replacement Hip /LT- dislocated in front repeat surgery 1 week later         Current Outpatient Medications:     acetaminophen (TYLENOL) 325 MG tablet, Take  3 tablets (975 mg) by mouth every 8 hours, Disp:  , Rfl:     acetaminophen-caffeine (EXCEDRIN TENSION HEADACHE) 500-65 MG TABS, Take 2 tablets by mouth every 6 hours as needed for mild pain, Disp: , Rfl:     albuterol (PROAIR HFA/PROVENTIL HFA/VENTOLIN HFA) 108 (90 Base) MCG/ACT inhaler, Inhale 2 puffs into the lungs every 6 hours, Disp: 18 g, Rfl: 1    atenolol (TENORMIN) 25 MG tablet, TAKE 1 TABLET DAILY, Disp: 90 tablet, Rfl: 2    chlorthalidone (HYGROTON) 25 MG tablet, Take 2 tablets (50 mg) by mouth daily, Disp: 180 tablet, Rfl: 2    conjugated estrogens (PREMARIN) 0.625 MG/GM vaginal cream, Place 1 g vaginally twice a week, Disp: 30 g, Rfl: 1    ipratropium (ATROVENT HFA) 17 MCG/ACT inhaler, Inhale 2 puffs into the lungs every 6 hours, Disp: 12.9 g, Rfl: 3    ipratropium (ATROVENT) 0.03 % nasal spray, USE 2 SPRAYS NASALLY DAILY AS NEEDED FOR RHINITIS, Disp: 60 mL, Rfl: 0    multivitamin w/minerals (THERA-VIT-M) tablet, Take 1 tablet by mouth daily, Disp: , Rfl:     nitroFURantoin macrocrystal (MACRODANTIN) 50 MG capsule, Take 1 capsule (50 mg) by mouth daily, Disp: 90 capsule, Rfl: 1    potassium chloride ER (K-TAB) 20 MEQ CR tablet, TAKE 1 TABLET DAILY, Disp: 90 tablet, Rfl: 2    psyllium 28.3 % POWD, Take 1 teaspoonful by mouth daily 2 times daily, Disp: , Rfl:     Current Facility-Administered Medications:     lidocaine 112 mL, EPINEPHrine (ADRENALIN) 1.12 mg in sodium chloride 0.9 % 1,113.12 mL (TUMESCENT), , Irrigation, Once, Jan Rausch MD    lidocaine 112 mL, EPINEPHrine (ADRENALIN) 1.12 mg in sodium chloride 0.9 % 1,113.12 mL (TUMESCENT), , Irrigation, Once, Jan Rausch MD     Family History   Problem Relation Age of Onset    Cancer Mother         Bladder    Neurologic Disorder Mother         heriditary tremor    C.A.D. Father     Cerebrovascular Disease Father     Allergies Maternal Grandfather     Respiratory Maternal Grandfather         Asthma    Diabetes Paternal Grandmother          Type II    Cancer Brother         Multiple myloma    C.A.D. Brother     Cancer Sister         kidney cancer    Neurofibromatosis Son     Breast Cancer No family hx of         reports that she quit smoking about 52 years ago. Her smoking use included cigarettes. She has a 20.00 pack-year smoking history. She has never used smokeless tobacco. She reports current alcohol use. She reports that she does not use drugs.      Review of Systems:    Pertinent items are noted in HPI.  Patient has symptomatic veins and changes of bilateral legs. These have progressed to the point of causing symptoms on a daily basis. This causes issues with daily activities and chores such as washing dishes, vacuuming, outdoor upkeep, and standing for long lengths of time       Objective:     Vitals:    09/27/23 1302   BP: (!) 165/77   Pulse: 55   Resp: 16   Temp: 97.5  F (36.4  C)   SpO2: 99%     There is no height or weight on file to calculate BMI.    EXAM:  GENERAL: This is a well-developed 88 year old female who appears her stated age  HEAD: normocephalic  HEENT: Pupils equal and reactive bilaterally  MOUTH: mucus membranes intact. Normal dentation  CARDIAC: RRR without murmur  CHEST/LUNG:  Clear to auscultation bilaterally  ABDOMEN: Soft, nontender, nondistended, no masses noted   NEUROLOGIC: Focally intact, nonfocal, alert and oriented x 3  INTEGUMENT: No open lesions or ulcers  VASCULAR: Pulses intact, symmetrical upper and lower extremities. There areskin changes consistent with chronic venous insufficiency. Varicose veins present in bilateral greater saphenous distribution. Spider veins present bilateral.                              Side:: Bilateral  VCSS  PAIN:: Absent: None  Varicose Veins:: Mild: Few scattered  Venous Edema:: Mild: Evening ankle swelling only  Skin Pigmentation:: Absent: None  Inflamation:: Absent: None  Induration:: Absent: None  Number of active ulcers:: 0  Active ulcer duration:: None  Active ulcer diameter::  None  Compression Therapy:: Full compliance stockings + elevation  VCSS Score:: 5  CEAP:: Simple varicose veins only    Imaging:    pending    Jan Rausch MD  General Surgery [phone]  Vascular Surgery [phone]

## 2023-10-27 ENCOUNTER — TELEPHONE (OUTPATIENT)
Dept: UROLOGY | Facility: CLINIC | Age: 88
End: 2023-10-27
Payer: COMMERCIAL

## 2023-10-27 NOTE — TELEPHONE ENCOUNTER
[facility] Call Center    Phone Message    May a detailed message be left on voicemail: yes     Reason for Call: Medication Refill Request    Has the patient contacted the pharmacy for the refill? Yes   Name of medication being requested: nitrofurantion  Provider who prescribed the medication: Neli Winn  Pharmacy: [facility]  [address], [place] [address]  Date medication is needed: asap pt is out of medication        Action Taken: Message routed to:  Other: URO    Travel Screening: Not Applicable

## 2023-10-27 NOTE — TELEPHONE ENCOUNTER
5/5/23   Plan:  1. Refer to urogynecology for surgical consideration of recurrent prolapse and urinary retention.   2. Marcodantin 50 mg daily for UTI prophylaxis x 6 months.   3. Restart vaginal estrogen cream two nights per week.  4. Follow up in 6 months to discuss discontinuing daily antibiotics.      EDGARDO PEDERSEN PA-C  Department of Urology     _______________________________    Pt needs to make appt as well as confirm if she has followed other recommendations in the plan.  __________________________________    Spoke with patient:  She tried the cream again for a few weeks and stopped because it didn't seem to stop the itching and it doesn't stay where she puts it.  Also concerned about the cost.     Reminded the cream is for future UTI reduction and not just itching.  She will refill it if she can afford it.    Will reach out and schedule the UroGyn (needs new one) to move towards surgery again (was going to do surgery then covid hit.)    Do you want pt to stop the ABX med next week when she runs out? Or give short term fill to keep taking until follow up she schedules with you next avail?    Wanda VENTURA   Specialty Clinic RN

## 2023-10-31 ENCOUNTER — OFFICE VISIT (OUTPATIENT)
Dept: UROLOGY | Facility: CLINIC | Age: 88
End: 2023-10-31
Payer: COMMERCIAL

## 2023-10-31 VITALS
BODY MASS INDEX: 26.46 KG/M2 | SYSTOLIC BLOOD PRESSURE: 137 MMHG | WEIGHT: 155 LBS | DIASTOLIC BLOOD PRESSURE: 86 MMHG | OXYGEN SATURATION: 59 % | HEART RATE: 97 BPM | HEIGHT: 64 IN | TEMPERATURE: 98 F

## 2023-10-31 DIAGNOSIS — N39.0 RECURRENT UTI: Primary | ICD-10-CM

## 2023-10-31 PROCEDURE — 99213 OFFICE O/P EST LOW 20 MIN: CPT | Mod: 25 | Performed by: STUDENT IN AN ORGANIZED HEALTH CARE EDUCATION/TRAINING PROGRAM

## 2023-10-31 PROCEDURE — 51798 US URINE CAPACITY MEASURE: CPT | Performed by: STUDENT IN AN ORGANIZED HEALTH CARE EDUCATION/TRAINING PROGRAM

## 2023-10-31 RX ORDER — ESTRADIOL 0.1 MG/G
2 CREAM VAGINAL
Qty: 85 G | Refills: 3 | Status: SHIPPED | OUTPATIENT
Start: 2023-11-02

## 2023-10-31 ASSESSMENT — PAIN SCALES - GENERAL: PAINLEVEL: NO PAIN (0)

## 2023-10-31 NOTE — PROGRESS NOTES
"    UROLOGY FOLLOW-UP NOTE          Chief Complaint:   Today I had the pleasure of seeing Ms. Caitlyn Wasserman in follow-up for a chief complaint of recurrent UTIs.          Interval Update   Caitlyn Wasserman is a very pleasant 88 year old female with a history of COPD, HTN, essential tremor, and HLD.     Brief History: Ms. Caitlyn Wasserman was last seen on 5/05/2023 for recurrent UTIs. She had a CT abdomen pelvis without contrast on 4/29/2023 for right flank pain, which was unremarkable.      The patient had a history of anterior posterior prolapse repair in 2004. She was seen by gynecology in January 2020 for recurrent anterior prolapse and was fit for a pessary. Per chart review, patient was able to expel all pessaries with cough. She elected to proceed with observation. Her PVR was 297 mL at her last visit and she was referred to see urogynecology.     She had a prescription for vaginal estrogen cream, but was not using it consistently. We started a course of daily Macrodantin at her last visit.     Today's notes: She is doing well today. She notes cloudy urine and urinary urgency, but this is consistent with her baseline.          Physical Exam:   Patient is a 88 year old  female   Vitals: Blood pressure 137/86, pulse 97, temperature 98  F (36.7  C), temperature source Tympanic, height 1.613 m (5' 3.5\"), weight 70.3 kg (155 lb), SpO2 (!) 59%, not currently breastfeeding.  General: Alert and oriented x 3, no acute distress.  Respiratory: Non-labored breathing.  Cardiac: Regular rate.    PVR: 217 mL        Labs and Pathology:    I personally reviewed all applicable laboratory data and went over findings with patient  Significant for:    CBC RESULTS:  Recent Labs   Lab Test 09/10/23  0657 04/29/23  1945 10/09/20  2056 09/29/20  0411 09/23/20  0842   WBC 6.1 8.3 9.8  --  5.9   HGB 13.1 13.7 10.5* 11.2* 13.9    262 373  --  256        BMP RESULTS:  Recent Labs   Lab Test 09/10/23  0657 04/29/23 1945 08/16/22  1537 " 07/13/21  0932 05/04/21  0846 04/19/21  1707 10/09/20  2056 09/30/20  0417   * 137 140 137 132* 138 133 133   POTASSIUM 3.5 3.5 3.4 3.4 3.7 3.2* 3.4 3.4   CHLORIDE 96* 97* 102 101 96 102 98 98   CO2 28 31* 31 34* 34* 33* 31 33*   ANIONGAP 9 9 7 2* 2* 3 4 2*   GLC 96 104* 119* 87 107* 106* 96 101*   BUN 13.4 14.4 15 15 14 13 17 10   CR 0.82 0.92 0.90 0.86 0.80 0.86 0.90 0.80   GFRESTIMATED 70 61 63 63 68 62 59* 68   GFRESTBLACK  --   --   --   --  79 72 68 79   SACHI 9.4 9.9 9.1 9.0 9.0 8.7 9.0 8.5       UA RESULTS:   Recent Labs   Lab Test 05/05/23  0944 04/29/23  1857 04/11/23  1437   SG 1.015 1.015 1.020   URINEPH 7.5* 7.0 6.5   NITRITE Negative Negative Positive*   RBCU 0-2 1 0-2   WBCU 0-5 17* >100*              Assessment/Plan   88 year old female seen in follow up for recurrent UTIs. She has been using Premarin cream occasionally, but this is expensive. It looks like Estrace cream is cheaper, so we will prescribe this instead.     The patient had a history of anterior posterior prolapse repair in 2004. She was seen by gynecology in January 2020 for recurrent anterior prolapse and was fit for a pessary. Per chart review, patient was able to expel all pessaries with cough. She elected to proceed with observation. Due to PVRs >200 mL, we discussed scheduling with urogynecology again. The patient is in agreement.     We will continue without daily Macrodantin at this time. Standing UA orders were placed should the patient develop symptoms of a UTI.     Plan:  1. Start vaginal Estrace cream two nights per week. Discontinue Premarin cream.   2. Urogynecology visit for consult on known cystocele.   3. Follow up with me for symptoms of a UTI.              Past Medical History:     Past Medical History:   Diagnosis Date    COPD (chronic obstructive pulmonary disease) (H)     Migraine, unspecified, without mention of intractable migraine without mention of status migrainosus     Other urinary incontinence     Pure  hypercholesterolemia     Unspecified essential hypertension             Past Surgical History:     Past Surgical History:   Procedure Laterality Date    ARTHROPLASTY HIP Right 9/28/2020    Procedure: TOTAL Hip Arthroplasty;  Surgeon: Ashkan Shah MD;  Location: [facility] OR    AS TOTAL KNEE ARTHROPLASTY Right 2011    HC ANTER COLPORRHAPHY,BLAD/VAGINA  6/04    Cystocele Repair,rectocele repair.    JOINT REPLACEMENT, HIP RT/LT  3/07, 4/07    Joint Replacement Hip /LT- dislocated in front repeat surgery 1 week later            Medications     Current Outpatient Medications   Medication    acetaminophen (TYLENOL) 325 MG tablet    acetaminophen-caffeine (EXCEDRIN TENSION HEADACHE) 500-65 MG TABS    albuterol (PROAIR HFA/PROVENTIL HFA/VENTOLIN HFA) 108 (90 Base) MCG/ACT inhaler    atenolol (TENORMIN) 25 MG tablet    chlorthalidone (HYGROTON) 25 MG tablet    conjugated estrogens (PREMARIN) 0.625 MG/GM vaginal cream    ipratropium (ATROVENT HFA) 17 MCG/ACT inhaler    ipratropium (ATROVENT) 0.03 % nasal spray    multivitamin w/minerals (THERA-VIT-M) tablet    nitroFURantoin macrocrystal (MACRODANTIN) 50 MG capsule    potassium chloride ER (K-TAB) 20 MEQ CR tablet    psyllium 28.3 % POWD     Current Facility-Administered Medications   Medication    lidocaine 112 mL, EPINEPHrine (ADRENALIN) 1.12 mg in sodium chloride 0.9 % 1,113.12 mL (TUMESCENT)    lidocaine 112 mL, EPINEPHrine (ADRENALIN) 1.12 mg in sodium chloride 0.9 % 1,113.12 mL (TUMESCENT)            Family History:     Family History   Problem Relation Age of Onset    Cancer Mother         Bladder    Neurologic Disorder Mother         heriditary tremor    C.A.D. Father     Cerebrovascular Disease Father     Allergies Maternal Grandfather     Respiratory Maternal Grandfather         Asthma    Diabetes Paternal Grandmother         Type II    Cancer Brother         Multiple myloma    C.A.D. Brother     Cancer Sister         kidney cancer    Neurofibromatosis Son      Breast Cancer No family hx of             Social History:     Social History     Socioeconomic History    Marital status:      Spouse name: Neeraj Wasserman    Number of children: 3    Years of education: 14    Highest education level: Not on file   Occupational History     Employer: RETIRED   Tobacco Use    Smoking status: Former     Packs/day: 1.00     Years: 20.00     Additional pack years: 0.00     Total pack years: 20.00     Types: Cigarettes     Quit date: 1971     Years since quittin.8    Smokeless tobacco: Never   Vaping Use    Vaping Use: Never used   Substance and Sexual Activity    Alcohol use: Yes     Comment: 1 wine a month maybe    Drug use: No    Sexual activity: Not Currently     Partners: Male   Other Topics Concern     Service No    Blood Transfusions No    Caffeine Concern Yes     Comment: 1 a day    Occupational Exposure No    Hobby Hazards No    Sleep Concern No    Stress Concern No    Weight Concern Yes     Comment: always ongoing problem    Special Diet Yes     Comment: calcium and vit D    Back Care No    Exercise No    Bike Helmet No    Seat Belt Yes    Self-Exams Yes    Parent/sibling w/ CABG, MI or angioplasty before 65F 55M? No   Social History Narrative    Not on file     Social Determinants of Health     Financial Resource Strain: Not on file   Food Insecurity: Not on file   Transportation Needs: Not on file   Physical Activity: Not on file   Stress: Not on file   Social Connections: Not on file   Interpersonal Safety: Not on file   Housing Stability: Not on file            Allergies:   Cortisone, Diclofenac sodium, Hydrocodone, Morphine and related, Oxycodone, and Tramadol         Review of Systems:  From intake questionnaire   Negative 14 system review except as noted on HPI, nurse's note.        EDGARDO PEDERSEN PA-C  Department of Urology

## 2023-10-31 NOTE — NURSING NOTE
"Initial /86   Pulse 97   Temp 98  F (36.7  C) (Tympanic)   Ht 1.613 m (5' 3.5\")   Wt 70.3 kg (155 lb)   LMP  (LMP Unknown)   SpO2 (!) 59%   BMI 27.03 kg/m   Estimated body mass index is 27.03 kg/m  as calculated from the following:    Height as of this encounter: 1.613 m (5' 3.5\").    Weight as of this encounter: 70.3 kg (155 lb). .  Active order to obtain bladder scan? Yes   Name of ordering provider:  Neli Winn  Bladder scan preformed post void Yes.  Bladder scan reveled 219ML  Provider notified?  Yes    Ashley Velasquez CMA        "

## 2023-11-08 ENCOUNTER — TELEPHONE (OUTPATIENT)
Dept: VASCULAR SURGERY | Facility: CLINIC | Age: 88
End: 2023-11-08
Payer: COMMERCIAL

## 2023-11-08 NOTE — TELEPHONE ENCOUNTER
Spoke with patient and she is wondering about her swelling that she has had previously. Pt is concerned because when she had surgery before she ended up having so much swelling in her leg that she went into the ED and had US done to rule out DVT and was negative. Told her there are risks and there is no way to tell if she would have this extreme swelling again but with any procedure/surgery usually you will have some swelling. Told her about importance of compression, elevation, and care afterwards. Pt is unsure on her plan if wanting to schedule procedure and will discuss with her family before proceeding. She call us back if she wishes to schedule. Number provided.

## 2023-11-08 NOTE — TELEPHONE ENCOUNTER
Caller: Caitlyn    Provider: MD Jan Rausch    Detailed reason for call: I called Caitlyn to schedule the vein procedure with Dr. Rausch but she states she has more questions she would like to discuss with the nurse regarding the recovery before she gets it scheduled.      Best phone number to contact: [phone]    Best time to contact: She states she will be home this afternoon, but will be out of the home tomorrow morning, okay to call in the afternoon again tomorrow.     Ok to leave a detailed message: Yes    Ok to speak to authorized person if needed: No      (Noted to patient if reason is related to wound or incision, to please send a photo via email or Couplet.)

## 2023-11-12 ENCOUNTER — HOSPITAL ENCOUNTER (EMERGENCY)
Facility: CLINIC | Age: 88
Discharge: HOME | End: 2023-11-12
Attending: PHYSICIAN ASSISTANT | Admitting: PHYSICIAN ASSISTANT
Payer: COMMERCIAL

## 2023-11-12 VITALS
RESPIRATION RATE: 18 BRPM | OXYGEN SATURATION: 98 % | TEMPERATURE: 97.4 F | SYSTOLIC BLOOD PRESSURE: 153 MMHG | HEART RATE: 58 BPM | DIASTOLIC BLOOD PRESSURE: 65 MMHG

## 2023-11-12 DIAGNOSIS — N39.0 URINARY TRACT INFECTION: ICD-10-CM

## 2023-11-12 LAB
ALBUMIN UR-MCNC: NEGATIVE MG/DL
APPEARANCE UR: ABNORMAL
BACTERIA #/AREA URNS HPF: ABNORMAL /HPF
BILIRUB UR QL STRIP: NEGATIVE
COLOR UR AUTO: YELLOW
GLUCOSE UR STRIP-MCNC: NEGATIVE MG/DL
HGB UR QL STRIP: ABNORMAL
KETONES UR STRIP-MCNC: NEGATIVE MG/DL
LEUKOCYTE ESTERASE UR QL STRIP: ABNORMAL
NITRATE UR QL: NEGATIVE
PH UR STRIP: 7 [PH] (ref 5–7)
RBC URINE: 3 /HPF
SP GR UR STRIP: 1 (ref 1–1.03)
UROBILINOGEN UR STRIP-MCNC: NORMAL MG/DL
WBC URINE: >182 /HPF

## 2023-11-12 PROCEDURE — 87086 URINE CULTURE/COLONY COUNT: CPT | Performed by: PHYSICIAN ASSISTANT

## 2023-11-12 PROCEDURE — 81001 URINALYSIS AUTO W/SCOPE: CPT | Performed by: PHYSICIAN ASSISTANT

## 2023-11-12 PROCEDURE — 99214 OFFICE O/P EST MOD 30 MIN: CPT | Performed by: PHYSICIAN ASSISTANT

## 2023-11-12 PROCEDURE — G0463 HOSPITAL OUTPT CLINIC VISIT: HCPCS | Performed by: PHYSICIAN ASSISTANT

## 2023-11-12 RX ORDER — CEPHALEXIN 500 MG/1
500 CAPSULE ORAL 4 TIMES DAILY
Qty: 28 CAPSULE | Refills: 0 | Status: SHIPPED | OUTPATIENT
Start: 2023-11-12 | End: 2023-11-19

## 2023-11-12 ASSESSMENT — ENCOUNTER SYMPTOMS
FREQUENCY: 1
CONSTITUTIONAL NEGATIVE: 1
GASTROINTESTINAL NEGATIVE: 1
MUSCULOSKELETAL NEGATIVE: 1
FEVER: 0

## 2023-11-12 NOTE — ED PROVIDER NOTES
History     Chief Complaint   Patient presents with    Urinary Problem     sX X 4 DAYS. Urgency. Frequency. Friday felt a little better but then came back yesterday. Cramping.      HPI  Caitlyn Wasserman is a 88 year old female who presents to Urgent Care with complaints of abdominal cramping, and increased urinary urgency and frequency.  Patient states her symptoms started 4 days ago and then seem to get better before worsening again yesterday and into today.  Denies fevers, chills, nausea, vomiting, abdominal pain, flank or back pain, or hematuria.      Pt has history of recurrent UTIs.  See urology. She had a CT abdomen pelvis without contrast on 4/29/2023 for right flank pain, which was unremarkable.       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 Pennington in 2004      Osteoarthritis of both hands 05/07/2019     Priority: Medium    Chronic obstructive pulmonary disease, unspecified COPD type (H) 01/10/2017     Priority: Medium    Chronic rhinitis 12/04/2015     Priority: Medium    Essential hypertension 12/04/2015     Priority: Medium    Dysuria 12/04/2015     Priority: Medium    Primary osteoarthritis of both knees 10/08/2014     Priority: Medium    Pedal edema 12/03/2013     Priority: Medium    Essential tremor  11/15/2013     Priority: Medium    Health Care Home 12/03/2012     Priority: Medium     Roseann Obando RN-PHN  [facility]   [phone]    DX V65.8 REPLACED WITH [facility] (04/08/2013)      Kyphosis of cervical region 11/28/2011     Priority: Medium    Neck pain, chronic 11/28/2011     Priority: Medium    HYPERLIPIDEMIA LDL GOAL <130 10/31/2010     Priority: Medium    Recurrent UTI 09/20/2010     Priority: Medium    Cataract 07/06/2009     Priority: Medium     Utility update for deleted IMO code  [employer] Update utility      Right shoulder pain 10/27/2008     Priority: Medium    Numbness in right leg 10/27/2008     Priority: Medium    LEFT HIP PAIN 03/23/2006     Priority: Medium    Urinary frequency 06/09/2005     Priority: Medium        Past Medical History:    Past Medical History:   Diagnosis Date    COPD (chronic obstructive pulmonary disease) (H)     Migraine, unspecified, without mention of intractable migraine without mention of status migrainosus     Other urinary incontinence     Pure hypercholesterolemia     Unspecified essential hypertension        Past Surgical History:    Past Surgical History:   Procedure Laterality Date    ARTHROPLASTY HIP Right 9/28/2020    Procedure: TOTAL Hip Arthroplasty;  Surgeon: Ashkan Shah MD;  Location: [facility] OR    AS TOTAL KNEE ARTHROPLASTY Right 2011    HC ANTER COLPORRHAPHY,BLAD/VAGINA  6/04    Cystocele Repair,rectocele repair.    JOINT REPLACEMENT, HIP RT/LT  3/07, 4/07    Joint Replacement Hip /LT- dislocated in front repeat surgery 1 week later       Family History:    Family History   Problem Relation Age of Onset    Cancer Mother         Bladder    Neurologic Disorder Mother         heriditary tremor    C.A.D. Father     Cerebrovascular Disease Father     Allergies Maternal Grandfather     Respiratory Maternal Grandfather         Asthma    Diabetes Paternal Grandmother         Type II    Cancer Brother         Multiple  wu VALDEZ Brother     Cancer Sister         kidney cancer    Neurofibromatosis Son     Breast Cancer No family hx of        Social History:  Marital Status:   [2]  Social History     Tobacco Use    Smoking status: Former     Packs/day: 1.00     Years: 20.00     Additional pack years: 0.00     Total pack years: 20.00     Types: Cigarettes     Quit date: 1971     Years since quittin.8    Smokeless tobacco: Never   Vaping Use    Vaping Use: Never used   Substance Use Topics    Alcohol use: Yes     Comment: 1 wine a month maybe    Drug use: No        Medications:    cephALEXin (KEFLEX) 500 MG capsule  acetaminophen (TYLENOL) 325 MG tablet  acetaminophen-caffeine (EXCEDRIN TENSION HEADACHE) 500-65 MG TABS  albuterol (PROAIR HFA/PROVENTIL HFA/VENTOLIN HFA) 108 (90 Base) MCG/ACT inhaler  atenolol (TENORMIN) 25 MG tablet  chlorthalidone (HYGROTON) 25 MG tablet  estradiol (ESTRACE) 0.1 MG/GM vaginal cream  ipratropium (ATROVENT HFA) 17 MCG/ACT inhaler  ipratropium (ATROVENT) 0.03 % nasal spray  multivitamin w/minerals (THERA-VIT-M) tablet  potassium chloride ER (K-TAB) 20 MEQ CR tablet  psyllium 28.3 % POWD          Review of Systems   Constitutional: Negative.  Negative for fever.   Gastrointestinal: Negative.    Genitourinary:  Positive for frequency and urgency.   Musculoskeletal: Negative.    Skin: Negative.    All other systems reviewed and are negative.      Physical Exam   BP: (!) 153/65  Pulse: 58  Temp: 97.4  F (36.3  C)  Resp: 18  SpO2: 98 %      Physical Exam  Constitutional:       General: She is not in acute distress.     Appearance: Normal appearance. She is not ill-appearing, toxic-appearing or diaphoretic.   HENT:      Head: Normocephalic and atraumatic.   Eyes:      Conjunctiva/sclera: Conjunctivae normal.   Cardiovascular:      Rate and Rhythm: Normal rate and regular rhythm.      Heart sounds: Normal heart sounds.   Pulmonary:      Effort: Pulmonary effort is normal.      Breath  sounds: Normal breath sounds.   Abdominal:      General: There is no distension.      Palpations: Abdomen is soft.      Tenderness: There is no abdominal tenderness. There is no right CVA tenderness, left CVA tenderness, guarding or rebound.   Musculoskeletal:         General: Normal range of motion.      Cervical back: Neck supple.   Skin:     General: Skin is warm and dry.   Neurological:      Mental Status: She is alert.         ED Course                 Procedures      Results for orders placed or performed during the hospital encounter of 11/12/23 (from the past 24 hour(s))   UA Macroscopic with reflex to Microscopic and Culture    Specimen: Urine, Clean Catch   Result Value Ref Range    Color Urine Yellow Colorless, Straw, Light Yellow, Yellow    Appearance Urine Cloudy (A) Clear    Glucose Urine Negative Negative mg/dL    Bilirubin Urine Negative Negative    Ketones Urine Negative Negative mg/dL    Specific Gravity Urine 1.005 1.003 - 1.035    Blood Urine Small (A) Negative    pH Urine 7.0 5.0 - 7.0    Protein Albumin Urine Negative Negative mg/dL    Urobilinogen Urine Normal Normal, 2.0 mg/dL    Nitrite Urine Negative Negative    Leukocyte Esterase Urine Large (A) Negative    Bacteria Urine Few (A) None Seen /HPF    RBC Urine 3 (H) <=2 /HPF    WBC Urine >182 (H) <=5 /HPF    Narrative    Urine Culture ordered based on laboratory criteria       Medications - No data to display    Assessments & Plan (with Medical Decision Making)     Pt is a 88 year old female who presents to Urgent Care with complaints of abdominal cramping, and increased urinary urgency and frequency.  Patient states her symptoms started 4 days ago and then seem to get better before worsening again yesterday and into today.      Pt has history of recurrent UTIs.  See urology. She had a CT abdomen pelvis without contrast on 4/29/2023 for right flank pain, which was unremarkable.     Pt is afebrile on arrival.  Exam as above.  Urinalysis  positive for large leuk esterase, > 182 WBCs.  Urine was sent for culture.  Discussed results with parent.  Return precautions were reviewed.  Hand-outs were provided.    Pt was sent with Keflex.  Instructed parent to have patient follow-up with PCP for continued care and management.  She is to return to the ED for persistent and/or worsening symptoms.  We discussed signs and symptoms to observe for that should prompt re-evaluation.  Pt's parent expressed understanding with and agreement with the plan, and patient was discharged home in good condition.    I have reviewed the nursing notes.    I have reviewed the findings, diagnosis, plan and need for follow up with the patient's parent.    Discharge Medication List as of 11/12/2023 10:14 AM        START taking these medications    Details   cephALEXin (KEFLEX) 500 MG capsule Take 1 capsule (500 mg) by mouth 4 times daily for 7 days, Disp-28 capsule, R-0, E-Prescribe             Final diagnoses:   Urinary tract infection       11/12/2023   [facility] EMERGENCY DEPT      Disclaimer:  This note consists of symbols derived from keyboarding, dictation and/or voice recognition software.  As a result, there may be errors in the script that have gone undetected.  Please consider this when interpreting information found in this chart.     Vangie Coon PA-C  11/12/23 103

## 2023-11-13 LAB — BACTERIA UR CULT: NORMAL

## 2023-11-20 ENCOUNTER — VIRTUAL VISIT (OUTPATIENT)
Dept: UROLOGY | Facility: CLINIC | Age: 88
End: 2023-11-20
Payer: COMMERCIAL

## 2023-11-20 VITALS — WEIGHT: 155 LBS | BODY MASS INDEX: 27.03 KG/M2

## 2023-11-20 DIAGNOSIS — N81.11 MIDLINE CYSTOCELE: Primary | ICD-10-CM

## 2023-11-20 DIAGNOSIS — N95.8 GENITOURINARY SYNDROME OF MENOPAUSE: ICD-10-CM

## 2023-11-20 DIAGNOSIS — N81.6 RECTOCELE: ICD-10-CM

## 2023-11-20 PROCEDURE — 99214 OFFICE O/P EST MOD 30 MIN: CPT | Mod: 95 | Performed by: UROLOGY

## 2023-11-20 NOTE — PROGRESS NOTES
Virtual Visit Details    Type of service:  Video Visit   Video Start Time: 9:58 AM  Video End Time:10:09 AM    Originating Location (pt. Location): Home    Distant Location (provider location):  Off-site  Platform used for Video Visit: Well

## 2023-11-20 NOTE — PROGRESS NOTES
HPI:  Caitlyn Wasserman is a 88 year old female with pelvic organ prolapse.  She had previous surgery with mesh but feels like she has had a recurrence.  She also has some recurrent utis.  She has a hx of anterior posterior prolapse repair in 2004. She was seen by gynecology in January 2020 for recurrent anterior prolapse and was fit for a pessary. Per chart review, patient was able to expel all pessaries with cough.   She is not very bothered by it however but just wanted to discuss a little.    Reviewed previous notes from Neli Winn from 5/5/23    Exam:  Wt 70.3 kg (155 lb)   LMP  (LMP Unknown)   BMI 27.03 kg/m    GENERAL: Healthy, alert and no distress  EYES: Eyes grossly normal to inspection.  No discharge or erythema, or obvious scleral/conjunctival abnormalities.  RESP: No audible wheeze, cough, or visible cyanosis.  No visible retractions or increased work of breathing.    SKIN: Visible skin clear. No significant rash, abnormal pigmentation or lesions.  NEURO: Cranial nerves grossly intact.  Mentation and speech appropriate for age.  PSYCH: Mentation appears normal, affect normal/bright, judgement and insight intact, normal speech and appearance well-groomed.    Review of Imaging:  The following imaging exams were reviewed by me and discussed with patient:  CT abd/pelvis on 9/10/23:  IMPRESSION:   1.  Artifact from bilateral hip replacements limits evaluation of the pelvic organs. Nonetheless, there appears to be wall thickening or soft tissue prominence of the lower vagina and/or perineum. This is otherwise not well evaluated by CT. Findings may   represent a mass, inflammatory changes, or prolapse. Recommend correlation with any evidence of inflammation or mass on physical exam.  2.  Cholelithiasis.  3.  Mild sigmoid diverticulosis without active inflammation.      Review of Labs:  The following labs were reviewed by me and discussed with the patient:  Lab Results   Component Value Date    WBC 6.1  09/10/2023    WBC 9.8 10/09/2020     Lab Results   Component Value Date    RBC 4.14 09/10/2023    RBC 3.26 10/09/2020     Lab Results   Component Value Date    HGB 13.1 09/10/2023    HGB 10.5 10/09/2020     Lab Results   Component Value Date    HCT 38.3 09/10/2023    HCT 31.1 10/09/2020     Lab Results   Component Value Date    MCV 93 09/10/2023    MCV 95 10/09/2020     Lab Results   Component Value Date    MCH 31.6 09/10/2023    MCH 32.2 10/09/2020     Lab Results   Component Value Date    MCHC 34.2 09/10/2023    MCHC 33.8 10/09/2020     Lab Results   Component Value Date    RDW 12.1 09/10/2023    RDW 13.8 10/09/2020     Lab Results   Component Value Date     09/10/2023     10/09/2020        Last Comprehensive Metabolic Panel:  Sodium   Date Value Ref Range Status   09/10/2023 133 (L) 136 - 145 mmol/L Final   05/04/2021 132 (L) 133 - 144 mmol/L Final     Potassium   Date Value Ref Range Status   09/10/2023 3.5 3.4 - 5.3 mmol/L Final   08/16/2022 3.4 3.4 - 5.3 mmol/L Final   05/04/2021 3.7 3.4 - 5.3 mmol/L Final     Chloride   Date Value Ref Range Status   09/10/2023 96 (L) 98 - 107 mmol/L Final   08/16/2022 102 94 - 109 mmol/L Final   05/04/2021 96 94 - 109 mmol/L Final     Carbon Dioxide   Date Value Ref Range Status   05/04/2021 34 (H) 20 - 32 mmol/L Final     Carbon Dioxide (CO2)   Date Value Ref Range Status   09/10/2023 28 22 - 29 mmol/L Final   08/16/2022 31 20 - 32 mmol/L Final     Anion Gap   Date Value Ref Range Status   09/10/2023 9 7 - 15 mmol/L Final   08/16/2022 7 3 - 14 mmol/L Final   05/04/2021 2 (L) 3 - 14 mmol/L Final     Glucose   Date Value Ref Range Status   09/10/2023 96 70 - 99 mg/dL Final   08/16/2022 119 (H) 70 - 99 mg/dL Final   05/04/2021 107 (H) 70 - 99 mg/dL Final     Urea Nitrogen   Date Value Ref Range Status   09/10/2023 13.4 8.0 - 23.0 mg/dL Final   08/16/2022 15 7 - 30 mg/dL Final   05/04/2021 14 7 - 30 mg/dL Final     Creatinine   Date Value Ref Range Status    09/10/2023 0.82 0.51 - 0.95 mg/dL Final   05/04/2021 0.80 0.52 - 1.04 mg/dL Final     GFR Estimate   Date Value Ref Range Status   09/10/2023 70 >60 mL/min/1.73m2 Final   05/04/2021 68 >60 mL/min/[1.73_m2] Final     Comment:     Non  GFR Calc  Starting 12/18/2018, serum creatinine based estimated GFR (eGFR) will be   calculated using the Chronic Kidney Disease Epidemiology Collaboration   (CKD-EPI) equation.       Calcium   Date Value Ref Range Status   09/10/2023 9.4 8.8 - 10.2 mg/dL Final   05/04/2021 9.0 8.5 - 10.1 mg/dL Final     Bilirubin Total   Date Value Ref Range Status   04/29/2023 0.5 <=1.2 mg/dL Final   02/15/2018 0.6 0.2 - 1.3 mg/dL Final     Alkaline Phosphatase   Date Value Ref Range Status   04/29/2023 80 35 - 104 U/L Final   02/15/2018 75 40 - 150 U/L Final     ALT   Date Value Ref Range Status   04/29/2023 17 10 - 35 U/L Final   02/15/2018 23 0 - 50 U/L Final     AST   Date Value Ref Range Status   04/29/2023 24 10 - 35 U/L Final   02/15/2018 24 0 - 45 U/L Final                Color Urine (no units)   Date Value   11/12/2023 Yellow   07/02/2021 Yellow     Appearance Urine (no units)   Date Value   11/12/2023 Cloudy (A)   07/02/2021 Cloudy     Glucose Urine (mg/dL)   Date Value   11/12/2023 Negative   07/02/2021 Negative     Bilirubin Urine (no units)   Date Value   11/12/2023 Negative   07/02/2021 Negative     Ketones Urine (mg/dL)   Date Value   11/12/2023 Negative   07/02/2021 Negative     Specific Gravity Urine (no units)   Date Value   11/12/2023 1.005   07/02/2021 1.015     pH Urine   Date Value   11/12/2023 7.0   07/02/2021 6.0 pH     Protein Albumin Urine (mg/dL)   Date Value   11/12/2023 Negative   07/02/2021 Negative     Urobilinogen Urine   Date Value   05/05/2023 0.2 E.U./dL   07/02/2021 0.2 EU/dL     Nitrite Urine (no units)   Date Value   11/12/2023 Negative   07/02/2021 Positive (A)     Leukocyte Esterase Urine (no units)   Date Value   11/12/2023 Large (A)    07/02/2021 Moderate (A)          Assessment & Plan   88 y.o female with pelvic organ prolapse who is s/p repair in 2004 and has had recurrence.  She is not particularly bothered by it but wanted to know what her options are.  She has tried conservative measures and for now would just like to observe.  She was started on estrogen cream for her recurrent uti's.  If these continue then would consider resuming prophylaxis, can even try methenamine with Vit C.  -continue estrogen cream for GUSM  -stay well hydrated  -f/u with Neli Winn in 3 months.    Deepak Eduardo MD  [facility]      ==========================      Additional Coding Information:    Time spent:  35 minutes spent on the date of the encounter doing chart review, history and exam, documentation and further activities per the note

## 2023-11-20 NOTE — NURSING NOTE
[facility] for meds needed quickly    Is the patient currently in the state of [place]? YES    Visit mode:VIDEO    If the visit is dropped, the patient can be reconnected by: VIDEO VISIT: Send to e-mail at: [email or web address]    Will anyone else be joining the visit? NO  (If patient encounters technical issues they should call [phone])    How would you like to obtain your AVS? MyChart    Are changes needed to the allergy or medication list? No    Reason for visit: RECHECK and Video Visit    Tiarra LOTT

## 2023-12-12 ENCOUNTER — LAB (OUTPATIENT)
Dept: LAB | Facility: CLINIC | Age: 88
End: 2023-12-12
Payer: COMMERCIAL

## 2023-12-12 DIAGNOSIS — N39.0 RECURRENT UTI: ICD-10-CM

## 2023-12-12 LAB
ALBUMIN UR-MCNC: NEGATIVE MG/DL
APPEARANCE UR: ABNORMAL
BACTERIA #/AREA URNS HPF: ABNORMAL /HPF
BILIRUB UR QL STRIP: NEGATIVE
COLOR UR AUTO: YELLOW
GLUCOSE UR STRIP-MCNC: NEGATIVE MG/DL
HGB UR QL STRIP: ABNORMAL
KETONES UR STRIP-MCNC: NEGATIVE MG/DL
LEUKOCYTE ESTERASE UR QL STRIP: ABNORMAL
NITRATE UR QL: POSITIVE
PH UR STRIP: 7.5 [PH] (ref 5–7)
RBC #/AREA URNS AUTO: ABNORMAL /HPF
SP GR UR STRIP: 1.02 (ref 1–1.03)
SQUAMOUS #/AREA URNS AUTO: ABNORMAL /LPF
UROBILINOGEN UR STRIP-ACNC: 0.2 E.U./DL
WBC #/AREA URNS AUTO: >100 /HPF

## 2023-12-12 PROCEDURE — 87086 URINE CULTURE/COLONY COUNT: CPT

## 2023-12-12 PROCEDURE — 87088 URINE BACTERIA CULTURE: CPT

## 2023-12-12 PROCEDURE — 81001 URINALYSIS AUTO W/SCOPE: CPT

## 2023-12-13 ENCOUNTER — TELEPHONE (OUTPATIENT)
Dept: UROLOGY | Facility: CLINIC | Age: 88
End: 2023-12-13
Payer: COMMERCIAL

## 2023-12-13 NOTE — TELEPHONE ENCOUNTER
Still waiting for the urine culture results.  Once available, will have Neli review and advise.    Khushbu Marshall RN on 12/13/2023 at 9:08 AM

## 2023-12-13 NOTE — TELEPHONE ENCOUNTER
Prelim results show 50,000-100,000 CFU/mL Streptococcus agalactiae (Group B Streptococcus)      Please advise.    Khushbu Marshall RN on 12/13/2023 at 3:12 PM

## 2023-12-13 NOTE — TELEPHONE ENCOUNTER
[facility] Call Center    Phone Message    May a detailed message be left on voicemail: yes     Reason for Call: Requesting Results     Name/type of test: UA/UC  Date of test: 12/12/23  Was test done at a location other than [facility] (Please fill in the location if not [facility])?: No    Action Taken: Message routed to:  Other: URO    Travel Screening: Not Applicable

## 2023-12-14 DIAGNOSIS — N30.00 ACUTE CYSTITIS WITHOUT HEMATURIA: Primary | ICD-10-CM

## 2023-12-14 LAB
BACTERIA UR CULT: ABNORMAL
BACTERIA UR CULT: ABNORMAL

## 2023-12-14 RX ORDER — AMPICILLIN TRIHYDRATE 500 MG
500 CAPSULE ORAL 4 TIMES DAILY
Qty: 20 CAPSULE | Refills: 0 | Status: SHIPPED | OUTPATIENT
Start: 2023-12-14 | End: 2023-12-19

## 2023-12-14 NOTE — TELEPHONE ENCOUNTER
[facility] Call Center    Phone Message    May a detailed message be left on voicemail: no     Reason for Call: Other: any prescriptions she may need she wants sent to [facility] [place], [place]   do not use her cell phone, may leave message on [phone]   Please leave message for she is not available at all today  Action Taken: Other: urology    Travel Screening: Not Applicable

## 2023-12-14 NOTE — TELEPHONE ENCOUNTER
final results show     50,000-100,000 CFU/mL Streptococcus agalactiae (Group B Streptococcus) Abnormal    This organism is susceptible to ampicillin, penicillin, vancomycin and the cephalosporins. If treatment is required AND your patient is allergic to penicillin, contact the Microbiology Lab within 5 days to request susceptibility testing.   <10,000 CFU/mL Urogenital anushka   This organism is susceptible to ampicillin, penicillin, vancomycin and the cephalosporins. If treatment is required AND your patient is allergic to penicillin, contact the Microbiology Lab within 5 days to request susceptibility testing.       Pt would like script sent to [facility] in [place]. Please send back to staff to call pt and leave message that script was sent. Thanks  Liz OSUNA RN BSN PHN  [facility]

## 2023-12-14 NOTE — TELEPHONE ENCOUNTER
Called pt and notified of script sent for UTI.  Pt verbalized understanding.  Liz OSUNA RN BSN PHN  [facility]

## 2023-12-15 ENCOUNTER — TELEPHONE (OUTPATIENT)
Dept: UROLOGY | Facility: CLINIC | Age: 88
End: 2023-12-15
Payer: COMMERCIAL

## 2023-12-15 NOTE — TELEPHONE ENCOUNTER
[facility] Call Center    Phone Message    May a detailed message be left on voicemail: yes     Reason for Call: Other: Patient calling to speak with Neli mckinley. Wanting to arrange a phone visit. Please reach out. Thank you     Action Taken: Message routed to:  [facility]    Travel Screening: Not Applicable

## 2023-12-15 NOTE — TELEPHONE ENCOUNTER
Call placed to patient.  Patient is itching and has excoriated labia with pink blood when wipes.    Patient has chronic itch and burning however this is feeling like when she had a positive Wet Prep (clue cells).    Patient wears a pad for leaking. And uses wet wipes for cleaning.  Discussed preventing urine burn with rinsing after wet wipes with plain water to prevent biofilm residue, then barrier cream, vagesil- which she has had recommended by [place], and ensure good understanding of the need to use the Hormone cream as directed and regularly.    Advised pt to inquire with Dr Eduardo (urogyn) if the itching and burning does not resolve after this heals- for possible suggestions on chronic itching or if they wish to recommend [facility] as pt cannot travel to Dr Eduardo office for exam.    Patient will call /follow up our office for any UTI issues.      FYI to Neli VENTURA   [facility] RN

## 2023-12-20 ENCOUNTER — LAB (OUTPATIENT)
Dept: LAB | Facility: CLINIC | Age: 88
End: 2023-12-20
Payer: COMMERCIAL

## 2023-12-20 DIAGNOSIS — N39.0 RECURRENT UTI: ICD-10-CM

## 2023-12-20 LAB
ALBUMIN UR-MCNC: NEGATIVE MG/DL
APPEARANCE UR: CLEAR
BACTERIA #/AREA URNS HPF: ABNORMAL /HPF
BILIRUB UR QL STRIP: NEGATIVE
COLOR UR AUTO: YELLOW
GLUCOSE UR STRIP-MCNC: NEGATIVE MG/DL
HGB UR QL STRIP: NEGATIVE
KETONES UR STRIP-MCNC: NEGATIVE MG/DL
LEUKOCYTE ESTERASE UR QL STRIP: ABNORMAL
NITRATE UR QL: NEGATIVE
PH UR STRIP: 7 [PH] (ref 5–7)
RBC #/AREA URNS AUTO: ABNORMAL /HPF
SP GR UR STRIP: 1.02 (ref 1–1.03)
SQUAMOUS #/AREA URNS AUTO: ABNORMAL /LPF
UROBILINOGEN UR STRIP-ACNC: 0.2 E.U./DL
WBC #/AREA URNS AUTO: ABNORMAL /HPF

## 2023-12-20 PROCEDURE — 81001 URINALYSIS AUTO W/SCOPE: CPT

## 2023-12-20 NOTE — TELEPHONE ENCOUNTER
[facility] Call Center    Phone Message    May a detailed message be left on voicemail: no     Reason for Call: Other: Patient had a lab done this morning and wanted to let the clinic know. Please my chart the patient or call as she is leaving town on Friday.     Action Taken: Other: [facility]    Travel Screening: Not Applicable

## 2024-01-02 ENCOUNTER — HOSPITAL ENCOUNTER (EMERGENCY)
Facility: CLINIC | Age: 89
Discharge: HOME | End: 2024-01-02
Payer: COMMERCIAL

## 2024-01-02 VITALS
TEMPERATURE: 98 F | OXYGEN SATURATION: 98 % | SYSTOLIC BLOOD PRESSURE: 153 MMHG | RESPIRATION RATE: 16 BRPM | HEART RATE: 66 BPM | DIASTOLIC BLOOD PRESSURE: 72 MMHG

## 2024-01-02 DIAGNOSIS — N39.0 RECURRENT UTI: ICD-10-CM

## 2024-01-02 DIAGNOSIS — N89.8 VAGINAL ITCHING: ICD-10-CM

## 2024-01-02 LAB
ALBUMIN UR-MCNC: 30 MG/DL
APPEARANCE UR: ABNORMAL
BACTERIA #/AREA URNS HPF: ABNORMAL /HPF
BILIRUB UR QL STRIP: NEGATIVE
CLUE CELLS: ABNORMAL
COLOR UR AUTO: YELLOW
GLUCOSE UR STRIP-MCNC: NEGATIVE MG/DL
HGB UR QL STRIP: ABNORMAL
KETONES UR STRIP-MCNC: NEGATIVE MG/DL
LEUKOCYTE ESTERASE UR QL STRIP: ABNORMAL
NITRATE UR QL: POSITIVE
PH UR STRIP: 7 [PH] (ref 5–7)
RBC URINE: 24 /HPF
SP GR UR STRIP: 1.01 (ref 1–1.03)
SQUAMOUS EPITHELIAL: 9 /HPF
TRICHOMONAS, WET PREP: ABNORMAL
UROBILINOGEN UR STRIP-MCNC: NORMAL MG/DL
WBC URINE: >182 /HPF
WBC'S/HIGH POWER FIELD, WET PREP: ABNORMAL
YEAST, WET PREP: ABNORMAL

## 2024-01-02 PROCEDURE — 99214 OFFICE O/P EST MOD 30 MIN: CPT

## 2024-01-02 PROCEDURE — 87086 URINE CULTURE/COLONY COUNT: CPT

## 2024-01-02 PROCEDURE — 87186 SC STD MICRODIL/AGAR DIL: CPT

## 2024-01-02 PROCEDURE — 87210 SMEAR WET MOUNT SALINE/INK: CPT

## 2024-01-02 PROCEDURE — 81001 URINALYSIS AUTO W/SCOPE: CPT

## 2024-01-02 PROCEDURE — G0463 HOSPITAL OUTPT CLINIC VISIT: HCPCS

## 2024-01-02 RX ORDER — CEPHALEXIN 500 MG/1
500 CAPSULE ORAL 4 TIMES DAILY
Qty: 28 CAPSULE | Refills: 0 | Status: SHIPPED | OUTPATIENT
Start: 2024-01-02 | End: 2024-01-09

## 2024-01-02 ASSESSMENT — ACTIVITIES OF DAILY LIVING (ADL): ADLS_ACUITY_SCORE: 33

## 2024-01-03 NOTE — ED PROVIDER NOTES
History   No chief complaint on file.    HPI  Caitlyn Wasserman is a 88 year old female who presents to urgent care for concern of urinary tract infection.  Patient reports significant history of recurrent UTIs.  Most recently diagnosed with UTI in 12/14/2023.  Patient states symptoms seems to have improved for short while but never completely resolved, and then have been worsening much more over the past few days.  Patient does also endorse some low back pain.  She denies any fevers.  She reports urinary frequency, burning, urgency and is also had some vaginal spotting with a clear to white and pink-colored discharge.  Patient reports she has had vaginal itching ongoing for several months and has tried some estrogen cream for this without any significant improvement.  She does have known history of a vaginal prolapse.  She denies any significant weakness, nausea, vomiting, diarrhea or any other significant concerns today.    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 Pennington in 2004      Osteoarthritis of both hands 05/07/2019     Priority: Medium    Chronic obstructive pulmonary disease, unspecified COPD type (H) 01/10/2017     Priority: Medium    Chronic rhinitis 12/04/2015     Priority: Medium    Essential hypertension  12/04/2015     Priority: Medium    Dysuria 12/04/2015     Priority: Medium    Primary osteoarthritis of both knees 10/08/2014     Priority: Medium    Pedal edema 12/03/2013     Priority: Medium    Essential tremor 11/15/2013     Priority: Medium    Health Care Home 12/03/2012     Priority: Medium     Roseann Obando RN-PHN  [facility]   [phone]    DX V65.8 REPLACED WITH [id number] [facility]ALTH CARE HOME (04/08/2013)      Kyphosis of cervical region 11/28/2011     Priority: Medium    Neck pain, chronic 11/28/2011     Priority: Medium    HYPERLIPIDEMIA LDL GOAL <130 10/31/2010     Priority: Medium    Recurrent UTI 09/20/2010     Priority: Medium    Cataract 07/06/2009     Priority: Medium     Utility update for deleted IMO code  Imo Update utility      Right shoulder pain 10/27/2008     Priority: Medium    Numbness in right leg 10/27/2008     Priority: Medium    LEFT HIP PAIN 03/23/2006     Priority: Medium    Urinary frequency 06/09/2005     Priority: Medium        Past Medical History:    Past Medical History:   Diagnosis Date    COPD (chronic obstructive pulmonary disease) (H)     Migraine, unspecified, without mention of intractable migraine without mention of status migrainosus     Other urinary incontinence     Pure hypercholesterolemia     Unspecified essential hypertension        Past Surgical History:    Past Surgical History:   Procedure Laterality Date    ARTHROPLASTY HIP Right 9/28/2020    Procedure: TOTAL Hip Arthroplasty;  Surgeon: Ashkan Shah MD;  Location: [facility] OR    AS TOTAL KNEE ARTHROPLASTY Right 2011    HC ANTER COLPORRHAPHY,BLAD/VAGINA  6/04    Cystocele Repair,rectocele repair.    JOINT REPLACEMENT, HIP RT/LT  3/07, 4/07    Joint Replacement Hip /LT- dislocated in front repeat surgery 1 week later       Family History:    Family History   Problem Relation Age of Onset    Cancer Mother         Bladder    Neurologic Disorder Mother         heriditary tremor     MARKELAALAINA Father     Cerebrovascular Disease Father     Allergies Maternal Grandfather     Respiratory Maternal Grandfather         Asthma    Diabetes Paternal Grandmother         Type II    Cancer Brother         Multiple myloma    C.A.BERNIE. Brother     Cancer Sister         kidney cancer    Neurofibromatosis Son     Breast Cancer No family hx of        Social History:  Marital Status:   [2]  Social History     Tobacco Use    Smoking status: Former     Packs/day: 1.00     Years: 20.00     Additional pack years: 0.00     Total pack years: 20.00     Types: Cigarettes     Quit date: 1971     Years since quittin.0    Smokeless tobacco: Never   Vaping Use    Vaping Use: Never used   Substance Use Topics    Alcohol use: Yes     Comment: 1 wine a month maybe    Drug use: No        Medications:    cephALEXin (KEFLEX) 500 MG capsule  acetaminophen (TYLENOL) 325 MG tablet  acetaminophen-caffeine (EXCEDRIN TENSION HEADACHE) 500-65 MG TABS  albuterol (PROAIR HFA/PROVENTIL HFA/VENTOLIN HFA) 108 (90 Base) MCG/ACT inhaler  atenolol (TENORMIN) 25 MG tablet  chlorthalidone (HYGROTON) 25 MG tablet  estradiol (ESTRACE) 0.1 MG/GM vaginal cream  ipratropium (ATROVENT HFA) 17 MCG/ACT inhaler  ipratropium (ATROVENT) 0.03 % nasal spray  multivitamin w/minerals (THERA-VIT-M) tablet  potassium chloride ER (K-TAB) 20 MEQ CR tablet  psyllium 28.3 % POWD          Review of Systems   All other systems reviewed and are negative.  See HPI    Physical Exam   BP: (!) 153/72  Pulse: 66  Temp: 98  F (36.7  C)  Resp: 16  SpO2: 98 %      Physical Exam  Constitutional:       General: She is not in acute distress.     Appearance: Normal appearance. She is well-developed.   HENT:      Head: Normocephalic and atraumatic.   Eyes:      General: No scleral icterus.     Conjunctiva/sclera: Conjunctivae normal.   Cardiovascular:      Rate and Rhythm: Normal rate and regular rhythm.   Pulmonary:      Effort: Pulmonary effort is normal. No respiratory  distress.   Abdominal:      General: Abdomen is flat.      Tenderness: There is no right CVA tenderness or left CVA tenderness.   Genitourinary:     General: Normal vulva.      Labia:         Right: No rash.       Uterus: With uterine prolapse.       Comments: External exam performed and patient noted to have prolapse of the uterus.  No significant discharge noted at this time.  Musculoskeletal:      Cervical back: Normal range of motion and neck supple.   Skin:     General: Skin is warm and dry.      Findings: No rash.   Neurological:      Mental Status: She is alert and oriented to person, place, and time.         ED Course                 Procedures         Results for orders placed or performed during the hospital encounter of 01/02/24 (from the past 24 hour(s))   UA Macroscopic with reflex to Microscopic and Culture    Specimen: Urine, Midstream   Result Value Ref Range    Color Urine Yellow Colorless, Straw, Light Yellow, Yellow    Appearance Urine Cloudy (A) Clear    Glucose Urine Negative Negative mg/dL    Bilirubin Urine Negative Negative    Ketones Urine Negative Negative mg/dL    Specific Gravity Urine 1.011 1.003 - 1.035    Blood Urine Small (A) Negative    pH Urine 7.0 5.0 - 7.0    Protein Albumin Urine 30 (A) Negative mg/dL    Urobilinogen Urine Normal Normal, 2.0 mg/dL    Nitrite Urine Positive (A) Negative    Leukocyte Esterase Urine Large (A) Negative    Bacteria Urine Many (A) None Seen /HPF    RBC Urine 24 (H) <=2 /HPF    WBC Urine >182 (H) <=5 /HPF    Squamous Epithelials Urine 9 (H) <=1 /HPF    Narrative    Urine Culture ordered based on laboratory criteria   Wet prep    Specimen: Vagina; Swab   Result Value Ref Range    Trichomonas Absent Absent    Yeast Absent Absent    Clue Cells Absent Absent    WBCs/high power field 2+ (A) None       Medications - No data to display    Assessments & Plan (with Medical Decision Making)   Patient presents to urgent care for concern of urinary tract infection.   Patient reports significant history of recurrent UTIs.  Most recently diagnosed with UTI in 12/14/2023.  Patient states symptoms seems to have improved for short while but never completely resolved, and then have been worsening much more over the past few days.  Patient does also endorse some low back pain.  She denies any fevers.  She reports urinary frequency, burning, urgency and is also had some vaginal spotting with a clear to white and pink-colored discharge.  Patient reports she has had vaginal itching ongoing for several months and has tried some estrogen cream for this without any significant improvement.  She does have known history of a vaginal prolapse.  She denies any significant weakness, nausea, vomiting, diarrhea or any other significant concerns today.    Urinalysis completed as above is concerning for urinary tract infection with greater than 182 WBCs, large leukocyte esterase, nitrite positive urine today.  Patient is afebrile and has no significant history of fevers.  She does endorse some low back pain, but is not having any flank pain and there is no CVA tenderness on exam today.  I have low concern for pyelonephritis or urolithiasis at this time.  Wet prep was completed today which does not show any signs of yeast, clue cells, or trichomonas.  Patient is noted to have a prolapse of the uterus on exam.  Will treat with Keflex at this time for UTI.  Urine culture is pending.  Patient states she would like further evaluation of her chronic vaginal itching and requests further evaluation with specialty care and referral was placed for OB/GYN today.  In the meantime, recommend that she follow-up with primary doctor for further evaluation if symptoms or not improving.  Return precautions for new or worsening symptoms were reviewed.  Handouts provided on discharge.  Patient was discharged in good condition and she is agreeable to above plan.    I have reviewed the nursing notes.    I have reviewed  the findings, diagnosis, plan and need for follow up with the patient.        Discharge Medication List as of 1/2/2024  7:17 PM        START taking these medications    Details   cephALEXin (KEFLEX) 500 MG capsule Take 1 capsule (500 mg) by mouth 4 times daily for 7 days, Disp-28 capsule, R-0, E-Prescribe             Final diagnoses:   Recurrent UTI   Vaginal itching       1/2/2024   [facility] EMERGENCY DEPT      Disclaimer:  This note consists of symbols derived from keyboarding, dictation and/or voice recognition software.  As a result, there may be errors in the script that have gone undetected.  Please consider this when interpreting information found in this chart.       Pj Augustine APRN CNP  01/02/24 2017

## 2024-01-03 NOTE — DISCHARGE INSTRUCTIONS
Antibiotics were sent to pharmacy.  Swab collected today to does not show any signs of yeast infection at this time.  If symptoms are not improving, you will want to follow-up with primary care or OB/GYN for further assessment.  Return in the meantime for new or worsening symptoms or if not improving.

## 2024-01-04 LAB — BACTERIA UR CULT: ABNORMAL

## 2024-01-05 ENCOUNTER — HOSPITAL ENCOUNTER (EMERGENCY)
Facility: CLINIC | Age: 89
Discharge: HOME | End: 2024-01-05
Attending: PHYSICIAN ASSISTANT | Admitting: PHYSICIAN ASSISTANT
Payer: COMMERCIAL

## 2024-01-05 VITALS
SYSTOLIC BLOOD PRESSURE: 158 MMHG | DIASTOLIC BLOOD PRESSURE: 69 MMHG | TEMPERATURE: 97.5 F | RESPIRATION RATE: 20 BRPM | OXYGEN SATURATION: 98 % | HEART RATE: 61 BPM

## 2024-01-05 DIAGNOSIS — N30.00 ACUTE CYSTITIS WITHOUT HEMATURIA: ICD-10-CM

## 2024-01-05 DIAGNOSIS — M54.9 BACK PAIN: ICD-10-CM

## 2024-01-05 LAB
ALBUMIN UR-MCNC: NEGATIVE MG/DL
APPEARANCE UR: ABNORMAL
BACTERIA #/AREA URNS HPF: ABNORMAL /HPF
BILIRUB UR QL STRIP: NEGATIVE
COLOR UR AUTO: YELLOW
GLUCOSE UR STRIP-MCNC: NEGATIVE MG/DL
HGB UR QL STRIP: NEGATIVE
KETONES UR STRIP-MCNC: NEGATIVE MG/DL
LEUKOCYTE ESTERASE UR QL STRIP: ABNORMAL
MUCOUS THREADS #/AREA URNS LPF: PRESENT /LPF
NITRATE UR QL: NEGATIVE
PH UR STRIP: 7 [PH] (ref 5–7)
RBC URINE: 1 /HPF
SP GR UR STRIP: 1.01 (ref 1–1.03)
SQUAMOUS EPITHELIAL: 5 /HPF
UROBILINOGEN UR STRIP-MCNC: NORMAL MG/DL
WBC URINE: 40 /HPF

## 2024-01-05 PROCEDURE — G0463 HOSPITAL OUTPT CLINIC VISIT: HCPCS | Performed by: PHYSICIAN ASSISTANT

## 2024-01-05 PROCEDURE — 87086 URINE CULTURE/COLONY COUNT: CPT | Performed by: PHYSICIAN ASSISTANT

## 2024-01-05 PROCEDURE — 81001 URINALYSIS AUTO W/SCOPE: CPT | Performed by: PHYSICIAN ASSISTANT

## 2024-01-05 PROCEDURE — 99214 OFFICE O/P EST MOD 30 MIN: CPT | Performed by: PHYSICIAN ASSISTANT

## 2024-01-05 RX ORDER — SULFAMETHOXAZOLE/TRIMETHOPRIM 800-160 MG
1 TABLET ORAL 2 TIMES DAILY
Qty: 14 TABLET | Refills: 0 | Status: SHIPPED | OUTPATIENT
Start: 2024-01-05 | End: 2024-01-12

## 2024-01-05 ASSESSMENT — ACTIVITIES OF DAILY LIVING (ADL): ADLS_ACUITY_SCORE: 35

## 2024-01-05 NOTE — ED PROVIDER NOTES
History   No chief complaint on file.    HPI  Caitlyn Wasserman is a 88 year old female past medical history significant for vaginal prolapse, recurrent UTIs, essential tremor, hypertension, status post total hip arthroplasty, status post right knee arthroplasty who is currently on antibiotics for urinary tract infection who presents to the urgent care with concern over possible persistent infection.  Patient reports that she continues to have sense of urinary urgency, voiding in small amounts and spasm like sensation prior to having urge to urinate.  She also has pain in her low back bilaterally which radiates into her hips and buttock.  Pain is exacerbated by certain movements, changes in position.  She will have intermittent sharp stabbing.  She did have some radiation down to her right lower leg earlier but none currently.  She has been treating with Tylenol but states concern that she is unable to sleep due to discomfort.    Allergies:  Allergies   Allergen Reactions    Cortisone Other (See Comments)     Couldn't walk well even after 2 weeks    Diclofenac Sodium Other (See Comments)     Took one tab in 2008, no reaction in note    Hydrocodone Nausea and Vomiting    Morphine And Related Nausea and Vomiting     Vomited 1.5 days    Oxycodone Nausea and Vomiting    Tramadol Nausea and Vomiting     Problem List:    Patient Active Problem List    Diagnosis Date Noted    Abnormal gait 10/22/2021     Priority: Medium    S/P total hip arthroplasty 09/30/2020     Priority: Medium    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 Pennington in 2004      Osteoarthritis of both hands 05/07/2019     Priority: Medium    Chronic obstructive pulmonary disease, unspecified COPD type (H) 01/10/2017     Priority: Medium    Chronic rhinitis 12/04/2015     Priority: Medium    Essential hypertension  12/04/2015     Priority: Medium    Dysuria 12/04/2015     Priority: Medium    Primary osteoarthritis of both knees 10/08/2014     Priority: Medium    Pedal edema 12/03/2013     Priority: Medium    Essential tremor 11/15/2013     Priority: Medium    Health Care Home 12/03/2012     Priority: Medium     Roseann Obando RN-PHN  [facility]   [phone]    DX V65.8 REPLACED WITH [facility] (04/08/2013)      Kyphosis of cervical region 11/28/2011     Priority: Medium    Neck pain, chronic 11/28/2011     Priority: Medium    HYPERLIPIDEMIA LDL GOAL <130 10/31/2010     Priority: Medium    Recurrent UTI 09/20/2010     Priority: Medium    Cataract 07/06/2009     Priority: Medium     Utility update for deleted IMO code  Im[employer] Update utility      Right shoulder pain 10/27/2008     Priority: Medium    Numbness in right leg 10/27/2008     Priority: Medium    LEFT HIP PAIN 03/23/2006     Priority: Medium    Urinary frequency 06/09/2005     Priority: Medium        Past Medical History:    Past Medical History:   Diagnosis Date    COPD (chronic obstructive pulmonary disease) (H)     Migraine, unspecified, without mention of intractable migraine without mention of status migrainosus     Other urinary incontinence     Pure hypercholesterolemia     Unspecified essential hypertension        Past Surgical History:    Past Surgical History:   Procedure Laterality Date    ARTHROPLASTY HIP Right 9/28/2020    Procedure: TOTAL Hip Arthroplasty;  Surgeon: Ashkan Shah MD;  Location: [facility] OR    AS TOTAL KNEE ARTHROPLASTY Right 2011    HC ANTER COLPORRHAPHY,BLAD/VAGINA  6/04    Cystocele Repair,rectocele repair.    JOINT REPLACEMENT, HIP RT/LT  3/07, 4/07    Joint Replacement Hip /LT- dislocated in front repeat surgery 1 week later       Family History:    Family History   Problem Relation Age of Onset    Cancer Mother         Bladder    Neurologic Disorder Mother         heriditary tremor     MARKELAMARSHA. Father     Cerebrovascular Disease Father     Allergies Maternal Grandfather     Respiratory Maternal Grandfather         Asthma    Diabetes Paternal Grandmother         Type II    Cancer Brother         Multiple myloma    C.A.D. Brother     Cancer Sister         kidney cancer    Neurofibromatosis Son     Breast Cancer No family hx of        Social History:  Marital Status:   [2]  Social History     Tobacco Use    Smoking status: Former     Packs/day: 1.00     Years: 20.00     Additional pack years: 0.00     Total pack years: 20.00     Types: Cigarettes     Quit date: 1971     Years since quittin.0    Smokeless tobacco: Never   Vaping Use    Vaping Use: Never used   Substance Use Topics    Alcohol use: Yes     Comment: 1 wine a month maybe    Drug use: No        Medications:    acetaminophen (TYLENOL) 325 MG tablet  acetaminophen-caffeine (EXCEDRIN TENSION HEADACHE) 500-65 MG TABS  albuterol (PROAIR HFA/PROVENTIL HFA/VENTOLIN HFA) 108 (90 Base) MCG/ACT inhaler  atenolol (TENORMIN) 25 MG tablet  cephALEXin (KEFLEX) 500 MG capsule  chlorthalidone (HYGROTON) 25 MG tablet  estradiol (ESTRACE) 0.1 MG/GM vaginal cream  ipratropium (ATROVENT HFA) 17 MCG/ACT inhaler  ipratropium (ATROVENT) 0.03 % nasal spray  multivitamin w/minerals (THERA-VIT-M) tablet  potassium chloride ER (K-TAB) 20 MEQ CR tablet  psyllium 28.3 % POWD      Review of Systems   Constitutional:  Negative for chills and fever.   Respiratory:  Negative for cough, shortness of breath and wheezing.    Cardiovascular:  Negative for chest pain and palpitations.   Gastrointestinal:  Negative for abdominal pain, diarrhea, nausea and vomiting.   Genitourinary:  Positive for dysuria, frequency and urgency. Negative for flank pain.   Musculoskeletal:  Positive for back pain.   Skin:  Negative for color change, rash and wound.   Neurological:  Negative for weakness and numbness.     Physical Exam      Physical Exam  Constitutional:        General: She is not in acute distress.     Appearance: She is not ill-appearing or toxic-appearing.   HENT:      Head: Normocephalic and atraumatic.   Cardiovascular:      Rate and Rhythm: Normal rate and regular rhythm.      Heart sounds: No murmur heard.     No friction rub. No gallop.   Pulmonary:      Effort: Pulmonary effort is normal.      Breath sounds: Normal breath sounds.   Abdominal:      General: Abdomen is flat.      Palpations: Abdomen is soft.      Tenderness: There is no right CVA tenderness, left CVA tenderness, guarding or rebound.   Musculoskeletal:      Cervical back: Normal and normal range of motion.      Thoracic back: Normal.      Lumbar back: Tenderness present. No swelling, deformity, lacerations, spasms or bony tenderness. Decreased range of motion.   Skin:     General: Skin is warm and dry.      Findings: No bruising, erythema or rash.   Neurological:      Mental Status: She is alert.      Sensory: No sensory deficit.       ED Course                 Procedures       Critical Care time:  none        Results for orders placed or performed during the hospital encounter of 01/05/24   UA with Microscopic reflex to Culture     Status: Abnormal    Specimen: Urine, Midstream   Result Value Ref Range    Color Urine Yellow Colorless, Straw, Light Yellow, Yellow    Appearance Urine Slightly Cloudy (A) Clear    Glucose Urine Negative Negative mg/dL    Bilirubin Urine Negative Negative    Ketones Urine Negative Negative mg/dL    Specific Gravity Urine 1.010 1.003 - 1.035    Blood Urine Negative Negative    pH Urine 7.0 5.0 - 7.0    Protein Albumin Urine Negative Negative mg/dL    Urobilinogen Urine Normal Normal, 2.0 mg/dL    Nitrite Urine Negative Negative    Leukocyte Esterase Urine Trace (A) Negative    Bacteria Urine Few (A) None Seen /HPF    Mucus Urine Present (A) None Seen /LPF    RBC Urine 1 <=2 /HPF    WBC Urine 40 (H) <=5 /HPF    Squamous Epithelials Urine 5 (H) <=1 /HPF    Narrative     Urine Culture ordered based on laboratory criteria                       Medications - No data to display    Assessments & Plan (with Medical Decision Making)     I have reviewed the nursing notes.  I have reviewed the findings, diagnosis, plan and need for follow up with the patient.       Discharge Medication List as of 1/5/2024  1:30 PM        START taking these medications    Details   sulfamethoxazole-trimethoprim (BACTRIM DS) 800-160 MG tablet Take 1 tablet by mouth 2 times daily for 7 days, Disp-14 tablet, R-0, E-Prescribe           Final diagnoses:   Back pain   Acute cystitis without hematuria     88-year-old female currently on Keflex for urinary tract infection presents to the urgent care with concern over possible persistent infection given persistent urinary frequency, urgency, burning as well as development of increasing low back pain exacerbated by certain movements, changes in position.  She had elevated blood pressure upon arrival, remainder vital signs stable.  Physical exam findings were significant for reproducible tenderness palpation of the left little low back which is also reproducible with movement.  No CVA tenderness.  Abdominal exam was benign.  She did have a repeat urinalysis which showed some persistent signs of infection with trace leukocyte esterase, few bacteria, 40 WBCs however did look significantly improved from prior.  I discussed that urinary tract infection could be contributing to back pain however back pain appears most more consistent with musculoskeletal condition differential would include muscle strain, spasm, DDD, spinal stenosis.  Low suspicion for herniated disc.  No worrisome red flag symptoms to suggest cauda equina, epidural abscess or other emergent need for MRI.  Will discontinue Keflex, initiate Bactrim pending repeat culture from today's visit.  Follow-up if no improvement of back pain within the next week. Worrisome reasons to return to the ER/UC sooner  discussed.    Disclaimer: This note consists of symbols derived from keyboarding, dictation, and/or voice recognition software. As a result, there may be errors in the script that have gone undetected.  Please consider this when interpreting information found in the chart.      1/5/2024   [facility] EMERGENCY DEPT       Chelly De La Torre PA-C  01/06/24 1824

## 2024-01-06 LAB — BACTERIA UR CULT: NO GROWTH

## 2024-01-06 ASSESSMENT — ENCOUNTER SYMPTOMS
NAUSEA: 0
VOMITING: 0
CHILLS: 0
FLANK PAIN: 0
WHEEZING: 0
DYSURIA: 1
FEVER: 0
COUGH: 0
FREQUENCY: 1
SHORTNESS OF BREATH: 0
WOUND: 0
PALPITATIONS: 0
BACK PAIN: 1
COLOR CHANGE: 0
NUMBNESS: 0
WEAKNESS: 0
DIARRHEA: 0
ABDOMINAL PAIN: 0

## 2024-01-11 ENCOUNTER — APPOINTMENT (OUTPATIENT)
Dept: CT IMAGING | Facility: CLINIC | Age: 89
End: 2024-01-11
Attending: EMERGENCY MEDICINE
Payer: COMMERCIAL

## 2024-01-11 ENCOUNTER — HOSPITAL ENCOUNTER (EMERGENCY)
Facility: CLINIC | Age: 89
Discharge: HOME | End: 2024-01-11
Attending: EMERGENCY MEDICINE | Admitting: EMERGENCY MEDICINE
Payer: COMMERCIAL

## 2024-01-11 VITALS
SYSTOLIC BLOOD PRESSURE: 139 MMHG | DIASTOLIC BLOOD PRESSURE: 63 MMHG | HEIGHT: 65 IN | BODY MASS INDEX: 25.83 KG/M2 | TEMPERATURE: 97.4 F | WEIGHT: 155 LBS | RESPIRATION RATE: 16 BRPM | OXYGEN SATURATION: 97 % | HEART RATE: 57 BPM

## 2024-01-11 DIAGNOSIS — M54.50 ACUTE LEFT-SIDED LOW BACK PAIN WITHOUT SCIATICA: ICD-10-CM

## 2024-01-11 LAB
ALBUMIN UR-MCNC: NEGATIVE MG/DL
APPEARANCE UR: ABNORMAL
BACTERIA #/AREA URNS HPF: ABNORMAL /HPF
BILIRUB UR QL STRIP: NEGATIVE
COLOR UR AUTO: YELLOW
GLUCOSE UR STRIP-MCNC: NEGATIVE MG/DL
HGB UR QL STRIP: ABNORMAL
KETONES UR STRIP-MCNC: 5 MG/DL
LEUKOCYTE ESTERASE UR QL STRIP: ABNORMAL
MUCOUS THREADS #/AREA URNS LPF: PRESENT /LPF
NITRATE UR QL: NEGATIVE
PH UR STRIP: 6 [PH] (ref 5–7)
RBC URINE: 3 /HPF
SP GR UR STRIP: 1.01 (ref 1–1.03)
SQUAMOUS EPITHELIAL: 5 /HPF
UROBILINOGEN UR STRIP-MCNC: NORMAL MG/DL
WBC URINE: 7 /HPF

## 2024-01-11 PROCEDURE — 99284 EMERGENCY DEPT VISIT MOD MDM: CPT | Mod: 25

## 2024-01-11 PROCEDURE — 72192 CT PELVIS W/O DYE: CPT

## 2024-01-11 PROCEDURE — 81001 URINALYSIS AUTO W/SCOPE: CPT | Performed by: PHYSICIAN ASSISTANT

## 2024-01-11 PROCEDURE — 250N000013 HC RX MED GY IP 250 OP 250 PS 637: Performed by: EMERGENCY MEDICINE

## 2024-01-11 PROCEDURE — 99283 EMERGENCY DEPT VISIT LOW MDM: CPT | Performed by: EMERGENCY MEDICINE

## 2024-01-11 PROCEDURE — 87086 URINE CULTURE/COLONY COUNT: CPT | Performed by: PHYSICIAN ASSISTANT

## 2024-01-11 RX ORDER — LIDOCAINE 4 G/G
1 PATCH TOPICAL EVERY 24 HOURS
Qty: 10 PATCH | Refills: 0 | Status: SHIPPED | OUTPATIENT
Start: 2024-01-11 | End: 2024-04-02

## 2024-01-11 RX ORDER — LIDOCAINE 4 G/G
1 PATCH TOPICAL ONCE
Status: DISCONTINUED | OUTPATIENT
Start: 2024-01-11 | End: 2024-01-11 | Stop reason: HOSPADM

## 2024-01-11 RX ADMIN — LIDOCAINE 1 PATCH: 560 PATCH PERCUTANEOUS; TOPICAL; TRANSDERMAL at 18:09

## 2024-01-11 ASSESSMENT — ACTIVITIES OF DAILY LIVING (ADL)
ADLS_ACUITY_SCORE: 35

## 2024-01-11 NOTE — ED NOTES
88-year-old female initially presented to urgent care for concern over left-sided low back pain, concern for possible urinary tract infection.  Patient has had 2 evaluations in the urgent care for dysuria initially seen 1/2/2024 was diagnosed with recurrent urinary tract infection.  When discharge symptoms fail to resolve and she developed low back pain she was seen again 1/5/2024 urinalysis showed some persistent signs of infection however had significantly improved.  Antibiotics were changed from Keflex to Bactrim which she has her last dose of today.  She states concerned that she has had nausea, increasing her left low back pain, nausea, decreased appetite, constipation, no bowel movements within the last 3 days and possible vaginal bleeding she has a known history of hemorrhoids for which she wears a peripad consistently did noted some bleeding on the anterior aspect of the pad today.  I discussed with patient  given concern for possible vaginal and worsening back pain recommended evaluation in in the ER today for further evaluation including possible imaging of the back, pelvic ultrasound, blood work, treatment of constipation which is outside of the typical scope evaluation available in urgent care.  Patient was amenable to transfer.  She was transferred to ED triage nurse.      Disclaimer: This note consists of symbols derived from keyboarding, dictation, and/or voice recognition software. As a result, there may be errors in the script that have gone undetected.  Please consider this when interpreting information found in the chart.       Chelly De La Torre PA-C  01/11/24 3345

## 2024-01-11 NOTE — ED PROVIDER NOTES
[facility] EMERGENCY DEPT  PHYSICIAN NOTE    MRN: [id number]    FINAL IMPRESSION     1. Acute left-sided low back pain without sciatica          ED COURSE & MDM     Patient presented for left low back pain. Initial vital signs reassuring.  Pyelonephritis considered as the cause of her pain, however the location is much lower than 1 would expect and her UA today is improving after appropriate antibiotics as per the urine culture from 1/2/2024.  No red flags to raise the concern for AAA, aortic dissection, epidural abscess, epidural hematoma, osteomyelitis, discitis, or cauda equina.  Symptoms not consistent with renal stones.  She is most tender over the left iliac crest, raising concern for a pathologic fracture or ostial mass, however CT is negative.  Her pain is most likely muscular in nature, so we discussed symptomatic treatment options including lidocaine patches. Patient discharged in stable condition. Return precautions provided. All questions answered.      ===================================================================    HPI     Caitlyn Wasserman is a 88 year old female with relevant PMH significant for recurrent UTIs, hypertension, COPD, and uterine prolapse presenting with left low back pain around the SI joint. Patient states the pain started a couple weeks ago and has been worsening.  It is constant at a low level and intermittently increases in intensity.  Exacerbating factors include lying flat.  The pain is improved by a heating pad, but the pain returns as soon as the heating pad is removed.  She has been getting treated for a UTI and today is her last day of Bactrim.  She reports that the medication has given her nausea, but she was not having it prior to antibiotics.  Her urinary symptoms are improved.  She denies abdominal pain, lower extremity weakness, new lower extremity numbness, and fever.  No recent instrumentation.  She does have some incontinence of stool and urine,  "however this is not new.  She has noticed some intermittent spotting on her pad recently, sometimes looking like clear or pink discharge and today looking bright red like blood.  No change from her baseline uterine prolapse and no uterine or vaginal pain.    Per chart review, she was seen in the ED 9 days ago and diagnosed with a UTI, discharged on Keflex.  She was seen again 6 days ago and switched to Bactrim.  Urine culture from 1-24 shows Klebsiella oxytoca susceptible to all but ampicillin.  Urine culture from 1/5/2024 shows no growth.    ROS  See HPI.    Problem list, medications, allergies, PMH, PSH, family history, and social history reviewed and updated as able in Epic.      PHYSICAL EXAM     Vitals:    01/11/24 1213 01/11/24 1635   BP: 138/57 (!) 140/68   Pulse: 67 58   Resp: 16    Temp: 97.4  F (36.3  C)    TempSrc: Oral    SpO2: 97%    Weight: 70.3 kg (155 lb)    Height: 1.651 m (5' 5\")         Constitutional: Alert, no acute distress.  HENT: Normocephalic, atraumatic.  Eyes: Sclera anicteric, EOMI.  Neck: Supple, full ROM. No midline tenderness.  CV: DP pulses 2+ and equal bilaterally.  Pulm: Non-labored respirations.  Abdomen: Soft, non-tender. No pulsatile mass.  MSK: No midline thoracic or lumbar tenderness. No paraspinal tenderness.  Mild tenderness over the left SI joint, more tenderness over the posterior left ilium.  No marked tenderness at the sciatic notch.  No CVA tenderness.  Neuro: A/O x3. Speech normal. Strength 5/5 and symmetric in both proximal and distal LE. Sensation to light touch decreased in left dorsal foot (patient notes it's the same as baseline).  Skin: Warm and dry, no rash.  Psych: Cooperative, able to follow commands. Intact attention.      TESTING   All testing reviewed and independently interpreted.    EKG  None    LABS  Labs Ordered and Resulted from Time of ED Arrival to Time of ED Departure   ROUTINE UA WITH MICROSCOPIC REFLEX TO CULTURE - Abnormal       Result Value    " Color Urine Yellow      Appearance Urine Slightly Cloudy (*)     Glucose Urine Negative      Bilirubin Urine Negative      Ketones Urine 5 (*)     Specific Gravity Urine 1.012      Blood Urine Moderate (*)     pH Urine 6.0      Protein Albumin Urine Negative      Urobilinogen Urine Normal      Nitrite Urine Negative      Leukocyte Esterase Urine Moderate (*)     Bacteria Urine Few (*)     Mucus Urine Present (*)     RBC Urine 3 (*)     WBC Urine 7 (*)     Squamous Epithelials Urine 5 (*)    URINE CULTURE       IMAGING  CT Pelvis Bone wo Contrast   Final Result   IMPRESSION:   1.  No acute musculoskeletal abnormality identified.      2.  Bilateral total hip arthroplasties. No evidence of loosening or periprosthetic fracture.      3.  Degenerative changes in the lower lumbar spine.      4.  Pelvic floor laxity.      5.  Aortoiliac atherosclerosis.                 Asp, MD Natalio  01/11/24 5974

## 2024-01-11 NOTE — ED NOTES
Patient was in the bathroom, trying to provide a urine sample but was unable - she did ring and ask that I view her yevgeniy pad that had some bright red bleeding. Patient does have a hx of hemorroids and feels as though this might be the issue, however she does also have uterine prolapse as well. Provider updated. Unable to provide sample.

## 2024-01-12 NOTE — DISCHARGE INSTRUCTIONS
The CT scan today was negative for any structural cause of your pain such as a fracture or a tumor.  It is most likely the pain is due to a muscle issue.  Use Tylenol and lidocaine patches as needed to help with the pain.  Continue to use the heating pad if it is helping.  Follow-up with your regular doctor to make sure you are doing well and to determine if any further treatment is required.

## 2024-01-13 LAB — BACTERIA UR CULT: NORMAL

## 2024-01-18 ENCOUNTER — OFFICE VISIT (OUTPATIENT)
Dept: PHYSICAL MEDICINE AND REHAB | Facility: CLINIC | Age: 89
End: 2024-01-18
Attending: PHYSICIAN ASSISTANT
Payer: COMMERCIAL

## 2024-01-18 ENCOUNTER — HOSPITAL ENCOUNTER (OUTPATIENT)
Dept: GENERAL RADIOLOGY | Facility: HOSPITAL | Age: 89
Discharge: HOME | End: 2024-01-18
Attending: NURSE PRACTITIONER | Admitting: NURSE PRACTITIONER
Payer: COMMERCIAL

## 2024-01-18 VITALS
SYSTOLIC BLOOD PRESSURE: 142 MMHG | OXYGEN SATURATION: 99 % | BODY MASS INDEX: 25.79 KG/M2 | HEART RATE: 52 BPM | DIASTOLIC BLOOD PRESSURE: 58 MMHG | HEIGHT: 65 IN

## 2024-01-18 DIAGNOSIS — M47.816 SPONDYLOSIS OF LUMBAR REGION WITHOUT MYELOPATHY OR RADICULOPATHY: ICD-10-CM

## 2024-01-18 DIAGNOSIS — M47.816 SPONDYLOSIS OF LUMBAR REGION WITHOUT MYELOPATHY OR RADICULOPATHY: Primary | ICD-10-CM

## 2024-01-18 PROCEDURE — 99204 OFFICE O/P NEW MOD 45 MIN: CPT | Performed by: NURSE PRACTITIONER

## 2024-01-18 PROCEDURE — 72100 X-RAY EXAM L-S SPINE 2/3 VWS: CPT

## 2024-01-18 RX ORDER — METHOCARBAMOL 500 MG/1
500 TABLET, FILM COATED ORAL 4 TIMES DAILY PRN
Qty: 40 TABLET | Refills: 0 | Status: SHIPPED | OUTPATIENT
Start: 2024-01-18

## 2024-01-18 ASSESSMENT — PAIN SCALES - GENERAL: PAINLEVEL: NO PAIN (1)

## 2024-01-18 NOTE — PROGRESS NOTES
ASSESSMENT: Caitlyn Wasserman is a 88 year old female who presents for consultation at the request of PCP Cindy Flor, who presents today for new patient evaluation of:    -left lower back pain     Patient is neurologically intact on exam. No myelopathic or red flag symptoms. She has left mid lumbar paraspinal musculature tenderness to palp. She has sharp pain with turning and twisting. I recommended lumbar XR for further evaluation as I dont think her recent pelvis CT scan went up high enough to capture the area of her concern. We talked about it potentially being muscular, related to facet arthropathy and disc degeneration L4-5, or less likely fracture. She had moderate osteopenia last dxa scan however this was 9 yrs ago. Tentatively if XR negative would plan for treating facetogenic pain with PT which she agrees with. Offered robaxin for her pain (which is severe) and tylenol, topical voltaren. We talked about possible side effects of robaxin.  We will call with results         No data to display                     Diagnoses and all orders for this visit:  Spondylosis of lumbar region without myelopathy or radiculopathy  -     XR Lumbar Spine 2/3 Views; Future  -     methocarbamol (ROBAXIN) 500 MG tablet; Take 1 tablet (500 mg) by mouth 4 times daily as needed for muscle spasms  Back pain  -     Spine  Referral      PLAN:  Reviewed spine anatomy and disease process. Discussed diagnosis and treatment options with the patient today. A shared decision making model was used.  The patient's values and choices were respected. The following represents what was discussed and decided upon by the provider and the patient.      -DIAGNOSTIC TESTS:  Images were personally reviewed and interpreted and explained to patient today using a spine model.   --I ordered XR lumbar spine    -PHYSICAL THERAPY:    --recommended PT if xr wnl    Discussed the importance of core strengthening, ROM, stretching exercises with the  HOSPITALIST PROGRESS NOTE:    Follow-up for:   Hyponatremia.        *Please note the patient's past medical, past surgical, family and social histories have all been reviewed.    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 Tolerating diet well. Denies any nausea or vomiting.     Examination:   Vital Signs:    Visit Vitals  /59 (BP Location: [facility], Patient Position: Semi-Ordonez's)   Pulse 70   Temp 97.7 °F (36.5 °C) (Oral)   Resp 18   Ht 5' 4\" (1.626 m)   Wt 72.5 kg   SpO2 99%   BMI 27.44 kg/m²     General: This is a 61 year old female lying comfortably in bed and appearing in no acute cardiorespiratory distress. Patient is able to speak in full sentences.  Psych: She is awake alert and oriented to time, place and person. Mood and affect are appropriate.  Head: Appears to be atraumatic and normocephalic.  Eyes: Reveal no icterus, no scleral injection, no conjunctival pallor. Pupils   equal, round and reactive to light and accommodation.  Extraocular movements appear to be intact.  Throat: Oropharyngeal exam reveals moist oral mucosa. There is no erythema, discharge or thrush.   Neck: Supple and symmetric. There is no JVD or thyromegaly. No carotid bruits. Trachea is midline. Patient is not using her accessory muscles of respirations. Patient has a left internal jugular vein  central line in place.   Cardio: Reveals presence of first and second heart sounds. Regular rate and rhythm. No murmurs, rubs or gallops.   Pulm: Reveals good effort and lungs are clear to auscultation   bilaterally. There are no wheezes, crackles or rhonchi.  GI:  Normoactive bowel sounds. Soft, non tender and non-distended. There is no guarding, rigidity or rebound tenderness.  There is no hepatosplenomegaly.   Extremities: No clubbing, cyanosis or edema.  Neurologic: CN 2 to 12 intact. Motor strength, reflexes and sensations intact and equal bilaterally. No pronator drift. Cerebellar signs intact. Gait and Romberg's test deferred at this time.  Derm: Appears unremarkable and no rashes are present.  Lymph: No cervical lymphadenopathy.   Musculoskeletal: No gross deformities or abnormalities.  Heme: No bruising or bleeding noted.  Vascular: Intact and equal peripheral pulsations bilaterally.        Intake/Output Summary (Last 24 hours) at 07/27/17 1346  Last data filed at 07/27/17 1200   Gross per 24 hour   Intake          2519.43 ml   Output              701 ml   Net          1818.43 ml       Weight:   Wt Readings from Last 1 Encounters:   07/25/17 72.5 kg         WBC (K/mcL)   Date Value   07/27/2017 7.6     HGB (g/dL)   Date Value   07/27/2017 11.7 (L)    HCT (%)   Date Value   07/27/2017 31.7 (L)    PLT (K/mcL)   Date Value   07/27/2017 247   08/15/2013 389      Sodium (mmol/L)   Date Value   07/27/2017 123 (L)      Chloride (mmol/L)   Date Value   07/27/2017 88 (L)      BUN (mg/dL)   Date Value   07/27/2017 12      Potassium (mmol/L)   Date Value   07/27/2017 3.9    Glucose (mg/dL)   Date Value   07/27/2017 99    Creatinine (mg/dL)   Date Value   07/27/2017 0.59        Medications reviewed    Consults:   IP CONSULT TO NEPHROLOGY  IP CONSULT TO CARDIOLOGY  IP CONSULT TO SOCIAL WORK    Diagnostics:   *Please review complete reports in imaging on EPIC     XR CHEST AP OR PA [[id number]] Collected: 07/25/17 9294  patient and how each of these entities is important in decreasing pain.  Explained to the patient that the purpose of physical therapy is to teach the patient a home exercise program.  These exercises need to be performed every day in order to decrease pain and prevent future occurrences of pain.        -MEDICATIONS:    --I ordered robaxin 500mg QID PRN  -topical voltaren gel 1%  -tylenol  Continue heat    Discussed multiple medication options today with patient. Discussed risks, side effects, and proper use of medications. Patient verbalized understanding.    -INTERVENTIONS:    Discussed role of injections with patient today. Patient would be a good candidate in the future for either epidural steroid injections or medial branch blocks if indicated based on symptoms and supported by imaging results.    -PATIENT EDUCATION:  Total time of 40 minutes, on the day of service, spent with the patient, reviewing the chart, placing orders, and documenting.   -Today we also discussed the pros and cons of the current treatment plan.    -FOLLOW-UP:   we will call with XR results    Advised patient to call the [facility] if symptoms worsen, new numbness or weakness develops in the legs, or if they develop new or worsening problems controlling bladder or bowel function.   ______________________________________________________________________    SUBJECTIVE:    HPI:  Caitlyn Wasserman  Is a 88 year old female hx chronic UTIs, htn, copd, anxiety, neuropathy, ibs, osteoarthritis who presents today for new patient evaluation of low back pain     Had UTI symptoms before xmas and was treated with antibiotics. Symptoms improved but did not resolve. Started having left sided back pain 3 days after xmas. Described the pain as an ache, which was sharp when she moved.  May have radiated down the leg once. She hadn't slept in bed for a while. Couldn't dress herself. Couldn't eat. She went to the ED multiple times. By the third time she went in,    Order Status: Completed Updated: 07/25/17 2228   Narrative:     AP PORTABLE VIEW CHEST, DATED 7/25/2017 at 2211 HOURS.    CLINICAL HISTORY:  Line placement.    COMPARISONS: 7/25/2017 at 1454 hours.   Impression:     IMPRESSION: Left internal jugular catheter placement with tip in the left  brachiocephalic vein. Clear lungs with sharp costophrenic angles. Normal  cardiac and sterile contours. No pneumothorax.      XR CHEST AP OR PA - PORTABLE [[id number]] Collected: 07/25/17 1507   Order Status: Completed Updated: 07/25/17 1511   Narrative:     AP PORTABLE VIEW CHEST, DATED 7/25/2017 at 1454 HOURS.    CLINICAL HISTORY:  Hypertension, weakness.    COMPARISONS: 8/12/2013.   Impression:     IMPRESSION: Small lung volumes. Minimal atelectasis at the left lung base.  Slight elevation of the left hemidiaphragm. No focal infiltrates. Sharp  costophrenic angles. The cardiac and mediastinal contours appear  unremarkable. There is no pneumothorax.      CT Head Brain [[id number]] Collected: 07/25/17 1400   Order Status: Completed Updated: 07/25/17 1411   Narrative:       CT HEAD WO CONTRAST    HISTORY: HEAD TRAUMA, CLOSED, MOD-SEVERE    TECHNIQUE:  Axial noncontrast scans were performed. Coronal and sagittal  reconstructions    Comparison: 8/5/2013    FINDINGS: The ventricular system is normal in size.  No mass effect or  midline shift are seen.    Brain density is unremarkable. There is no subdural or epidural hematoma.    The calvarium is intact.    There is a scalp hematoma in the anterior frontal area.   Impression:     IMPRESSION: Normal noncontrast CT of the head.    No evidence of traumatic injury to the brain or calvarium.              ASSESSMENT:    61-year-old female with a past medical history of chronic hyponatremia on Keppra/Prevacid and Lasix presented following dizziness and a fall. Patient was found to have a serum sodium of 110 and a potassium of 2.9. Patient was placed in IV fluids following which her sodium  the pain was pretty bad. She had a pelvis CT scan done with limited findings. Pain eventually improved with eating. Worse with standing. She was treated with keflex 500mg x 7 days.     Pain has improved but is not gone. 1/10 today, 4/10 at worst. Worse with walking, standing. Improves with heat, sitting. She has been taking tylenol for the pain    She did PT for her neck 2 yrs ago, got worse in that area. Has not done PT for her lower back  She tried a lidocaine patch which the 3rd or 5th time she used it, she noticed some visual changes when her eyes were closed. She opened her eyes and it was still there. Thought to be closed eye hallucinations per her internet searches. She took it off and resolved.     She denies leg pain, numbness, weakness.    She has not had any lumbar epidural steroid injections or lumbar surgeries    -Treatment to Date:     -Medications:  Tylenol      Current Outpatient Medications   Medication    acetaminophen (TYLENOL) 325 MG tablet    methocarbamol (ROBAXIN) 500 MG tablet    acetaminophen-caffeine (EXCEDRIN TENSION HEADACHE) 500-65 MG TABS    albuterol (PROAIR HFA/PROVENTIL HFA/VENTOLIN HFA) 108 (90 Base) MCG/ACT inhaler    atenolol (TENORMIN) 25 MG tablet    chlorthalidone (HYGROTON) 25 MG tablet    estradiol (ESTRACE) 0.1 MG/GM vaginal cream    ipratropium (ATROVENT HFA) 17 MCG/ACT inhaler    ipratropium (ATROVENT) 0.03 % nasal spray    Lidocaine (LIDOCARE) 4 % Patch    multivitamin w/minerals (THERA-VIT-M) tablet    potassium chloride ER (K-TAB) 20 MEQ CR tablet    psyllium 28.3 % POWD     Current Facility-Administered Medications   Medication    lidocaine 112 mL, EPINEPHrine (ADRENALIN) 1.12 mg in sodium chloride 0.9 % 1,113.12 mL (TUMESCENT)    lidocaine 112 mL, EPINEPHrine (ADRENALIN) 1.12 mg in sodium chloride 0.9 % 1,113.12 mL (TUMESCENT)       Allergies   Allergen Reactions    Cortisone Other (See Comments)     Couldn't walk well even after 2 weeks    Diclofenac Sodium Other  (See Comments)     Took one tab in 2008, no reaction in note    Hydrocodone Nausea and Vomiting    Morphine And Related Nausea and Vomiting     Vomited 1.5 days    Oxycodone Nausea and Vomiting    Tramadol Nausea and Vomiting       Past Medical History:   Diagnosis Date    COPD (chronic obstructive pulmonary disease) (H)     Migraine, unspecified, without mention of intractable migraine without mention of status migrainosus     Other urinary incontinence     Pure hypercholesterolemia     Unspecified essential hypertension         Patient Active Problem List   Diagnosis    Urinary frequency    LEFT HIP PAIN    Right shoulder pain    Numbness in right leg    Cataract    Recurrent UTI    HYPERLIPIDEMIA LDL GOAL <130    Kyphosis of cervical region    Neck pain, chronic    Health Care Home    Essential tremor    Pedal edema    Primary osteoarthritis of both knees    Chronic rhinitis    Essential hypertension    Dysuria    Chronic obstructive pulmonary disease, unspecified COPD type (H)    Osteoarthritis of both hands    Vaginal prolapse    Hip arthrosis    S/P total hip arthroplasty    Abnormal gait       Past Surgical History:   Procedure Laterality Date    ARTHROPLASTY HIP Right 9/28/2020    Procedure: TOTAL Hip Arthroplasty;  Surgeon: Ashkan Shah MD;  Location: [facility] OR    AS TOTAL KNEE ARTHROPLASTY Right 2011    HC ANTER COLPORRHAPHY,BLAD/VAGINA  6/04    Cystocele Repair,rectocele repair.    JOINT REPLACEMENT, HIP RT/LT  3/07, 4/07    Joint Replacement Hip /LT- dislocated in front repeat surgery 1 week later       Family History   Problem Relation Age of Onset    Cancer Mother         Bladder    Neurologic Disorder Mother         heriditary tremor    C.A.D. Father     Cerebrovascular Disease Father     Allergies Maternal Grandfather     Respiratory Maternal Grandfather         Asthma    Diabetes Paternal Grandmother         Type II    Cancer Brother         Multiple myloma    C.A.D. Brother     Cancer Sister   actually reduced. Patient has a history of drinking 6-8 large bottles of water per day on average. Following unresponsiveness to IV fluids, nephrology was contacted and the patient was placed on 3% normal saline following the placement of a central line. Sodium improved as anticipated following which 3% hypertonic saline was discontinued. Patient is also placed on fluid restriction 1500 cc. With the above measures, patient's sodium improved and is presently 123. Urine osmolality was 293, a urine sodium was 5 with serum osmolality of 232. Neurology was also consult and secondary to dizziness and a fall at home. Patient is also found to be significantly hypertensive requiring IV labetalol. Patient is a history of coronary artery disease status post stenting in 2013 with a negative stress test in 2014. Patient had an echocardiogram the results of which are pending.        Severe hyponatremia: Likely secondary to primary polydipsia plus a component of alcohol use and SIADH  Hypokalemia  Hypomagnesemia  Alcohol abuse  Essential hypertension with hypertensive urgency: Blood pressures now improved and stabilized.  Coronary artery disease status post coronary stent placement  History of seizures  Dizziness  Fall    PLAN:   - Continue to monitor on telemetry.  - Monitor Serial Na Levels.  Status post 3% normal saline treatment following which the sodium is improved.  - Nephrology on consult for further recommendations. Appreciate input.  - Monitor blood pressures.  - IV Hydralazine PEN for SBP>160.  - Cardiology on consult for further recommendations. Appreciate input.  - Continue patient's Keppra and other home medications for chronic medical conditions.  - Seizure precautions.  - PT and OT evaluations requested. Appreciate input.  - Patient briefly counseled on alcohol cessation. Monitor under CIWA protocol.  Patient presently on aspirin, Plavix, lisinopril, Lasix 40 by mouth daily, Coreg statins  Keppra restarted it to 50  "       kidney cancer    Neurofibromatosis Son     Breast Cancer No family hx of        Reviewed past medical, surgical, and family history with patient found on new patient intake packet located in EMR Media tab.     SOCIAL HX: alcohol use, nonsmoker, no heavy drinking, no rec drug use    ROS: positive for feet/leg swelling, questionable color changes in the hands/feet, shortness of breath, cough, abdominal pain, diarrhea, loss of bowel control, pain with urinating, difficulty urinating, loss of bladder control, joint pain, muscle pain, muscle fatigue, imbalance, insomnia, excessive tiredness, anxiety.  Specifically negative for headache, dizziness, foot drop, fevers, chills, appetite changes, nausea/vomiting, unexplained weight loss. Otherwise 13 systems reviewed are negative. Please see the patient's intake questionnaire from today for details.    OBJECTIVE:  BP (!) 142/58 (BP Location: Right arm, Patient Position: Sitting, Cuff Size: Adult Regular)   Pulse 52   Ht 5' 5\" (1.651 m)   LMP  (LMP Unknown)   SpO2 99%   BMI 25.79 kg/m      PHYSICAL EXAMINATION:    --CONSTITUTIONAL:  Vital signs as above.  No acute distress.  The patient is well nourished and well groomed.  --PSYCHIATRIC:  Appropriate mood and affect. The patient is awake, alert, oriented to person, place, time and answering questions appropriately with clear speech.    --SKIN:  Skin over the face, bilateral lower extremities, and posterior torso is clean, dry, intact without rashes.    --RESPIRATORY: Normal rhythm and effort. No abnormal accessory muscle breathing patterns noted.   --ABDOMINAL:  Non-distended.    --GROSS MOTOR: Gait is non-antalgic. Easily arises from a seated position. Toe walking and heel walking are normal without significant difficulty.      --LOWER EXTREMITY MOTOR TESTING:  Hip flexion: right 5/5, left 5/5  Hip abduction: right 5/5, left 5/5  Hip adduction: right 5/5, left 5/5   Quads: right 5/5, left 5/5  Hamstrings: right " mg twice a day. Patient continued on Synthroid with normal TSH.  We'll monitor blood pressure and BMP and if stable anticipate discharge in a.m if cleared by nephrology and cardiology  GI prophylaxis: PPI therapy.  DVT prophylaxis: SC Lovenox, SCDs and ASHUTOSH stockings.    Code status: Full Resuscitation    Reviewed Pertinent: Medications, Past Medical History, Past Surgical History, Radiology, Labs and Old Records.    Hospital Day #: 2.     Tentative discharge Disposition: TBD. PT and OT evaluations requested.     Plan discussed in detail with patient and RN suzanne    Total time spent coordinating care more than 35 minutes.    -------------------------------------------------------------------------------------------------------------------     Signed:  Mercedes Valentine MD  7/27/2017  1:55 PM       5/5, left 5/5  Dorsiflexion: right 5/5, left 5/5  Plantar flexion: right 5/5, left 5/5    Great toe MTP extension/EHL: right 5/5, left 5/5    --NEUROLOGICAL:  1/4 patellar and achilles reflexes bilaterally. Sensation to light touch is intact throughout both lower extremities. Babinski is negative. No clonus.    --MUSCULOSKELETAL: Lumbar spine inspection reveals no evidence of deformity. Range of motion is limited in lumbar flexion, extension, lateral rotation. No point tenderness to palpation lumbar spine. + left mid lumbar paraspinal musculature tenderness to palp.    Straight leg raising is negative.    --SACROILIAC JOINT: One finger point test was negative. Negative SI joint tenderness to palpation bilaterally.    --VASCULAR:  Bilateral lower extremities are warm without any discoloration.  There is no pitting edema of the bilateral lower extremities.    RESULTS:   Prior medical records from [facility] and Care Everywhere were reviewed today.    Imaging: Spine imaging was personally reviewed and interpreted today. The images were shown to the patient and the findings were explained using a spine model.      CT Pelvis Bone wo Contrast    Result Date: 1/11/2024  EXAM: CT PELVIS BONE WO CONTRAST LOCATION: [facility] DATE: 1/11/2024 INDICATION: Left pelvic pain/tenderness. COMPARISON: 09/10/2023. TECHNIQUE: CT scan of the pelvis was performed without IV contrast. Multiplanar reformats were obtained. Dose reduction techniques were used. CONTRAST: None. FINDINGS: PELVIS ORGANS: Pelvic floor laxity with inferior descent of the pelvic organs. No inflammatory changes or free fluid. Mild aortoiliac atherosclerosis. MUSCULOSKELETAL: Demineralization. Bilateral total hip arthroplasties. Components appear well seated. Joint alignment is normal. No evidence of significant left or right hip joint effusion. Chronic hypertrophic bone formation and irregularity along the anterior inferior iliac  spine. No acute fracture or adjacent soft tissue swelling. Degenerative disc and facet changes in the lower lumbar spine.     IMPRESSION: 1.  No acute musculoskeletal abnormality identified. 2.  Bilateral total hip arthroplasties. No evidence of loosening or periprosthetic fracture. 3.  Degenerative changes in the lower lumbar spine. 4.  Pelvic floor laxity. 5.  Aortoiliac atherosclerosis.          Rima KAHN-C  [facility]  O. [phone]

## 2024-01-18 NOTE — LETTER
1/18/2024         RE: Caitlyn Wasserman  [address] [address] [id number]        Dear Colleague,    Thank you for referring your patient, Caitlyn Wasserman, to the [facility]. Please see a copy of my visit note below.    ASSESSMENT: Caitlyn Wasserman is a 88 year old female who presents for consultation at the request of PCP Cindy Flor, who presents today for new patient evaluation of:    -left lower back pain     Patient is neurologically intact on exam. No myelopathic or red flag symptoms. She has left mid lumbar paraspinal musculature tenderness to palp. She has sharp pain with turning and twisting. I recommended lumbar XR for further evaluation as I dont think her recent pelvis CT scan went up high enough to capture the area of her concern. We talked about it potentially being muscular, related to facet arthropathy and disc degeneration L4-5, or less likely fracture. She had moderate osteopenia last dxa scan however this was 9 yrs ago. Tentatively if XR negative would plan for treating facetogenic pain with PT which she agrees with. Offered robaxin for her pain (which is severe) and tylenol, topical voltaren. We talked about possible side effects of robaxin.  We will call with results         No data to display                     Diagnoses and all orders for this visit:  Spondylosis of lumbar region without myelopathy or radiculopathy  -     XR Lumbar Spine 2/3 Views; Future  -     methocarbamol (ROBAXIN) 500 MG tablet; Take 1 tablet (500 mg) by mouth 4 times daily as needed for muscle spasms  Back pain  -     Spine  Referral      PLAN:  Reviewed spine anatomy and disease process. Discussed diagnosis and treatment options with the patient today. A shared decision making model was used.  The patient's values and choices were respected. The following represents what was discussed and decided upon by the provider and the patient.      -DIAGNOSTIC TESTS:  Images  were personally reviewed and interpreted and explained to patient today using a spine model.   --I ordered XR lumbar spine    -PHYSICAL THERAPY:    --recommended PT if xr wnl    Discussed the importance of core strengthening, ROM, stretching exercises with the patient and how each of these entities is important in decreasing pain.  Explained to the patient that the purpose of physical therapy is to teach the patient a home exercise program.  These exercises need to be performed every day in order to decrease pain and prevent future occurrences of pain.        -MEDICATIONS:    --I ordered robaxin 500mg QID PRN  -topical voltaren gel 1%  -tylenol  Continue heat    Discussed multiple medication options today with patient. Discussed risks, side effects, and proper use of medications. Patient verbalized understanding.    -INTERVENTIONS:    Discussed role of injections with patient today. Patient would be a good candidate in the future for either epidural steroid injections or medial branch blocks if indicated based on symptoms and supported by imaging results.    -PATIENT EDUCATION:  Total time of 40 minutes, on the day of service, spent with the patient, reviewing the chart, placing orders, and documenting.   -Today we also discussed the pros and cons of the current treatment plan.    -FOLLOW-UP:   we will call with XR results    Advised patient to call the Spine Center if symptoms worsen, new numbness or weakness develops in the legs, or if they develop new or worsening problems controlling bladder or bowel function.   ______________________________________________________________________    SUBJECTIVE:    HPI:  Caitlyn Wasserman  Is a 88 year old female hx chronic UTIs, htn, copd, anxiety, neuropathy, ibs, osteoarthritis who presents today for new patient evaluation of low back pain     Had UTI symptoms before xmas and was treated with antibiotics. Symptoms improved but did not resolve. Started having left sided back pain  3 days after xmas. Described the pain as an ache, which was sharp when she moved.  May have radiated down the leg once. She hadn't slept in bed for a while. Couldn't dress herself. Couldn't eat. She went to the ED multiple times. By the third time she went in, the pain was pretty bad. She had a pelvis CT scan done with limited findings. Pain eventually improved with eating. Worse with standing. She was treated with keflex 500mg x 7 days.     Pain has improved but is not gone. 1/10 today, 4/10 at worst. Worse with walking, standing. Improves with heat, sitting. She has been taking tylenol for the pain    She did PT for her neck 2 yrs ago, got worse in that area. Has not done PT for her lower back  She tried a lidocaine patch which the 3rd or 5th time she used it, she noticed some visual changes when her eyes were closed. She opened her eyes and it was still there. Thought to be closed eye hallucinations per her internet searches. She took it off and resolved.     She denies leg pain, numbness, weakness.    She has not had any lumbar epidural steroid injections or lumbar surgeries    -Treatment to Date:     -Medications:  Tylenol      Current Outpatient Medications   Medication     acetaminophen (TYLENOL) 325 MG tablet     methocarbamol (ROBAXIN) 500 MG tablet     acetaminophen-caffeine (EXCEDRIN TENSION HEADACHE) 500-65 MG TABS     albuterol (PROAIR HFA/PROVENTIL HFA/VENTOLIN HFA) 108 (90 Base) MCG/ACT inhaler     atenolol (TENORMIN) 25 MG tablet     chlorthalidone (HYGROTON) 25 MG tablet     estradiol (ESTRACE) 0.1 MG/GM vaginal cream     ipratropium (ATROVENT HFA) 17 MCG/ACT inhaler     ipratropium (ATROVENT) 0.03 % nasal spray     Lidocaine (LIDOCARE) 4 % Patch     multivitamin w/minerals (THERA-VIT-M) tablet     potassium chloride ER (K-TAB) 20 MEQ CR tablet     psyllium 28.3 % POWD     Current Facility-Administered Medications   Medication     lidocaine 112 mL, EPINEPHrine (ADRENALIN) 1.12 mg in sodium chloride  0.9 % 1,113.12 mL (TUMESCENT)     lidocaine 112 mL, EPINEPHrine (ADRENALIN) 1.12 mg in sodium chloride 0.9 % 1,113.12 mL (TUMESCENT)       Allergies   Allergen Reactions     Cortisone Other (See Comments)     Couldn't walk well even after 2 weeks     Diclofenac Sodium Other (See Comments)     Took one tab in 2008, no reaction in note     Hydrocodone Nausea and Vomiting     Morphine And Related Nausea and Vomiting     Vomited 1.5 days     Oxycodone Nausea and Vomiting     Tramadol Nausea and Vomiting       Past Medical History:   Diagnosis Date     COPD (chronic obstructive pulmonary disease) (H)      Migraine, unspecified, without mention of intractable migraine without mention of status migrainosus      Other urinary incontinence      Pure hypercholesterolemia      Unspecified essential hypertension         Patient Active Problem List   Diagnosis     Urinary frequency     LEFT HIP PAIN     Right shoulder pain     Numbness in right leg     Cataract     Recurrent UTI     HYPERLIPIDEMIA LDL GOAL <130     Kyphosis of cervical region     Neck pain, chronic     Health Care Home     Essential tremor     Pedal edema     Primary osteoarthritis of both knees     Chronic rhinitis     Essential hypertension     Dysuria     Chronic obstructive pulmonary disease, unspecified COPD type (H)     Osteoarthritis of both hands     Vaginal prolapse     Hip arthrosis     S/P total hip arthroplasty     Abnormal gait       Past Surgical History:   Procedure Laterality Date     ARTHROPLASTY HIP Right 9/28/2020    Procedure: TOTAL Hip Arthroplasty;  Surgeon: Ashkan Shah MD;  Location: [facility] OR     AS TOTAL KNEE ARTHROPLASTY Right 2011     HC ANTER COLPORRHAPHY,BLAD/VAGINA  6/04    Cystocele Repair,rectocele repair.     JOINT REPLACEMENT, HIP RT/LT  3/07, 4/07    Joint Replacement Hip /LT- dislocated in front repeat surgery 1 week later       Family History   Problem Relation Age of Onset     Cancer Mother         Bladder      "Neurologic Disorder Mother         heriditary tremor     C.A.D. Father      Cerebrovascular Disease Father      Allergies Maternal Grandfather      Respiratory Maternal Grandfather         Asthma     Diabetes Paternal Grandmother         Type II     Cancer Brother         Multiple myloma     C.A.D. Brother      Cancer Sister         kidney cancer     Neurofibromatosis Son      Breast Cancer No family hx of        Reviewed past medical, surgical, and family history with patient found on new patient intake packet located in EMR Media tab.     SOCIAL HX: alcohol use, nonsmoker, no heavy drinking, no rec drug use    ROS: positive for feet/leg swelling, questionable color changes in the hands/feet, shortness of breath, cough, abdominal pain, diarrhea, loss of bowel control, pain with urinating, difficulty urinating, loss of bladder control, joint pain, muscle pain, muscle fatigue, imbalance, insomnia, excessive tiredness, anxiety.  Specifically negative for headache, dizziness, foot drop, fevers, chills, appetite changes, nausea/vomiting, unexplained weight loss. Otherwise 13 systems reviewed are negative. Please see the patient's intake questionnaire from today for details.    OBJECTIVE:  BP (!) 142/58 (BP Location: Right arm, Patient Position: Sitting, Cuff Size: Adult Regular)   Pulse 52   Ht 5' 5\" (1.651 m)   LMP  (LMP Unknown)   SpO2 99%   BMI 25.79 kg/m      PHYSICAL EXAMINATION:    --CONSTITUTIONAL:  Vital signs as above.  No acute distress.  The patient is well nourished and well groomed.  --PSYCHIATRIC:  Appropriate mood and affect. The patient is awake, alert, oriented to person, place, time and answering questions appropriately with clear speech.    --SKIN:  Skin over the face, bilateral lower extremities, and posterior torso is clean, dry, intact without rashes.    --RESPIRATORY: Normal rhythm and effort. No abnormal accessory muscle breathing patterns noted.   --ABDOMINAL:  Non-distended.    --GROSS " MOTOR: Gait is non-antalgic. Easily arises from a seated position. Toe walking and heel walking are normal without significant difficulty.      --LOWER EXTREMITY MOTOR TESTING:  Hip flexion: right 5/5, left 5/5  Hip abduction: right 5/5, left 5/5  Hip adduction: right 5/5, left 5/5   Quads: right 5/5, left 5/5  Hamstrings: right 5/5, left 5/5  Dorsiflexion: right 5/5, left 5/5  Plantar flexion: right 5/5, left 5/5    Great toe MTP extension/EHL: right 5/5, left 5/5    --NEUROLOGICAL:  1/4 patellar and achilles reflexes bilaterally. Sensation to light touch is intact throughout both lower extremities. Babinski is negative. No clonus.    --MUSCULOSKELETAL: Lumbar spine inspection reveals no evidence of deformity. Range of motion is limited in lumbar flexion, extension, lateral rotation. No point tenderness to palpation lumbar spine. + left mid lumbar paraspinal musculature tenderness to palp.    Straight leg raising is negative.    --SACROILIAC JOINT: One finger point test was negative. Negative SI joint tenderness to palpation bilaterally.    --VASCULAR:  Bilateral lower extremities are warm without any discoloration.  There is no pitting edema of the bilateral lower extremities.    RESULTS:   Prior medical records from [facility] Everywhere were reviewed today.    Imaging: Spine imaging was personally reviewed and interpreted today. The images were shown to the patient and the findings were explained using a spine model.      CT Pelvis Bone wo Contrast    Result Date: 1/11/2024  EXAM: CT PELVIS BONE WO CONTRAST LOCATION: [facility] DATE: 1/11/2024 INDICATION: Left pelvic pain/tenderness. COMPARISON: 09/10/2023. TECHNIQUE: CT scan of the pelvis was performed without IV contrast. Multiplanar reformats were obtained. Dose reduction techniques were used. CONTRAST: None. FINDINGS: PELVIS ORGANS: Pelvic floor laxity with inferior descent of the pelvic organs. No inflammatory  changes or free fluid. Mild aortoiliac atherosclerosis. MUSCULOSKELETAL: Demineralization. Bilateral total hip arthroplasties. Components appear well seated. Joint alignment is normal. No evidence of significant left or right hip joint effusion. Chronic hypertrophic bone formation and irregularity along the anterior inferior iliac spine. No acute fracture or adjacent soft tissue swelling. Degenerative disc and facet changes in the lower lumbar spine.     IMPRESSION: 1.  No acute musculoskeletal abnormality identified. 2.  Bilateral total hip arthroplasties. No evidence of loosening or periprosthetic fracture. 3.  Degenerative changes in the lower lumbar spine. 4.  Pelvic floor laxity. 5.  Aortoiliac atherosclerosis.          Rima KAHN-C  [facility]  O. [phone]             Again, thank you for allowing me to participate in the care of your patient.        Sincerely,        SUDHAKAR Wang CNP

## 2024-01-18 NOTE — PATIENT INSTRUCTIONS
Imaging (Xray) has been ordered today.     There are 3 different locations you can go as a walk-in today :    [facility]  [address] [id number]    [facility] [place]  [address], [address]   [facility] [address]      [address] [address]         ~You have been referred for Physical Therapy to [facility]. They will call you to schedule an appointment.      If you have not heard from the scheduling office within 2 business days, please call [phone] for ALL other locations.    Discussed the importance of core strengthening, ROM, stretching exercises and how each of these entities is important in decreasing pain and improving long term spine health.  The purpose of physical therapy is to teach you an individualized home exercise program.  These exercises need to be performed every day in order to decrease pain and prevent future occurrences of pain.             Topical voltaren 1% gel to back pain area as needed  Tylenol as needed as well as directed over the counter  Robaxin (muscle relaxant medication) has been prescribed today. Please take one four times daily as needed. This medication may cause drowsiness. Please do not work or drive while taking this medication until you know how it affects you. If it does make you drowsy, you should only take it before bedtime or at times that you do not have to work/drive.     ~Please call our [facility] Nurse Navigation line [phone] with any questions or concerns about your treatment plan, if symptoms worsen and you would like to be seen urgently, or if you have any new or worsening numbness, weakness, or problems controlling bladder and bowel function.  ~You are also welcome to contact Rima Thacker via SnapDash, but please be aware that responses to SnapDash message may take 2-3 days due to the high volume of patients seen in clinic.

## 2024-01-19 ENCOUNTER — TELEPHONE (OUTPATIENT)
Dept: PHYSICAL MEDICINE AND REHAB | Facility: CLINIC | Age: 89
End: 2024-01-19
Payer: COMMERCIAL

## 2024-01-19 DIAGNOSIS — M47.816 SPONDYLOSIS OF LUMBAR REGION WITHOUT MYELOPATHY OR RADICULOPATHY: Primary | ICD-10-CM

## 2024-01-19 NOTE — TELEPHONE ENCOUNTER
Phone call to patient to review results and provider's recommendations. Results given and explained. Explained PSP is recommending some PT at this time. Once she has completed PT, she should return for follow up if continues to have pain. She may return sooner if pain worsens or changes. Encouraged pt to call nurse navigation line if questions or concerns arise. Stated understanding. She will await a call to get scheduled for PT.

## 2024-01-19 NOTE — TELEPHONE ENCOUNTER
----- Message from SUDHAKAR Castorena CNP sent at 1/19/2024 12:14 PM CST -----  Please let Caitlyn know that her lumbar xrays did not show any evidence of new fractures. This is great news. Her chronic L1 fracture is unchanged. She appears to have chronic degenerative arthritis wear and tear changes of the joints at L3-4 and L4-5, and chronic disc degeneration at L4-5 and either side of her L1 fracture. These findings can cause back pain. I would recommend physical therapy to focus on strengthening the muscles in her back to support these areas of wear and tear, and following up with me afterwards.

## 2024-01-26 ENCOUNTER — LAB (OUTPATIENT)
Dept: LAB | Facility: CLINIC | Age: 89
End: 2024-01-26
Payer: COMMERCIAL

## 2024-01-26 DIAGNOSIS — N39.0 RECURRENT UTI: ICD-10-CM

## 2024-01-26 DIAGNOSIS — N30.00 ACUTE CYSTITIS WITHOUT HEMATURIA: Primary | ICD-10-CM

## 2024-01-26 LAB
ALBUMIN UR-MCNC: 100 MG/DL
APPEARANCE UR: ABNORMAL
BACTERIA #/AREA URNS HPF: ABNORMAL /HPF
BILIRUB UR QL STRIP: NEGATIVE
COLOR UR AUTO: YELLOW
GLUCOSE UR STRIP-MCNC: NEGATIVE MG/DL
HGB UR QL STRIP: ABNORMAL
KETONES UR STRIP-MCNC: NEGATIVE MG/DL
LEUKOCYTE ESTERASE UR QL STRIP: ABNORMAL
NITRATE UR QL: POSITIVE
PH UR STRIP: 7.5 [PH] (ref 5–7)
RBC #/AREA URNS AUTO: ABNORMAL /HPF
SP GR UR STRIP: 1.02 (ref 1–1.03)
SQUAMOUS #/AREA URNS AUTO: ABNORMAL /LPF
UROBILINOGEN UR STRIP-ACNC: 0.2 E.U./DL
WBC #/AREA URNS AUTO: >100 /HPF

## 2024-01-26 PROCEDURE — 87086 URINE CULTURE/COLONY COUNT: CPT

## 2024-01-26 PROCEDURE — 81001 URINALYSIS AUTO W/SCOPE: CPT

## 2024-01-26 RX ORDER — NITROFURANTOIN 25; 75 MG/1; MG/1
100 CAPSULE ORAL 2 TIMES DAILY
Qty: 10 CAPSULE | Refills: 0 | Status: SHIPPED | OUTPATIENT
Start: 2024-01-26 | End: 2024-01-31

## 2024-01-27 LAB — BACTERIA UR CULT: NORMAL

## 2024-02-08 ENCOUNTER — THERAPY VISIT (OUTPATIENT)
Dept: PHYSICAL THERAPY | Facility: CLINIC | Age: 89
End: 2024-02-08
Attending: NURSE PRACTITIONER
Payer: COMMERCIAL

## 2024-02-08 DIAGNOSIS — M47.816 SPONDYLOSIS OF LUMBAR REGION WITHOUT MYELOPATHY OR RADICULOPATHY: ICD-10-CM

## 2024-02-08 PROCEDURE — 97140 MANUAL THERAPY 1/> REGIONS: CPT | Mod: GP

## 2024-02-08 PROCEDURE — 97161 PT EVAL LOW COMPLEX 20 MIN: CPT | Mod: GP

## 2024-02-08 PROCEDURE — 97535 SELF CARE MNGMENT TRAINING: CPT | Mod: GP

## 2024-02-08 PROCEDURE — 97110 THERAPEUTIC EXERCISES: CPT | Mod: GP

## 2024-02-08 NOTE — PROGRESS NOTES
"PHYSICAL THERAPY EVALUATION  Type of Visit: Evaluation    See electronic medical record for Abuse and Falls Screening details.    Subjective       Presenting condition or subjective complaint: Pt reports her back started hurting 23. Not sure if it is related to her UTI.  Date of onset: 23    Relevant medical history: Arthritis; Bladder or bowel problems; COPD; High blood pressure; Implanted device; Incontinence; Pain at night or rest   Dates & types of surgery: R TKA, R YSABEL, LTHA    Prior diagnostic imaging/testing results: CT scan     Prior therapy history for the same diagnosis, illness or injury: No      Prior Level of Function  Independent    Living Environment  Social support: With a significant other or spouse   [place] [facility] level   Stairs to enter the home: Yes 1     Ramp: No   Stairs inside the home: No       Help at home: None  Equipment owned: Four-point cane; Walker; Walker with wheels; Grab bars     Employment: No    Hobbies/Interests: reading, [employer] activities, computer    Patient goals for therapy: wants to be pain free    Pain assessment: Pain present  Location: L LBP/Ratin/10     Objective   LUMBAR SPINE EVALUATION  PAIN: Pain Level at Rest: 1/10  Pain Level with Use: 5/10  Pain Location: lumbar spine  Pain Quality: Sharp  Pain Frequency: intermittent  Pain is Worst: Ist thing in the am  Pain is Exacerbated By: standing from sitting, twisting, getting in/out of bed/car, bending, lifting  Pain is Relieved By: otc medications and rest  Pain Progression: Worsened  POSTURE: Sitting Posture: Rounded shoulders, Forward head, Thoracic kyphosis increased  GAIT:   Amb without assistive device, wide LEÓN, short step length, antalgic gait, fwd flexed, slow jessica  BALANCE/PROPRIOCEPTION: Single Leg Stance Eyes Open (seconds): R 3\", L 5\"  ROM:   (Degrees) Left AROM Right AROM   Hip Flexion 90* 100*   Hip Extension 0* 0*   Hip Abduction WFL WFL   Hip Adduction WFL WFL   Hip Internal " Rotation 10* 10*   Hip External Rotation 10* 15*   Lumbar Rotation Mildly limited + Moderately limited +   Lumbar Side glide Severely limited +++ Severely limited +++   Lumbar Flexion Hands to knees +   Lumbar Extension Severely limited ++     PELVIC/SI SCREEN: Kelley (March): neg  STRENGTH: WNL, except B hip ER 4/5, PF 3-/5  FLEXIBILITY: Decreased hip IR L, Decreased hip ER L, Decreased piriformis L, Decreased hip flexors L, Decreased hamstrings L, Decreased hip IR R, Decreased hip ER R, Decreased hip flexors R, Decreased hamstrings R  LUMBAR/HIP Special Tests:    Left Right   LAMONT Negative  Negative    FADIR/Labrum/ISAURA Negative  Negative    Piriformis Negative  Negative    SLR Negative  Negative    Slump Negative  Negative     PELVIS/SI SPECIAL TESTS: WFL  PALPATION:  tender L QL, external oblique      Assessment & Plan   CLINICAL IMPRESSIONS  Medical Diagnosis: Spondylosis of lumbar region without myelopathy or radiculopathy    Treatment Diagnosis: L LBP   Impression/Assessment: Patient is a 88 year old female with L LBP complaints.  The following significant findings have been identified: Pain, Decreased ROM/flexibility, Decreased strength, Impaired balance, Impaired gait, and Impaired posture. These impairments interfere with their ability to perform self care tasks, household chores, household mobility, and community mobility as compared to previous level of function.     Clinical Decision Making (Complexity):  Clinical Presentation: Stable/Uncomplicated  Clinical Presentation Rationale: based on medical and personal factors listed in PT evaluation  Clinical Decision Making (Complexity): Low complexity    PLAN OF CARE  Treatment Interventions:  Interventions: Manual Therapy, Therapeutic Activity, Therapeutic Exercise    Long Term Goals     PT Goal 1  Goal Identifier: 1  Goal Description: Pt will be able to sit>stand with < 3/10 pain.  Target Date: 03/21/24  PT Goal 2  Goal Identifier: 2  Goal Description: Pt  will be able to get in/out of bed/car with < 3/10 pain.  Target Date: 04/11/24  PT Goal 3  Goal Identifier: 3  Goal Description: Pt will be able to lift a laundry basket with < 3/10 pain.  Target Date: 05/02/24  PT Goal 4  Goal Identifier: 4  Goal Description: Pt will be independent with HEP for optimal functional recovery.  Target Date: 05/02/24      Frequency of Treatment: 1x/week  Duration of Treatment: 12 weeks    Education Assessment:   Learner/Method: Patient;Listening;Demonstration;Pictures/Video;No Barriers to Learning    Risks and benefits of evaluation/treatment have been explained.   Patient/Family/caregiver agrees with Plan of Care.     Evaluation Time:     PT Eval, Low Complexity Minutes (80294): 20       Signing Clinician: SHANTEL Poon [facility]                                                                                   OUTPATIENT PHYSICAL THERAPY      PLAN OF TREATMENT FOR OUTPATIENT REHABILITATION   Patient's Last Name, First Name, Caitlyn Salgado YOB: 1935   Provider's Name   [facility]   Medical Record No.  [id number]     Onset Date: 12/28/23  Start of Care Date: 02/08/24     Medical Diagnosis:  Spondylosis of lumbar region without myelopathy or radiculopathy      PT Treatment Diagnosis:  L LBP Plan of Treatment  Frequency/Duration: 1x/week/ 12 weeks    Certification date from 02/08/24 to 05/02/24         See note for plan of treatment details and functional goals     Chantal Mayers, PT                         I CERTIFY THE NEED FOR THESE SERVICES FURNISHED UNDER        THIS PLAN OF TREATMENT AND WHILE UNDER MY CARE     (Physician attestation of this document indicates review and certification of the therapy plan).              Referring Provider:  Rima Thacker    Initial Assessment  See Epic Evaluation- Start of Care Date: 02/08/24

## 2024-02-09 ENCOUNTER — NURSE TRIAGE (OUTPATIENT)
Dept: FAMILY MEDICINE | Facility: CLINIC | Age: 89
End: 2024-02-09
Payer: COMMERCIAL

## 2024-02-09 NOTE — TELEPHONE ENCOUNTER
Symptoms    Describe your symptoms: Hemorrhoid is bleeding pt said more than usual. It has gotten worse the last few days actively bleeding now. Pt is unsure who should she see PCP or would PCP refer her to see specialty.     Any pain: No    How long have you been having symptoms: Getting worse the past 3 days. Started awhile ago faint color of blood.       Could we send this information to you in SmartRx or would you prefer to receive a phone call?:   Patient would prefer a phone call   Okay to leave a detailed message?: Yes at Cell number on file:    Telephone Information:   [phone] (Home)

## 2024-02-09 NOTE — TELEPHONE ENCOUNTER
Called patient and she reports hemorrhoids have been bleeding more.     RN scheduled a same day appointment for patient on 2/13/24.    Reason for Disposition   MILD rectal bleeding (more than just a few drops or streaks)    Additional Information   Negative: Passed out (i.e., fainted, collapsed and was not responding)   Negative: Shock suspected (e.g., cold/pale/clammy skin, too weak to stand, low BP, rapid pulse)   Negative: Vomiting red blood or black (coffee ground) material   Negative: Sounds like a life-threatening emergency to the triager   Negative: Diarrhea is main symptom   Negative: Rectal symptoms   Negative: SEVERE rectal bleeding (large blood clots; constant or on and off bleeding)   Negative: SEVERE dizziness (e.g., unable to stand, requires support to walk, feels like passing out now)   Negative: MODERATE rectal bleeding (small blood clots, passing blood without stool, or toilet water turns red) more than once a day   Negative: Bloody, black, or tarry bowel movements  (Exception: Chronic-unchanged black-grey bowel movements and is taking iron pills or Pepto-Bismol.)   Negative: High-risk adult (e.g., prior surgery on aorta, abdominal aortic aneurysm)   Negative: Rectal foreign body (inserted or swallowed)   Negative: SEVERE abdominal pain (e.g., excruciating)   Negative: Constant abdominal pain lasting > 2 hours   Negative: Pale skin (pallor) of new-onset or worsening   Negative: Patient sounds very sick or weak to the triager   Negative: MODERATE rectal bleeding (small blood clots, passing blood without stool, or toilet water turns red)   Negative: Taking Coumadin (warfarin) or other strong blood thinner, or known bleeding disorder (e.g., thrombocytopenia)   Negative: Colonoscopy in past 72 hours   Negative: Known cirrhosis of the liver (or history of liver failure or ascites)   Negative: Patient wants to be seen    Protocols used: Rectal Bleeding-A-OH      Ania Abdi RN on 2/9/2024 at 10:37  AM

## 2024-02-13 ENCOUNTER — OFFICE VISIT (OUTPATIENT)
Dept: FAMILY MEDICINE | Facility: CLINIC | Age: 89
End: 2024-02-13
Payer: COMMERCIAL

## 2024-02-13 VITALS
BODY MASS INDEX: 24.83 KG/M2 | HEIGHT: 65 IN | DIASTOLIC BLOOD PRESSURE: 62 MMHG | HEART RATE: 78 BPM | SYSTOLIC BLOOD PRESSURE: 138 MMHG | RESPIRATION RATE: 16 BRPM | TEMPERATURE: 96.6 F | OXYGEN SATURATION: 100 % | WEIGHT: 149 LBS

## 2024-02-13 DIAGNOSIS — K64.4 EXTERNAL HEMORRHOIDS: ICD-10-CM

## 2024-02-13 DIAGNOSIS — J44.9 CHRONIC OBSTRUCTIVE PULMONARY DISEASE, UNSPECIFIED COPD TYPE (H): ICD-10-CM

## 2024-02-13 DIAGNOSIS — R35.0 URINARY FREQUENCY: Primary | ICD-10-CM

## 2024-02-13 LAB
ALBUMIN UR-MCNC: NEGATIVE MG/DL
APPEARANCE UR: ABNORMAL
BACTERIA #/AREA URNS HPF: ABNORMAL /HPF
BILIRUB UR QL STRIP: NEGATIVE
COLOR UR AUTO: YELLOW
GLUCOSE UR STRIP-MCNC: NEGATIVE MG/DL
HGB UR QL STRIP: ABNORMAL
KETONES UR STRIP-MCNC: NEGATIVE MG/DL
LEUKOCYTE ESTERASE UR QL STRIP: ABNORMAL
NITRATE UR QL: POSITIVE
PH UR STRIP: 7 [PH] (ref 5–7)
RBC #/AREA URNS AUTO: ABNORMAL /HPF
SP GR UR STRIP: 1.01 (ref 1–1.03)
SQUAMOUS #/AREA URNS AUTO: ABNORMAL /LPF
UROBILINOGEN UR STRIP-ACNC: 0.2 E.U./DL
WBC #/AREA URNS AUTO: ABNORMAL /HPF
WBC CLUMPS #/AREA URNS HPF: PRESENT /HPF

## 2024-02-13 PROCEDURE — 99214 OFFICE O/P EST MOD 30 MIN: CPT | Performed by: NURSE PRACTITIONER

## 2024-02-13 PROCEDURE — 87086 URINE CULTURE/COLONY COUNT: CPT | Performed by: NURSE PRACTITIONER

## 2024-02-13 PROCEDURE — 81001 URINALYSIS AUTO W/SCOPE: CPT | Performed by: NURSE PRACTITIONER

## 2024-02-13 RX ORDER — LIDOCAINE HCL AND HYDROCORTISONE ACETATE 28; 5.5 MG/G; MG/G
1 GEL RECTAL PRN
Qty: 100 G | Refills: 0 | Status: SHIPPED | OUTPATIENT
Start: 2024-02-13 | End: 2024-06-14

## 2024-02-13 RX ORDER — SULFAMETHOXAZOLE/TRIMETHOPRIM 800-160 MG
1 TABLET ORAL 2 TIMES DAILY
Qty: 14 TABLET | Refills: 0 | Status: SHIPPED | OUTPATIENT
Start: 2024-02-13 | End: 2024-09-23

## 2024-02-13 ASSESSMENT — PAIN SCALES - GENERAL: PAINLEVEL: NO PAIN (0)

## 2024-02-13 NOTE — PATIENT INSTRUCTIONS
Try the gel as needed for hemorrhoids and continue to prevent constipation as much as able.  Try the antibiotics again and push fluids and follow up with urology for further treatment options.      Our Clinic hours are:  Mondays    7:20 am - 7 pm  Tues -  Fri  7:20 am - 5 pm    Clinic Phone: [phone]    The clinic lab opens at 7:30 am Mon - Fri and appointments are required.    [facility]  Ph. [phone]  Monday  8 am - 7pm  Tues - Fri 8 am - 5:30 pm

## 2024-02-13 NOTE — LETTER
February 19, 2024      Caitlyn Wasserman  [address] [facility] [address]        Dear ,    We are writing to inform you of your test results.    Urine culture is again, negative.   Follow up with urology.     Resulted Orders   UA Macroscopic with reflex to Microscopic and Culture - Clinic Collect   Result Value Ref Range    Color Urine Yellow Colorless, Straw, Light Yellow, Yellow    Appearance Urine Slightly Cloudy (A) Clear    Glucose Urine Negative Negative mg/dL    Bilirubin Urine Negative Negative    Ketones Urine Negative Negative mg/dL    Specific Gravity Urine 1.015 1.003 - 1.035    Blood Urine Small (A) Negative    pH Urine 7.0 5.0 - 7.0    Protein Albumin Urine Negative Negative mg/dL    Urobilinogen Urine 0.2 0.2, 1.0 E.U./dL    Nitrite Urine Positive (A) Negative    Leukocyte Esterase Urine Moderate (A) Negative   Urine Microscopic Exam   Result Value Ref Range    Bacteria Urine Few (A) None Seen /HPF    RBC Urine 0-2 0-2 /HPF /HPF    WBC Urine 5-10 (A) 0-5 /HPF /HPF    Squamous Epithelials Urine Few (A) None Seen /LPF    WBC Clumps Urine Present (A) None Seen /HPF   Urine Culture   Result Value Ref Range    Culture >100,000 CFU/mL Mixture of Urogenital Elva     Narrative    Multiple morphotypes present with no predominant organism.  Growth consistent with probable contamination during collection.  Suggest repeat specimen if clinically indicated.        If you have any questions or concerns, please call the clinic at the number listed above.       Sincerely,      SUDHAKAR Yung CNP

## 2024-02-13 NOTE — PROGRESS NOTES
Assessment & Plan     Urinary frequency    - UA Macroscopic with reflex to Microscopic and Culture - Clinic Collect  - Urine Microscopic Exam  - Urine Culture  - sulfamethoxazole-trimethoprim (BACTRIM DS) 800-160 MG tablet; Take 1 tablet by mouth 2 times daily  Will cover until culture is back, although likely be negative, the last few cultures have been negative.  She will need to follow with urology for further evaluation and treatment options for her chronic UTI concerns.    Chronic obstructive pulmonary disease, unspecified COPD type (H)    No issues, breathing fine. Continue to monitor.    External hemorrhoids    - lidocaine-hydrocortisone ace 2.8-0.55 % GEL; Place 1 Application rectally as needed (hemorrhoids)  Discussed how to take the medication(s), expected outcomes, potential side effects.  Reviewed avoiding constipation, prolonged sitting, etc.            See Patient Instructions  Patient Instructions   Try the gel as needed for hemorrhoids and continue to prevent constipation as much as able.  Try the antibiotics again and push fluids and follow up with urology for further treatment options.      Our Clinic hours are:  Mondays    7:20 am - 7 pm  Tues -  Fri  7:20 am - 5 pm    Clinic Phone: [phone]    The clinic lab opens at 7:30 am Mon - Fri and appointments are required.    [facility]. [phone]  Monday  8 am - 7pm  Tues - Fri 8 am - 5:30 pm         Werner Brady is a 88 year old, presenting for the following health issues:  Rectal Problem      2/13/2024     3:46 PM   Additional Questions   Roomed by      History of Present Illness       Reason for visit:  Bleeding hemorids  Symptom onset:  3-4 weeks ago  Symptoms include:  Bleeding hemorids  Symptom intensity:  Moderate  Symptom progression:  Staying the same  Had these symptoms before:  Yes  Has tried/received treatment for these symptoms:  No  What makes it worse:  Yes  What makes it better:  Yes    She eats  "2-3 servings of fruits and vegetables daily.She consumes 0 sweetened beverage(s) daily.She exercises with enough effort to increase her heart rate 10 to 19 minutes per day.  She exercises with enough effort to increase her heart rate 3 or less days per week.   She is taking medications regularly.                 Review of Systems  Constitutional, HEENT, cardiovascular, pulmonary, gi and gu systems are negative, except as otherwise noted.      Objective    /62   Pulse 78   Temp (!) 96.6  F (35.9  C) (Tympanic)   Resp 16   Ht 1.651 m (5' 5\")   Wt 67.6 kg (149 lb)   LMP  (LMP Unknown)   SpO2 100%   BMI 24.79 kg/m    Body mass index is 24.79 kg/m .  Physical Exam   GENERAL: healthy, alert and no distress  HENT: ear canals and TM's normal, pharynx without erythema  NECK: no adenopathy, no asymmetry  RESP: lungs clear to auscultation - no rales, rhonchi or wheezes  CV: regular rate and rhythm, normal S1 S2, no S3 or S4, no murmur  ABDOMEN: soft, nontender, no hepatosplenomegaly, no masses and bowel sounds normal  MS: no gross musculoskeletal defects noted      Results for orders placed or performed in visit on 02/13/24 (from the past 24 hour(s))   UA Macroscopic with reflex to Microscopic and Culture - Clinic Collect    Specimen: Urine, Midstream   Result Value Ref Range    Color Urine Yellow Colorless, Straw, Light Yellow, Yellow    Appearance Urine Slightly Cloudy (A) Clear    Glucose Urine Negative Negative mg/dL    Bilirubin Urine Negative Negative    Ketones Urine Negative Negative mg/dL    Specific Gravity Urine 1.015 1.003 - 1.035    Blood Urine Small (A) Negative    pH Urine 7.0 5.0 - 7.0    Protein Albumin Urine Negative Negative mg/dL    Urobilinogen Urine 0.2 0.2, 1.0 E.U./dL    Nitrite Urine Positive (A) Negative    Leukocyte Esterase Urine Moderate (A) Negative   Urine Microscopic Exam   Result Value Ref Range    Bacteria Urine Few (A) None Seen /HPF    RBC Urine 0-2 0-2 /HPF /HPF    WBC Urine " 5-10 (A) 0-5 /HPF /HPF    Squamous Epithelials Urine Few (A) None Seen /LPF    WBC Clumps Urine Present (A) None Seen /HPF           Signed Electronically by: SUDHAKAR Yung CNP

## 2024-02-14 LAB — BACTERIA UR CULT: NORMAL

## 2024-02-15 ENCOUNTER — THERAPY VISIT (OUTPATIENT)
Dept: PHYSICAL THERAPY | Facility: CLINIC | Age: 89
End: 2024-02-15
Attending: NURSE PRACTITIONER
Payer: COMMERCIAL

## 2024-02-15 DIAGNOSIS — M47.816 SPONDYLOSIS OF LUMBAR REGION WITHOUT MYELOPATHY OR RADICULOPATHY: Primary | ICD-10-CM

## 2024-02-15 PROCEDURE — 97110 THERAPEUTIC EXERCISES: CPT | Mod: GP

## 2024-02-15 PROCEDURE — 97140 MANUAL THERAPY 1/> REGIONS: CPT | Mod: GP

## 2024-02-20 ENCOUNTER — OFFICE VISIT (OUTPATIENT)
Dept: UROLOGY | Facility: CLINIC | Age: 89
End: 2024-02-20
Payer: COMMERCIAL

## 2024-02-20 VITALS
DIASTOLIC BLOOD PRESSURE: 75 MMHG | BODY MASS INDEX: 24.83 KG/M2 | OXYGEN SATURATION: 100 % | HEART RATE: 56 BPM | SYSTOLIC BLOOD PRESSURE: 154 MMHG | HEIGHT: 65 IN | WEIGHT: 149 LBS | TEMPERATURE: 97.6 F

## 2024-02-20 DIAGNOSIS — R33.9 INCOMPLETE BLADDER EMPTYING: Primary | ICD-10-CM

## 2024-02-20 LAB
ALBUMIN UR-MCNC: NEGATIVE MG/DL
APPEARANCE UR: ABNORMAL
BACTERIA #/AREA URNS HPF: ABNORMAL /HPF
BILIRUB UR QL STRIP: NEGATIVE
COLOR UR AUTO: YELLOW
GLUCOSE UR STRIP-MCNC: NEGATIVE MG/DL
HGB UR QL STRIP: ABNORMAL
KETONES UR STRIP-MCNC: NEGATIVE MG/DL
LEUKOCYTE ESTERASE UR QL STRIP: ABNORMAL
MUCOUS THREADS #/AREA URNS LPF: PRESENT /LPF
NITRATE UR QL: NEGATIVE
PH UR STRIP: 7 [PH] (ref 5–7)
RBC #/AREA URNS AUTO: ABNORMAL /HPF
SP GR UR STRIP: 1.01 (ref 1–1.03)
SQUAMOUS #/AREA URNS AUTO: ABNORMAL /LPF
UROBILINOGEN UR STRIP-ACNC: 0.2 E.U./DL
WBC #/AREA URNS AUTO: ABNORMAL /HPF

## 2024-02-20 PROCEDURE — 81001 URINALYSIS AUTO W/SCOPE: CPT | Performed by: STUDENT IN AN ORGANIZED HEALTH CARE EDUCATION/TRAINING PROGRAM

## 2024-02-20 PROCEDURE — 99213 OFFICE O/P EST LOW 20 MIN: CPT | Performed by: STUDENT IN AN ORGANIZED HEALTH CARE EDUCATION/TRAINING PROGRAM

## 2024-02-20 ASSESSMENT — PAIN SCALES - GENERAL: PAINLEVEL: NO PAIN (0)

## 2024-02-20 NOTE — PROGRESS NOTES
"    UROLOGY FOLLOW-UP NOTE          Chief Complaint:   Today I had the pleasure of seeing Ms. Caitlyn Wasserman in follow-up for a chief complaint of recurrent UTIs.          Interval Update   Caitlyn Wasserman is a very pleasant 88 year old female with a history of COPD, HTN, essential tremor, and HLD.      Brief History: Ms. Caitlyn Wasserman has followed with urology for recurrent UTIs. She had a CT abdomen pelvis without contrast on 4/29/2023 for right flank pain, which was unremarkable.      The patient had a history of anterior posterior prolapse repair in 2004. She was seen by gynecology in January 2020 for recurrent anterior prolapse and was fit for a pessary. Per chart review, patient was able to expel all pessaries with cough. Her PVRs tend to be around 200 mL. She met virtually with Dr. Eduardo and elected to continue observation.     Today's notes: She has been working with physical therapy for back pain. She has not noticed improvement yet. She reports urgency with small volume voids.          Physical Exam:   Patient is a 88 year old  female   Vitals: Blood pressure (!) 154/75, pulse 56, temperature 97.6  F (36.4  C), temperature source Tympanic, height 1.651 m (5' 5\"), weight 67.6 kg (149 lb), SpO2 100%, not currently breastfeeding.  General: Alert and oriented x 3, no acute distress.  Respiratory: Non-labored breathing.  Cardiac: Regular rate.  :       Anterior prolapse noted. Tissues appear irritated, but not able to identify a source for bleeding.       Rectal exam deferred.    PVR: 382 mL        Labs and Pathology:    I personally reviewed all applicable laboratory data and went over findings with patient  Significant for:    CBC RESULTS:  Recent Labs   Lab Test 09/10/23  0657 04/29/23  1945 10/09/20  2056 09/29/20  0411 09/23/20  0842   WBC 6.1 8.3 9.8  --  5.9   HGB 13.1 13.7 10.5* 11.2* 13.9    262 373  --  256        BMP RESULTS:  Recent Labs   Lab Test 09/10/23  0657 04/29/23 1945 08/16/22  1537 " 07/13/21  0932 05/04/21  0846 04/19/21  1707 10/09/20  2056 09/30/20  0417   * 137 140 137 132* 138 133 133   POTASSIUM 3.5 3.5 3.4 3.4 3.7 3.2* 3.4 3.4   CHLORIDE 96* 97* 102 101 96 102 98 98   CO2 28 31* 31 34* 34* 33* 31 33*   ANIONGAP 9 9 7 2* 2* 3 4 2*   GLC 96 104* 119* 87 107* 106* 96 101*   BUN 13.4 14.4 15 15 14 13 17 10   CR 0.82 0.92 0.90 0.86 0.80 0.86 0.90 0.80   GFRESTIMATED 70 61 63 63 68 62 59* 68   GFRESTBLACK  --   --   --   --  79 72 68 79   SACHI 9.4 9.9 9.1 9.0 9.0 8.7 9.0 8.5       UA RESULTS:   Recent Labs   Lab Test 02/13/24  1611 01/26/24  1005 01/11/24  1351   SG 1.015 1.020 1.012   URINEPH 7.0 7.5* 6.0   NITRITE Positive* Positive* Negative   RBCU 0-2 2-5* 3*   WBCU 5-10* >100* 7*            Assessment/Plan   88 year old female seen in follow up for anterior prolapse. The patient had a history of anterior posterior prolapse repair in 2004. She was seen by gynecology in January 2020 for recurrent anterior prolapse and was fit for a pessary. Per chart review, patient was able to expel all pessaries with cough. Her PVRs tend to be around 200 mL. She met virtually with Dr. Eduardo and elected to continue observation.     Her PVR today was 382 mL. We discussed obtaining a renal US at this time to evaluate for bladder emptying and hydronephrosis. If her PVR continues to be elevated above her baseline, it would be worthwhile to reconsider intervention.     Plan:  UA today.   Continue vaginal estrogen cream two nights per week.   Renal US. Follow up pending results.            Past Medical History:     Past Medical History:   Diagnosis Date    COPD (chronic obstructive pulmonary disease) (H)     Migraine, unspecified, without mention of intractable migraine without mention of status migrainosus     Other urinary incontinence     Pure hypercholesterolemia     Unspecified essential hypertension             Past Surgical History:     Past Surgical History:   Procedure Laterality Date     ARTHROPLASTY HIP Right 9/28/2020    Procedure: TOTAL Hip Arthroplasty;  Surgeon: Ashkan Shah MD;  Location: [facility] OR    AS TOTAL KNEE ARTHROPLASTY Right 2011    HC ANTER COLPORRHAPHY,BLAD/VAGINA  6/04    Cystocele Repair,rectocele repair.    JOINT REPLACEMENT, HIP RT/LT  3/07, 4/07    Joint Replacement Hip /LT- dislocated in front repeat surgery 1 week later            Medications     Current Outpatient Medications   Medication    sulfamethoxazole-trimethoprim (BACTRIM DS) 800-160 MG tablet    acetaminophen (TYLENOL) 325 MG tablet    acetaminophen-caffeine (EXCEDRIN TENSION HEADACHE) 500-65 MG TABS    albuterol (PROAIR HFA/PROVENTIL HFA/VENTOLIN HFA) 108 (90 Base) MCG/ACT inhaler    atenolol (TENORMIN) 25 MG tablet    chlorthalidone (HYGROTON) 25 MG tablet    estradiol (ESTRACE) 0.1 MG/GM vaginal cream    ipratropium (ATROVENT HFA) 17 MCG/ACT inhaler    ipratropium (ATROVENT) 0.03 % nasal spray    Lidocaine (LIDOCARE) 4 % Patch    lidocaine-hydrocortisone ace 2.8-0.55 % GEL    methocarbamol (ROBAXIN) 500 MG tablet    multivitamin w/minerals (THERA-VIT-M) tablet    potassium chloride ER (K-TAB) 20 MEQ CR tablet    psyllium 28.3 % POWD     Current Facility-Administered Medications   Medication    lidocaine 112 mL, EPINEPHrine (ADRENALIN) 1.12 mg in sodium chloride 0.9 % 1,113.12 mL (TUMESCENT)    lidocaine 112 mL, EPINEPHrine (ADRENALIN) 1.12 mg in sodium chloride 0.9 % 1,113.12 mL (TUMESCENT)            Family History:     Family History   Problem Relation Age of Onset    Cancer Mother         Bladder    Neurologic Disorder Mother         heriditary tremor    C.A.D. Father     Cerebrovascular Disease Father     Allergies Maternal Grandfather     Respiratory Maternal Grandfather         Asthma    Diabetes Paternal Grandmother         Type II    Cancer Brother         Multiple myloma    C.A.D. Brother     Cancer Sister         kidney cancer    Neurofibromatosis Son     Breast Cancer No family hx of              Social History:     Social History     Socioeconomic History    Marital status:      Spouse name: Neeraj Wasserman    Number of children: 3    Years of education: 14    Highest education level: Not on file   Occupational History     Employer: RETIRED   Tobacco Use    Smoking status: Former     Packs/day: 1.00     Years: 20.00     Additional pack years: 0.00     Total pack years: 20.00     Types: Cigarettes     Quit date: 1971     Years since quittin.1    Smokeless tobacco: Never   Vaping Use    Vaping Use: Never used   Substance and Sexual Activity    Alcohol use: Yes     Comment: 1 wine a month maybe    Drug use: No    Sexual activity: Not Currently     Partners: Male   Other Topics Concern     Service No    Blood Transfusions No    Caffeine Concern Yes     Comment: 1 a day    Occupational Exposure No    Hobby Hazards No    Sleep Concern No    Stress Concern No    Weight Concern Yes     Comment: always ongoing problem    Special Diet Yes     Comment: calcium and vit D    Back Care No    Exercise No    Bike Helmet No    Seat Belt Yes    Self-Exams Yes    Parent/sibling w/ CABG, MI or angioplasty before 65F 55M? No   Social History Narrative    Not on file     Social Determinants of Health     Financial Resource Strain: Not on file   Food Insecurity: Not on file   Transportation Needs: Not on file   Physical Activity: Not on file   Stress: Not on file   Social Connections: Not on file   Interpersonal Safety: Low Risk  (2024)    Interpersonal Safety     Do you feel physically and emotionally safe where you currently live?: Yes     Within the past 12 months, have you been hit, slapped, kicked or otherwise physically hurt by someone?: No     Within the past 12 months, have you been humiliated or emotionally abused in other ways by your partner or ex-partner?: No   Housing Stability: Not on file            Allergies:   Cortisone, Diclofenac sodium, Hydrocodone, Morphine and related, Oxycodone,  and Tramadol         Review of Systems:  From intake questionnaire   Negative 14 system review except as noted on HPI, nurse's note.        EDGARDO PEDERSEN PA-C  Department of Urology

## 2024-02-20 NOTE — NURSING NOTE
"Initial BP (!) 154/75   Pulse 56   Temp 97.6  F (36.4  C) (Tympanic)   Ht 1.651 m (5' 5\")   Wt 67.6 kg (149 lb)   LMP  (LMP Unknown)   SpO2 100%   BMI 24.79 kg/m   Estimated body mass index is 24.79 kg/m  as calculated from the following:    Height as of this encounter: 1.651 m (5' 5\").    Weight as of this encounter: 67.6 kg (149 lb). .  Ashley LÓPEZ CMA 02/20/24 10:25 AM  "

## 2024-02-20 NOTE — NURSING NOTE
Active order to obtain bladder scan? Yes   Name of ordering provider:  Neli Winn  Bladder scan preformed post void Yes.  Bladder scan reveled 382ML  Provider notified?  Yes    Ashley LÓPEZ CMA

## 2024-02-27 ENCOUNTER — THERAPY VISIT (OUTPATIENT)
Dept: PHYSICAL THERAPY | Facility: CLINIC | Age: 89
End: 2024-02-27
Attending: NURSE PRACTITIONER
Payer: COMMERCIAL

## 2024-02-27 DIAGNOSIS — M47.816 SPONDYLOSIS OF LUMBAR REGION WITHOUT MYELOPATHY OR RADICULOPATHY: Primary | ICD-10-CM

## 2024-02-27 PROCEDURE — 97140 MANUAL THERAPY 1/> REGIONS: CPT | Mod: GP

## 2024-02-27 PROCEDURE — 97110 THERAPEUTIC EXERCISES: CPT | Mod: GP

## 2024-02-27 NOTE — PROGRESS NOTES
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   Target Date 03/21/24   Date Met 02/27/24   PT Goal 2   Goal Identifier 2   Goal Description Pt will be able to get in/out of bed/car with < 3/10 pain.   Target Date 04/11/24   Date Met 02/27/24   PT Goal 3   Goal Identifier 3   Goal Description Pt will be able to lift a laundry basket with < 3/10 pain.   Target Date 05/02/24   Date Met 02/27/24   PT Goal 4   Goal Identifier 4   Goal Description Pt will be independent with HEP for optimal functional recovery.   Target Date 05/02/24   Date Met 02/27/24   Subjective Report   Subjective Report Pt reports 2 flare ups in the last week, but overall she feels better. Currently has no pain in L LBP. Her arthritis is causing \"nagging\" pain at the end of her spine and to the R. Rates that pain at 1/10. She is ready to do the HEP independently.   Objective Measures   Objective Measures Objective Measure 1;Objective Measure 2   Objective Measure 1   Objective Measure Trunk AROM   Details flex: fingers to shins, ext: mod limited, R SBing WFL with mild L LBP, L SBing WFL, B rot WFL with mild L LBP   Objective Measure 2   Objective Measure Palpation   Details mild tenderness L QL   Treatment Interventions (PT)   Interventions Therapeutic Procedure/Exercise;Manual Therapy;Self Care/Home Management   Therapeutic Procedure/Exercise   Therapeutic " Procedures: strength, endurance, ROM, flexibillity minutes (87636) 15   PTRx Ther Proc 1 Supine Lumbar Hip Roll   PTRx Ther Proc 1 - Details x 10   PTRx Ther Proc 2 Partial Sit-up   PTRx Ther Proc 2 - Details x 10   PTRx Ther Proc 3 Partial Sit-up Oblique   PTRx Ther Proc 3 - Details x 10   PTRx Ther Proc 4 Propped Knee Extension Stretch in Chair   PTRx Ther Proc 4 - Details rev   PTRx Ther Proc 5 Bridging #1   PTRx Ther Proc 5 - Details x 10   Manual Therapy   Manual Therapy: Mobilization, MFR, MLD, friction massage minutes (62102) 10   Manual Therapy 1 STM/MFR   Manual Therapy 1 - Details STM/MFR L QL   Patient Response/Progress no pain afterward   Education   Learner/Method Patient;Listening;Demonstration;Pictures/Video;No Barriers to Learning   Plan   Plan for next session MT as needed, sitting ball exs, core strengthening   Total Session Time   Timed Code Treatment Minutes 25   Total Treatment Time (sum of timed and untimed services) 25         DISCHARGE  Reason for Discharge: Patient has met all goals.    Discharge Plan: Patient to continue home program.    Referring Provider:  Rima Thacker

## 2024-03-02 ENCOUNTER — HOSPITAL ENCOUNTER (OUTPATIENT)
Dept: ULTRASOUND IMAGING | Facility: CLINIC | Age: 89
Discharge: HOME | End: 2024-03-02
Attending: STUDENT IN AN ORGANIZED HEALTH CARE EDUCATION/TRAINING PROGRAM | Admitting: STUDENT IN AN ORGANIZED HEALTH CARE EDUCATION/TRAINING PROGRAM
Payer: COMMERCIAL

## 2024-03-02 DIAGNOSIS — R33.9 INCOMPLETE BLADDER EMPTYING: ICD-10-CM

## 2024-03-02 PROCEDURE — 76770 US EXAM ABDO BACK WALL COMP: CPT

## 2024-03-13 ENCOUNTER — TELEPHONE (OUTPATIENT)
Dept: UROLOGY | Facility: CLINIC | Age: 89
End: 2024-03-13
Payer: COMMERCIAL

## 2024-03-13 NOTE — TELEPHONE ENCOUNTER
[employer]als message sent to pt to to notify to make appt in six months.  Liz OSUNA RN BSN PHN  Specialty Clinics

## 2024-03-13 NOTE — TELEPHONE ENCOUNTER
[facility] Call Center    Phone Message    May a detailed message be left on voicemail: yes     Reason for Call: Other: Pt called in wanting to know the findings of the ultrasound she did 03/02. Pt is requesting a call back please.      Action Taken: Message routed to:  Other: Urology    Travel Screening: Not Applicable

## 2024-03-13 NOTE — TELEPHONE ENCOUNTER
Called pt and reviewed results. Pt verbalized understanding. She would like to know what the next steps. Please advise.   Liz OSUNA RN BSN PHN  Specialty Clinics

## 2024-04-02 ENCOUNTER — OFFICE VISIT (OUTPATIENT)
Dept: FAMILY MEDICINE | Facility: CLINIC | Age: 89
End: 2024-04-02
Payer: COMMERCIAL

## 2024-04-02 VITALS
WEIGHT: 144 LBS | TEMPERATURE: 98.3 F | HEART RATE: 52 BPM | SYSTOLIC BLOOD PRESSURE: 139 MMHG | OXYGEN SATURATION: 96 % | HEIGHT: 65 IN | BODY MASS INDEX: 23.99 KG/M2 | RESPIRATION RATE: 16 BRPM | DIASTOLIC BLOOD PRESSURE: 88 MMHG

## 2024-04-02 DIAGNOSIS — I10 ESSENTIAL HYPERTENSION: Primary | ICD-10-CM

## 2024-04-02 DIAGNOSIS — E78.5 HYPERLIPIDEMIA LDL GOAL <130: ICD-10-CM

## 2024-04-02 PROCEDURE — 99214 OFFICE O/P EST MOD 30 MIN: CPT | Performed by: NURSE PRACTITIONER

## 2024-04-02 RX ORDER — CHLORTHALIDONE 25 MG/1
50 TABLET ORAL DAILY
Qty: 180 TABLET | Refills: 2 | Status: SHIPPED | OUTPATIENT
Start: 2024-04-02

## 2024-04-02 RX ORDER — POTASSIUM CHLORIDE 1500 MG/1
20 TABLET, EXTENDED RELEASE ORAL DAILY
Qty: 90 TABLET | Refills: 3 | Status: SHIPPED | OUTPATIENT
Start: 2024-04-02

## 2024-04-02 RX ORDER — ATENOLOL 25 MG/1
25 TABLET ORAL DAILY
Qty: 90 TABLET | Refills: 3 | Status: SHIPPED | OUTPATIENT
Start: 2024-04-02

## 2024-04-02 SDOH — HEALTH STABILITY: PHYSICAL HEALTH: ON AVERAGE, HOW MANY DAYS PER WEEK DO YOU ENGAGE IN MODERATE TO STRENUOUS EXERCISE (LIKE A BRISK WALK)?: 0 DAYS

## 2024-04-02 SDOH — HEALTH STABILITY: PHYSICAL HEALTH: ON AVERAGE, HOW MANY MINUTES DO YOU ENGAGE IN EXERCISE AT THIS LEVEL?: 0 MIN

## 2024-04-02 ASSESSMENT — PAIN SCALES - GENERAL: PAINLEVEL: NO PAIN (0)

## 2024-04-02 ASSESSMENT — SOCIAL DETERMINANTS OF HEALTH (SDOH): HOW OFTEN DO YOU GET TOGETHER WITH FRIENDS OR RELATIVES?: ONCE A WEEK

## 2024-04-02 NOTE — PATIENT INSTRUCTIONS
Continue with present medications, refills sent.      Our Clinic hours are:  Mondays    7:20 am - 7 pm  Tues -  Fri  7:20 am - 5 pm    Clinic Phone: [phone]    The clinic lab opens at 7:30 am Mon - Fri and appointments are required.    [facility]. [phone]  Monday  8 am - 7pm  Tues - Fri 8 am - 5:30 pm

## 2024-04-02 NOTE — COMMUNITY RESOURCES LIST (ENGLISH)
April 2, 2024           YOUR PERSONALIZED LIST OF SERVICES & PROGRAMS           & RECREATION    [place] - Sports clubs and recreational activities - [employer] [facility] [employer]  [address] [place] [address] (Distance: 9.8 miles)  Language: English  Fee: Self pay, Sliding scale      of [place] - Sports clubs and recreational activities  [address] [place] [address] (Distance: 20.7 miles)  Phone: [phone]) [phone]  Website: https://www.[facility].Listen Edition/363/House-Trails  Language: English  Fee: Self pay      [facility] - Adult Enrichment  Phone: [phone]  Website: https://Yeti Data/adults-seniors/adult-enrichment/  Language: English  Hours: Mon 7:30 AM - 4:00 PM Tue 7:30 AM - 4:00 PM Wed 7:30 AM - 4:00 PM Thu 7:30 AM - 4:00 PM Fri 7:30 AM - 4:00 PM    Classes/Groups      [facility] - Group fitness classes  [address] [place] [address] (Distance: 7.9 miles)  Phone: [phone]  Website: http://Work4ce.me/  Language: English  Fee: Self pay  Accessibility: Ada accessible      [employer] - Sit and Be Fit/[employer]  [address] [place] [address] (Distance: 19.7 miles)  Website: https://physicaltherapypt[[email or web address]].InnerWireless/programs/sit-and-be-fit/  Language: English  Fee: Free      - Online and Local Fitness Classes  Phone: [phone]) [phone]  Website: https://tools.Pacinian/Search/OnlineClasses  Language: English  Fee: Free               IMPORTANT NUMBERS & WEBSITES        Emergency Services  911  .   United Way  211 http://211unitedway.org  .   Poison Control  (343) 392-2597 http://[place]poison.org http://wisconsinpoison.org  .     Suicide and Crisis Lifeline  988 http://988lifeline.org  .   Childhelp National Child Abuse Hotline  344.339.5277 http://Childhelphotline.org   .   National Sexual Assault Hotline  (130) 162-9653 (HOPE) http://Rainn.org   .     National Runaway  Safeline  (739) 407-7404 (RUNAWAY) http://Moto EuroparuMetagenomix.org  .   Pregnancy & Postpartum Support  Call/text 280-333-3087  [place]: http://ppsupportmn.org  [place]: http://psichapters.com/wi  .   Substance Abuse National Helpline ([facility])  481-053-HELP (9476) http://Findtreatment.gov   .                DISCLAIMER: These resources have been generated via the [employer] Platform. [employer] does not endorse any service providers mentioned in this resource list. [employer] does not guarantee that the services mentioned in this resource list will be available to you or will improve your health or wellness.    [facility]

## 2024-04-02 NOTE — COMMUNITY RESOURCES LIST (ENGLISH)
April 2, 2024           YOUR PERSONALIZED LIST OF SERVICES & PROGRAMS           & RECREATION    [place]      [place] - Sports clubs and recreational activities - [facility]  [address] [place] [address] (Distance: 9.8 miles)  Language: English  Fee: Self pay, Sliding scale      of [place] - Sports clubs and recreational activities  [address] [place] [address] (Distance: 20.7 miles)  Phone: [phone]  Website: https://www.Arctic Sand Technologiesmn.D.C[employer] Loans & Services/363/House-Trails  Language: English  Fee: Self pay      [facility] - Adult Enrichment  Phone: [phone]  Website: https://[employer]/adults-seniors/adult-enrichment/  Language: English  Hours: Mon 7:30 AM - 4:00 PM Tue 7:30 AM - 4:00 PM Wed 7:30 AM - 4:00 PM Thu 7:30 AM - 4:00 PM Fri 7:30 AM - 4:00 PM    Classes/Groups      [facility] - Group fitness classes  [address] [place] [address] (Distance: 7.9 miles)  Phone: [phone]  Website: http://Feedlooks/  Language: English  Fee: Self pay  Accessibility: Ada accessible      [employer] - Sit and Be Fit/[place] [facility]LEX  [address] [place] [address] (Distance: 19.7 miles)  Website: https://physicaltherapyptAmorelie.[employer]./programs/sit-and-be-fit/  Language: English  Fee: Free      - Online and Local Fitness Classes  Phone: [phone]) [phone]  Website: https://tools.[employer]/Search/OnlineClasses  Language: English  Fee: Free               IMPORTANT NUMBERS & WEBSITES        Emergency Services  911  .   United Way  211 http://211unitedway.org  .   Poison Control  (289) 136-8780 http://mnpoison.org http://wisconsinpoison.org  .     Suicide and Crisis Lifeline  988 http://988lifeline.org  .   Childhelp National Child Abuse Hotline  703.850.3306 http://Childhelphotline.org   .   National Sexual Assault Hotline  (994) 428-2074 (HOPE) http://Rainn.org   .     National Runaway  Safeline  (777) 133-3315 (RUNAWAY) http://RPX CorporationruEpiCrystals.org  .   Pregnancy & Postpartum Support  Call/text 678-155-8995  [place]: http://ppsupportmn.org  [place]: http://psichapters.com/wi  .   Substance Abuse National Helpline ([facility])  200-037-HELP (2760) http://Findtreatment.gov   .                DISCLAIMER: These resources have been generated via the Promisec Platform. Promisec does not endorse any service providers mentioned in this resource list. Promisec does not guarantee that the services mentioned in this resource list will be available to you or will improve your health or wellness.    [facility]

## 2024-04-02 NOTE — COMMUNITY RESOURCES LIST (ENGLISH)
April 2, 2024           YOUR PERSONALIZED LIST OF SERVICES & PROGRAMS           & RECREATION    [place]      [place] - Sports clubs and recreational activities - [facility]  [address] [place] [address] (Distance: 9.8 miles)  Language: English  Fee: Self pay, Sliding scale      of [place] - Sports clubs and recreational activities  [address] [place] [place] [address] (Distance: 20.7 miles)  Phone: [phone]  Website: https://www.Social Insightmn.atokore/363/House-Trails  Language: English  Fee: Self pay      [facility] - Adult Enrichment  Phone: [phone]  Website: https://HEMS Technology/adults-seniors/adult-enrichment/  Language: English  Hours: Mon 7:30 AM - 4:00 PM Tue 7:30 AM - 4:00 PM Wed 7:30 AM - 4:00 PM Thu 7:30 AM - 4:00 PM Fri 7:30 AM - 4:00 PM    Classes/Groups      [facility] - Group fitness classes  [address] [place] [address] (Distance: 7.9 miles)  Phone: [phone]  Website: http://AdScoot/  Language: English  Fee: Self pay  Accessibility: Ada accessible      [employer] - Sit and Be Fit/[employer] FLEX  [address] [place] [address] (Distance: 19.7 miles)  Website: https://physicaltherapyptConjunct.[employer]Mica/programs/sit-and-be-fit/  Language: English  Fee: Free      - Online and Local Fitness Classes  Phone: [phone]  Website: https://tools.UiTV/Search/OnlineClasses  Language: English  Fee: Free               IMPORTANT NUMBERS & WEBSITES        Emergency Services  911  .   United Way  211 http://211unitedway.org  .   Poison Control  (606) 604-1668 http://mnpoison.org http://wisconsinpoison.org  .     Suicide and Crisis Lifeline  988 http://988lifeline.org  .   Childhelp National Child Abuse Hotline  752.690.3868 http://Childhelphotline.org   .   National Sexual Assault Hotline  (797) 259-8664 (HOPE) http://Rainn.org   .     National Runaway  Safeline  (441) 426-9978 (RUNAWAY) http://BlinpickruAgily Networks.org  .   Pregnancy & Postpartum Support  Call/text 109-267-6539  [place]: http://ppsupportmn.org  [place]: http://psichapters.com/wi  .   Substance Abuse National Helpline ([facility])  860-120-HELP (3648) http://Findtreatment.gov   .                DISCLAIMER: These resources have been generated via the Braingaze Platform. Braingaze does not endorse any service providers mentioned in this resource list. Braingaze does not guarantee that the services mentioned in this resource list will be available to you or will improve your health or wellness.    [facility]

## 2024-04-02 NOTE — PROGRESS NOTES
Assessment & Plan     Essential hypertension    - atenolol (TENORMIN) 25 MG tablet; Take 1 tablet (25 mg) by mouth daily  - chlorthalidone (HYGROTON) 25 MG tablet; Take 2 tablets (50 mg) by mouth daily  - potassium chloride ER (K-TAB) 20 MEQ CR tablet; Take 1 tablet (20 mEq) by mouth daily  Continue monitoring and present medications.    Hyperlipidemia LDL goal <130    Continue healthy diet, will assess lipids at upcoming annual Medicare visit in September.            Counseling  Appropriate preventive services were discussed with this patient, including applicable screening as appropriate for fall prevention, nutrition, physical activity, Tobacco-use cessation, weight loss and cognition.  Checklist reviewing preventive services available has been given to the patient.  Reviewed patient's diet, addressing concerns and/or questions.   Discussed possible causes of fatigue. Updated plan of care.  Patient reported difficulty with activities of daily living were addressed today.Information on urinary incontinence and treatment options given to patient.       See Patient Instructions  Patient Instructions   Continue with present medications, refills sent.      Our Clinic hours are:  Mondays    7:20 am - 7 pm  Tues -  Fri  7:20 am - 5 pm    Clinic Phone: [phone]    The clinic lab opens at 7:30 am Mon - Fri and appointments are required.    [facility]  Ph. [phone]  Monday  8 am - 7pm  Tues - Fri 8 am - 5:30 pm         Werner Brady is a 88 year old, presenting for the following health issues:  Hypertension        4/2/2024     1:19 PM   Additional Questions   Roomed by Dianna CARRILLO CMA     HPI     Needs refills of her medications.  Offers no acute concerns today. States it's been a rough few months with concerns with frequent UTI's and back pain.    Hypertension Follow-up    Do you check your blood pressure regularly outside of the clinic? No   Are you following a low salt diet? No  Are  "your blood pressures ever more than 140 on the top number (systolic) OR more   than 90 on the bottom number (diastolic), for example 140/90? Yes        Review of Systems  Constitutional, HEENT, cardiovascular, pulmonary, gi and gu systems are negative, except as otherwise noted.      Objective    /88   Pulse 52   Temp 98.3  F (36.8  C) (Tympanic)   Resp 16   Ht 1.651 m (5' 5\")   Wt 65.3 kg (144 lb)   LMP  (LMP Unknown)   SpO2 96%   BMI 23.96 kg/m    Body mass index is 23.96 kg/m .  Physical Exam   GENERAL: healthy, alert and no distress  NECK: no adenopathy, no asymmetry  RESP: lungs clear to auscultation - no rales, rhonchi or wheezes  CV: regular rate and rhythm, normal S1 S2, no S3 or S4, no murmur  MS: no gross musculoskeletal defects noted              Signed Electronically by: SUDHAKAR Yung CNP    "

## 2024-05-01 ENCOUNTER — OFFICE VISIT (OUTPATIENT)
Dept: FAMILY MEDICINE | Facility: CLINIC | Age: 89
End: 2024-05-01
Payer: COMMERCIAL

## 2024-05-01 ENCOUNTER — NURSE TRIAGE (OUTPATIENT)
Dept: FAMILY MEDICINE | Facility: CLINIC | Age: 89
End: 2024-05-01

## 2024-05-01 VITALS
BODY MASS INDEX: 26.87 KG/M2 | WEIGHT: 146 LBS | SYSTOLIC BLOOD PRESSURE: 164 MMHG | OXYGEN SATURATION: 97 % | HEIGHT: 62 IN | HEART RATE: 63 BPM | TEMPERATURE: 98 F | DIASTOLIC BLOOD PRESSURE: 80 MMHG

## 2024-05-01 DIAGNOSIS — R31.0 GROSS HEMATURIA: Primary | ICD-10-CM

## 2024-05-01 PROCEDURE — 99213 OFFICE O/P EST LOW 20 MIN: CPT | Performed by: NURSE PRACTITIONER

## 2024-05-01 NOTE — PROGRESS NOTES
"  Assessment & Plan     Gross hematuria  Source of bleeding is unclear.  No blood present on examination of genitals, vaginal canal or rectum/anus.  Patient was unable to leave a urine sample in clinic today.  Took sterile cup home to collect and then she will return to clinic lab.  Further plan pending results.  - UA Macroscopic with reflex to Microscopic and Culture - Lab Collect; Future    The risks, benefits and treatment options of prescribed medications or other treatments have been discussed with the patient. The patient verbalized their understanding and should call or follow up if no improvement or if they develop further problems.  Khushbu Bass, CNP            Subjective       Caitlyn is a 88 year old, presenting for the following health issues:  Rectal Problem (Rectal bleeding)    HPI         Concern - rectal bleeding  Onset: started months ago- maybe more  Description: bright red blood on toilet paper this morning  Has also noticed pink and red discharge on pad in underwear- she is sure this is from the rectum, not vagina.  States she always has a mattery discharge coming from her rectum  Wears a pad daily  Intensity: moderate  Progression of Symptoms:  worsening;  noticing blood daily for the last week  Accompanying Signs & Symptoms: stomach cramps  Feels like she has to urinate all the time-    Previous history of similar problem: yes  Precipitating factors:        Worsened by: has prolapsed bladder  History of hemorrhoids    Alleviating factors:        Improved by:   Therapies tried and outcome:  none     Patient has a history of UTIs.  Does not think this blood is from her urine      Review of Systems  Constitutional, HEENT, cardiovascular, pulmonary, gi and gu systems are negative, except as otherwise noted.      Objective    BP (!) 164/80 (BP Location: Right arm, Patient Position: Sitting)   Pulse 63   Temp 98  F (36.7  C) (Tympanic)   Ht 1.581 m (5' 2.25\")   Wt 66.2 kg (146 lb)   LMP  (LMP " Unknown)   SpO2 97%   BMI 26.49 kg/m    Body mass index is 26.49 kg/m .  Physical Exam   GENERAL: alert and no distress  ABDOMEN: soft, nontender, no hepatosplenomegaly, no masses and bowel sounds normal   (female): Vaginal vault is erythematous.  Prolapse visible.  No open areas or lesions, no evidence for bleeding in her labia or vault.  Swab into vaginal canal was negative for visible blood.  Digital rectal exam was negative for masses.  Anoscope exam was negative for hemorrhoids, no visible blood loss in rectum.            Signed Electronically by: SUDHAKAR Pack CNP

## 2024-05-01 NOTE — TELEPHONE ENCOUNTER
"Reason for Disposition    MODERATE rectal bleeding (small blood clots, passing blood without stool, or toilet water turns red)    Additional Information    Negative: Passed out (i.e., fainted, collapsed and was not responding)    Negative: Shock suspected (e.g., cold/pale/clammy skin, too weak to stand, low BP, rapid pulse)    Negative: Vomiting red blood or black (coffee ground) material    Negative: Sounds like a life-threatening emergency to the triager    Negative: Diarrhea is main symptom    Negative: Rectal symptoms    Negative: SEVERE abdominal pain (e.g., excruciating)    Negative: Constant abdominal pain lasting > 2 hours    Negative: Pale skin (pallor) of new-onset or worsening    Negative: Patient sounds very sick or weak to the triager    Answer Assessment - Initial Assessment Questions  1. APPEARANCE of BLOOD: \"What color is it?\" \"Is it passed separately, on the surface of the stool, or mixed in with the stool?\"       Bright red and clear fluid as well  2. AMOUNT: \"How much blood was passed?\"       Nickel at times,   3. FREQUENCY: \"How many times has blood been passed with the stools?\"       unsure  4. ONSET: \"When was the blood first seen in the stools?\" (Days or weeks)       Months ago  5. DIARRHEA: \"Is there also some diarrhea?\" If Yes, ask: \"How many diarrhea stools in the past 24 hours?\"       occasional  6. CONSTIPATION: \"Do you have constipation?\" If Yes, ask: \"How bad is it?\"      no  7. RECURRENT SYMPTOMS: \"Have you had blood in your stools before?\" If Yes, ask: \"When was the last time?\" and \"What happened that time?\"       Yes unsure  8. BLOOD THINNERS: \"Do you take any blood thinners?\" (e.g., Coumadin/warfarin, Pradaxa/dabigatran, aspirin)      none  9. OTHER SYMPTOMS: \"Do you have any other symptoms?\"  (e.g., abdomen pain, vomiting, dizziness, fever)      no  10. PREGNANCY: \"Is there any chance you are pregnant?\" \"When was your last menstrual period?\"        none    Protocols used: Rectal " Bleeding-A-OH

## 2024-05-02 ENCOUNTER — LAB (OUTPATIENT)
Dept: LAB | Facility: CLINIC | Age: 89
End: 2024-05-02
Payer: COMMERCIAL

## 2024-05-02 DIAGNOSIS — R31.0 GROSS HEMATURIA: ICD-10-CM

## 2024-05-02 LAB
ALBUMIN UR-MCNC: 30 MG/DL
APPEARANCE UR: ABNORMAL
BACTERIA #/AREA URNS HPF: ABNORMAL /HPF
BILIRUB UR QL STRIP: NEGATIVE
COLOR UR AUTO: YELLOW
GLUCOSE UR STRIP-MCNC: NEGATIVE MG/DL
HGB UR QL STRIP: ABNORMAL
KETONES UR STRIP-MCNC: NEGATIVE MG/DL
LEUKOCYTE ESTERASE UR QL STRIP: ABNORMAL
NITRATE UR QL: NEGATIVE
PH UR STRIP: 6.5 [PH] (ref 5–7)
RBC #/AREA URNS AUTO: ABNORMAL /HPF
SP GR UR STRIP: 1.02 (ref 1–1.03)
SQUAMOUS #/AREA URNS AUTO: ABNORMAL /LPF
UROBILINOGEN UR STRIP-ACNC: 0.2 E.U./DL
WBC #/AREA URNS AUTO: >100 /HPF

## 2024-05-02 PROCEDURE — 87086 URINE CULTURE/COLONY COUNT: CPT

## 2024-05-02 PROCEDURE — 81001 URINALYSIS AUTO W/SCOPE: CPT

## 2024-05-03 DIAGNOSIS — N30.00 ACUTE CYSTITIS WITHOUT HEMATURIA: Primary | ICD-10-CM

## 2024-05-03 RX ORDER — CEPHALEXIN 500 MG/1
500 CAPSULE ORAL 4 TIMES DAILY
Qty: 28 CAPSULE | Refills: 0 | Status: SHIPPED | OUTPATIENT
Start: 2024-05-03 | End: 2024-05-10

## 2024-05-04 LAB — BACTERIA UR CULT: NORMAL

## 2024-05-27 ENCOUNTER — HOSPITAL ENCOUNTER (EMERGENCY)
Facility: CLINIC | Age: 89
Discharge: HOME | End: 2024-05-27
Attending: EMERGENCY MEDICINE | Admitting: EMERGENCY MEDICINE
Payer: COMMERCIAL

## 2024-05-27 ENCOUNTER — APPOINTMENT (OUTPATIENT)
Dept: ULTRASOUND IMAGING | Facility: CLINIC | Age: 89
End: 2024-05-27
Attending: EMERGENCY MEDICINE
Payer: COMMERCIAL

## 2024-05-27 VITALS
SYSTOLIC BLOOD PRESSURE: 175 MMHG | RESPIRATION RATE: 18 BRPM | HEART RATE: 59 BPM | HEIGHT: 62 IN | BODY MASS INDEX: 27.6 KG/M2 | TEMPERATURE: 97.9 F | DIASTOLIC BLOOD PRESSURE: 75 MMHG | WEIGHT: 150 LBS | OXYGEN SATURATION: 99 %

## 2024-05-27 DIAGNOSIS — N81.4 VAGINAL AND CERVICAL PROLAPSE: ICD-10-CM

## 2024-05-27 PROCEDURE — 99283 EMERGENCY DEPT VISIT LOW MDM: CPT | Performed by: EMERGENCY MEDICINE

## 2024-05-27 PROCEDURE — 76856 US EXAM PELVIC COMPLETE: CPT

## 2024-05-27 PROCEDURE — 99284 EMERGENCY DEPT VISIT MOD MDM: CPT | Mod: 25 | Performed by: EMERGENCY MEDICINE

## 2024-05-27 PROCEDURE — 76830 TRANSVAGINAL US NON-OB: CPT

## 2024-05-27 ASSESSMENT — ACTIVITIES OF DAILY LIVING (ADL)
ADLS_ACUITY_SCORE: 38
ADLS_ACUITY_SCORE: 38
ADLS_ACUITY_SCORE: 36

## 2024-05-27 ASSESSMENT — COLUMBIA-SUICIDE SEVERITY RATING SCALE - C-SSRS
1. IN THE PAST MONTH, HAVE YOU WISHED YOU WERE DEAD OR WISHED YOU COULD GO TO SLEEP AND NOT WAKE UP?: NO
6. HAVE YOU EVER DONE ANYTHING, STARTED TO DO ANYTHING, OR PREPARED TO DO ANYTHING TO END YOUR LIFE?: NO
2. HAVE YOU ACTUALLY HAD ANY THOUGHTS OF KILLING YOURSELF IN THE PAST MONTH?: NO

## 2024-05-27 NOTE — ED PROVIDER NOTES
History     Chief Complaint   Patient presents with    Vaginal Bleeding     Pt reports she had an itch in her yevgeniy area and went to scratch, pt reports there was a lot of bright red blood with clots that appeared to be coming from her vagina. Pt reports she does have a prolapsed bladder.      HPI  History per patient, review of [facility] EMR and Care Everywhere EMR, including extensive chart review of multiple clinic notes (5/1/2024, 4/2/2024,  and most recent several urgent care visits with complaint of UTI symptoms and pinkish discharge), multiple prior imaging studies (as documented below) and multiple prior laboratory evaluations (UA and urine culture 5/2/2024, disease lab workup most recent CBC BMP 9/10/2023).    Caitlyn Wasserman is a 88 year old female with history of vaginal prolapse, history of TVT, anterior/posterior and enterocele repair with mesh in 2004, multiple prior urgent care evaluations recurrent UTIs, hemorrhoids, who presents with report of vaginal bleeding noted when she felt pruritus in the vulvar area went to scratch this area and passed blood and clots which she felt were vaginal in origin.  No anticoagulation therapy.  No other signs or symptoms of bleeding.   She has had months of ? rectal bleeding or bright red blood per rectum with pink and red discharge on the pad in her underwear and with  Wiping with toilet paper which she thought was rectal in origin.  Review of EMR reveals that she recently, 5/1/2024, had gross hematuria vs BRBPR vs vaginal bleeding and was diagnosed with acute cystitis.   examination in clinic documented erythematous vaginal canal was visible prolapse but no open areas or lesions or evidence of bleeding in her labia or vaginal canal.  Swab into the vaginal canal was negative for blood.  Digital rectal exam was negative and anoscopy was negative for hemorrhoids or visible blood loss per rectum.  5/2/2024 urine culture:  mixed urogenital anushka    Previous Records  Reviewed:  1/11/2024 CT pelvis bone without contrast:    9/10/2023 CT abdomen pelvis without contrast:     9/10/2023 pelvic ultrasound:  FINDINGS:     UTERUS: 7.3 x 4.6 x 2.5 cm. Normal in size and position with no masses.     ENDOMETRIUM: 2 mm. Normal smooth endometrium. Small amount of fluid seen in the region of the cervix.     Ovaries are obscured by bowel gas and not visualized. No significant free fluid.     IMPRESSION:  1.  Ovaries obscured by bowel gas.  2.  Small amount of endometrial fluid. No evidence of recurrent uterine prolapse.        Allergies:  Allergies   Allergen Reactions    Cortisone Other (See Comments)     Couldn't walk well even after 2 weeks    Diclofenac Sodium Other (See Comments)     Took one tab in 2008, no reaction in note    Hydrocodone Nausea and Vomiting    Lidocaine Hallucination     Patches     Morphine And Codeine Nausea and Vomiting     Vomited 1.5 days    Oxycodone Nausea and Vomiting    Tramadol Nausea and Vomiting       Problem List:    Patient Active Problem List    Diagnosis Date Noted    Spondylosis of lumbar region without myelopathy or radiculopathy 02/08/2024     Priority: Medium    Abnormal gait 10/22/2021     Priority: Medium    S/P total hip arthroplasty 09/30/2020     Priority: Medium    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 Pennington in 2004      Osteoarthritis of both hands 05/07/2019     Priority: Medium    Chronic obstructive pulmonary disease, unspecified COPD type (H) 01/10/2017     Priority: Medium    Chronic rhinitis 12/04/2015     Priority: Medium    Essential hypertension 12/04/2015     Priority: Medium    Dysuria 12/04/2015     Priority: Medium    Primary osteoarthritis of both knees 10/08/2014     Priority: Medium    Pedal edema 12/03/2013     Priority: Medium    Essential tremor 11/15/2013     Priority:  Medium    Health Care Home 12/03/2012     Priority: Medium     Roseann Obando RN-PHN  FPALEISHA / ELEUTERIO [facility]   [phone]    DX V65.8 REPLACED WITH 57096 [facility]ALTH CARE HOME (04/08/2013)      Kyphosis of cervical region 11/28/2011     Priority: Medium    Neck pain, chronic 11/28/2011     Priority: Medium    HYPERLIPIDEMIA LDL GOAL <130 10/31/2010     Priority: Medium    Recurrent UTI 09/20/2010     Priority: Medium    Cataract 07/06/2009     Priority: Medium     Utility update for deleted IMO code  Imo Update utility      Right shoulder pain 10/27/2008     Priority: Medium    Numbness in right leg 10/27/2008     Priority: Medium    LEFT HIP PAIN 03/23/2006     Priority: Medium    Urinary frequency 06/09/2005     Priority: Medium        Past Medical History:    Past Medical History:   Diagnosis Date    COPD (chronic obstructive pulmonary disease) (H)     Migraine, unspecified, without mention of intractable migraine without mention of status migrainosus     Other urinary incontinence     Pure hypercholesterolemia     Unspecified essential hypertension        Past Surgical History:    Past Surgical History:   Procedure Laterality Date    ARTHROPLASTY HIP Right 9/28/2020    Procedure: TOTAL Hip Arthroplasty;  Surgeon: Ashkan Shah MD;  Location: [facility] OR    AS TOTAL KNEE ARTHROPLASTY Right 2011    HC ANTER COLPORRHAPHY,BLAD/VAGINA  6/04    Cystocele Repair,rectocele repair.    JOINT REPLACEMENT, HIP RT/LT  3/07, 4/07    Joint Replacement Hip /LT- dislocated in front repeat surgery 1 week later       Family History:    Family History   Problem Relation Age of Onset    Cancer Mother         Bladder    Neurologic Disorder Mother         heriditary tremor    C.A.D. Father     Cerebrovascular Disease Father     Allergies Maternal Grandfather     Respiratory Maternal Grandfather         Asthma    Diabetes Paternal Grandmother         Type II    Cancer Brother         Multiple myloma    C.A.D. Brother  "    Cancer Sister         kidney cancer    Neurofibromatosis Son     Breast Cancer No family hx of        Social History:  Marital Status:   [2]  Social History     Tobacco Use    Smoking status: Former     Current packs/day: 0.00     Average packs/day: 1 pack/day for 20.0 years (20.0 ttl pk-yrs)     Types: Cigarettes     Start date: 1951     Quit date: 1971     Years since quittin.4    Smokeless tobacco: Never   Vaping Use    Vaping status: Never Used   Substance Use Topics    Alcohol use: Yes     Comment: 1 wine a month maybe    Drug use: No        Medications:    acetaminophen (TYLENOL) 325 MG tablet  acetaminophen-caffeine (EXCEDRIN TENSION HEADACHE) 500-65 MG TABS  albuterol (PROAIR HFA/PROVENTIL HFA/VENTOLIN HFA) 108 (90 Base) MCG/ACT inhaler  atenolol (TENORMIN) 25 MG tablet  chlorthalidone (HYGROTON) 25 MG tablet  estradiol (ESTRACE) 0.1 MG/GM vaginal cream  ipratropium (ATROVENT HFA) 17 MCG/ACT inhaler  ipratropium (ATROVENT) 0.03 % nasal spray  lidocaine-hydrocortisone ace 2.8-0.55 % GEL  methocarbamol (ROBAXIN) 500 MG tablet  multivitamin w/minerals (THERA-VIT-M) tablet  potassium chloride ER (K-TAB) 20 MEQ CR tablet  psyllium 28.3 % POWD  sulfamethoxazole-trimethoprim (BACTRIM DS) 800-160 MG tablet      Review of Systems  As mentioned in the HPI, in addition focused review of systems was negative.    Physical Exam   BP: (!) 175/75  Pulse: 59  Temp: 97.9  F (36.6  C)  Resp: 18  Height: 157.5 cm (5' 2\")  Weight: 68 kg (150 lb)  SpO2: 99 %      Physical Exam  Vitals and nursing note reviewed.   Constitutional:       General: She is not in acute distress.     Appearance: Normal appearance. She is not ill-appearing.   Abdominal:      General: There is no distension.      Tenderness: There is no abdominal tenderness.   Genitourinary:     Comments:  and rectal examination: Normal external genitalia and anus no masses, lesions or sources of bleeding.  There is vaginal and cervical prolapse " to the introitus with macerated tissue but no active bleeding.  Prolapse was manually reduced and speculum exam was performed and showed no source of bleeding.  No rectal bleeding, rectal masses and normal stool.  Skin:     General: Skin is warm and dry.      Coloration: Skin is not pale.      Findings: No bruising, erythema or rash.   Neurological:      Mental Status: She is alert.   Psychiatric:         Mood and Affect: Mood normal.         Behavior: Behavior normal.           ED Course        Procedures              Results for orders placed or performed during the hospital encounter of 05/27/24 (from the past 24 hour(s))   US Pelvic Complete with Transvaginal    Narrative    EXAM: US PELVIC TRANSABDOMINAL AND TRANSVAGINAL  LOCATION: [facility]  DATE: 5/27/2024    INDICATION: eval prolapse with vaginal bleeding. Vaginal bleeding. Bladder prolapse. Postmenopausal.  COMPARISON: 9/10/2023  TECHNIQUE: Transabdominal scans were performed. Endovaginal ultrasound was performed to better visualize the adnexa.    FINDINGS:    UTERUS: 5.5 x 4.5 x 2.1 cm. Normal in size and position with no masses.    ENDOMETRIUM: 2 mm. Normal smooth endometrium.    RIGHT OVARY: Not visualized likely due to bowel gas and small postmenopausal size.    LEFT OVARY: Not visualized likely due to bowel gas and small postmenopausal size.    No significant free fluid. The bladder appears unremarkable. No definite prolapse visualized.      Impression    IMPRESSION:  1.  Endometrial thickness is 2 mm.  2.  The ovaries are not visualized likely due to bowel gas and small postmenopausal size.         Medications - No data to display    I reviewed the case consulted with Dr. Dove OB/GYN.  She will see Caitlyn in clinic follow-up this coming week, Wednesday, 5/29/2024 at 2 PM.  She requested a pelvic ultrasound evaluation.     Assessments & Plan (with Medical Decision Making)   88 year old female with history of vaginal  prolapse, history of TVT, anterior/posterior and enterocele repair with mesh in 2004, multiple prior urgent care evaluations recurrent UTIs, hemorrhoids, who presents with report of vaginal bleeding noted when she felt pruritus in the vulvar area went to scratch this area and passed blood and clots which she felt were vaginal in origin.  No anticoagulation therapy.  No other signs or symptoms of bleeding.   exam is remarkable for pronounced vaginal and cervical prolapse which was reduced manually without difficulty.  There is excoriation of tissue which I believe is a source of bleeding, but without active bleeding.  No other source of bleeding was identified and there is no evidence of anal, rectal or GI bleeding on rectal examination.  OB/GYN was consulted, a pelvic ultrasound was performed and was unremarkable.  Dr. Dove clinic follow-up in 2 days, Wednesday, 5/29/2024 at 2 PM.  OB/GYN will see her in     I have reviewed the nursing notes.    I have reviewed the findings, diagnosis, plan and need for follow up with the patient and her .     Medical Decision Making: Moderate complexity        New Prescriptions    No medications on file       Final diagnoses:   Vaginal and cervical prolapse       5/27/2024   [facility] EMERGENCY DEPT       Melissa, Sumeet MULLIGAN MD  05/27/24 3150

## 2024-05-27 NOTE — ED TRIAGE NOTES
Pt reports she had an itch in her yevgeniy area and went to scratch, pt reports there was a lot of bright red blood with clots that appeared to be coming from her vagina. Pt reports she does have a prolapsed bladder.

## 2024-05-29 ENCOUNTER — OFFICE VISIT (OUTPATIENT)
Dept: OBGYN | Facility: CLINIC | Age: 89
End: 2024-05-29
Payer: COMMERCIAL

## 2024-05-29 VITALS
BODY MASS INDEX: 27.46 KG/M2 | SYSTOLIC BLOOD PRESSURE: 130 MMHG | DIASTOLIC BLOOD PRESSURE: 72 MMHG | RESPIRATION RATE: 16 BRPM | HEIGHT: 62 IN | WEIGHT: 149.2 LBS | HEART RATE: 64 BPM | TEMPERATURE: 98.4 F

## 2024-05-29 DIAGNOSIS — N81.3 UTEROVAGINAL PROLAPSE, COMPLETE: Primary | ICD-10-CM

## 2024-05-29 DIAGNOSIS — B37.2 CANDIDAL INTERTRIGO: ICD-10-CM

## 2024-05-29 DIAGNOSIS — N86 CERVICAL EROSION: ICD-10-CM

## 2024-05-29 PROCEDURE — 99204 OFFICE O/P NEW MOD 45 MIN: CPT | Performed by: OBSTETRICS & GYNECOLOGY

## 2024-05-29 RX ORDER — TERCONAZOLE 0.4 %
1 CREAM WITH APPLICATOR VAGINAL AT BEDTIME
Qty: 1 G | Refills: 0 | Status: SHIPPED | OUTPATIENT
Start: 2024-05-29 | End: 2024-06-05

## 2024-05-29 NOTE — PROGRESS NOTES
Chief Complaint   Patient presents with    Consult     Prolapse- caused some bleeding.         HPI:  Caitlyn Wasserman, 88 year old,  presents with complaints of prolapse and bleeding.  She has had a uterine prolapse for quite a long time.  She was seeing Dr. Zaman before she retired.  She has tried pessaries in the past but they fell out.  She was planning on doing a surgery but when COVID hit and everything got delayed.  She has been having a little bit of pinkish discharge and her incontinence pads.  She is asked about it but nobody is ever found anything or cause.  She had increased bleeding on Monday and went to the ER.  She was having a lot of itching and she was talking on the phone and is definitely scratched and noticed the bleeding started after that.  Since that time the bleeding has been better but she still has occasional patches of blood.  States her uterus hangs out sometimes very far.  Has had a lift with mesh in the past and was told surgery may be more difficult.  She was planning the surgery at 1 point but then everything got delayed.      Past Medical History:   Diagnosis Date    COPD (chronic obstructive pulmonary disease) (H)     Migraine, unspecified, without mention of intractable migraine without mention of status migrainosus     Other urinary incontinence     Pure hypercholesterolemia     Unspecified essential hypertension      Past Surgical History:   Procedure Laterality Date    ARTHROPLASTY HIP Right 2020    Procedure: TOTAL Hip Arthroplasty;  Surgeon: Ashkan Shah MD;  Location: [facility]    AS TOTAL KNEE ARTHROPLASTY Right     HC ANTER COLPORRHAPHY,BLAD/VAGINA      Cystocele Repair,rectocele repair.    JOINT REPLACEMENT, HIP RT/LT  3/07,     Joint Replacement Hip /LT- dislocated in front repeat surgery 1 week later     Social History     Socioeconomic History    Marital status:      Spouse name: Neeraj Wasserman    Number of children: 3    Years of  education: 14    Highest education level: Not on file   Occupational History     Employer: RETIRED   Tobacco Use    Smoking status: Former     Current packs/day: 0.00     Average packs/day: 1 pack/day for 20.0 years (20.0 ttl pk-yrs)     Types: Cigarettes     Start date: 1951     Quit date: 1971     Years since quittin.4    Smokeless tobacco: Never   Vaping Use    Vaping status: Never Used   Substance and Sexual Activity    Alcohol use: Yes     Comment: 1 wine a month maybe    Drug use: No    Sexual activity: Not Currently     Partners: Male   Other Topics Concern     Service No    Blood Transfusions No    Caffeine Concern Yes     Comment: 1 a day    Occupational Exposure No    Hobby Hazards No    Sleep Concern No    Stress Concern No    Weight Concern Yes     Comment: always ongoing problem    Special Diet Yes     Comment: calcium and vit D    Back Care No    Exercise No    Bike Helmet No    Seat Belt Yes    Self-Exams Yes    Parent/sibling w/ CABG, MI or angioplasty before 65F 55M? No   Social History Narrative    Not on file     Social Determinants of Health     Financial Resource Strain: Low Risk  (2024)    Financial Resource Strain     Within the past 12 months, have you or your family members you live with been unable to get utilities (heat, electricity) when it was really needed?: No   Food Insecurity: Low Risk  (2024)    Food Insecurity     Within the past 12 months, did you worry that your food would run out before you got money to buy more?: No     Within the past 12 months, did the food you bought just not last and you didn t have money to get more?: No   Transportation Needs: Low Risk  (2024)    Transportation Needs     Within the past 12 months, has lack of transportation kept you from medical appointments, getting your medicines, non-medical meetings or appointments, work, or from getting things that you need?: No   Physical Activity: Inactive (2024)    Exercise  Vital Sign     Days of Exercise per Week: 0 days     Minutes of Exercise per Session: 0 min   Stress: Stress Concern Present (4/2/2024)    [employer] - Occupational Stress Questionnaire     Feeling of Stress : To some extent   Social Connections: Unknown (4/2/2024)    Social Connection and Isolation Panel [NHANES]     Frequency of Communication with Friends and Family: Not on file     Frequency of Social Gatherings with Friends and Family: Once a week     Attends Mosque Services: Not on file     Active Member of Clubs or Organizations: Not on file     Attends Club or Organization Meetings: Not on file     Marital Status: Not on file   Interpersonal Safety: Low Risk  (2/13/2024)    Interpersonal Safety     Do you feel physically and emotionally safe where you currently live?: Yes     Within the past 12 months, have you been hit, slapped, kicked or otherwise physically hurt by someone?: No     Within the past 12 months, have you been humiliated or emotionally abused in other ways by your partner or ex-partner?: No   Housing Stability: Low Risk  (4/2/2024)    Housing Stability     Do you have housing? : Yes     Are you worried about losing your housing?: No     Family History   Problem Relation Age of Onset    Cancer Mother         Bladder    Neurologic Disorder Mother         heriditary tremor    C.A.D. Father     Cerebrovascular Disease Father     Allergies Maternal Grandfather     Respiratory Maternal Grandfather         Asthma    Diabetes Paternal Grandmother         Type II    Cancer Brother         Multiple myloma    C.A.D. Brother     Cancer Sister         kidney cancer    Neurofibromatosis Son     Breast Cancer No family hx of         Current Outpatient Medications   Medication Sig Dispense Refill    atenolol (TENORMIN) 25 MG tablet Take 1 tablet (25 mg) by mouth daily 90 tablet 3    chlorthalidone (HYGROTON) 25 MG tablet Take 2 tablets (50 mg) by mouth daily 180 tablet 2     potassium chloride ER (K-TAB) 20 MEQ CR tablet Take 1 tablet (20 mEq) by mouth daily 90 tablet 3    terconazole (TERAZOL 7) 0.4 % vaginal cream Place 1 applicator vaginally at bedtime for 7 days Also apply a small amount to the creases of the thighs 2-3 times daily until rash resolves.  Recheck in the office in 2-3 weeks. 1 g 0    acetaminophen (TYLENOL) 325 MG tablet Take 3 tablets (975 mg) by mouth every 8 hours (Patient not taking: Reported on 5/29/2024)      acetaminophen-caffeine (EXCEDRIN TENSION HEADACHE) 500-65 MG TABS Take 2 tablets by mouth every 6 hours as needed for mild pain (Patient not taking: Reported on 5/29/2024)      albuterol (PROAIR HFA/PROVENTIL HFA/VENTOLIN HFA) 108 (90 Base) MCG/ACT inhaler Inhale 2 puffs into the lungs every 6 hours (Patient not taking: Reported on 5/29/2024) 18 g 1    estradiol (ESTRACE) 0.1 MG/GM vaginal cream Place 2 g vaginally twice a week (Patient not taking: Reported on 5/29/2024) 85 g 3    ipratropium (ATROVENT HFA) 17 MCG/ACT inhaler Inhale 2 puffs into the lungs every 6 hours (Patient not taking: Reported on 5/29/2024) 12.9 g 3    ipratropium (ATROVENT) 0.03 % nasal spray USE 2 SPRAYS NASALLY DAILY AS NEEDED FOR RHINITIS (Patient not taking: Reported on 5/29/2024) 60 mL 0    lidocaine-hydrocortisone ace 2.8-0.55 % GEL Place 1 Application rectally as needed (hemorrhoids) (Patient not taking: Reported on 4/2/2024) 100 g 0    methocarbamol (ROBAXIN) 500 MG tablet Take 1 tablet (500 mg) by mouth 4 times daily as needed for muscle spasms (Patient not taking: Reported on 4/2/2024) 40 tablet 0    multivitamin w/minerals (THERA-VIT-M) tablet Take 1 tablet by mouth daily (Patient not taking: Reported on 5/29/2024)      psyllium 28.3 % POWD Take 1 teaspoonful by mouth daily 2 times daily (Patient not taking: Reported on 4/2/2024)      sulfamethoxazole-trimethoprim (BACTRIM DS) 800-160 MG tablet Take 1 tablet by mouth 2 times daily (Patient not taking: Reported on 4/2/2024)  "14 tablet 0        Allergies:     Allergies   Allergen Reactions    Cortisone Other (See Comments)     Couldn't walk well even after 2 weeks    Diclofenac Sodium Other (See Comments)     Took one tab in 2008, no reaction in note    Hydrocodone Nausea and Vomiting    Lidocaine Hallucination     Patches     Morphine And Codeine Nausea and Vomiting     Vomited 1.5 days    Oxycodone Nausea and Vomiting    Tramadol Nausea and Vomiting        ROS:  See HPI      Physical Exam:  /72 (BP Location: Left arm, Patient Position: Chair, Cuff Size: Adult Regular)   Pulse 64   Temp 98.4  F (36.9  C) (Tympanic)   Resp 16   Ht 1.575 m (5' 2\")   Wt 67.7 kg (149 lb 3.2 oz)   LMP  (LMP Unknown)   BMI 27.29 kg/m       Appearance: well developed, well nourished, no acute distress  Head Exam normocephalic, no lesions or deformities  Abdomen: soft, non-tender, no masses, no organomegaly, no hernia,  Skin: no lesions  Extremities: no edema  Psych: orientated x3    Genitourinary Exam  Vulva: normal, no lesions  Urethral meatus: normal size and location, no lesions or discharge  Urethra: no tenderness or masses  Bladder: no fullness or tenderness  Vagina:POS cystocele and rectocele  Cervix: superficial erosion noted on the anterior cervix and upper, anterior vagina, likely from rubbing on pads.  Uterus: normal position, mobile, non-tender, size 4 weeks. Complete procidentia  Adnexa: no palpable masses bilaterally    EXAM: US PELVIC TRANSABDOMINAL AND TRANSVAGINAL  LOCATION: [facility]  DATE: 5/27/2024     INDICATION: eval prolapse with vaginal bleeding. Vaginal bleeding. Bladder prolapse. Postmenopausal.  COMPARISON: 9/10/2023  TECHNIQUE: Transabdominal scans were performed. Endovaginal ultrasound was performed to better visualize the adnexa.     FINDINGS:     UTERUS: 5.5 x 4.5 x 2.1 cm. Normal in size and position with no masses.     ENDOMETRIUM: 2 mm. Normal smooth endometrium.     RIGHT OVARY: Not " visualized likely due to bowel gas and small postmenopausal size.     LEFT OVARY: Not visualized likely due to bowel gas and small postmenopausal size.     No significant free fluid. The bladder appears unremarkable. No definite prolapse visualized.                                                                      IMPRESSION:  1.  Endometrial thickness is 2 mm.  2.  The ovaries are not visualized likely due to bowel gas and small postmenopausal size.     Assessment/Plan:  Encounter Diagnoses   Name Primary?    Uterovaginal prolapse, complete Yes    Cervical erosion     Candidal intertrigo        Her bleeding has improved.  Her pelvic ultrasound was normal. She has a superficial erosion that is likely the cause.  Advised applying amount of estrogen directly to the sore every day for 2 weeks.  We also discussed using Vaseline or Desitin on her pads to keep the area from rubbing on the pads.    She has inflammation with caking of discharge in the creases of the thigh and inner labia.  It is difficult to tell if this is an underlying skin condition due to the inflammation and diabetes she has Candida intertrigo treat that first.  Recommended repeat exam in 2 to 3 weeks after treatment.  We discussed the possibility of needing a skin biopsy.    Discussed prolapse and reviewed pictures.  Discussed options including no treatment, pessaries and surgery with and without mesh.  Discussed risks and benefits of each.  After discussion, she would consider trying a pessary at the next visit.  She had tried some a long time ago and most fell out.  I think it is worth trying a gellhorn prior to surgery.  If she does proceed with surgery, she would want the modified [place].  We discussed the inability to do pelvic exams or have intercourse ever again.  She had a normal ultrasound and is not sexually active.  Will address again after treating griselda.        Jemima Porras MD on 5/29/2024 at 2:17 PM

## 2024-05-29 NOTE — PATIENT INSTRUCTIONS
Apply a tiny amount of vaginal estrogen (pea-sized amount) to the cervix, where you noted bleeding, once a day for 2 weeks.    I sent a prescription for Terazol cream to your pharmacy.  I would recommend grabbing this in the creases of the thighs and itchy areas on the skin 2-3 times a day.  Also place 1 applicatorful into the vagina before bed at night.    I would recommend a repeat exam in the office in 2 to 3 weeks.  We may need to consider a skin biopsy if your itching has not improved.    For the prolapse, I would recommend trying a pessary again.  We could consider surgeries where we either close off the vagina or remove the uterus and tack the top of the vagina back up.  Closing the vagina has much less risk and recovery, however you cannot get pelvic exams or have intercourse after this.    You can apply a small amount of Desitin or Vaseline to your pad to prevent the cervix from rubbing on it and bleeding.

## 2024-05-29 NOTE — NURSING NOTE
"Initial /72 (BP Location: Left arm, Patient Position: Chair, Cuff Size: Adult Regular)   Pulse 64   Temp 98.4  F (36.9  C) (Tympanic)   Resp 16   Ht 1.575 m (5' 2\")   Wt 67.7 kg (149 lb 3.2 oz)   LMP  (LMP Unknown)   BMI 27.29 kg/m   Estimated body mass index is 27.29 kg/m  as calculated from the following:    Height as of this encounter: 1.575 m (5' 2\").    Weight as of this encounter: 67.7 kg (149 lb 3.2 oz). .    Gabriela Floyd, MIKEY    "

## 2024-06-07 ENCOUNTER — OFFICE VISIT (OUTPATIENT)
Dept: NEUROLOGY | Facility: CLINIC | Age: 89
End: 2024-06-07
Payer: COMMERCIAL

## 2024-06-07 VITALS
DIASTOLIC BLOOD PRESSURE: 72 MMHG | RESPIRATION RATE: 16 BRPM | WEIGHT: 149 LBS | BODY MASS INDEX: 27.25 KG/M2 | HEART RATE: 52 BPM | SYSTOLIC BLOOD PRESSURE: 134 MMHG

## 2024-06-07 DIAGNOSIS — G60.9 NEUROPATHY, IDIOPATHIC: ICD-10-CM

## 2024-06-07 DIAGNOSIS — G62.9 NEUROPATHY: Primary | ICD-10-CM

## 2024-06-07 DIAGNOSIS — G25.0 ESSENTIAL TREMOR: ICD-10-CM

## 2024-06-07 DIAGNOSIS — R73.9 HYPERGLYCEMIA: ICD-10-CM

## 2024-06-07 PROCEDURE — 99205 OFFICE O/P NEW HI 60 MIN: CPT | Performed by: PSYCHIATRY & NEUROLOGY

## 2024-06-07 PROCEDURE — G2211 COMPLEX E/M VISIT ADD ON: HCPCS | Performed by: PSYCHIATRY & NEUROLOGY

## 2024-06-07 NOTE — PROGRESS NOTES
NEUROLOGY OUTPATIENT CONSULT NOTE   Jun 7, 2024     CHIEF COMPLAINT/REASON FOR VISIT/REASON FOR CONSULT  Patient presents with:  NEUROPATHY    REASON FOR CONSULTATION- Neuropathy Eval    REFERRAL SOURCE  Dr. Cindy Flor  CC Dr. Cindy Flor    HISTORY OF PRESENT ILLNESS  Caitlyn Wasserman is a 88 year old female seen today for evaluation of neuropathy.  Symptoms started in 2007 after left hip replacement surgery.  She reports initially a few of her toes were tingling but then they spread to all toes in both feet.  Does report some numbness in the bottom of her feet.  More recently her hands have also started going numb though this is mainly during sleep at night.  She does get up multiple times at nighttime for her hands going to sleep.  She does complain of some minimal back pain and neck pain.  Some stiffness in her arms and legs due to arthritis.  No shooting pain down the legs.  Denies any neuropathic pain though occasionally will get some burning in the bottom of her soles but this is not very bothersome.  Mild balance issues.  No major weight gain or any alcohol exposure.  No family history of neuropathy.    She does complain of some tremors in upper extremities.  These are mainly with activity.  Denies any resting tremor.  No other parkinsonian symptoms    Previous history is reviewed and this is unchanged.    PAST MEDICAL/SURGICAL HISTORY  Past Medical History:   Diagnosis Date    COPD (chronic obstructive pulmonary disease) (H)     Migraine, unspecified, without mention of intractable migraine without mention of status migrainosus     Other urinary incontinence     Pure hypercholesterolemia     Unspecified essential hypertension      Patient Active Problem List   Diagnosis    Urinary frequency    LEFT HIP PAIN    Right shoulder pain    Numbness in right leg    Cataract    Recurrent UTI    HYPERLIPIDEMIA LDL GOAL <130    Kyphosis of cervical region    Neck pain, chronic    Health Care Home     Essential tremor    Pedal edema    Primary osteoarthritis of both knees    Chronic rhinitis    Essential hypertension    Dysuria    Chronic obstructive pulmonary disease, unspecified COPD type (H)    Osteoarthritis of both hands    Vaginal prolapse    Hip arthrosis    S/P total hip arthroplasty    Abnormal gait    Spondylosis of lumbar region without myelopathy or radiculopathy   Significant high cholesterol, high blood pressure, migraines, arthritis, vision loss    FAMILY HISTORY  Family History   Problem Relation Age of Onset    Cancer Mother         Bladder    Neurologic Disorder Mother         heriditary tremor    C.A.D. Father     Cerebrovascular Disease Father     Allergies Maternal Grandfather     Respiratory Maternal Grandfather         Asthma    Diabetes Paternal Grandmother         Type II    Cancer Brother         Multiple myloma    C.A.D. Brother     Cancer Sister         kidney cancer    Neurofibromatosis Son     Breast Cancer No family hx of    Positive for essential tremor.  No family history of neuropathy.    SOCIAL HISTORY  Social History     Tobacco Use    Smoking status: Former     Current packs/day: 0.00     Average packs/day: 1 pack/day for 20.0 years (20.0 ttl pk-yrs)     Types: Cigarettes     Start date: 1951     Quit date: 1971     Years since quittin.4    Smokeless tobacco: Never   Vaping Use    Vaping status: Never Used   Substance Use Topics    Alcohol use: Yes     Comment: 1 wine a month maybe    Drug use: No       SYSTEMS REVIEW  Twelve-system ROS was done and other than the HPI this was negative/positive for neck pain, arm and leg pain, joint pain, numbness/tingling, weakness/paralysis/leg symptoms, difficulty walking, falling, balance issues movement disorder/tremor, bladder issues, vision symptoms, sleepy during the day, bowel problems, respiratory problems.    MEDICATIONS  Current Outpatient Medications   Medication Sig Dispense Refill    acetaminophen (TYLENOL) 325 MG  tablet Take 3 tablets (975 mg) by mouth every 8 hours      albuterol (PROAIR HFA/PROVENTIL HFA/VENTOLIN HFA) 108 (90 Base) MCG/ACT inhaler Inhale 2 puffs into the lungs every 6 hours 18 g 1    atenolol (TENORMIN) 25 MG tablet Take 1 tablet (25 mg) by mouth daily 90 tablet 3    chlorthalidone (HYGROTON) 25 MG tablet Take 2 tablets (50 mg) by mouth daily 180 tablet 2    estradiol (ESTRACE) 0.1 MG/GM vaginal cream Place 2 g vaginally twice a week (Patient taking differently: Place vaginally twice a week) 85 g 3    ipratropium (ATROVENT) 0.03 % nasal spray USE 2 SPRAYS NASALLY DAILY AS NEEDED FOR RHINITIS 60 mL 0    multivitamin w/minerals (THERA-VIT-M) tablet Take 1 tablet by mouth daily      potassium chloride ER (K-TAB) 20 MEQ CR tablet Take 1 tablet (20 mEq) by mouth daily 90 tablet 3    psyllium 28.3 % POWD Take 1 teaspoonful by mouth daily 2 times daily      acetaminophen-caffeine (EXCEDRIN TENSION HEADACHE) 500-65 MG TABS Take 2 tablets by mouth every 6 hours as needed for mild pain (Patient not taking: Reported on 5/29/2024)      ipratropium (ATROVENT HFA) 17 MCG/ACT inhaler Inhale 2 puffs into the lungs every 6 hours (Patient not taking: Reported on 5/29/2024) 12.9 g 3    lidocaine-hydrocortisone ace 2.8-0.55 % GEL Place 1 Application rectally as needed (hemorrhoids) (Patient not taking: Reported on 4/2/2024) 100 g 0    methocarbamol (ROBAXIN) 500 MG tablet Take 1 tablet (500 mg) by mouth 4 times daily as needed for muscle spasms (Patient not taking: Reported on 4/2/2024) 40 tablet 0    sulfamethoxazole-trimethoprim (BACTRIM DS) 800-160 MG tablet Take 1 tablet by mouth 2 times daily (Patient not taking: Reported on 4/2/2024) 14 tablet 0     Current Facility-Administered Medications   Medication Dose Route Frequency Provider Last Rate Last Admin    lidocaine 112 mL, EPINEPHrine (ADRENALIN) 1.12 mg in sodium chloride 0.9 % 1,113.12 mL (TUMESCENT)   Irrigation Once Jan Rausch MD        lidocaine 112 mL,  EPINEPHrine (ADRENALIN) 1.12 mg in sodium chloride 0.9 % 1,113.12 mL (TUMESCENT)   Irrigation Once Jan Rausch MD            PHYSICAL EXAMINATION  VITALS: /72   Pulse 52   Resp 16   Wt 67.6 kg (149 lb)   LMP  (LMP Unknown)   BMI 27.25 kg/m    GENERAL: Healthy appearing, alert, no acute distress, normal habitus.  CARDIOVASCULAR: Extremities warm and well perfused. Pulses present.   NEUROLOGICAL:  Patient is awake and oriented to self, place and time.  Attention span is normal.  Memory is grossly intact.  Language is fluent and follows commands appropriately.  Appropriate fund of knowledge. Cranial nerves 2-12 are intact. There is no pronator drift.  Motor exam shows 5/5 strength in all extremities.  Tone is symmetric bilaterally in upper and lower extremities.  Reflexes are symmetric and 2+ in upper extremities and lower extremities.  Ankle jerks absent sensory exam is grossly intact to light touch, pin prick and vibration with decreased vibratory sense in the toes and decreased pinprick sensation to the mid ankle level.  Finger to nose and heel to shin is without dysmetria.  Romberg is negative.  Gait is slightly wide-based and the patient is able to do tandem walk and walk on toes and heels with mild difficulty.  Very mild postural tremor in both upper extremities.    DIAGNOSTICS  XR C spine  IMPRESSION: Vertebral body heights are maintained. Bones appear mildly  osteopenic. Multilevel facet disease. Degenerative changes at the  craniocervical junction. No significant anterolisthesis or  retrolisthesis. No prevertebral edema.     RELEVANT LABS  Component      Latest Ref Rng 4/29/2023  7:45 PM 9/10/2023  6:57 AM   WBC      4.0 - 11.0 10e3/uL 8.3  6.1    RBC Count      3.80 - 5.20 10e6/uL 4.28  4.14    Hemoglobin      11.7 - 15.7 g/dL 13.7  13.1    Hematocrit      35.0 - 47.0 % 40.6  38.3    MCV      78 - 100 fL 95  93    MCH      26.5 - 33.0 pg 32.0  31.6    MCHC      31.5 - 36.5 g/dL 33.7  34.2     RDW      10.0 - 15.0 % 11.9  12.1    Platelet Count      150 - 450 10e3/uL 262  252    % Neutrophils      % 61  54    % Lymphocytes      % 26  30    % Monocytes      % 9  10    % Eosinophils      % 3  5    % Basophils      % 1  1    % Immature Granulocytes      % 0  0    NRBCs per 100 WBC      <1 /100 0     Absolute Neutrophils      1.6 - 8.3 10e3/uL 5.0  3.3    Absolute Lymphocytes      0.8 - 5.3 10e3/uL 2.2  1.8    Absolute Monocytes      0.0 - 1.3 10e3/uL 0.8  0.6    Absolute Eosinophils      0.0 - 0.7 10e3/uL 0.3  0.3    Absolute Basophils      0.0 - 0.2 10e3/uL 0.1  0.1    Absolute Immature Granulocytes      <=0.4 10e3/uL 0.0  0.0    Absolute NRBCs      10e3/uL 0.0     Sodium      136 - 145 mmol/L 137  133 (L)    Potassium      3.4 - 5.3 mmol/L 3.5  3.5    Chloride      98 - 107 mmol/L 97 (L)  96 (L)    Carbon Dioxide (CO2)      22 - 29 mmol/L 31 (H)  28    Anion Gap      7 - 15 mmol/L 9  9    Urea Nitrogen      8.0 - 23.0 mg/dL 14.4  13.4    Creatinine      0.51 - 0.95 mg/dL 0.92  0.82    Calcium      8.8 - 10.2 mg/dL 9.9  9.4    Glucose      70 - 99 mg/dL 104 (H)  96    Alkaline Phosphatase      35 - 104 U/L 80     AST      10 - 35 U/L 24     ALT      10 - 35 U/L 17     Protein Total      6.4 - 8.3 g/dL 7.5     Albumin      3.5 - 5.2 g/dL 4.4     Bilirubin Total      <=1.2 mg/dL 0.5     GFR Estimate      >60 mL/min/1.73m2 61  70       Legend:  (L) Low  (H) High      OUTSIDE RECORDS  Outside referral notes and chart notes were reviewed and pertinent information has been summarized (in addition to the HPI):-      IMPRESSION/REPORT/PLAN  Neuropathy  Essential tremor    This is a 88 year old female seen today for numbness in the feet.  Exam shows decreased pinprick and vibratory sense in the feet suggestive of peripheral neuropathy.  Blood work through primary care so far has been negative.  Will check blood work to look for other causes.  Will check a EMG to confirm diagnosis.  Discussed prognosis and concept  of idiopathic neuropathy.  She does not have any significant neuropathic pain that warrants medication.  Recommend exercise and healthy lifestyle    She does have a mild tremor in both upper extremities suggestive of essential tremor.  Currently this is not very bothersome and we will hold off medication.  There is family history of tremors.    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  Encouraged her to keep the wrist straight at nighttime when she is sleeping.  Otherwise she could buy a carpal tunnel brace to see if it helps.  Patients with peripheral polyneuropathy are more likely to get entrapment neuropathy in the upper extremities.    Can see her back in about 2 to 3 months after testing.    -     Vitamin B6; Future  -     Vitamin B12; Future  -     Vitamin B1 whole blood; Future  -     TSH with free T4 reflex; Future  -     Protein electrophoresis; Future  -     Hemoglobin A1c; Future  -     EMG; Future    Return in about 6 weeks (around 7/19/2024) for In-Clinic Visit (must), Add on PAOR, After testing.    Over 60 minutes were spent coordinating the care for the patient on the day of the encounter.  This includes previsit, during visit and post visit activities as documented above.  Counseling patient.  Reviewing chart.  Multiple problems reviewed.  (Activities include but not inclusive of reviewing chart, reviewing outside records, reviewing labs and imaging study results as well as the images, patient visit time including getting history and exam,  use if applicable, review of test results with the patient and coming up with a plan in a shared model, counseling patient and family, education and answering patient questions, EMR , EMR diagnosis entry and problem list management, medication reconciliation and prescription management if applicable, paperwork if applicable, printing documents and documentation of the  visit activities.)        Jack Toure MD  Neurologist  [facility]  Tel:- [phone]    This note was dictated using voice recognition software.  Any grammatical or context distortions are unintentional and inherent to the software.

## 2024-06-07 NOTE — LETTER
6/7/2024      Caitlyn Wasserman  [id number] [id number]      Dear Colleague,    Thank you for referring your patient, Caitlyn Wasserman, to the [facility] [place]. Please see a copy of my visit note below.    NEUROLOGY OUTPATIENT CONSULT NOTE   Jun 7, 2024     CHIEF COMPLAINT/REASON FOR VISIT/REASON FOR CONSULT  Patient presents with:  NEUROPATHY    REASON FOR CONSULTATION- Neuropathy Eval    REFERRAL SOURCE  Dr. Cindy Flor  CC Dr. Cindy Flor    HISTORY OF PRESENT ILLNESS  Caitlyn Wasserman is a 88 year old female seen today for evaluation of neuropathy.  Symptoms started in 2007 after left hip replacement surgery.  She reports initially a few of her toes were tingling but then they spread to all toes in both feet.  Does report some numbness in the bottom of her feet.  More recently her hands have also started going numb though this is mainly during sleep at night.  She does get up multiple times at nighttime for her hands going to sleep.  She does complain of some minimal back pain and neck pain.  Some stiffness in her arms and legs due to arthritis.  No shooting pain down the legs.  Denies any neuropathic pain though occasionally will get some burning in the bottom of her soles but this is not very bothersome.  Mild balance issues.  No major weight gain or any alcohol exposure.  No family history of neuropathy.    She does complain of some tremors in upper extremities.  These are mainly with activity.  Denies any resting tremor.  No other parkinsonian symptoms    Previous history is reviewed and this is unchanged.    PAST MEDICAL/SURGICAL HISTORY  Past Medical History:   Diagnosis Date     COPD (chronic obstructive pulmonary disease) (H)      Migraine, unspecified, without mention of intractable migraine without mention of status migrainosus      Other urinary incontinence      Pure hypercholesterolemia      Unspecified essential hypertension      Patient Active  Problem List   Diagnosis     Urinary frequency     LEFT HIP PAIN     Right shoulder pain     Numbness in right leg     Cataract     Recurrent UTI     HYPERLIPIDEMIA LDL GOAL <130     Kyphosis of cervical region     Neck pain, chronic     Health Care Home     Essential tremor     Pedal edema     Primary osteoarthritis of both knees     Chronic rhinitis     Essential hypertension     Dysuria     Chronic obstructive pulmonary disease, unspecified COPD type (H)     Osteoarthritis of both hands     Vaginal prolapse     Hip arthrosis     S/P total hip arthroplasty     Abnormal gait     Spondylosis of lumbar region without myelopathy or radiculopathy   Significant high cholesterol, high blood pressure, migraines, arthritis, vision loss    FAMILY HISTORY  Family History   Problem Relation Age of Onset     Cancer Mother         Bladder     Neurologic Disorder Mother         heriditary tremor     C.A.D. Father      Cerebrovascular Disease Father      Allergies Maternal Grandfather      Respiratory Maternal Grandfather         Asthma     Diabetes Paternal Grandmother         Type II     Cancer Brother         Multiple myloma     C.A.D. Brother      Cancer Sister         kidney cancer     Neurofibromatosis Son      Breast Cancer No family hx of    Positive for essential tremor.  No family history of neuropathy.    SOCIAL HISTORY  Social History     Tobacco Use     Smoking status: Former     Current packs/day: 0.00     Average packs/day: 1 pack/day for 20.0 years (20.0 ttl pk-yrs)     Types: Cigarettes     Start date: 1951     Quit date: 1971     Years since quittin.4     Smokeless tobacco: Never   Vaping Use     Vaping status: Never Used   Substance Use Topics     Alcohol use: Yes     Comment: 1 wine a month maybe     Drug use: No       SYSTEMS REVIEW  Twelve-system ROS was done and other than the HPI this was negative/positive for neck pain, arm and leg pain, joint pain, numbness/tingling, weakness/paralysis/leg  symptoms, difficulty walking, falling, balance issues movement disorder/tremor, bladder issues, vision symptoms, sleepy during the day, bowel problems, respiratory problems.    MEDICATIONS  Current Outpatient Medications   Medication Sig Dispense Refill     acetaminophen (TYLENOL) 325 MG tablet Take 3 tablets (975 mg) by mouth every 8 hours       albuterol (PROAIR HFA/PROVENTIL HFA/VENTOLIN HFA) 108 (90 Base) MCG/ACT inhaler Inhale 2 puffs into the lungs every 6 hours 18 g 1     atenolol (TENORMIN) 25 MG tablet Take 1 tablet (25 mg) by mouth daily 90 tablet 3     chlorthalidone (HYGROTON) 25 MG tablet Take 2 tablets (50 mg) by mouth daily 180 tablet 2     estradiol (ESTRACE) 0.1 MG/GM vaginal cream Place 2 g vaginally twice a week (Patient taking differently: Place vaginally twice a week) 85 g 3     ipratropium (ATROVENT) 0.03 % nasal spray USE 2 SPRAYS NASALLY DAILY AS NEEDED FOR RHINITIS 60 mL 0     multivitamin w/minerals (THERA-VIT-M) tablet Take 1 tablet by mouth daily       potassium chloride ER (K-TAB) 20 MEQ CR tablet Take 1 tablet (20 mEq) by mouth daily 90 tablet 3     psyllium 28.3 % POWD Take 1 teaspoonful by mouth daily 2 times daily       acetaminophen-caffeine (EXCEDRIN TENSION HEADACHE) 500-65 MG TABS Take 2 tablets by mouth every 6 hours as needed for mild pain (Patient not taking: Reported on 5/29/2024)       ipratropium (ATROVENT HFA) 17 MCG/ACT inhaler Inhale 2 puffs into the lungs every 6 hours (Patient not taking: Reported on 5/29/2024) 12.9 g 3     lidocaine-hydrocortisone ace 2.8-0.55 % GEL Place 1 Application rectally as needed (hemorrhoids) (Patient not taking: Reported on 4/2/2024) 100 g 0     methocarbamol (ROBAXIN) 500 MG tablet Take 1 tablet (500 mg) by mouth 4 times daily as needed for muscle spasms (Patient not taking: Reported on 4/2/2024) 40 tablet 0     sulfamethoxazole-trimethoprim (BACTRIM DS) 800-160 MG tablet Take 1 tablet by mouth 2 times daily (Patient not taking: Reported on  4/2/2024) 14 tablet 0     Current Facility-Administered Medications   Medication Dose Route Frequency Provider Last Rate Last Admin     lidocaine 112 mL, EPINEPHrine (ADRENALIN) 1.12 mg in sodium chloride 0.9 % 1,113.12 mL (TUMESCENT)   Irrigation Once Jan Rausch MD         lidocaine 112 mL, EPINEPHrine (ADRENALIN) 1.12 mg in sodium chloride 0.9 % 1,113.12 mL (TUMESCENT)   Irrigation Once Jan Rausch MD            PHYSICAL EXAMINATION  VITALS: /72   Pulse 52   Resp 16   Wt 67.6 kg (149 lb)   LMP  (LMP Unknown)   BMI 27.25 kg/m    GENERAL: Healthy appearing, alert, no acute distress, normal habitus.  CARDIOVASCULAR: Extremities warm and well perfused. Pulses present.   NEUROLOGICAL:  Patient is awake and oriented to self, place and time.  Attention span is normal.  Memory is grossly intact.  Language is fluent and follows commands appropriately.  Appropriate fund of knowledge. Cranial nerves 2-12 are intact. There is no pronator drift.  Motor exam shows 5/5 strength in all extremities.  Tone is symmetric bilaterally in upper and lower extremities.  Reflexes are symmetric and 2+ in upper extremities and lower extremities.  Ankle jerks absent sensory exam is grossly intact to light touch, pin prick and vibration with decreased vibratory sense in the toes and decreased pinprick sensation to the mid ankle level.  Finger to nose and heel to shin is without dysmetria.  Romberg is negative.  Gait is slightly wide-based and the patient is able to do tandem walk and walk on toes and heels with mild difficulty.  Very mild postural tremor in both upper extremities.    DIAGNOSTICS  XR C spine  IMPRESSION: Vertebral body heights are maintained. Bones appear mildly  osteopenic. Multilevel facet disease. Degenerative changes at the  craniocervical junction. No significant anterolisthesis or  retrolisthesis. No prevertebral edema.     RELEVANT LABS  Component      Latest Ref Rng 4/29/2023  7:45 PM  9/10/2023  6:57 AM   WBC      4.0 - 11.0 10e3/uL 8.3  6.1    RBC Count      3.80 - 5.20 10e6/uL 4.28  4.14    Hemoglobin      11.7 - 15.7 g/dL 13.7  13.1    Hematocrit      35.0 - 47.0 % 40.6  38.3    MCV      78 - 100 fL 95  93    MCH      26.5 - 33.0 pg 32.0  31.6    MCHC      31.5 - 36.5 g/dL 33.7  34.2    RDW      10.0 - 15.0 % 11.9  12.1    Platelet Count      150 - 450 10e3/uL 262  252    % Neutrophils      % 61  54    % Lymphocytes      % 26  30    % Monocytes      % 9  10    % Eosinophils      % 3  5    % Basophils      % 1  1    % Immature Granulocytes      % 0  0    NRBCs per 100 WBC      <1 /100 0     Absolute Neutrophils      1.6 - 8.3 10e3/uL 5.0  3.3    Absolute Lymphocytes      0.8 - 5.3 10e3/uL 2.2  1.8    Absolute Monocytes      0.0 - 1.3 10e3/uL 0.8  0.6    Absolute Eosinophils      0.0 - 0.7 10e3/uL 0.3  0.3    Absolute Basophils      0.0 - 0.2 10e3/uL 0.1  0.1    Absolute Immature Granulocytes      <=0.4 10e3/uL 0.0  0.0    Absolute NRBCs      10e3/uL 0.0     Sodium      136 - 145 mmol/L 137  133 (L)    Potassium      3.4 - 5.3 mmol/L 3.5  3.5    Chloride      98 - 107 mmol/L 97 (L)  96 (L)    Carbon Dioxide (CO2)      22 - 29 mmol/L 31 (H)  28    Anion Gap      7 - 15 mmol/L 9  9    Urea Nitrogen      8.0 - 23.0 mg/dL 14.4  13.4    Creatinine      0.51 - 0.95 mg/dL 0.92  0.82    Calcium      8.8 - 10.2 mg/dL 9.9  9.4    Glucose      70 - 99 mg/dL 104 (H)  96    Alkaline Phosphatase      35 - 104 U/L 80     AST      10 - 35 U/L 24     ALT      10 - 35 U/L 17     Protein Total      6.4 - 8.3 g/dL 7.5     Albumin      3.5 - 5.2 g/dL 4.4     Bilirubin Total      <=1.2 mg/dL 0.5     GFR Estimate      >60 mL/min/1.73m2 61  70       Legend:  (L) Low  (H) High      OUTSIDE RECORDS  Outside referral notes and chart notes were reviewed and pertinent information has been summarized (in addition to the HPI):-      IMPRESSION/REPORT/PLAN  Neuropathy  Essential tremor    This is a 88 year old female seen  today for numbness in the feet.  Exam shows decreased pinprick and vibratory sense in the feet suggestive of peripheral neuropathy.  Blood work through primary care so far has been negative.  Will check blood work to look for other causes.  Will check a EMG to confirm diagnosis.  Discussed prognosis and concept of idiopathic neuropathy.  She does not have any significant neuropathic pain that warrants medication.  Recommend exercise and healthy lifestyle    She does have a mild tremor in both upper extremities suggestive of essential tremor.  Currently this is not very bothersome and we will hold off medication.  There is family history of tremors.    Can see her back in about 2 to 3 months after testing.    -     Vitamin B6; Future  -     Vitamin B12; Future  -     Vitamin B1 whole blood; Future  -     TSH with free T4 reflex; Future  -     Protein electrophoresis; Future  -     Hemoglobin A1c; Future  -     EMG; Future    Return in about 6 weeks (around 7/19/2024) for In-Clinic Visit (must), Add on PAOR, After testing.    Over 60 minutes were spent coordinating the care for the patient on the day of the encounter.  This includes previsit, during visit and post visit activities as documented above.  Counseling patient.  Reviewing chart.  Multiple problems reviewed.  (Activities include but not inclusive of reviewing chart, reviewing outside records, reviewing labs and imaging study results as well as the images, patient visit time including getting history and exam,  use if applicable, review of test results with the patient and coming up with a plan in a shared model, counseling patient and family, education and answering patient questions, EMR , EMR diagnosis entry and problem list management, medication reconciliation and prescription management if applicable, paperwork if applicable, printing documents and documentation of the visit activities.)        Jack Toure,  MD  Neurologist  [facility]  Tel:- [phone]    This note was dictated using voice recognition software.  Any grammatical or context distortions are unintentional and inherent to the software.      Again, thank you for allowing me to participate in the care of your patient.        Sincerely,        Jack Toure MD

## 2024-06-14 ENCOUNTER — OFFICE VISIT (OUTPATIENT)
Dept: OBGYN | Facility: CLINIC | Age: 89
End: 2024-06-14
Payer: COMMERCIAL

## 2024-06-14 VITALS
DIASTOLIC BLOOD PRESSURE: 67 MMHG | HEART RATE: 53 BPM | WEIGHT: 145 LBS | OXYGEN SATURATION: 97 % | HEIGHT: 62 IN | BODY MASS INDEX: 26.68 KG/M2 | TEMPERATURE: 97.6 F | SYSTOLIC BLOOD PRESSURE: 123 MMHG | RESPIRATION RATE: 16 BRPM

## 2024-06-14 DIAGNOSIS — B37.2 CANDIDAL INTERTRIGO: ICD-10-CM

## 2024-06-14 DIAGNOSIS — N81.3 UTEROVAGINAL PROLAPSE, COMPLETE: Primary | ICD-10-CM

## 2024-06-14 DIAGNOSIS — N86 CERVICAL EROSION: ICD-10-CM

## 2024-06-14 PROCEDURE — 99213 OFFICE O/P EST LOW 20 MIN: CPT | Performed by: OBSTETRICS & GYNECOLOGY

## 2024-06-14 RX ORDER — FLUCONAZOLE 150 MG/1
150 TABLET ORAL
Qty: 3 TABLET | Refills: 0 | Status: SHIPPED | OUTPATIENT
Start: 2024-06-14 | End: 2024-06-21

## 2024-06-14 RX ORDER — TERCONAZOLE 0.4 %
CREAM WITH APPLICATOR VAGINAL
Qty: 45 G | Refills: 3 | Status: SHIPPED | OUTPATIENT
Start: 2024-06-14

## 2024-06-14 NOTE — NURSING NOTE
"Initial /67 (BP Location: Right arm, Patient Position: Chair, Cuff Size: Adult Regular)   Pulse 53   Temp 97.6  F (36.4  C) (Tympanic)   Resp 16   Ht 1.575 m (5' 2\")   Wt 65.8 kg (145 lb)   LMP  (LMP Unknown)   BMI 26.52 kg/m   Estimated body mass index is 26.52 kg/m  as calculated from the following:    Height as of this encounter: 1.575 m (5' 2\").    Weight as of this encounter: 65.8 kg (145 lb). .    Gabriela Floyd, MIKEY    "

## 2024-06-14 NOTE — PROGRESS NOTES
Chief Complaint   Patient presents with    Follow Up         HPI:  Caitlyn Wasserman, 88 year old,  presents for follow up of vaginal itching and prolapse.  Her itching got better with treatment, but then returned shortly after.  She feels she has to sit for a long period of time to empty her bladder.  She has not tried splinting.  The spot on her cervix has healed and she stopped bleeding.    Current Outpatient Medications   Medication Sig Dispense Refill    acetaminophen (TYLENOL) 325 MG tablet Take 3 tablets (975 mg) by mouth every 8 hours      atenolol (TENORMIN) 25 MG tablet Take 1 tablet (25 mg) by mouth daily 90 tablet 3    chlorthalidone (HYGROTON) 25 MG tablet Take 2 tablets (50 mg) by mouth daily 180 tablet 2    fluconazole (DIFLUCAN) 150 MG tablet Take 1 tablet (150 mg) by mouth every 3 days for 3 doses 3 tablet 0    multivitamin w/minerals (THERA-VIT-M) tablet Take 1 tablet by mouth daily      potassium chloride ER (K-TAB) 20 MEQ CR tablet Take 1 tablet (20 mEq) by mouth daily 90 tablet 3    terconazole (TERAZOL 7) 0.4 % vaginal cream Use one applicator full before bed for 7-14 days.      Apply to skin 3-4 times a day for 1 week, then twice daily for 3 weeks. 45 g 3    acetaminophen-caffeine (EXCEDRIN TENSION HEADACHE) 500-65 MG TABS Take 2 tablets by mouth every 6 hours as needed for mild pain (Patient not taking: Reported on 2024)      albuterol (PROAIR HFA/PROVENTIL HFA/VENTOLIN HFA) 108 (90 Base) MCG/ACT inhaler Inhale 2 puffs into the lungs every 6 hours (Patient not taking: Reported on 2024) 18 g 1    estradiol (ESTRACE) 0.1 MG/GM vaginal cream Place 2 g vaginally twice a week (Patient not taking: Reported on 2024) 85 g 3    ipratropium (ATROVENT HFA) 17 MCG/ACT inhaler Inhale 2 puffs into the lungs every 6 hours (Patient not taking: Reported on 2024) 12.9 g 3    ipratropium (ATROVENT) 0.03 % nasal spray USE 2 SPRAYS NASALLY DAILY AS NEEDED FOR RHINITIS (Patient not  "taking: Reported on 6/14/2024) 60 mL 0    methocarbamol (ROBAXIN) 500 MG tablet Take 1 tablet (500 mg) by mouth 4 times daily as needed for muscle spasms (Patient not taking: Reported on 4/2/2024) 40 tablet 0    psyllium 28.3 % POWD Take 1 teaspoonful by mouth daily 2 times daily (Patient not taking: Reported on 6/14/2024)      sulfamethoxazole-trimethoprim (BACTRIM DS) 800-160 MG tablet Take 1 tablet by mouth 2 times daily (Patient not taking: Reported on 4/2/2024) 14 tablet 0     Current Facility-Administered Medications   Medication Dose Route Frequency Provider Last Rate Last Admin    lidocaine 112 mL, EPINEPHrine (ADRENALIN) 1.12 mg in sodium chloride 0.9 % 1,113.12 mL (TUMESCENT)   Irrigation Once Jan Rausch MD        lidocaine 112 mL, EPINEPHrine (ADRENALIN) 1.12 mg in sodium chloride 0.9 % 1,113.12 mL (TUMESCENT)   Irrigation Once Jan Rausch MD                Allergies   Allergen Reactions    Cortisone Other (See Comments)     Couldn't walk well even after 2 weeks    Diclofenac Sodium Other (See Comments)     Took one tab in 2008, no reaction in note    Hydrocodone Nausea and Vomiting    Lidocaine Hallucination     Patches     Morphine And Codeine Nausea and Vomiting     Vomited 1.5 days    Oxycodone Nausea and Vomiting    Tramadol Nausea and Vomiting        /67 (BP Location: Right arm, Patient Position: Chair, Cuff Size: Adult Regular)   Pulse 53   Temp 97.6  F (36.4  C) (Tympanic)   Resp 16   Ht 1.575 m (5' 2\")   Wt 65.8 kg (145 lb)   LMP  (LMP Unknown)   SpO2 97%   BMI 26.52 kg/m      A&O, NAD  Genitourinary Exam  Vulva: erythema with satellite lesions in the creases of the thighs, labia and around anus.  Caking of discharge noted.  Urethral meatus: normal size and location, no lesions or discharge  Urethra: no tenderness or masses  Bladder: no fullness or tenderness  Vagina: nl appearance and rugae, no cystocele and rectocele  Cervix: normal appearance, no lesions, no " discharge, no cervical motion tenderness        Assessment:  Encounter Diagnoses   Name Primary?    Uterovaginal prolapse, complete Yes    Cervical erosion     Candidal intertrigo            Plan:  1. The cervical erosion has healed.    2.  We had discussed a trial of pessary or if that doesn't work, proceeding to modified [place].  Will clear candida before doing that.  She will try splinting for now.    3.  Will treat candida with diflucan and topical antifungals.  Discussed very dilute bleach washes with 1/4 tsp of bleach in a gallon of water to rinse once daily.    =      Jemima Porras MD ....................  6/14/2024

## 2024-07-03 ENCOUNTER — OFFICE VISIT (OUTPATIENT)
Dept: OBGYN | Facility: CLINIC | Age: 89
End: 2024-07-03
Payer: COMMERCIAL

## 2024-07-03 VITALS
DIASTOLIC BLOOD PRESSURE: 69 MMHG | HEIGHT: 62 IN | HEART RATE: 66 BPM | SYSTOLIC BLOOD PRESSURE: 111 MMHG | WEIGHT: 146.3 LBS | BODY MASS INDEX: 26.92 KG/M2 | RESPIRATION RATE: 16 BRPM | TEMPERATURE: 98.9 F

## 2024-07-03 DIAGNOSIS — N81.3 UTEROVAGINAL PROLAPSE, COMPLETE: Primary | ICD-10-CM

## 2024-07-03 DIAGNOSIS — B37.31 CANDIDIASIS OF VULVA AND VAGINA: ICD-10-CM

## 2024-07-03 PROCEDURE — 99213 OFFICE O/P EST LOW 20 MIN: CPT | Mod: 25 | Performed by: OBSTETRICS & GYNECOLOGY

## 2024-07-03 PROCEDURE — A4561 PESSARY RUBBER, ANY TYPE: HCPCS | Performed by: OBSTETRICS & GYNECOLOGY

## 2024-07-03 PROCEDURE — 57160 INSERT PESSARY/OTHER DEVICE: CPT | Performed by: OBSTETRICS & GYNECOLOGY

## 2024-07-03 PROCEDURE — 99459 PELVIC EXAMINATION: CPT | Performed by: OBSTETRICS & GYNECOLOGY

## 2024-07-03 RX ORDER — CLOTRIMAZOLE AND BETAMETHASONE DIPROPIONATE 10; .64 MG/G; MG/G
CREAM TOPICAL 2 TIMES DAILY
Qty: 45 G | Refills: 2 | Status: SHIPPED | OUTPATIENT
Start: 2024-07-03 | End: 2024-09-04

## 2024-07-03 NOTE — PROGRESS NOTES
Chief Complaint   Patient presents with    Follow Up     CERVICAL EROSION         HPI:  Caitlyn Wasserman, 88 year old,  presents for follow up of yeast infection and prolapse.  Has been using terazole for 7 days internally and externally.  Using vaginal estrogen to the cervix.  Still has itching, sores and complains of difficulty urinating.    Current Outpatient Medications   Medication Sig Dispense Refill    acetaminophen (TYLENOL) 325 MG tablet Take 3 tablets (975 mg) by mouth every 8 hours      atenolol (TENORMIN) 25 MG tablet Take 1 tablet (25 mg) by mouth daily 90 tablet 3    chlorthalidone (HYGROTON) 25 MG tablet Take 2 tablets (50 mg) by mouth daily 180 tablet 2    clotrimazole-betamethasone (LOTRISONE) 1-0.05 % external cream Apply topically 2 times daily 45 g 2    estradiol (ESTRACE) 0.1 MG/GM vaginal cream Place 2 g vaginally twice a week (Patient taking differently: Place vaginally twice a week) 85 g 3    multivitamin w/minerals (THERA-VIT-M) tablet Take 1 tablet by mouth daily      potassium chloride ER (K-TAB) 20 MEQ CR tablet Take 1 tablet (20 mEq) by mouth daily 90 tablet 3    acetaminophen-caffeine (EXCEDRIN TENSION HEADACHE) 500-65 MG TABS Take 2 tablets by mouth every 6 hours as needed for mild pain (Patient not taking: Reported on 2024)      albuterol (PROAIR HFA/PROVENTIL HFA/VENTOLIN HFA) 108 (90 Base) MCG/ACT inhaler Inhale 2 puffs into the lungs every 6 hours (Patient not taking: Reported on 2024) 18 g 1    ipratropium (ATROVENT HFA) 17 MCG/ACT inhaler Inhale 2 puffs into the lungs every 6 hours (Patient not taking: Reported on 2024) 12.9 g 3    ipratropium (ATROVENT) 0.03 % nasal spray USE 2 SPRAYS NASALLY DAILY AS NEEDED FOR RHINITIS (Patient not taking: Reported on 2024) 60 mL 0    methocarbamol (ROBAXIN) 500 MG tablet Take 1 tablet (500 mg) by mouth 4 times daily as needed for muscle spasms (Patient not taking: Reported on 2024) 40 tablet 0    psyllium 28.3  "% POWD Take 1 teaspoonful by mouth daily 2 times daily (Patient not taking: Reported on 6/14/2024)      sulfamethoxazole-trimethoprim (BACTRIM DS) 800-160 MG tablet Take 1 tablet by mouth 2 times daily (Patient not taking: Reported on 4/2/2024) 14 tablet 0    terconazole (TERAZOL 7) 0.4 % vaginal cream Use one applicator full before bed for 7-14 days.      Apply to skin 3-4 times a day for 1 week, then twice daily for 3 weeks. (Patient not taking: Reported on 7/3/2024) 45 g 3     Current Facility-Administered Medications   Medication Dose Route Frequency Provider Last Rate Last Admin    lidocaine 112 mL, EPINEPHrine (ADRENALIN) 1.12 mg in sodium chloride 0.9 % 1,113.12 mL (TUMESCENT)   Irrigation Once Jan Rausch MD        lidocaine 112 mL, EPINEPHrine (ADRENALIN) 1.12 mg in sodium chloride 0.9 % 1,113.12 mL (TUMESCENT)   Irrigation Once Jan Rausch MD                Allergies   Allergen Reactions    Cortisone Other (See Comments)     Couldn't walk well even after 2 weeks    Diclofenac Sodium Other (See Comments)     Took one tab in 2008, no reaction in note    Hydrocodone Nausea and Vomiting    Lidocaine Hallucination     Patches     Morphine And Codeine Nausea and Vomiting     Vomited 1.5 days    Oxycodone Nausea and Vomiting    Tramadol Nausea and Vomiting        /69 (BP Location: Left arm, Patient Position: Sitting, Cuff Size: Adult Regular)   Pulse 66   Temp 98.9  F (37.2  C)   Resp 16   Ht 1.575 m (5' 2\")   Wt 66.4 kg (146 lb 4.8 oz)   LMP  (LMP Unknown)   BMI 26.76 kg/m      A&O, NAD  Genitourinary Exam  Vulva: erythema in creases of groin and labia majora  Urethral meatus: normal size and location, no lesions or discharge  Urethra: no tenderness or masses  Bladder: no fullness or tenderness  Vagina: nl appearance and rugae, grade 3cystocele and rectocele  Cervix: cervix prolapses 1 cm past the hymen on exam, cervical erosion healed.      Procedure: Pessary Fitting  Indication:  "   Encounter Diagnoses   Name Primary?    Uterovaginal prolapse, complete Yes    Candidiasis of vulva and vagina            The risks and benefits were discussed.  The patient was placed in the dorsal-lithotomy position.  Exam revealed the above findings.  A size 2 1/2 in gelhorn pessary was initially tried.  She was allowed to ambulate and void. She did tolerate the initial pessary. She did well with the initial pessary and was discharged with one.  She is to return in 2 weeks for recheck.  She was advised to return sooner if she is having any discomfort, bleeding or concerns.     Assessment:  Encounter Diagnoses   Name Primary?    Uterovaginal prolapse, complete Yes    Candidiasis of vulva and vagina            Plan:  1. Fitted for a 2 1/2 in [place] today.  2.  Will treat with topical Lotrisone.  3.  Recheck in 1 month.            Jemima Porras MD ....................  7/3/2024

## 2024-07-17 ENCOUNTER — OFFICE VISIT (OUTPATIENT)
Dept: OBGYN | Facility: CLINIC | Age: 89
End: 2024-07-17
Payer: COMMERCIAL

## 2024-07-17 VITALS
WEIGHT: 148 LBS | HEIGHT: 62 IN | DIASTOLIC BLOOD PRESSURE: 84 MMHG | SYSTOLIC BLOOD PRESSURE: 129 MMHG | TEMPERATURE: 96.9 F | BODY MASS INDEX: 27.23 KG/M2 | HEART RATE: 61 BPM | RESPIRATION RATE: 16 BRPM

## 2024-07-17 DIAGNOSIS — J30.89 CHRONIC NON-SEASONAL ALLERGIC RHINITIS: ICD-10-CM

## 2024-07-17 DIAGNOSIS — N81.3 UTEROVAGINAL PROLAPSE, COMPLETE: Primary | ICD-10-CM

## 2024-07-17 DIAGNOSIS — K59.00 CONSTIPATION, UNSPECIFIED CONSTIPATION TYPE: ICD-10-CM

## 2024-07-17 PROCEDURE — 99213 OFFICE O/P EST LOW 20 MIN: CPT | Mod: 25 | Performed by: OBSTETRICS & GYNECOLOGY

## 2024-07-17 PROCEDURE — 57160 INSERT PESSARY/OTHER DEVICE: CPT | Performed by: OBSTETRICS & GYNECOLOGY

## 2024-07-17 PROCEDURE — 99459 PELVIC EXAMINATION: CPT | Performed by: OBSTETRICS & GYNECOLOGY

## 2024-07-17 PROCEDURE — A4561 PESSARY RUBBER, ANY TYPE: HCPCS | Performed by: OBSTETRICS & GYNECOLOGY

## 2024-07-17 RX ORDER — IPRATROPIUM BROMIDE 21 UG/1
2 SPRAY, METERED NASAL DAILY
Qty: 60 ML | Refills: 0 | Status: SHIPPED | OUTPATIENT
Start: 2024-07-17

## 2024-07-17 RX ORDER — DOCUSATE SODIUM 100 MG/1
100 CAPSULE, LIQUID FILLED ORAL DAILY
Qty: 60 CAPSULE | Refills: 4 | Status: SHIPPED | OUTPATIENT
Start: 2024-07-17

## 2024-07-17 NOTE — TELEPHONE ENCOUNTER
Prescription approved per [facility] Refill Protocol.    Pending Prescriptions:                       Disp   Refills    ipratropium (ATROVENT) 0.03 % nasal spray 60 mL  0            Requested Prescriptions   Pending Prescriptions Disp Refills    ipratropium (ATROVENT) 0.03 % nasal spray 60 mL 0       Nasal Allergy Protocol Passed - 7/17/2024 11:26 AM        Passed - Patient is age 12 or older        Passed - Medication is active on med list        Passed - Recent (12 mo) or future (90 days) visit within the authorizing provider's specialty     The patient must have completed an in-person or virtual visit within the past 12 months or has a future visit scheduled within the next 90 days with the authorizing provider s specialty.  Urgent care and e-visits do not quality as an office visit for this protocol.          Passed - Medication indicated for associated diagnosis     Medication is associated with one or more of the following diagnoses:   Benign prostatic hyperplasia  Erectile dysfunction  Female sexual arousal disorder  Pulmonary hypertension  Raynaud s  Sexual Dysfunction               Margi Mayers RN on 7/17/2024 at 1:51 PM

## 2024-07-17 NOTE — NURSING NOTE
"Initial /84 (BP Location: Left arm, Patient Position: Chair, Cuff Size: Adult Regular)   Pulse 61   Temp 96.9  F (36.1  C) (Tympanic)   Resp 16   Ht 1.575 m (5' 2\")   Wt 67.1 kg (148 lb)   LMP  (LMP Unknown)   BMI 27.07 kg/m   Estimated body mass index is 27.07 kg/m  as calculated from the following:    Height as of this encounter: 1.575 m (5' 2\").    Weight as of this encounter: 67.1 kg (148 lb). .    Gabriela Floyd, MIKEY    "

## 2024-07-31 ENCOUNTER — OFFICE VISIT (OUTPATIENT)
Dept: OBGYN | Facility: CLINIC | Age: 89
End: 2024-07-31
Payer: COMMERCIAL

## 2024-07-31 VITALS
HEIGHT: 62 IN | TEMPERATURE: 98 F | BODY MASS INDEX: 27.1 KG/M2 | SYSTOLIC BLOOD PRESSURE: 148 MMHG | DIASTOLIC BLOOD PRESSURE: 65 MMHG | WEIGHT: 147.3 LBS | RESPIRATION RATE: 16 BRPM | HEART RATE: 55 BPM

## 2024-07-31 DIAGNOSIS — N39.0 URINARY TRACT INFECTION WITH HEMATURIA, SITE UNSPECIFIED: Primary | ICD-10-CM

## 2024-07-31 DIAGNOSIS — N81.3 UTEROVAGINAL PROLAPSE, COMPLETE: Primary | ICD-10-CM

## 2024-07-31 DIAGNOSIS — R31.9 URINARY TRACT INFECTION WITH HEMATURIA, SITE UNSPECIFIED: Primary | ICD-10-CM

## 2024-07-31 DIAGNOSIS — R35.0 URINARY FREQUENCY: ICD-10-CM

## 2024-07-31 LAB
ALBUMIN UR-MCNC: NEGATIVE MG/DL
AMORPH CRY #/AREA URNS HPF: ABNORMAL /HPF
APPEARANCE UR: ABNORMAL
BACTERIA #/AREA URNS HPF: ABNORMAL /HPF
BILIRUB UR QL STRIP: NEGATIVE
COLOR UR AUTO: YELLOW
GLUCOSE UR STRIP-MCNC: NEGATIVE MG/DL
HGB UR QL STRIP: ABNORMAL
KETONES UR STRIP-MCNC: NEGATIVE MG/DL
LEUKOCYTE ESTERASE UR QL STRIP: ABNORMAL
NITRATE UR QL: POSITIVE
PH UR STRIP: 7 [PH] (ref 5–7)
RBC #/AREA URNS AUTO: ABNORMAL /HPF
SP GR UR STRIP: 1.01 (ref 1–1.03)
SQUAMOUS #/AREA URNS AUTO: ABNORMAL /LPF
UROBILINOGEN UR STRIP-ACNC: 0.2 E.U./DL
WBC #/AREA URNS AUTO: >100 /HPF
WBC CLUMPS #/AREA URNS HPF: PRESENT /HPF

## 2024-07-31 PROCEDURE — 81001 URINALYSIS AUTO W/SCOPE: CPT | Performed by: OBSTETRICS & GYNECOLOGY

## 2024-07-31 PROCEDURE — G2211 COMPLEX E/M VISIT ADD ON: HCPCS | Performed by: OBSTETRICS & GYNECOLOGY

## 2024-07-31 PROCEDURE — 87186 SC STD MICRODIL/AGAR DIL: CPT | Performed by: OBSTETRICS & GYNECOLOGY

## 2024-07-31 PROCEDURE — 99213 OFFICE O/P EST LOW 20 MIN: CPT | Performed by: OBSTETRICS & GYNECOLOGY

## 2024-07-31 PROCEDURE — 87086 URINE CULTURE/COLONY COUNT: CPT | Performed by: OBSTETRICS & GYNECOLOGY

## 2024-07-31 RX ORDER — NITROFURANTOIN 25; 75 MG/1; MG/1
100 CAPSULE ORAL 2 TIMES DAILY
Qty: 10 CAPSULE | Refills: 0 | Status: SHIPPED | OUTPATIENT
Start: 2024-07-31 | End: 2024-08-05

## 2024-07-31 NOTE — NURSING NOTE
"Initial BP (!) 148/65 (BP Location: Right arm, Patient Position: Chair, Cuff Size: Adult Regular)   Pulse 55   Temp 98  F (36.7  C) (Tympanic)   Resp 16   Ht 1.575 m (5' 2\")   Wt 66.8 kg (147 lb 4.8 oz)   LMP  (LMP Unknown)   BMI 26.94 kg/m   Estimated body mass index is 26.94 kg/m  as calculated from the following:    Height as of this encounter: 1.575 m (5' 2\").    Weight as of this encounter: 66.8 kg (147 lb 4.8 oz). .    Gabriela Floyd, MIKEY    "

## 2024-07-31 NOTE — PROGRESS NOTES
"Pessary follow-up visit.    Caitlyn Wasserman is not complaining of any problems with the pessary. She is here for it to be removed, cleaned and reinserted.  Has an occasional twinge when she starts to urinate.  Would like a UA before she goes home so she doesn't have to worry about coming back for a bladder infection.    BP (!) 148/65 (BP Location: Right arm, Patient Position: Chair, Cuff Size: Adult Regular)   Pulse 55   Temp 98  F (36.7  C) (Tympanic)   Resp 16   Ht 1.575 m (5' 2\")   Wt 66.8 kg (147 lb 4.8 oz)   LMP  (LMP Unknown)   BMI 26.94 kg/m       EXAM:    General Appearance: Pleasant, alert, appropriate appearance for age. No acute distress  Head Exam: Normocephalic, without obvious abnormality.  Genitourinary Exam Female:   External genitalia: atrophic vulva  Urinary system: urethral meatus normal  Vagina: mucosa atrophic and pale, no excoriations or ulcerations noted.    The size 2 3/4 in [place]  pessary was removed, cleaned and reinserted without issue.    IMP: Pelvic prolapse - doing well with pessary.  Urinary frequency    Plan: cath ua sent today due to inability to collect clean catch. (Patient feels she is too unsteady to do that).     Follow up in 6 months.    Jemima Porras MD on 7/31/2024 at 3:51 PM      "

## 2024-08-02 ENCOUNTER — TELEPHONE (OUTPATIENT)
Dept: OBGYN | Facility: CLINIC | Age: 89
End: 2024-08-02
Payer: COMMERCIAL

## 2024-08-02 NOTE — TELEPHONE ENCOUNTER
Urine culture final result is not back yet. Continue the antibiotic I sent her for now. I will follow up on the final urine culture result.    Gisela Higgins MD  Obstetrics and Gynecology  [facility]  08/02/2024 12:54 PM       Patient notified. Patient verbalized understanding. Nuzhat Martines RN

## 2024-08-02 NOTE — TELEPHONE ENCOUNTER
Can a Provider review 7-31-24 UA/UC and advise?    Looks like UC shows gram neg bacilli, but no sensitivity report was done, so not sure if this is considered a negative result then? Or?    Uses [facility] pharmacy and no allergies to antibiotics.     Nuzhat Martines RN

## 2024-08-02 NOTE — TELEPHONE ENCOUNTER
"\"I am having trouble with my My chart account. I get the emails that come automatically, but I don't get notifications from the Doctors I see about my test results, and I am not sure why.. I had some urination issues and had tests done the other day, but I have not gotten any results?..\"    I did advise patient she will need to call the My chart help desk to help her with the My chart portion as it looks like UA was released to My chart, UC was just finalized this am at 7:28 am and I don't see a Provider has reviewed this result yet, so will ask Providers to do this.    Nuzhat Martines RN              "

## 2024-08-04 LAB
BACTERIA UR CULT: ABNORMAL
BACTERIA UR CULT: ABNORMAL

## 2024-08-12 ENCOUNTER — PATIENT OUTREACH (OUTPATIENT)
Dept: CARE COORDINATION | Facility: CLINIC | Age: 89
End: 2024-08-12
Payer: COMMERCIAL

## 2024-08-26 ENCOUNTER — PATIENT OUTREACH (OUTPATIENT)
Dept: CARE COORDINATION | Facility: CLINIC | Age: 89
End: 2024-08-26

## 2024-09-04 ENCOUNTER — OFFICE VISIT (OUTPATIENT)
Dept: OBGYN | Facility: CLINIC | Age: 89
End: 2024-09-04
Payer: COMMERCIAL

## 2024-09-04 VITALS
WEIGHT: 146.8 LBS | TEMPERATURE: 96.1 F | RESPIRATION RATE: 16 BRPM | HEIGHT: 62 IN | DIASTOLIC BLOOD PRESSURE: 71 MMHG | HEART RATE: 57 BPM | BODY MASS INDEX: 27.02 KG/M2 | SYSTOLIC BLOOD PRESSURE: 159 MMHG

## 2024-09-04 DIAGNOSIS — N30.00 ACUTE CYSTITIS WITHOUT HEMATURIA: ICD-10-CM

## 2024-09-04 DIAGNOSIS — R35.0 URINARY FREQUENCY: Primary | ICD-10-CM

## 2024-09-04 DIAGNOSIS — B37.31 CANDIDIASIS OF VULVA AND VAGINA: ICD-10-CM

## 2024-09-04 LAB
ALBUMIN UR-MCNC: NEGATIVE MG/DL
APPEARANCE UR: ABNORMAL
BACTERIA #/AREA URNS HPF: ABNORMAL /HPF
BILIRUB UR QL STRIP: NEGATIVE
COLOR UR AUTO: YELLOW
GLUCOSE UR STRIP-MCNC: NEGATIVE MG/DL
HGB UR QL STRIP: ABNORMAL
KETONES UR STRIP-MCNC: NEGATIVE MG/DL
LEUKOCYTE ESTERASE UR QL STRIP: ABNORMAL
MUCOUS THREADS #/AREA URNS LPF: PRESENT /LPF
NITRATE UR QL: POSITIVE
PH UR STRIP: 7 [PH] (ref 5–7)
RBC #/AREA URNS AUTO: ABNORMAL /HPF
RENAL EPI CELLS #/AREA URNS HPF: ABNORMAL /HPF
SP GR UR STRIP: <=1.005 (ref 1–1.03)
SQUAMOUS #/AREA URNS AUTO: ABNORMAL /LPF
UROBILINOGEN UR STRIP-ACNC: 0.2 E.U./DL
WBC #/AREA URNS AUTO: ABNORMAL /HPF
WBC CLUMPS #/AREA URNS HPF: PRESENT /HPF

## 2024-09-04 PROCEDURE — 87186 SC STD MICRODIL/AGAR DIL: CPT | Performed by: OBSTETRICS & GYNECOLOGY

## 2024-09-04 PROCEDURE — 81001 URINALYSIS AUTO W/SCOPE: CPT | Performed by: OBSTETRICS & GYNECOLOGY

## 2024-09-04 PROCEDURE — 99214 OFFICE O/P EST MOD 30 MIN: CPT | Performed by: OBSTETRICS & GYNECOLOGY

## 2024-09-04 PROCEDURE — 87086 URINE CULTURE/COLONY COUNT: CPT | Performed by: OBSTETRICS & GYNECOLOGY

## 2024-09-04 RX ORDER — SULFAMETHOXAZOLE/TRIMETHOPRIM 800-160 MG
1 TABLET ORAL 2 TIMES DAILY
Qty: 20 TABLET | Refills: 0 | Status: SHIPPED | OUTPATIENT
Start: 2024-09-04 | End: 2024-09-14

## 2024-09-04 RX ORDER — CLOTRIMAZOLE AND BETAMETHASONE DIPROPIONATE 10; .64 MG/G; MG/G
CREAM TOPICAL 2 TIMES DAILY
Qty: 45 G | Refills: 2 | Status: SHIPPED | OUTPATIENT
Start: 2024-09-04

## 2024-09-04 NOTE — PROGRESS NOTES
Chief Complaint   Patient presents with    UTI     Pesssary check         HPI:  Caitlyn Wasserman, 88 year old,  presents for vaginal itching.  She is not due for a pessary check today.  Last visit, she had a UTI that was treated for 5 days with Macrobid.  She does not report bladder symptoms to me.  She reports vaginal itching that started again.  She had a little lotrimin left at home and used it yesterday and that helped.  She reported to nursing that she had a harder time urinating, cramping and urinary frequency.    Current Outpatient Medications   Medication Sig Dispense Refill    acetaminophen-caffeine (EXCEDRIN TENSION HEADACHE) 500-65 MG TABS Take 2 tablets by mouth every 6 hours as needed for mild pain      albuterol (PROAIR HFA/PROVENTIL HFA/VENTOLIN HFA) 108 (90 Base) MCG/ACT inhaler Inhale 2 puffs into the lungs every 6 hours 18 g 1    atenolol (TENORMIN) 25 MG tablet Take 1 tablet (25 mg) by mouth daily 90 tablet 3    chlorthalidone (HYGROTON) 25 MG tablet Take 2 tablets (50 mg) by mouth daily 180 tablet 2    clotrimazole-betamethasone (LOTRISONE) 1-0.05 % external cream Apply topically 2 times daily. 45 g 2    docusate sodium (COLACE) 100 MG capsule Take 1 capsule (100 mg) by mouth daily 60 capsule 4    estradiol (ESTRACE) 0.1 MG/GM vaginal cream Place 2 g vaginally twice a week (Patient taking differently: Place vaginally twice a week.) 85 g 3    ipratropium (ATROVENT HFA) 17 MCG/ACT inhaler Inhale 2 puffs into the lungs every 6 hours 12.9 g 3    ipratropium (ATROVENT) 0.03 % nasal spray Spray 2 sprays into both nostrils daily 60 mL 0    multivitamin w/minerals (THERA-VIT-M) tablet Take 1 tablet by mouth daily      potassium chloride ER (K-TAB) 20 MEQ CR tablet Take 1 tablet (20 mEq) by mouth daily 90 tablet 3    sulfamethoxazole-trimethoprim (BACTRIM DS) 800-160 MG tablet Take 1 tablet by mouth 2 times daily for 10 days. 20 tablet 0    acetaminophen (TYLENOL) 325 MG tablet Take 3 tablets (975  "mg) by mouth every 8 hours (Patient not taking: Reported on 7/31/2024)      methocarbamol (ROBAXIN) 500 MG tablet Take 1 tablet (500 mg) by mouth 4 times daily as needed for muscle spasms (Patient not taking: Reported on 4/2/2024) 40 tablet 0    psyllium 28.3 % POWD Take 1 teaspoonful by mouth daily 2 times daily (Patient not taking: Reported on 6/14/2024)      sulfamethoxazole-trimethoprim (BACTRIM DS) 800-160 MG tablet Take 1 tablet by mouth 2 times daily (Patient not taking: Reported on 4/2/2024) 14 tablet 0    terconazole (TERAZOL 7) 0.4 % vaginal cream Use one applicator full before bed for 7-14 days.      Apply to skin 3-4 times a day for 1 week, then twice daily for 3 weeks. (Patient not taking: Reported on 7/3/2024) 45 g 3     Current Facility-Administered Medications   Medication Dose Route Frequency Provider Last Rate Last Admin    lidocaine 112 mL, EPINEPHrine (ADRENALIN) 1.12 mg in sodium chloride 0.9 % 1,113.12 mL (TUMESCENT)   Irrigation Once Jan Rausch MD        lidocaine 112 mL, EPINEPHrine (ADRENALIN) 1.12 mg in sodium chloride 0.9 % 1,113.12 mL (TUMESCENT)   Irrigation Once Jan Rausch MD                Allergies   Allergen Reactions    Cortisone Other (See Comments)     Couldn't walk well even after 2 weeks    Diclofenac Sodium Other (See Comments)     Took one tab in 2008, no reaction in note    Hydrocodone Nausea and Vomiting    Lidocaine Hallucination     Patches     Morphine And Codeine Nausea and Vomiting     Vomited 1.5 days    Oxycodone Nausea and Vomiting    Tramadol Nausea and Vomiting        BP (!) 159/71 (BP Location: Right arm, Patient Position: Sitting, Cuff Size: Adult Regular)   Pulse 57   Temp (!) 96.1  F (35.6  C)   Resp 16   Ht 1.575 m (5' 2\")   Wt 66.6 kg (146 lb 12.8 oz)   LMP  (LMP Unknown)   BMI 26.85 kg/m          Genitourinary Exam  Vulva: erythema with satellite lesions in the creases of the thighs and inner buttocks.  Urethral meatus: normal size and " location, no lesions or discharge         Assessment:  Encounter Diagnoses   Name Primary?    Urinary frequency Yes    Candidiasis of vulva and vagina     Acute cystitis without hematuria            Plan:  1. Refills of lotrisone sent to her pharmacy to apply topical to candida intertrigo.  2.  Will treat UTI with bactrim for 10 days and recheck urine in two weeks as this is her second infection in a couple of months.  Culture pending.  3.  She is not due for a pessary check and I did not recommend manipulating the bladder with active infection.      Jemima Porras MD ....................  9/4/2024    3:54 PM

## 2024-09-05 DIAGNOSIS — L30.9 DERMATITIS: Primary | ICD-10-CM

## 2024-09-05 NOTE — TELEPHONE ENCOUNTER
Pending Prescriptions:                       Disp   Refills    hydrocortisone 2.5 % ointment [Pharmacy M*20 g   0            Sig: APPLY RECTALLY AS NEEDED FOR HEMORRHOIDS.    Routing refill request to provider for review/approval because:  Drug not on the [facility] [place] protocol       Adam Avila RN

## 2024-09-06 LAB
BACTERIA UR CULT: ABNORMAL
BACTERIA UR CULT: ABNORMAL

## 2024-09-06 RX ORDER — HYDROCORTISONE 25 MG/G
OINTMENT TOPICAL
Qty: 20 G | Refills: 0 | Status: SHIPPED | OUTPATIENT
Start: 2024-09-06

## 2024-09-09 ENCOUNTER — TELEPHONE (OUTPATIENT)
Dept: OBGYN | Facility: CLINIC | Age: 89
End: 2024-09-09
Payer: COMMERCIAL

## 2024-09-09 DIAGNOSIS — N30.00 ACUTE CYSTITIS WITHOUT HEMATURIA: Primary | ICD-10-CM

## 2024-09-09 RX ORDER — AMOXICILLIN 500 MG/1
500 CAPSULE ORAL 2 TIMES DAILY
Qty: 14 CAPSULE | Refills: 0 | Status: SHIPPED | OUTPATIENT
Start: 2024-09-09 | End: 2024-09-16

## 2024-09-09 NOTE — TELEPHONE ENCOUNTER
S-(situation): patient requesting new medication for UTI     B-(background): last office visit 9/4/2024 for vaginal itching.     Urine culture showed E Coli    A-(assessment): patient unable to tolerate the Bactrim and would liek a new prescription sent to [facility] pharmacy    R-(recommendations): please review and advise.    Thank you.    Licha ANDERSEN RN   [facility]

## 2024-09-09 NOTE — TELEPHONE ENCOUNTER
[facility] Call Center    Phone Message    May a detailed message be left on voicemail: yes     Reason for Call: Other: Patient is calling because the Bactrim made her sick and needs a new prescription. Would request that it go to [facility]. Thank you      Action Taken: Other: [facility]    Travel Screening: Not Applicable     Date of Service:

## 2024-09-09 NOTE — TELEPHONE ENCOUNTER
The lab orders say she has 2 of 6 UA orders remaining.  She should do this at least 3 days after she finishes antibiotics.    Jemima Porras MD on 9/9/2024 at 4:12 PM     Call to pt to notify of above.  Unable to reach.  Left message for pt to call back.    Licha Fields   [facility]  RN

## 2024-09-09 NOTE — TELEPHONE ENCOUNTER
Please let her know a new prescription was sent.    Jemima Porras MD on 9/9/2024 at 11:32 AM       Pt notified of above  Pt reports understanding.    Patient states she was to return to the lab for repeat testing a few days after the antibiotic is complete to be sure the infection cleared.     Will check with Dr. Dove - writer does not see orders.    Pt does not have further questions or concerns.    Licha Fields   [facility]  RN

## 2024-09-12 NOTE — TELEPHONE ENCOUNTER
Patient has appointment in the lab in  for urinalysis on 9/19.    Licha ANDERSEN RN   [facility]

## 2024-09-19 ENCOUNTER — LAB (OUTPATIENT)
Dept: LAB | Facility: CLINIC | Age: 89
End: 2024-09-19
Payer: COMMERCIAL

## 2024-09-19 DIAGNOSIS — N39.0 RECURRENT UTI: ICD-10-CM

## 2024-09-19 DIAGNOSIS — N30.00 ACUTE CYSTITIS WITHOUT HEMATURIA: Primary | ICD-10-CM

## 2024-09-19 LAB
ALBUMIN UR-MCNC: NEGATIVE MG/DL
APPEARANCE UR: ABNORMAL
BACTERIA #/AREA URNS HPF: ABNORMAL /HPF
BILIRUB UR QL STRIP: NEGATIVE
COLOR UR AUTO: YELLOW
GLUCOSE UR STRIP-MCNC: NEGATIVE MG/DL
HGB UR QL STRIP: ABNORMAL
KETONES UR STRIP-MCNC: NEGATIVE MG/DL
LEUKOCYTE ESTERASE UR QL STRIP: ABNORMAL
NITRATE UR QL: NEGATIVE
PH UR STRIP: 7.5 [PH] (ref 5–7)
RBC #/AREA URNS AUTO: ABNORMAL /HPF
SP GR UR STRIP: 1.01 (ref 1–1.03)
SQUAMOUS #/AREA URNS AUTO: ABNORMAL /LPF
UROBILINOGEN UR STRIP-ACNC: 0.2 E.U./DL
WBC #/AREA URNS AUTO: ABNORMAL /HPF
WBC CLUMPS #/AREA URNS HPF: PRESENT /HPF

## 2024-09-19 PROCEDURE — 81001 URINALYSIS AUTO W/SCOPE: CPT

## 2024-09-19 PROCEDURE — 87088 URINE BACTERIA CULTURE: CPT

## 2024-09-19 PROCEDURE — 87086 URINE CULTURE/COLONY COUNT: CPT

## 2024-09-19 PROCEDURE — 87186 SC STD MICRODIL/AGAR DIL: CPT

## 2024-09-19 RX ORDER — SULFAMETHOXAZOLE/TRIMETHOPRIM 800-160 MG
1 TABLET ORAL 2 TIMES DAILY
Qty: 14 TABLET | Refills: 0 | Status: SHIPPED | OUTPATIENT
Start: 2024-09-19 | End: 2024-09-23

## 2024-09-21 LAB
BACTERIA UR CULT: ABNORMAL
BACTERIA UR CULT: ABNORMAL

## 2024-09-23 ENCOUNTER — PATIENT OUTREACH (OUTPATIENT)
Dept: CARE COORDINATION | Facility: CLINIC | Age: 89
End: 2024-09-23
Payer: COMMERCIAL

## 2024-09-23 ENCOUNTER — TELEPHONE (OUTPATIENT)
Dept: OBGYN | Facility: CLINIC | Age: 89
End: 2024-09-23
Payer: COMMERCIAL

## 2024-09-23 ENCOUNTER — TELEPHONE (OUTPATIENT)
Dept: UROLOGY | Facility: CLINIC | Age: 89
End: 2024-09-23
Payer: COMMERCIAL

## 2024-09-23 NOTE — TELEPHONE ENCOUNTER
Called [facility]. Pharmacy staff stated patient picked up her antibiotic today.  Ashley LÓPEZ CMA 09/23/24 3:26 PM

## 2024-09-23 NOTE — TELEPHONE ENCOUNTER
Called patient to inform her she has a UTI. Antibiotics have been sent to the [facility] in [place]. Voicemail left with request to return call.

## 2024-09-23 NOTE — CONFIDENTIAL NOTE
S-(situation): patient waiting on urine culture results.    B-(background): was on amoxicillin for treatment of UTI - told to retest urine after completion of antibiotics and 3 days had passed since finishing.    A-(assessment): patient went in on day 4 after completing antibiotics. Lab was ran under Urology - Neli VAZQUEZ. She sent a prescription for Bactrim. Patient unable to tolerate Bactrim. Patient would like new prescription as it appears she still needs treatment     R-(recommendations): please review and advise.    Thank you.    Licha ANDERSEN RN   [facility]

## 2024-09-24 ENCOUNTER — TELEPHONE (OUTPATIENT)
Dept: UROLOGY | Facility: CLINIC | Age: 89
End: 2024-09-24
Payer: COMMERCIAL

## 2024-09-24 DIAGNOSIS — N30.90 BLADDER INFECTION: Primary | ICD-10-CM

## 2024-09-24 RX ORDER — CEPHALEXIN 500 MG/1
500 CAPSULE ORAL 2 TIMES DAILY
Qty: 14 CAPSULE | Refills: 0 | Status: SHIPPED | OUTPATIENT
Start: 2024-09-24 | End: 2024-10-01

## 2024-09-24 NOTE — TELEPHONE ENCOUNTER
Prescription sent by Dr. Last.  Patient notified and will check with the pharmacy for  in [facility].    Licha ANDERSEN RN   [facility]

## 2024-09-24 NOTE — TELEPHONE ENCOUNTER
[facility] Call Center    Phone Message    May a detailed message be left on voicemail: yes     Reason for Call: Other: . Pt is calling in again, she said she is hoping for an rx to be sent to [facility] [place] [address] before the pharmacy closes at 5pm, please reach out to Caitlyn, thank you!    Action Taken: Message routed to:  Other: obgyn    Travel Screening: Not Applicable     Date of Service:

## 2024-09-24 NOTE — TELEPHONE ENCOUNTER
[facility] Call Center    Phone Message    May a detailed message be left on voicemail: yes     Reason for Call: Other: pt calling back, urine sample was meant for Dr Dove in Gyno, per pt  has been seen her cause she now has a pessary    Action Taken: Other: urology    Travel Screening: Not Applicable     Date of Service:

## 2024-09-24 NOTE — TELEPHONE ENCOUNTER
Will route to Ob/Gyn provider and team as an FYI that patient prefers to follow up with them.    Khushbu Marshall RN on 9/24/2024 at 10:05 AM

## 2024-09-24 NOTE — TELEPHONE ENCOUNTER
Please see note below from patient.     Patient last seen Dr. Dove in OB on 9/4/24.     Per Dr. Dove's note:    Plan:  1. Refills of lotrisone sent to her pharmacy to apply topical to candida intertrigo.  2.  Will treat UTI with bactrim for 10 days and recheck urine in two weeks as this is her second infection in a couple of months.  Culture pending.  3.  She is not due for a pessary check and I did not recommend manipulating the bladder with active infection.    Per Neli's last result note, Rx was sent for Bactrim DS. Patient was to schedule a follow up with Urology.    Patient does not currently have a follow with Urology or Ob.    Per note, patient wishes to follow up with Ob since she has a pessary.    Khushbu Marshall RN on 9/24/2024 at 9:21 AM

## 2024-10-23 ENCOUNTER — OFFICE VISIT (OUTPATIENT)
Dept: NEUROLOGY | Facility: CLINIC | Age: 89
End: 2024-10-23
Attending: PSYCHIATRY & NEUROLOGY
Payer: COMMERCIAL

## 2024-10-23 VITALS
WEIGHT: 141 LBS | SYSTOLIC BLOOD PRESSURE: 159 MMHG | BODY MASS INDEX: 25.95 KG/M2 | HEART RATE: 51 BPM | DIASTOLIC BLOOD PRESSURE: 89 MMHG | HEIGHT: 62 IN

## 2024-10-23 DIAGNOSIS — G62.9 NEUROPATHY: ICD-10-CM

## 2024-10-23 DIAGNOSIS — G25.0 ESSENTIAL TREMOR: ICD-10-CM

## 2024-10-23 DIAGNOSIS — G62.9 NEUROPATHY: Primary | ICD-10-CM

## 2024-10-23 PROCEDURE — 95910 NRV CNDJ TEST 7-8 STUDIES: CPT | Performed by: PSYCHIATRY & NEUROLOGY

## 2024-10-23 PROCEDURE — 95885 MUSC TST DONE W/NERV TST LIM: CPT | Mod: RT | Performed by: PSYCHIATRY & NEUROLOGY

## 2024-10-23 PROCEDURE — 99214 OFFICE O/P EST MOD 30 MIN: CPT | Performed by: PSYCHIATRY & NEUROLOGY

## 2024-10-23 NOTE — NURSING NOTE
Chief Complaint   Patient presents with    Results     EMG results and follow up. Patient states her legs are a little shakier, her left hand does get numb throughout the night.      Ashley Villagran MA

## 2024-10-23 NOTE — PROCEDURES
[facility]    [facility], [address]  [place] [address]  Tel: [phone]  Fax: [phone]          Full Name: Caitlyn Wasserman Gender: Female  Patient ID: [id number] YOB: 1935      Visit Date: 10/23/2024 12:51  Age: 89 Years 0 Months Old  Interpreted By: Jack Toure MD   Ref Dr.: Cindy Flor NP  Tech: ST   Height: 5 feet 4 inch  Reason for referral: Evaluate bilateral lowers. c/o tingling, numbness, weakness, tremors in both legs/feet > 5 years. Left > Right. h/o both hips, right knee replaced.       Motor NCS      Nerve / Sites Lat Amp Dist Taj    ms mV cm m/s   L Peroneal - EDB      Ankle 4.84 3.3 8       Fib head 13.07 2.8 33 40      Pop fossa 15.57 2.9 12 48   R Peroneal - EDB      Ankle 5.52 1.0 8       Fib head 13.70 0.9 33.5 41      Pop fossa 16.30 1.1 12 46   L Tibial - AH      Ankle 3.59 6.3 8       Pop fossa 13.59 5.9 40 40   R Tibial - AH      Ankle 4.48 3.6 8       Pop fossa 14.22 2.7 39 40       F  Wave      Nerve Fmin    ms   L Tibial - AH 55.42   R Tibial - AH 55.68       Sensory NCS      Nerve / Sites Onset Lat Pk Lat Amp.2-3 Dist Taj    ms ms  V cm m/s   L Sural - Ankle (Calf)      Calf NR NR NR 14 NR   R Sural - Ankle (Calf)      Calf NR NR NR 14 NR   L Superficial peroneal - Ankle      Lat leg 3.23 3.59 4.3 12 37   R Superficial peroneal - Ankle      Lat leg NR NR NR 12 NR       EMG Summary Table     Spontaneous MUAP Rcmt Note   Muscle Fib PSW Fasc IA # Amp Dur PPP Rate Type   L. Gluteus medius None None None N N N N N N N   L. Gluteus frederick None None None N N N N N N N   L. Quadriceps None None None N N N N N N N   L. Adductor jemima None None None N N N N N N N     Decline to continue. Testing terminated per patient request.       SUMMARY  Nerve conduction and EMG study of bilateral lower extremities shows:    Normal right peroneal distal motor latency with decreased amplitude and  normal conduction velocity.  Normal left peroneal distal motor latency, amplitude and conduction velocity.  Normal right tibial distal motor latency with decreased amplitude and normal conduction velocity.  Normal left tibial distal motor latency, amplitude and conduction velocity.  Absent bilateral sural and right superficial peroneal sensory SNAP.  Normal left superficial peroneal sensory SNAP.  Monopolar needle exam is normal.  Testing not completed.    CLINICAL INTERPRETATION:  This is an abnormal nerve conduction and EMG study.  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  Further clinical correlation is needed.     Jack Toure MD  Neurologist  [facility] Neurology [facility]  Tel:- [phone]

## 2024-10-23 NOTE — LETTER
10/23/2024      Caitlyn Wasserman  [address] [address]      Dear Colleague,    Thank you for referring your patient, Caitlyn Wasserman, to the [facility] [place]. Please see a copy of my visit note below.    See procedure note.       Again, thank you for allowing me to participate in the care of your patient.        Sincerely,        Jack Toure MD   Will monitor.

## 2024-10-23 NOTE — PATIENT INSTRUCTIONS
Instructions for Blood Draw  ([facility] (2nd floor) or [facility] ([facility])     Hours:   [facility]: M-F 7am-5pm, Saturday- 9am-1pm No appointment is needed.  [facility]: M-F 7am-5pm, Saturday & - 9am-12pm No appointment is needed.  Schedule Lab Appointments through [facility] or by [phone] ([phone])

## 2024-10-23 NOTE — LETTER
10/23/2024      Caitlyn Wasserman  [address] [address]      Dear Colleague,    Thank you for referring your patient, Caitlyn Wasserman, to the [facility] [place]. Please see a copy of my visit note below.    NEUROLOGY OUTPATIENT PROGRESS NOTE   Oct 23, 2024     CHIEF COMPLAINT/REASON FOR VISIT/REASON FOR CONSULT  Patient presents with:  Results: EMG results and follow up. Patient states her legs are a little shakier, her left hand does get numb throughout the night.     REASON FOR CONSULTATION- Neuropathy Eval    REFERRAL SOURCE  Dr. Cindy Flor  CC Dr. Cindy Flor    HISTORY OF PRESENT ILLNESS  Caitlyn Wasserman is a 89 year old female seen today for evaluation of neuropathy.  Symptoms started in 2007 after left hip replacement surgery.  She reports initially a few of her toes were tingling but then they spread to all toes in both feet.  Does report some numbness in the bottom of her feet.  More recently her hands have also started going numb though this is mainly during sleep at night.  She does get up multiple times at nighttime for her hands going to sleep.  She does complain of some minimal back pain and neck pain.  Some stiffness in her arms and legs due to arthritis.  No shooting pain down the legs.  Denies any neuropathic pain though occasionally will get some burning in the bottom of her soles but this is not very bothersome.  Mild balance issues.  No major weight gain or any alcohol exposure.  No family history of neuropathy.    She does complain of some tremors in upper extremities.  These are mainly with activity.  Denies any resting tremor.  No other parkinsonian symptoms    10/23/24  Patient returns today  1.  Feet are about the same.  Continues to have numbness in the feet and some balance issues  2.  Tremor is about the same.  No worsening.  No other new concerns.    Previous history is reviewed and this is unchanged.    PAST MEDICAL/SURGICAL  HISTORY  Past Medical History:   Diagnosis Date     COPD (chronic obstructive pulmonary disease) (H)      Migraine, unspecified, without mention of intractable migraine without mention of status migrainosus      Other urinary incontinence      Pure hypercholesterolemia      Unspecified essential hypertension      Patient Active Problem List   Diagnosis     Urinary frequency     LEFT HIP PAIN     Right shoulder pain     Numbness in right leg     Cataract     Recurrent UTI     HYPERLIPIDEMIA LDL GOAL <130     Kyphosis of cervical region     Neck pain, chronic     Essential tremor     Pedal edema     Primary osteoarthritis of both knees     Chronic rhinitis     Essential hypertension     Dysuria     Chronic obstructive pulmonary disease, unspecified COPD type (H)     Osteoarthritis of both hands     Vaginal prolapse     Hip arthrosis     S/P total hip arthroplasty     Abnormal gait     Spondylosis of lumbar region without myelopathy or radiculopathy   Significant high cholesterol, high blood pressure, migraines, arthritis, vision loss    FAMILY HISTORY  Family History   Problem Relation Age of Onset     Cancer Mother         Bladder     Neurologic Disorder Mother         heriditary tremor     C.A.D. Father      Cerebrovascular Disease Father      Allergies Maternal Grandfather      Respiratory Maternal Grandfather         Asthma     Diabetes Paternal Grandmother         Type II     Cancer Brother         Multiple myloma     C.A.D. Brother      Cancer Sister         kidney cancer     Neurofibromatosis Son      Breast Cancer No family hx of    Positive for essential tremor.  No family history of neuropathy.    SOCIAL HISTORY  Social History     Tobacco Use     Smoking status: Former     Current packs/day: 0.00     Average packs/day: 1 pack/day for 20.0 years (20.0 ttl pk-yrs)     Types: Cigarettes     Start date: 1951     Quit date: 1971     Years since quittin.8     Smokeless tobacco: Never   Vaping Use      Vaping status: Never Used   Substance Use Topics     Alcohol use: Yes     Comment: 1 wine a month maybe     Drug use: No       SYSTEMS REVIEW  Twelve-system ROS was done and other than the HPI this was negative/positive for neck pain, arm and leg pain, joint pain, numbness/tingling, weakness/paralysis/leg symptoms, difficulty walking, falling, balance issues movement disorder/tremor, bladder issues, vision symptoms, sleepy during the day, bowel problems, respiratory problems.    MEDICATIONS  Current Outpatient Medications   Medication Sig Dispense Refill     acetaminophen (TYLENOL) 325 MG tablet Take 3 tablets (975 mg) by mouth every 8 hours (Patient taking differently: Take 975 mg by mouth every 8 hours as needed for mild pain.)       acetaminophen-caffeine (EXCEDRIN TENSION HEADACHE) 500-65 MG TABS Take 2 tablets by mouth every 6 hours as needed for mild pain       albuterol (PROAIR HFA/PROVENTIL HFA/VENTOLIN HFA) 108 (90 Base) MCG/ACT inhaler Inhale 2 puffs into the lungs every 6 hours 18 g 1     atenolol (TENORMIN) 25 MG tablet Take 1 tablet (25 mg) by mouth daily 90 tablet 3     chlorthalidone (HYGROTON) 25 MG tablet Take 2 tablets (50 mg) by mouth daily 180 tablet 2     clotrimazole-betamethasone (LOTRISONE) 1-0.05 % external cream Apply topically 2 times daily. 45 g 2     docusate sodium (COLACE) 100 MG capsule Take 1 capsule (100 mg) by mouth daily 60 capsule 4     estradiol (ESTRACE) 0.1 MG/GM vaginal cream Place 2 g vaginally twice a week 85 g 3     hydrocortisone 2.5 % ointment APPLY RECTALLY AS NEEDED FOR HEMORRHOIDS. 20 g 0     ipratropium (ATROVENT HFA) 17 MCG/ACT inhaler Inhale 2 puffs into the lungs every 6 hours 12.9 g 3     ipratropium (ATROVENT) 0.03 % nasal spray Spray 2 sprays into both nostrils daily 60 mL 0     multivitamin w/minerals (THERA-VIT-M) tablet Take 1 tablet by mouth daily       potassium chloride ER (K-TAB) 20 MEQ CR tablet Take 1 tablet (20 mEq) by mouth daily 90 tablet 3      "methocarbamol (ROBAXIN) 500 MG tablet Take 1 tablet (500 mg) by mouth 4 times daily as needed for muscle spasms (Patient not taking: Reported on 10/23/2024) 40 tablet 0     psyllium 28.3 % POWD Take 1 teaspoonful by mouth daily 2 times daily (Patient not taking: Reported on 6/14/2024)       terconazole (TERAZOL 7) 0.4 % vaginal cream Use one applicator full before bed for 7-14 days.      Apply to skin 3-4 times a day for 1 week, then twice daily for 3 weeks. (Patient not taking: Reported on 10/23/2024) 45 g 3     Current Facility-Administered Medications   Medication Dose Route Frequency Provider Last Rate Last Admin     lidocaine 112 mL, EPINEPHrine (ADRENALIN) 1.12 mg in sodium chloride 0.9 % 1,113.12 mL (TUMESCENT)   Irrigation Once Jan Rausch MD         lidocaine 112 mL, EPINEPHrine (ADRENALIN) 1.12 mg in sodium chloride 0.9 % 1,113.12 mL (TUMESCENT)   Irrigation Once Jan Rausch MD            PHYSICAL EXAMINATION  VITALS: BP (!) 159/89 (BP Location: Right arm, Patient Position: Sitting)   Pulse 51   Ht 1.575 m (5' 2\")   Wt 64 kg (141 lb)   LMP  (LMP Unknown)   BMI 25.79 kg/m    GENERAL: Healthy appearing, alert, no acute distress, normal habitus.  CARDIOVASCULAR: Extremities warm and well perfused. Pulses present.   NEUROLOGICAL:  Patient is awake and oriented to self, place and time.  Attention span is normal.  Memory is grossly intact.  Language is fluent and follows commands appropriately.  Appropriate fund of knowledge. Cranial nerves 2-12 are intact. There is no pronator drift.  Motor exam shows 5/5 strength in all extremities.  Tone is symmetric bilaterally in upper and lower extremities.  Reflexes are symmetric and 2+ in upper extremities and lower extremities.  Ankle jerks absent sensory exam is grossly intact to light touch, pin prick and vibration with decreased vibratory sense in the toes and decreased pinprick sensation to the mid ankle level.  Finger to nose and heel to shin is " without dysmetria.  Romberg is negative.  Gait is slightly wide-based and the patient is able to do tandem walk and walk on toes and heels with mild difficulty.  Very mild postural tremor in both upper extremities.  Exam stable    DIAGNOSTICS  XR C spine  IMPRESSION: Vertebral body heights are maintained. Bones appear mildly  osteopenic. Multilevel facet disease. Degenerative changes at the  craniocervical junction. No significant anterolisthesis or  retrolisthesis. No prevertebral edema.     RELEVANT LABS  Component      Latest Ref Rng 4/29/2023  7:45 PM 9/10/2023  6:57 AM   WBC      4.0 - 11.0 10e3/uL 8.3  6.1    RBC Count      3.80 - 5.20 10e6/uL 4.28  4.14    Hemoglobin      11.7 - 15.7 g/dL 13.7  13.1    Hematocrit      35.0 - 47.0 % 40.6  38.3    MCV      78 - 100 fL 95  93    MCH      26.5 - 33.0 pg 32.0  31.6    MCHC      31.5 - 36.5 g/dL 33.7  34.2    RDW      10.0 - 15.0 % 11.9  12.1    Platelet Count      150 - 450 10e3/uL 262  252    % Neutrophils      % 61  54    % Lymphocytes      % 26  30    % Monocytes      % 9  10    % Eosinophils      % 3  5    % Basophils      % 1  1    % Immature Granulocytes      % 0  0    NRBCs per 100 WBC      <1 /100 0     Absolute Neutrophils      1.6 - 8.3 10e3/uL 5.0  3.3    Absolute Lymphocytes      0.8 - 5.3 10e3/uL 2.2  1.8    Absolute Monocytes      0.0 - 1.3 10e3/uL 0.8  0.6    Absolute Eosinophils      0.0 - 0.7 10e3/uL 0.3  0.3    Absolute Basophils      0.0 - 0.2 10e3/uL 0.1  0.1    Absolute Immature Granulocytes      <=0.4 10e3/uL 0.0  0.0    Absolute NRBCs      10e3/uL 0.0     Sodium      136 - 145 mmol/L 137  133 (L)    Potassium      3.4 - 5.3 mmol/L 3.5  3.5    Chloride      98 - 107 mmol/L 97 (L)  96 (L)    Carbon Dioxide (CO2)      22 - 29 mmol/L 31 (H)  28    Anion Gap      7 - 15 mmol/L 9  9    Urea Nitrogen      8.0 - 23.0 mg/dL 14.4  13.4    Creatinine      0.51 - 0.95 mg/dL 0.92  0.82    Calcium      8.8 - 10.2 mg/dL 9.9  9.4    Glucose      70 - 99  mg/dL 104 (H)  96    Alkaline Phosphatase      35 - 104 U/L 80     AST      10 - 35 U/L 24     ALT      10 - 35 U/L 17     Protein Total      6.4 - 8.3 g/dL 7.5     Albumin      3.5 - 5.2 g/dL 4.4     Bilirubin Total      <=1.2 mg/dL 0.5     GFR Estimate      >60 mL/min/1.73m2 61  70       Legend:  (L) Low  (H) High      OUTSIDE RECORDS  Outside referral notes and chart notes were reviewed and pertinent information has been summarized (in addition to the HPI):-      EMG  CLINICAL INTERPRETATION:  This is an abnormal nerve conduction and EMG study.  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  Further clinical correlation is needed.     IMPRESSION/REPORT/PLAN  Neuropathy  Essential tremor    This is a 89 year old female seen today for numbness in the feet.  Exam shows decreased pinprick and vibratory sense in the feet suggestive of peripheral neuropathy.  Blood work through primary care so far has been negative.  Other blood work I ordered last time is pending.  EMG is confirmatory for neuropathy.  Could be idiopathic neuropathy.  Discussed prognosis.  Recommend exercise and healthy lifestyle and healthy diet.    She does have a mild tremor in both upper extremities suggestive of essential tremor.  Currently this is not very bothersome and we will hold off medication.  There is family history of tremors.  Stable.    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  Encouraged her to keep the wrist straight at nighttime when she is sleeping.  Did recommend carpal tunnel brace since this is not better yet.  Patients with peripheral polyneuropathy are more likely to get entrapment neuropathy in the upper extremities.    Return back in 4 to 5 months.    -     Vitamin B6; Future  -     Vitamin B12; Future  -     Vitamin B1 whole blood; Future  -     TSH with free T4 reflex; Future  -     Protein electrophoresis; Future  -      Hemoglobin A1c; Future  -     Trial of carpal tunnel brace    Return in about 4 months (around 2/23/2025) for In-Clinic Visit (must), After testing.    Over 30 minutes were spent coordinating the care for the patient on the day of the encounter.  This includes previsit, during visit and post visit activities as documented above.  Counseling patient.  Multiple problems reviewed.  Reviewed testing.  (Activities include but not inclusive of reviewing chart, reviewing outside records, reviewing labs and imaging study results as well as the images, patient visit time including getting history and exam,  use if applicable, review of test results with the patient and coming up with a plan in a shared model, counseling patient and family, education and answering patient questions, EMR , EMR diagnosis entry and problem list management, medication reconciliation and prescription management if applicable, paperwork if applicable, printing documents and documentation of the visit activities.)        Jack Toure MD  Neurologist  [facility] Neurology [facility]  Tel:- [phone]    This note was dictated using voice recognition software.  Any grammatical or context distortions are unintentional and inherent to the software.      Again, thank you for allowing me to participate in the care of your patient.        Sincerely,        Jack Toure MD

## 2024-10-23 NOTE — PROGRESS NOTES
NEUROLOGY OUTPATIENT PROGRESS NOTE   Oct 23, 2024     CHIEF COMPLAINT/REASON FOR VISIT/REASON FOR CONSULT  Patient presents with:  Results: EMG results and follow up. Patient states her legs are a little shakier, her left hand does get numb throughout the night.     REASON FOR CONSULTATION- Neuropathy Eval    REFERRAL SOURCE  Dr. Cindy Flor   Dr. Cindy Flor    HISTORY OF PRESENT ILLNESS  Caitlyn Wasserman is a 89 year old female seen today for evaluation of neuropathy.  Symptoms started in 2007 after left hip replacement surgery.  She reports initially a few of her toes were tingling but then they spread to all toes in both feet.  Does report some numbness in the bottom of her feet.  More recently her hands have also started going numb though this is mainly during sleep at night.  She does get up multiple times at nighttime for her hands going to sleep.  She does complain of some minimal back pain and neck pain.  Some stiffness in her arms and legs due to arthritis.  No shooting pain down the legs.  Denies any neuropathic pain though occasionally will get some burning in the bottom of her soles but this is not very bothersome.  Mild balance issues.  No major weight gain or any alcohol exposure.  No family history of neuropathy.    She does complain of some tremors in upper extremities.  These are mainly with activity.  Denies any resting tremor.  No other parkinsonian symptoms    10/23/24  Patient returns today  1.  Feet are about the same.  Continues to have numbness in the feet and some balance issues  2.  Tremor is about the same.  No worsening.  No other new concerns.    Previous history is reviewed and this is unchanged.    PAST MEDICAL/SURGICAL HISTORY  Past Medical History:   Diagnosis Date    COPD (chronic obstructive pulmonary disease) (H)     Migraine, unspecified, without mention of intractable migraine without mention of status migrainosus     Other urinary incontinence     Pure  hypercholesterolemia     Unspecified essential hypertension      Patient Active Problem List   Diagnosis    Urinary frequency    LEFT HIP PAIN    Right shoulder pain    Numbness in right leg    Cataract    Recurrent UTI    HYPERLIPIDEMIA LDL GOAL <130    Kyphosis of cervical region    Neck pain, chronic    Essential tremor    Pedal edema    Primary osteoarthritis of both knees    Chronic rhinitis    Essential hypertension    Dysuria    Chronic obstructive pulmonary disease, unspecified COPD type (H)    Osteoarthritis of both hands    Vaginal prolapse    Hip arthrosis    S/P total hip arthroplasty    Abnormal gait    Spondylosis of lumbar region without myelopathy or radiculopathy   Significant high cholesterol, high blood pressure, migraines, arthritis, vision loss    FAMILY HISTORY  Family History   Problem Relation Age of Onset    Cancer Mother         Bladder    Neurologic Disorder Mother         heriditary tremor    C.A.D. Father     Cerebrovascular Disease Father     Allergies Maternal Grandfather     Respiratory Maternal Grandfather         Asthma    Diabetes Paternal Grandmother         Type II    Cancer Brother         Multiple myloma    C.A.D. Brother     Cancer Sister         kidney cancer    Neurofibromatosis Son     Breast Cancer No family hx of    Positive for essential tremor.  No family history of neuropathy.    SOCIAL HISTORY  Social History     Tobacco Use    Smoking status: Former     Current packs/day: 0.00     Average packs/day: 1 pack/day for 20.0 years (20.0 ttl pk-yrs)     Types: Cigarettes     Start date: 1951     Quit date: 1971     Years since quittin.8    Smokeless tobacco: Never   Vaping Use    Vaping status: Never Used   Substance Use Topics    Alcohol use: Yes     Comment: 1 wine a month maybe    Drug use: No       SYSTEMS REVIEW  Twelve-system ROS was done and other than the HPI this was negative/positive for neck pain, arm and leg pain, joint pain, numbness/tingling,  weakness/paralysis/leg symptoms, difficulty walking, falling, balance issues movement disorder/tremor, bladder issues, vision symptoms, sleepy during the day, bowel problems, respiratory problems.    MEDICATIONS  Current Outpatient Medications   Medication Sig Dispense Refill    acetaminophen (TYLENOL) 325 MG tablet Take 3 tablets (975 mg) by mouth every 8 hours (Patient taking differently: Take 975 mg by mouth every 8 hours as needed for mild pain.)      acetaminophen-caffeine (EXCEDRIN TENSION HEADACHE) 500-65 MG TABS Take 2 tablets by mouth every 6 hours as needed for mild pain      albuterol (PROAIR HFA/PROVENTIL HFA/VENTOLIN HFA) 108 (90 Base) MCG/ACT inhaler Inhale 2 puffs into the lungs every 6 hours 18 g 1    atenolol (TENORMIN) 25 MG tablet Take 1 tablet (25 mg) by mouth daily 90 tablet 3    chlorthalidone (HYGROTON) 25 MG tablet Take 2 tablets (50 mg) by mouth daily 180 tablet 2    clotrimazole-betamethasone (LOTRISONE) 1-0.05 % external cream Apply topically 2 times daily. 45 g 2    docusate sodium (COLACE) 100 MG capsule Take 1 capsule (100 mg) by mouth daily 60 capsule 4    estradiol (ESTRACE) 0.1 MG/GM vaginal cream Place 2 g vaginally twice a week 85 g 3    hydrocortisone 2.5 % ointment APPLY RECTALLY AS NEEDED FOR HEMORRHOIDS. 20 g 0    ipratropium (ATROVENT HFA) 17 MCG/ACT inhaler Inhale 2 puffs into the lungs every 6 hours 12.9 g 3    ipratropium (ATROVENT) 0.03 % nasal spray Spray 2 sprays into both nostrils daily 60 mL 0    multivitamin w/minerals (THERA-VIT-M) tablet Take 1 tablet by mouth daily      potassium chloride ER (K-TAB) 20 MEQ CR tablet Take 1 tablet (20 mEq) by mouth daily 90 tablet 3    methocarbamol (ROBAXIN) 500 MG tablet Take 1 tablet (500 mg) by mouth 4 times daily as needed for muscle spasms (Patient not taking: Reported on 10/23/2024) 40 tablet 0    psyllium 28.3 % POWD Take 1 teaspoonful by mouth daily 2 times daily (Patient not taking: Reported on 6/14/2024)      terconazole  "(TERAZOL 7) 0.4 % vaginal cream Use one applicator full before bed for 7-14 days.      Apply to skin 3-4 times a day for 1 week, then twice daily for 3 weeks. (Patient not taking: Reported on 10/23/2024) 45 g 3     Current Facility-Administered Medications   Medication Dose Route Frequency Provider Last Rate Last Admin    lidocaine 112 mL, EPINEPHrine (ADRENALIN) 1.12 mg in sodium chloride 0.9 % 1,113.12 mL (TUMESCENT)   Irrigation Once Jan Rausch MD        lidocaine 112 mL, EPINEPHrine (ADRENALIN) 1.12 mg in sodium chloride 0.9 % 1,113.12 mL (TUMESCENT)   Irrigation Once Jan Rausch MD            PHYSICAL EXAMINATION  VITALS: BP (!) 159/89 (BP Location: Right arm, Patient Position: Sitting)   Pulse 51   Ht 1.575 m (5' 2\")   Wt 64 kg (141 lb)   LMP  (LMP Unknown)   BMI 25.79 kg/m    GENERAL: Healthy appearing, alert, no acute distress, normal habitus.  CARDIOVASCULAR: Extremities warm and well perfused. Pulses present.   NEUROLOGICAL:  Patient is awake and oriented to self, place and time.  Attention span is normal.  Memory is grossly intact.  Language is fluent and follows commands appropriately.  Appropriate fund of knowledge. Cranial nerves 2-12 are intact. There is no pronator drift.  Motor exam shows 5/5 strength in all extremities.  Tone is symmetric bilaterally in upper and lower extremities.  Reflexes are symmetric and 2+ in upper extremities and lower extremities.  Ankle jerks absent sensory exam is grossly intact to light touch, pin prick and vibration with decreased vibratory sense in the toes and decreased pinprick sensation to the mid ankle level.  Finger to nose and heel to shin is without dysmetria.  Romberg is negative.  Gait is slightly wide-based and the patient is able to do tandem walk and walk on toes and heels with mild difficulty.  Very mild postural tremor in both upper extremities.  Exam stable    DIAGNOSTICS  XR C spine  IMPRESSION: Vertebral body heights are maintained. " Bones appear mildly  osteopenic. Multilevel facet disease. Degenerative changes at the  craniocervical junction. No significant anterolisthesis or  retrolisthesis. No prevertebral edema.     RELEVANT LABS  Component      Latest Ref Rng 4/29/2023  7:45 PM 9/10/2023  6:57 AM   WBC      4.0 - 11.0 10e3/uL 8.3  6.1    RBC Count      3.80 - 5.20 10e6/uL 4.28  4.14    Hemoglobin      11.7 - 15.7 g/dL 13.7  13.1    Hematocrit      35.0 - 47.0 % 40.6  38.3    MCV      78 - 100 fL 95  93    MCH      26.5 - 33.0 pg 32.0  31.6    MCHC      31.5 - 36.5 g/dL 33.7  34.2    RDW      10.0 - 15.0 % 11.9  12.1    Platelet Count      150 - 450 10e3/uL 262  252    % Neutrophils      % 61  54    % Lymphocytes      % 26  30    % Monocytes      % 9  10    % Eosinophils      % 3  5    % Basophils      % 1  1    % Immature Granulocytes      % 0  0    NRBCs per 100 WBC      <1 /100 0     Absolute Neutrophils      1.6 - 8.3 10e3/uL 5.0  3.3    Absolute Lymphocytes      0.8 - 5.3 10e3/uL 2.2  1.8    Absolute Monocytes      0.0 - 1.3 10e3/uL 0.8  0.6    Absolute Eosinophils      0.0 - 0.7 10e3/uL 0.3  0.3    Absolute Basophils      0.0 - 0.2 10e3/uL 0.1  0.1    Absolute Immature Granulocytes      <=0.4 10e3/uL 0.0  0.0    Absolute NRBCs      10e3/uL 0.0     Sodium      136 - 145 mmol/L 137  133 (L)    Potassium      3.4 - 5.3 mmol/L 3.5  3.5    Chloride      98 - 107 mmol/L 97 (L)  96 (L)    Carbon Dioxide (CO2)      22 - 29 mmol/L 31 (H)  28    Anion Gap      7 - 15 mmol/L 9  9    Urea Nitrogen      8.0 - 23.0 mg/dL 14.4  13.4    Creatinine      0.51 - 0.95 mg/dL 0.92  0.82    Calcium      8.8 - 10.2 mg/dL 9.9  9.4    Glucose      70 - 99 mg/dL 104 (H)  96    Alkaline Phosphatase      35 - 104 U/L 80     AST      10 - 35 U/L 24     ALT      10 - 35 U/L 17     Protein Total      6.4 - 8.3 g/dL 7.5     Albumin      3.5 - 5.2 g/dL 4.4     Bilirubin Total      <=1.2 mg/dL 0.5     GFR Estimate      >60 mL/min/1.73m2 61  70       Legend:  (L)  Low  (H) High      OUTSIDE RECORDS  Outside referral notes and chart notes were reviewed and pertinent information has been summarized (in addition to the HPI):-      EMG  CLINICAL INTERPRETATION:  This is an abnormal nerve conduction and EMG study.  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  Further clinical correlation is needed.     IMPRESSION/REPORT/PLAN  Neuropathy  Essential tremor    This is a 89 year old female seen today for numbness in the feet.  Exam shows decreased pinprick and vibratory sense in the feet suggestive of peripheral neuropathy.  Blood work through primary care so far has been negative.  Other blood work I ordered last time is pending.  EMG is confirmatory for neuropathy.  Could be idiopathic neuropathy.  Discussed prognosis.  Recommend exercise and healthy lifestyle and healthy diet.    She does have a mild tremor in both upper extremities suggestive of essential tremor.  Currently this is not very bothersome and we will hold off medication.  There is family history of tremors.  Stable.    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  Encouraged her to keep the wrist straight at nighttime when she is sleeping.  Did recommend carpal tunnel brace since this is not better yet.  Patients with peripheral polyneuropathy are more likely to get entrapment neuropathy in the upper extremities.    Return back in 4 to 5 months.    -     Vitamin B6; Future  -     Vitamin B12; Future  -     Vitamin B1 whole blood; Future  -     TSH with free T4 reflex; Future  -     Protein electrophoresis; Future  -     Hemoglobin A1c; Future  -     Trial of carpal tunnel brace    Return in about 4 months (around 2/23/2025) for In-Clinic Visit (must), After testing.    Over 30 minutes were spent coordinating the care for the patient on the day of the encounter.  This includes previsit, during visit and post visit  activities as documented above.  Counseling patient.  Multiple problems reviewed.  Reviewed testing.  (Activities include but not inclusive of reviewing chart, reviewing outside records, reviewing labs and imaging study results as well as the images, patient visit time including getting history and exam,  use if applicable, review of test results with the patient and coming up with a plan in a shared model, counseling patient and family, education and answering patient questions, EMR , EMR diagnosis entry and problem list management, medication reconciliation and prescription management if applicable, paperwork if applicable, printing documents and documentation of the visit activities.)        Jack Toure MD  Neurologist  [facility]  Tel:- [phone]    This note was dictated using voice recognition software.  Any grammatical or context distortions are unintentional and inherent to the software.

## 2024-10-25 ENCOUNTER — LAB (OUTPATIENT)
Dept: LAB | Facility: CLINIC | Age: 89
End: 2024-10-25
Payer: COMMERCIAL

## 2024-10-25 DIAGNOSIS — G60.9 NEUROPATHY, IDIOPATHIC: ICD-10-CM

## 2024-10-25 DIAGNOSIS — G62.9 NEUROPATHY: ICD-10-CM

## 2024-10-25 DIAGNOSIS — R73.9 HYPERGLYCEMIA: ICD-10-CM

## 2024-10-25 LAB
EST. AVERAGE GLUCOSE BLD GHB EST-MCNC: 126 MG/DL
HBA1C MFR BLD: 6 % (ref 0–5.6)
TOTAL PROTEIN SERUM FOR ELP: 7.1 G/DL (ref 6.4–8.3)
TSH SERPL DL<=0.005 MIU/L-ACNC: 1.45 UIU/ML (ref 0.3–4.2)

## 2024-10-25 PROCEDURE — 86334 IMMUNOFIX E-PHORESIS SERUM: CPT | Performed by: STUDENT IN AN ORGANIZED HEALTH CARE EDUCATION/TRAINING PROGRAM

## 2024-10-25 PROCEDURE — 36415 COLL VENOUS BLD VENIPUNCTURE: CPT

## 2024-10-25 PROCEDURE — 83036 HEMOGLOBIN GLYCOSYLATED A1C: CPT

## 2024-10-25 PROCEDURE — 84165 PROTEIN E-PHORESIS SERUM: CPT | Performed by: STUDENT IN AN ORGANIZED HEALTH CARE EDUCATION/TRAINING PROGRAM

## 2024-10-25 PROCEDURE — 99000 SPECIMEN HANDLING OFFICE-LAB: CPT

## 2024-10-25 PROCEDURE — 84425 ASSAY OF VITAMIN B-1: CPT | Mod: 90

## 2024-10-25 PROCEDURE — 82607 VITAMIN B-12: CPT

## 2024-10-25 PROCEDURE — 84155 ASSAY OF PROTEIN SERUM: CPT

## 2024-10-25 PROCEDURE — 84443 ASSAY THYROID STIM HORMONE: CPT

## 2024-10-26 LAB — VIT B12 SERPL-MCNC: 885 PG/ML (ref 232–1245)

## 2024-10-28 ENCOUNTER — LAB (OUTPATIENT)
Dept: LAB | Facility: CLINIC | Age: 89
End: 2024-10-28
Payer: COMMERCIAL

## 2024-10-28 DIAGNOSIS — G62.9 NEUROPATHY: ICD-10-CM

## 2024-10-28 LAB
ALBUMIN SERPL ELPH-MCNC: 4.2 G/DL (ref 3.7–5.1)
ALPHA1 GLOB SERPL ELPH-MCNC: 0.2 G/DL (ref 0.2–0.4)
ALPHA2 GLOB SERPL ELPH-MCNC: 0.6 G/DL (ref 0.5–0.9)
B-GLOBULIN SERPL ELPH-MCNC: 0.7 G/DL (ref 0.6–1)
GAMMA GLOB SERPL ELPH-MCNC: 1.3 G/DL (ref 0.7–1.6)
LOCATION OF TASK: NORMAL
LOCATION OF TASK: NORMAL
M PROTEIN SERPL ELPH-MCNC: 0 G/DL
PROT PATTERN SERPL ELPH-IMP: NORMAL
PROT PATTERN SERPL IFE-IMP: NORMAL

## 2024-10-28 PROCEDURE — 99000 SPECIMEN HANDLING OFFICE-LAB: CPT

## 2024-10-28 PROCEDURE — 36415 COLL VENOUS BLD VENIPUNCTURE: CPT

## 2024-10-28 PROCEDURE — 84207 ASSAY OF VITAMIN B-6: CPT | Mod: 90

## 2024-11-01 LAB — VIT B1 PYROPHOSHATE BLD-SCNC: 130 NMOL/L

## 2024-11-02 LAB — PYRIDOXAL PHOS SERPL-SCNC: 80.2 NMOL/L

## 2024-12-05 ENCOUNTER — THERAPY VISIT (OUTPATIENT)
Dept: PHYSICAL THERAPY | Facility: CLINIC | Age: 89
End: 2024-12-05
Payer: COMMERCIAL

## 2024-12-05 DIAGNOSIS — I89.0 LYMPHEDEMA: Primary | ICD-10-CM

## 2024-12-05 PROCEDURE — 97161 PT EVAL LOW COMPLEX 20 MIN: CPT | Mod: GP

## 2024-12-05 PROCEDURE — 97140 MANUAL THERAPY 1/> REGIONS: CPT | Mod: GP

## 2024-12-05 NOTE — PROGRESS NOTES
PHYSICAL THERAPY EVALUATION  Type of Visit: Evaluation        Fall Risk Screen:  Fall screen completed by: PT  Have you fallen 2 or more times in the past year?: No  Have you fallen and had an injury in the past year?: No  Is patient a fall risk?: No    Subjective         Presenting condition or subjective complaint:    Date of onset: 11/22/24    Relevant medical history:   lymphedema, numbness in R leg, COPD, HTN, hyperlipidemia, pedal edema, PA of B knees, hip arthrosis, spondylosis of lumbar region, abnormal gait  Dates & types of surgery:  R YSABEL 9/2020, R TKA 2011    Prior diagnostic imaging/testing results:       Prior therapy history for the same diagnosis, illness or injury:        Prior Level of Function  Transfers: Assistive equipment - intermittent use of a SPC  Ambulation: Assistive equipment - intermittent use of a SPC - can walk community distances  ADL: Assistive equipment- uses a sock aide for LBD assist  IADL: Driving, Finances, Housekeeping, Laundry, Meal preparation, Medication management    Living Environment  Social support:     Type of home:     Stairs to enter the home:         Ramp:     Stairs inside the home:         Help at home:    Equipment owned:       Employment:      Hobbies/Interests:      Patient goals for therapy:      Pain assessment: Pain denied     Objective       EDEMA EVALUATION  Additional history:  Body part affected by edema:    If cancer related, treatment:    If not cancer related, problems with veins or cause of swelling:    Distance able to walk:    Time able to stand:    Sensation problems in hands/feet:      Edema etiology: Surgery, YSABEL, TKA    FUNCTIONAL SCALES   Lymphedema Life Impact Scale (LLIS): (Patient-Rptd) 10    Cognitive Status Examination  Orientation: Oriented to person, place and time   Level of Consciousness: Alert  Follows Commands and Answers Questions: 100% of the time  Personal Safety and Judgement: Intact  Memory: Intact    EDEMA  Skin Condition:  Dryness, Pitting - No weeping was noted today, but pt did have that a couple weeks ago  Scar: Pt has old and well-healed incisional scars from R YSABEL and TKA, and L YSABEL (posterior approach - dislocated x3)  Capillary Refill: Symmetrical  Radial Pulse: Symmetrical  Dorsal Pedal Pulse: Decreased  Stemmer Sign: +  Ulceration: No    GIRTH MEASUREMENTS: Refer to separate girth measurement flowsheet for specific measurements.    VOLUME LE  Right LE Total Volume (mL): 3386.18  Left LE Total Volume (mL): 3392.2  LE Limb Comparison:  Identify AFFECTED Limb Volume-Vi (ml): 3392.2  Identify UNAFFECTED Limb Volume-Vu (ml): 3386.2  % LE Swellin.2  LE Limb % Difference: 0.18    RANGE OF MOTION: LE ROM WFL  STRENGTH: LE Strength WFL  POSTURE: WFL  PALPATION: pitting edema in lower legs, eddie prominent over L ankle  ACTIVITIES OF DAILY LIVING: Is ModIND in all ADLs, - uses a sock aide for donning her current OTC compression stockings (only 15-20mmHg)  BED MOBILITY: WFL  TRANSFERS: Independent  GAIT/LOCOMOTION: Uses a SPC intermittently for stability, but did not bring it with her today, so her gait pattern is very slow and significant lateral deviations  BALANCE: WFL  SENSATION: LE Sensation WNL - occ has neuropathic symptoms   VASCULAR: Vascular concerns, Pulses  COORDINATION: WFL  MUSCLE TONE: WFL    Assessment & Plan   CLINICAL IMPRESSIONS  Medical Diagnosis: Lymphedema    Treatment Diagnosis: B LE Lymphedema   Impression/Assessment: Patient is a 89 year old female with worsening complaints of weeping edema in B LEs a couple weeks ago (today there's no weeping).  The following significant findings have been identified: Edema, Impaired gait, Impaired muscle performance, and Decreased activity tolerance. These impairments interfere with their ability to perform self care tasks, recreational activities, household chores, driving , household mobility, and community mobility as compared to previous level of function.     Clinical  Decision Making (Complexity):  Clinical Presentation: Stable/Uncomplicated  Clinical Presentation Rationale: based on medical and personal factors listed in PT evaluation  Clinical Decision Making (Complexity): Low complexity    PLAN OF CARE  Treatment Interventions:  Interventions: Gait Training, Manual Therapy, Therapeutic Activity, Therapeutic Exercise, Self-Care/Home Management, Gradient Compression Bandaging    Long Term Goals     PT Goal 1  Goal Identifier: 1  Goal Description: Pt and caregiver to be IND in applying temporary compression garments for self-care of B LE lymphedema mgmt  Rationale: to maximize safety and independence with performance of ADLs and functional tasks  Target Date: 01/02/25  PT Goal 2  Goal Identifier: 2  Goal Description: Pt will show a statistically significant decrease in her LLIS score by 8 pts in order to return to PLOF.  Rationale: to maximize safety and independence with performance of ADLs and functional tasks  Target Date: 01/30/25  PT Goal 3  Goal Identifier: LTG  Goal Description: Pt will independetly manage don/doff and long-term mgmt of compression garments to promote IND return to previous recreational activities in the community.  Rationale: to maximize safety and independence with performance of ADLs and functional tasks  Target Date: 02/27/25      Frequency of Treatment: 1x/wk  Duration of Treatment: 12wks    Recommended Referrals to Other Professionals: Physical Therapy  Education Assessment:   Learner/Method: Patient;Listening;Reading;Demonstration;Pictures/Video;No Barriers to Learning    Risks and benefits of evaluation/treatment have been explained.   Patient/Family/caregiver agrees with Plan of Care.     Evaluation Time:     PT Eval, Low Complexity Minutes (74601): 15   Present: Not applicable     Signing Clinician: Lore Olivera, PT        [facility]                                                                                    OUTPATIENT PHYSICAL THERAPY      PLAN OF TREATMENT FOR OUTPATIENT REHABILITATION   Patient's Last Name, First Name, Caitlyn Salgado YOB: 1935   Provider's Name   [facility]   Medical Record No.  [id number]     Onset Date: 11/22/24  Start of Care Date: 12/05/24     Medical Diagnosis:  Lymphedema      PT Treatment Diagnosis:  B LE Lymphedema Plan of Treatment  Frequency/Duration: 1x/wk/ 12wks    Certification date from 12/05/24 to 02/27/25         See note for plan of treatment details and functional goals     Lore Olivera, PT                         I CERTIFY THE NEED FOR THESE SERVICES FURNISHED UNDER        THIS PLAN OF TREATMENT AND WHILE UNDER MY CARE     (Physician attestation of this document indicates review and certification of the therapy plan).              Referring Provider:  Aurora Vila    Initial Assessment  See Epic Evaluation- Start of Care Date: 12/05/24

## 2024-12-09 ENCOUNTER — THERAPY VISIT (OUTPATIENT)
Dept: PHYSICAL THERAPY | Facility: CLINIC | Age: 89
End: 2024-12-09
Payer: COMMERCIAL

## 2024-12-09 DIAGNOSIS — I89.0 LYMPHEDEMA: Primary | Chronic | ICD-10-CM

## 2024-12-09 PROCEDURE — 97140 MANUAL THERAPY 1/> REGIONS: CPT | Mod: GP,CQ | Performed by: REHABILITATION PRACTITIONER

## 2024-12-17 NOTE — TELEPHONE ENCOUNTER
"Requested Prescriptions   Pending Prescriptions Disp Refills     atenolol-chlorthalidone (TENORETIC 50) 50-25 MG tablet 90 tablet 3    Last Written Prescription Date:  2/15/18  Last Fill Quantity: 90 tab,  # refills: 3   Last office visit: 9/28/2018 with prescribing provider:  Nurse   Future Office Visit:     Sig: Take 1 tablet by mouth daily Needs labs and office visit    Beta-Blockers Protocol Passed - 3/11/2019  1:55 PM       Passed - Blood pressure under 140/90 in past 12 months    BP Readings from Last 3 Encounters:   05/21/18 125/70   04/11/18 128/86   02/15/18 125/71                Passed - Patient is age 6 or older       Passed - Recent (12 mo) or future (30 days) visit within the authorizing provider's specialty    Patient had office visit in the last 12 months or has a visit in the next 30 days with authorizing provider or within the authorizing provider's specialty.  See \"Patient Info\" tab in inbasket, or \"Choose Columns\" in Meds & Orders section of the refill encounter.             Passed - Medication is active on med list          "
Medication is being filled for 1 time refill only due to:  Pt's last OV with Dr. Salgado 2/15/18. Letter mailed.   Gypsy BEASLEY RN        
Patient

## 2024-12-28 NOTE — PLAN OF CARE
[facility]      OUTPATIENT OCCUPATIONAL THERAPY  EVALUATION  PLAN OF TREATMENT FOR OUTPATIENT REHABILITATION  (COMPLETE FOR INITIAL CLAIMS ONLY)  Patient's Last Name, First Name, M.I.  YOB: 1935  Caitlyn Wasserman                          Provider's Name  [facility] Medical Record No.  [id number]                               Onset Date:  09/28/20   Start of Care Date:   9/29/20     Type:     ___PT   _X_OT   ___SLP Medical Diagnosis:                        S/p R YSABEL   OT Diagnosis:  decreased independence with ADLs   Visits from SOC:  1   _________________________________________________________________________________  Plan of Treatment/Functional Goals    Planned Interventions: ADL retraining, AE training      Goals: See Occupational Therapy Goals on Care Plan in [facility] electronic health record.    Therapy Frequency: Daily  Predicted Duration of Therapy Intervention: 2 days  _________________________________________________________________________________    I CERTIFY THE NEED FOR THESE SERVICES FURNISHED UNDER        THIS PLAN OF TREATMENT AND WHILE UNDER MY CARE     (Physician co-signature of this document indicates review and certification of the therapy plan).               ,      Referring Physician: Ashkan Shah MD             Initial Assessment        See Occupational Therapy evaluation dated   in Epic electronic health record.                  
                                                                              [facility]      OUTPATIENT PHYSICAL THERAPY EVALUATION  PLAN OF TREATMENT FOR OUTPATIENT REHABILITATION  (COMPLETE FOR INITIAL CLAIMS ONLY)  Patient's Last Name, First Name, M.I.  YOB: 1935  Caitlyn Wasserman                        Provider's Name  [facility] Medical Record No.  [id number]                               Onset Date:  09/28/20   Start of Care Date: 9/29/20         Type:     _X_PT   ___OT   ___SLP Medical Diagnosis:   right total hip arthroplasty.                         PT Diagnosis:  right total hip arthroplasty.    Visits from SOC:  1   _________________________________________________________________________________  Plan of Treatment/Functional Goals    Planned Interventions:  ,    bed mobility training, gait training, strengthening, transfer training, home program guidelines,       Goals: See Physical Therapy Goals on Care Plan in [employer] electronic health record.    Therapy Frequency: 2x/day  Predicted Duration of Therapy Intervention: 1 day  _________________________________________________________________________________    I CERTIFY THE NEED FOR THESE SERVICES FURNISHED UNDER        THIS PLAN OF TREATMENT AND WHILE UNDER MY CARE     (Physician co-signature of this document indicates review and certification of the therapy plan).                 ,      Referring Physician: DR Shah            Initial Assessment        See Physical Therapy evaluation dated   in Epic electronic health record.  
AOx4, up w/assist of 1 and walker to bathroom void. Scheduled tylenol as ordered, declines narcotics d/t nausea. Per patient nausea significantly improved since no narcotics for aprox 20 hours. Declines ice to right hip, as feels this increases her pain. No prn antiemetics given. VSS, rested intermittently throughout shift.  
AOx4, up w/assist of 1, walker and gait belt. Moves well with assistance getting RLE out of bed. Tylenol currently controlling pain adequately, declines narcotics d/t nausea. No current nausea, prn hydroxyzine, scheduled tylenol given. Currently resting comfortably.  
Discharge Planner OT   Patient plan for discharge: Home with spouse     Current status: Pt able to complete ADLs with supervision-independence. Has all recommended AE/DME.     Barriers to return to prior living situation: None    Recommendations for discharge: Home with spouse     Rationale for recommendations: all IP OT goals met    Occupational Therapy Discharge Summary    Reason for therapy discharge:    All goals and outcomes met, no further needs identified.    Progress towards therapy goal(s). See goals on Care Plan in [facility] electronic health record for goal details.  Goals met    Therapy recommendation(s):    No further therapy is recommended.           Entered by: Griselda Borja 09/30/2020 12:42 PM       
Discharge Planner OT   Patient plan for discharge: home with spouse     Current status: Eval complete. Pt has AE for lower body dressing and uses daily. Is able to verbalize understanding to hip precautions during ADL tasks. Pt is concerned with toilet transfer at home, however does not want to practice at this time d/t nausea. Discussed various DME and technique options. Will attempt tomorrow.     Barriers to return to prior living situation: generally does not feel well- nausea     Recommendations for discharge: Home with spouse     Rationale for recommendations: Pt has reacher, shower chair and sock aid that she uses daily. Pt states spouse can get something for toilet if needed.        Entered by: Griselda Borja 09/29/2020 10:29 AM       
Patient is alert and oriented. Rating pain 3-6/10 in right hip.  Prn dilaudid and schedule tylenol given for pain.  Ice pack being applied.  Dressing on right hip is clean, dry and intact.  Pulses normal.  Edema noted in bilateral ankles, which patient states is normal for her.      At 2010, attempted to assist patient to commode.  Patient c/o dizziness, encouraged to sit at edge of bed.  When patient stood, she became light headed and she was lift back into bed.  BP 95/51, on-call provider paged and LR bolus given.      Patient did better when getting out of bed later this evening, she was able to stand and take a few steps to the commode.  When patient returned to bed she had nausea that resolved without medication.  Patient is now resting in between cares.    PCD's on, capno on, patient is using IS.  Lung sounds are clear. Oxygen sats WNL on room air.  
Patient is alert and oriented. Up in room and pastor with 1 assist, gait belt and walker. Needed minimal assist to get legs back into bed. Patient was able to boost herself up in bed independently. Denies nausea. Eating poorly though. Voids without difficulty, passing flatus. Restarted atenolol and chlorthalidone today. Will discharge later this afternoon if blood pressure remains stable.    
Patient receiving Tylenol and Dilaudid for right hip discomfort. Complained of some nausea. Given dose of Compazine as not time for Zofran yet. Patient continued to complain of nausea. Given dose of Zofran. Patient later had emesis and stated lunch tray smell increased her nausea. Patient eating flavored water ice. Given scheduled Tylenol for pain at lunch time, declined Dilaudid to help prevent nausea. She is noted to be allergic to many narcotics. Up to chair for meals, up to commode and ambulated with therapy in patient room. Ice pack in use off and on, patient states it actually makes her pain worse. ROZINA stockings applied per order. Using PCD while in bed. BP low this morning, 96/44, BP meds held per orders. Afternoon /53.   
Physical Therapy Discharge Summary    Reason for therapy discharge:    Discharged to home.    Progress towards therapy goal(s). See goals on Care Plan in [facility] electronic health record for goal details.  Goals met   Pt feeling better today- ready to discharge to home. ; performs bed mobility, transfers w/ RW/ SBA- ambulated 80 feet x1 with RW and SBA, including up/ down steps w/  Use of railing. SEC and SBA.    HEP issued    Therapy recommendation(s):    Continue home exercise program.      
Pt reports satisfactory control of R hip pain w/ use of ice, scheduled tylenol & prn dilaudid 2mg po. Still has intermittent nausea, but no emesis. Takes bites of saltine crackers with pain meds. Given IV Zofran this morning & ginger ale. Pt has been up to bedside commode w/ 1-2 assist, gait belt & walker. Pt states much less dizziness when allowed to move slowly. Dressing D&I.  
[facility] DISCHARGE NOTE    Patient discharged to home at 2:17 PM via wheel chair. Accompanied by staff. Discharge instructions reviewed with patient, opportunity offered to ask questions. Prescriptions sent to patients preferred pharmacy. All belongings sent with patient.    Margi Caro RN  
Warm

## 2025-01-07 ENCOUNTER — OFFICE VISIT (OUTPATIENT)
Dept: FAMILY MEDICINE | Facility: CLINIC | Age: 89
End: 2025-01-07
Payer: COMMERCIAL

## 2025-01-07 VITALS
HEIGHT: 63 IN | HEART RATE: 54 BPM | WEIGHT: 146 LBS | RESPIRATION RATE: 16 BRPM | SYSTOLIC BLOOD PRESSURE: 134 MMHG | BODY MASS INDEX: 25.87 KG/M2 | DIASTOLIC BLOOD PRESSURE: 68 MMHG | TEMPERATURE: 97.7 F | OXYGEN SATURATION: 96 %

## 2025-01-07 DIAGNOSIS — L30.9 DERMATITIS: ICD-10-CM

## 2025-01-07 DIAGNOSIS — G62.9 PERIPHERAL POLYNEUROPATHY: ICD-10-CM

## 2025-01-07 DIAGNOSIS — I10 ESSENTIAL HYPERTENSION: ICD-10-CM

## 2025-01-07 DIAGNOSIS — E78.5 HYPERLIPIDEMIA LDL GOAL <130: ICD-10-CM

## 2025-01-07 DIAGNOSIS — M19.042 PRIMARY OSTEOARTHRITIS OF BOTH HANDS: ICD-10-CM

## 2025-01-07 DIAGNOSIS — N39.0 RECURRENT UTI: ICD-10-CM

## 2025-01-07 DIAGNOSIS — J44.9 CHRONIC OBSTRUCTIVE PULMONARY DISEASE, UNSPECIFIED COPD TYPE (H): ICD-10-CM

## 2025-01-07 DIAGNOSIS — Z00.00 ENCOUNTER FOR MEDICARE ANNUAL WELLNESS EXAM: Primary | ICD-10-CM

## 2025-01-07 DIAGNOSIS — M19.041 PRIMARY OSTEOARTHRITIS OF BOTH HANDS: ICD-10-CM

## 2025-01-07 LAB
ANION GAP SERPL CALCULATED.3IONS-SCNC: 10 MMOL/L (ref 7–15)
BUN SERPL-MCNC: 13.2 MG/DL (ref 8–23)
CALCIUM SERPL-MCNC: 10.3 MG/DL (ref 8.8–10.4)
CHLORIDE SERPL-SCNC: 94 MMOL/L (ref 98–107)
CHOLEST SERPL-MCNC: 238 MG/DL
CREAT SERPL-MCNC: 0.82 MG/DL (ref 0.51–0.95)
EGFRCR SERPLBLD CKD-EPI 2021: 69 ML/MIN/1.73M2
FASTING STATUS PATIENT QL REPORTED: NO
FASTING STATUS PATIENT QL REPORTED: NO
GLUCOSE SERPL-MCNC: 96 MG/DL (ref 70–99)
HCO3 SERPL-SCNC: 30 MMOL/L (ref 22–29)
HDLC SERPL-MCNC: 75 MG/DL
LDLC SERPL CALC-MCNC: 136 MG/DL
NONHDLC SERPL-MCNC: 163 MG/DL
POTASSIUM SERPL-SCNC: 4.3 MMOL/L (ref 3.4–5.3)
SODIUM SERPL-SCNC: 134 MMOL/L (ref 135–145)
TRIGL SERPL-MCNC: 136 MG/DL

## 2025-01-07 PROCEDURE — 80048 BASIC METABOLIC PNL TOTAL CA: CPT | Performed by: NURSE PRACTITIONER

## 2025-01-07 PROCEDURE — 80061 LIPID PANEL: CPT | Performed by: NURSE PRACTITIONER

## 2025-01-07 PROCEDURE — 36415 COLL VENOUS BLD VENIPUNCTURE: CPT | Performed by: NURSE PRACTITIONER

## 2025-01-07 RX ORDER — HYDROCORTISONE 25 MG/G
OINTMENT TOPICAL 2 TIMES DAILY
Qty: 20 G | Refills: 3 | Status: SHIPPED | OUTPATIENT
Start: 2025-01-07

## 2025-01-07 RX ORDER — ALBUTEROL SULFATE 90 UG/1
2 INHALANT RESPIRATORY (INHALATION) EVERY 6 HOURS
Qty: 18 G | Refills: 3 | Status: SHIPPED | OUTPATIENT
Start: 2025-01-07

## 2025-01-07 RX ORDER — CHLORTHALIDONE 25 MG/1
50 TABLET ORAL DAILY
Qty: 180 TABLET | Refills: 3 | Status: SHIPPED | OUTPATIENT
Start: 2025-01-07

## 2025-01-07 RX ORDER — ESTRADIOL 0.1 MG/G
2 CREAM VAGINAL
Qty: 85 G | Refills: 3 | Status: SHIPPED | OUTPATIENT
Start: 2025-01-09

## 2025-01-07 SDOH — HEALTH STABILITY: PHYSICAL HEALTH: ON AVERAGE, HOW MANY DAYS PER WEEK DO YOU ENGAGE IN MODERATE TO STRENUOUS EXERCISE (LIKE A BRISK WALK)?: 0 DAYS

## 2025-01-07 ASSESSMENT — PAIN SCALES - GENERAL: PAINLEVEL_OUTOF10: NO PAIN (0)

## 2025-01-07 ASSESSMENT — SOCIAL DETERMINANTS OF HEALTH (SDOH): HOW OFTEN DO YOU GET TOGETHER WITH FRIENDS OR RELATIVES?: MORE THAN THREE TIMES A WEEK

## 2025-01-07 NOTE — PATIENT INSTRUCTIONS
Will be notified of pending labs.  Follow up with neurology as scheduled if you chose to.  Try the ointment for hand pain.          Patient Education   Preventive Care Advice   This is general advice given by our system to help you stay healthy. However, your care team may have specific advice just for you. Please talk to your care team about your preventive care needs.  Nutrition  Eat 5 or more servings of fruits and vegetables each day.  Try wheat bread, brown rice and whole grain pasta (instead of white bread, rice, and pasta).  Get enough calcium and vitamin D. Check the label on foods and aim for 100% of the RDA (recommended daily allowance).  Lifestyle  Exercise at least 150 minutes each week  (30 minutes a day, 5 days a week).  Do muscle strengthening activities 2 days a week. These help control your weight and prevent disease.  No smoking.  Wear sunscreen to prevent skin cancer.  Have a dental exam and cleaning every 6 months.  Yearly exams  See your health care team every year to talk about:  Any changes in your health.  Any medicines your care team has prescribed.  Preventive care, family planning, and ways to prevent chronic diseases.  Shots (vaccines)   HPV shots (up to age 26), if you've never had them before.  Hepatitis B shots (up to age 59), if you've never had them before.  COVID-19 shot: Get this shot when it's due.  Flu shot: Get a flu shot every year.  Tetanus shot: Get a tetanus shot every 10 years.  Pneumococcal, hepatitis A, and RSV shots: Ask your care team if you need these based on your risk.  Shingles shot (for age 50 and up)  General health tests  Diabetes screening:  Starting at age 35, Get screened for diabetes at least every 3 years.  If you are younger than age 35, ask your care team if you should be screened for diabetes.  Cholesterol test: At age 39, start having a cholesterol test every 5 years, or more often if advised.  Bone density scan (DEXA): At age 50, ask your care team if  you should have this scan for osteoporosis (brittle bones).  Hepatitis C: Get tested at least once in your life.  STIs (sexually transmitted infections)  Before age 24: Ask your care team if you should be screened for STIs.  After age 24: Get screened for STIs if you're at risk. You are at risk for STIs (including HIV) if:  You are sexually active with more than one person.  You don't use condoms every time.  You or a partner was diagnosed with a sexually transmitted infection.  If you are at risk for HIV, ask about PrEP medicine to prevent HIV.  Get tested for HIV at least once in your life, whether you are at risk for HIV or not.  Cancer screening tests  Cervical cancer screening: If you have a cervix, begin getting regular cervical cancer screening tests starting at age 21.  Breast cancer scan (mammogram): If you've ever had breasts, begin having regular mammograms starting at age 40. This is a scan to check for breast cancer.  Colon cancer screening: It is important to start screening for colon cancer at age 45.  Have a colonoscopy test every 10 years (or more often if you're at risk) Or, ask your provider about stool tests like a FIT test every year or Cologuard test every 3 years.  To learn more about your testing options, visit:   .  For help making a decision, visit:   https://bit.ly/wh36396.  Prostate cancer screening test: If you have a prostate, ask your care team if a prostate cancer screening test (PSA) at age 55 is right for you.  Lung cancer screening: If you are a current or former smoker ages 50 to 80, ask your care team if ongoing lung cancer screenings are right for you.  For informational purposes only. Not to replace the advice of your health care provider. Copyright   2023 [employer]. All rights reserved. Clinically reviewed by the [facility]. Amplitude [id number] - REV 01/24.  Learning About Activities of Daily Living  What are activities of daily  living?     Activities of daily living (ADLs) are the basic self-care tasks you do every day. These include eating, bathing, dressing, and moving around.  As you age, and if you have health problems, you may find that it's harder to do some of these tasks. If so, your doctor can suggest ideas that may help.  To measure what kind of help you may need, your doctor will ask how well you are able to do ADLs. Let your doctor know if there are any tasks that you are having trouble doing. This is an important first step to getting help. And when you have the help you need, you can stay as independent as possible.  How will a doctor assess your ADLs?  Asking about ADLs is part of a routine health checkup your doctor will likely do as you age. Your health check might be done in a doctor's office, in your home, or at a hospital. The goal is to find out if you are having any problems that could make it hard to care for yourself or that make it unsafe for you to be on your own.  To measure your ADLs, your doctor will ask how hard it is for you to do routine tasks. Your doctor may also want to know if you have changed the way you do a task because of a health problem. Your doctor may watch how you:  Walk back and forth.  Keep your balance while you stand or walk.  Move from sitting to standing or from a bed to a chair.  Button or unbutton a shirt or sweater.  Remove and put on your shoes.  It's common to feel a little worried or anxious if you find you can't do all the things you used to be able to do. Talking with your doctor about ADLs is a way to make sure you're as safe as possible and able to care for yourself as well as you can. You may want to bring a caregiver, friend, or family member to your checkup. They can help you talk to your doctor.  Follow-up care is a key part of your treatment and safety. Be sure to make and go to all appointments, and call your doctor if you are having problems. It's also a good idea to know  your test results and keep a list of the medicines you take.  Current as of: October 24, 2023  Content Version: 14.3    2024 [employer].   Care instructions adapted under license by your healthcare professional. If you have questions about a medical condition or this instruction, always ask your healthcare professional. [employer] disclaims any warranty or liability for your use of this information.    Preventing Falls: Care Instructions  Injuries and health problems such as trouble walking or poor eyesight can increase your risk of falling. So can some medicines. But there are things you can do to help prevent falls. You can exercise to get stronger. You can also arrange your home to make it safer.    Talk to your doctor about the medicines you take. Ask if any of them increase the risk of falls and whether they can be changed or stopped.   Try to exercise regularly. It can help improve your strength and balance. This can help lower your risk of falling.         Practice fall safety and prevention.   Wear low-heeled shoes that fit well and give your feet good support. Talk to your doctor if you have foot problems that make this hard.  Carry a cellphone or wear a medical alert device that you can use to call for help.  Use stepladders instead of chairs to reach high objects. Don't climb if you're at risk for falls. Ask for help, if needed.  Wear the correct eyeglasses, if you need them.        Make your home safer.   Remove rugs, cords, clutter, and furniture from walkways.  Keep your house well lit. Use night-lights in hallways and bathrooms.  Install and use sturdy handrails on stairways.  Wear nonskid footwear, even inside. Don't walk barefoot or in socks without shoes.        Be safe outside.   Use handrails, curb cuts, and ramps whenever possible.  Keep your hands free by using a shoulder bag or backpack.  Try to walk in well-lit areas. Watch out for uneven ground, changes in pavement,  "and debris.  Be careful in the winter. Walk on the grass or gravel when sidewalks are slippery. Use de-icer on steps and walkways. Add non-slip devices to shoes.    Put grab bars and nonskid mats in your shower or tub and near the toilet. Try to use a shower chair or bath bench when bathing.   Get into a tub or shower by putting in your weaker leg first. Get out with your strong side first. Have a phone or medical alert device in the bathroom with you.   Where can you learn more?  Go to https://www.[employer].net/patiented  Enter G117 in the search box to learn more about \"Preventing Falls: Care Instructions.\"  Current as of: July 31, 2024  Content Version: 14.3    2024 [employer].   Care instructions adapted under license by your healthcare professional. If you have questions about a medical condition or this instruction, always ask your healthcare professional. [employer] disclaims any warranty or liability for your use of this information.    Hearing Loss: Care Instructions  Overview     Hearing loss is a sudden or slow decrease in how well you hear. It can range from slight to profound. Permanent hearing loss can occur with aging. It also can happen when you are exposed long-term to loud noise. Examples include listening to loud music, riding motorcycles, or being around other loud machines.  Hearing loss can affect your work and home life. It can make you feel lonely or depressed. You may feel that you have lost your independence. But hearing aids and other devices can help you hear better and feel connected to others.  Follow-up care is a key part of your treatment and safety. Be sure to make and go to all appointments, and call your doctor if you are having problems. It's also a good idea to know your test results and keep a list of the medicines you take.  How can you care for yourself at home?  Avoid loud noises whenever possible. This helps keep your hearing from getting " "worse.  Always wear hearing protection around loud noises.  Wear a hearing aid as directed.  A professional can help you pick a hearing aid that will work best for you.  You can also get hearing aids over the counter for mild to moderate hearing loss.  Have hearing tests as your doctor suggests. They can show whether your hearing has changed. Your hearing aid may need to be adjusted.  Use other devices as needed. These may include:  Telephone amplifiers and hearing aids that can connect to a television, stereo, radio, or microphone.  Devices that use lights or vibrations. These alert you to the doorbell, a ringing telephone, or a baby monitor.  Television closed-captioning. This shows the words at the bottom of the screen. Most new TVs can do this.  TTY (text telephone). This lets you type messages back and forth on the telephone instead of talking or listening. These devices are also called TDD. When messages are typed on the keyboard, they are sent over the phone line to a receiving TTY. The message is shown on a monitor.  Use text messaging, social media, and email if it is hard for you to communicate by telephone.  Try to learn a listening technique called speechreading. It is not lipreading. You pay attention to people's gestures, expressions, posture, and tone of voice. These clues can help you understand what a person is saying. Face the person you are talking to, and have them face you. Make sure the lighting is good. You need to see the other person's face clearly.  Think about counseling if you need help to adjust to your hearing loss.  When should you call for help?  Watch closely for changes in your health, and be sure to contact your doctor if:    You think your hearing is getting worse.     You have new symptoms, such as dizziness or nausea.   Where can you learn more?  Go to https://www.healthwise.net/patiented  Enter R798 in the search box to learn more about \"Hearing Loss: Care " "Instructions.\"  Current as of: September 27, 2023  Content Version: 14.3    2024 [employer].   Care instructions adapted under license by your healthcare professional. If you have questions about a medical condition or this instruction, always ask your healthcare professional. [employer] disclaims any warranty or liability for your use of this information.    Learning About Sleeping Well  What does sleeping well mean?     Sleeping well means getting enough sleep to feel good and stay healthy. How much sleep is enough varies among people.  The number of hours you sleep and how you feel when you wake up are both important. If you do not feel refreshed, you probably need more sleep. Another sign of not getting enough sleep is feeling tired during the day.  Experts recommend that adults get at least 7 or more hours of sleep per day. Children and older adults need more sleep.  Why is getting enough sleep important?  Getting enough quality sleep is a basic part of good health. When your sleep suffers, your physical health, mood, and your thoughts can suffer too. You may find yourself feeling more grumpy or stressed. Not getting enough sleep also can lead to serious problems, including injury, accidents, anxiety, and depression.  What might cause poor sleeping?  Many things can cause sleep problems, including:  Changes to your sleep schedule.  Stress. Stress can be caused by fear about a single event, such as giving a speech. Or you may have ongoing stress, such as worry about work or school.  Depression, anxiety, and other mental or emotional conditions.  Changes in your sleep habits or surroundings. This includes changes that happen where you sleep, such as noise, light, or sleeping in a different bed. It also includes changes in your sleep pattern, such as having jet lag or working a late shift.  Health problems, such as pain, breathing problems, and restless legs syndrome.  Lack of regular " "exercise.  Using alcohol, nicotine, or caffeine before bed.  How can you help yourself?  Here are some tips that may help you sleep more soundly and wake up feeling more refreshed.  Your sleeping area   Use your bedroom only for sleeping and sex. A bit of light reading may help you fall asleep. But if it doesn't, do your reading elsewhere in the house. Try not to use your TV, computer, smartphone, or tablet while you are in bed.  Be sure your bed is big enough to stretch out comfortably, especially if you have a sleep partner.  Keep your bedroom quiet, dark, and cool. Use curtains, blinds, or a sleep mask to block out light. To block out noise, use earplugs, soothing music, or a \"white noise\" machine.  Your evening and bedtime routine   Create a relaxing bedtime routine. You might want to take a warm shower or bath, or listen to soothing music.  Go to bed at the same time every night. And get up at the same time every morning, even if you feel tired.  What to avoid   Limit caffeine (coffee, tea, caffeinated sodas) during the day, and don't have any for at least 6 hours before bedtime.  Avoid drinking alcohol before bedtime. Alcohol can cause you to wake up more often during the night.  Try not to smoke or use tobacco, especially in the evening. Nicotine can keep you awake.  Limit naps during the day, especially close to bedtime.  Avoid lying in bed awake for too long. If you can't fall asleep or if you wake up in the middle of the night and can't get back to sleep within about 20 minutes, get out of bed and go to another room until you feel sleepy.  Avoid taking medicine right before bed that may keep you awake or make you feel hyper or energized. Your doctor can tell you if your medicine may do this and if you can take it earlier in the day.  If you can't sleep   Imagine yourself in a peaceful, pleasant scene. Focus on the details and feelings of being in a place that is relaxing.  Get up and do a quiet or boring " "activity until you feel sleepy.  Avoid drinking any liquids before going to bed to help prevent waking up often to use the bathroom.  Where can you learn more?  Go to https://www.[employer].net/patiented  Enter J942 in the search box to learn more about \"Learning About Sleeping Well.\"  Current as of: July 31, 2024  Content Version: 14.3    2024 [employer].   Care instructions adapted under license by your healthcare professional. If you have questions about a medical condition or this instruction, always ask your healthcare professional. [employer] disclaims any warranty or liability for your use of this information.    Bladder Training: Care Instructions  Your Care Instructions     Bladder training is used to treat urge incontinence and stress incontinence. Urge incontinence means that the need to urinate comes on so fast that you can't get to a toilet in time. Stress incontinence means that you leak urine because of pressure on your bladder. For example, it may happen when you laugh, cough, or lift something heavy.  Bladder training can increase how long you can wait before you have to urinate. It can also help your bladder hold more urine. And it can give you better control over the urge to urinate.  It is important to remember that bladder training takes a few weeks to a few months to make a difference. You may not see results right away, but don't give up.  Follow-up care is a key part of your treatment and safety. Be sure to make and go to all appointments, and call your doctor if you are having problems. It's also a good idea to know your test results and keep a list of the medicines you take.  How can you care for yourself at home?  Work with your doctor to come up with a bladder training program that is right for you. You may use one or more of the following methods.  Delayed urination  In the beginning, try to keep from urinating for 5 minutes after you first feel the need to " "go.  While you wait, take deep, slow breaths to relax. Kegel exercises can also help you delay the need to go to the bathroom.  After some practice, when you can easily wait 5 minutes to urinate, try to wait 10 minutes before you urinate.  Slowly increase the waiting period until you are able to control when you have to urinate.  Scheduled urination  Empty your bladder when you first wake up in the morning.  Schedule times throughout the day when you will urinate.  Start by going to the bathroom every hour, even if you don't need to go.  Slowly increase the time between trips to the bathroom.  When you have found a schedule that works well for you, keep doing it.  If you wake up during the night and have to urinate, do it. Apply your schedule to waking hours only.  Kegel exercises  These tighten and strengthen pelvic muscles, which can help you control the flow of urine. (If doing these exercises causes pain, stop doing them and talk with your doctor.) To do Kegel exercises:  Squeeze your muscles as if you were trying not to pass gas. Or squeeze your muscles as if you were stopping the flow of urine. Your belly, legs, and buttocks shouldn't move.  Hold the squeeze for 3 seconds, then relax for 5 to 10 seconds.  Start with 3 seconds, then add 1 second each week until you are able to squeeze for 10 seconds.  Repeat the exercise 10 times a session. Do 3 to 8 sessions a day.  When should you call for help?  Watch closely for changes in your health, and be sure to contact your doctor if:    Your incontinence is getting worse.     You do not get better as expected.   Where can you learn more?  Go to https://www.[employer].net/patiented  Enter V684 in the search box to learn more about \"Bladder Training: Care Instructions.\"  Current as of: April 30, 2024  Content Version: 14.3    2024 [employer].   Care instructions adapted under license by your healthcare professional. If you have questions about a medical " condition or this instruction, always ask your healthcare professional. [employer] disclaims any warranty or liability for your use of this information.

## 2025-01-07 NOTE — PROGRESS NOTES
Preventive Care Visit  [facility]  SUDHAKAR Yung CNP, Family Medicine  Jan 7, 2025      Assessment & Plan     Encounter for Medicare annual wellness exam      Dermatitis    - hydrocortisone 2.5 % ointment; Apply topically 2 times daily.  Use as needed for itchy skin.    Essential hypertension    - BASIC METABOLIC PANEL; Future  - chlorthalidone (HYGROTON) 25 MG tablet; Take 2 tablets (50 mg) by mouth daily.  - BASIC METABOLIC PANEL  Controlled, continue medications and monitoring, labs are pending.    Chronic obstructive pulmonary disease, unspecified COPD type (H)    - albuterol (PROAIR HFA/PROVENTIL HFA/VENTOLIN HFA) 108 (90 Base) MCG/ACT inhaler; Inhale 2 puffs into the lungs every 6 hours.  Continue with prn use of inhaler.  No concerns with her breathing, she does have an occasional cough.  Continue to monitor.    Hyperlipidemia LDL goal <130    - Lipid panel reflex to direct LDL Non-fasting; Future  - Lipid panel reflex to direct LDL Non-fasting    Primary osteoarthritis of both hands    - diclofenac (VOLTAREN) 1 % topical gel; Apply 4 g topically 4 times daily.  Discussed how to take the medication(s), expected outcomes, potential side effects.    Recurrent UTI    - estradiol (ESTRACE) 0.1 MG/GM vaginal cream; Place 2 g vaginally twice a week.  Continue work with ob/gyn.    Peripheral polyneuropathy    Referred back to neurology for summary and plan going forth.          Counseling  Appropriate preventive services were addressed with this patient via screening, questionnaire, or discussion as appropriate for fall prevention, nutrition, physical activity, Tobacco-use cessation, social engagement, weight loss and cognition.  Checklist reviewing preventive services available has been given to the patient.  Reviewed patient's diet, addressing concerns and/or questions.   Discussed possible causes of fatigue. Updated plan of care.  Patient reported difficulty with activities  of daily living were addressed today.The patient was provided with written information regarding signs of hearing loss.   Information on urinary incontinence and treatment options given to patient.       See Patient Instructions  Patient Instructions   Will be notified of pending labs.  Follow up with neurology as scheduled if you chose to.  Try the ointment for hand pain.          Patient Education  Preventive Care Advice   This is general advice given by our system to help you stay healthy. However, your care team may have specific advice just for you. Please talk to your care team about your preventive care needs.  Nutrition  Eat 5 or more servings of fruits and vegetables each day.  Try wheat bread, brown rice and whole grain pasta (instead of white bread, rice, and pasta).  Get enough calcium and vitamin D. Check the label on foods and aim for 100% of the RDA (recommended daily allowance).  Lifestyle  Exercise at least 150 minutes each week  (30 minutes a day, 5 days a week).  Do muscle strengthening activities 2 days a week. These help control your weight and prevent disease.  No smoking.  Wear sunscreen to prevent skin cancer.  Have a dental exam and cleaning every 6 months.  Yearly exams  See your health care team every year to talk about:  Any changes in your health.  Any medicines your care team has prescribed.  Preventive care, family planning, and ways to prevent chronic diseases.  Shots (vaccines)   HPV shots (up to age 26), if you've never had them before.  Hepatitis B shots (up to age 59), if you've never had them before.  COVID-19 shot: Get this shot when it's due.  Flu shot: Get a flu shot every year.  Tetanus shot: Get a tetanus shot every 10 years.  Pneumococcal, hepatitis A, and RSV shots: Ask your care team if you need these based on your risk.  Shingles shot (for age 50 and up)  General health tests  Diabetes screening:  Starting at age 35, Get screened for diabetes at least every 3 years.  If  you are younger than age 35, ask your care team if you should be screened for diabetes.  Cholesterol test: At age 39, start having a cholesterol test every 5 years, or more often if advised.  Bone density scan (DEXA): At age 50, ask your care team if you should have this scan for osteoporosis (brittle bones).  Hepatitis C: Get tested at least once in your life.  STIs (sexually transmitted infections)  Before age 24: Ask your care team if you should be screened for STIs.  After age 24: Get screened for STIs if you're at risk. You are at risk for STIs (including HIV) if:  You are sexually active with more than one person.  You don't use condoms every time.  You or a partner was diagnosed with a sexually transmitted infection.  If you are at risk for HIV, ask about PrEP medicine to prevent HIV.  Get tested for HIV at least once in your life, whether you are at risk for HIV or not.  Cancer screening tests  Cervical cancer screening: If you have a cervix, begin getting regular cervical cancer screening tests starting at age 21.  Breast cancer scan (mammogram): If you've ever had breasts, begin having regular mammograms starting at age 40. This is a scan to check for breast cancer.  Colon cancer screening: It is important to start screening for colon cancer at age 45.  Have a colonoscopy test every 10 years (or more often if you're at risk) Or, ask your provider about stool tests like a FIT test every year or Cologuard test every 3 years.  To learn more about your testing options, visit:   .  For help making a decision, visit:   https://bit.ly/yb28829.  Prostate cancer screening test: If you have a prostate, ask your care team if a prostate cancer screening test (PSA) at age 55 is right for you.  Lung cancer screening: If you are a current or former smoker ages 50 to 80, ask your care team if ongoing lung cancer screenings are right for you.  For informational purposes only. Not to replace the advice of your health care  provider. Copyright   2023 [facility]. All rights reserved. Clinically reviewed by the [facility] Transitions Program. inthinc 999407 - REV 01/24.  Learning About Activities of Daily Living  What are activities of daily living?     Activities of daily living (ADLs) are the basic self-care tasks you do every day. These include eating, bathing, dressing, and moving around.  As you age, and if you have health problems, you may find that it's harder to do some of these tasks. If so, your doctor can suggest ideas that may help.  To measure what kind of help you may need, your doctor will ask how well you are able to do ADLs. Let your doctor know if there are any tasks that you are having trouble doing. This is an important first step to getting help. And when you have the help you need, you can stay as independent as possible.  How will a doctor assess your ADLs?  Asking about ADLs is part of a routine health checkup your doctor will likely do as you age. Your health check might be done in a doctor's office, in your home, or at a hospital. The goal is to find out if you are having any problems that could make it hard to care for yourself or that make it unsafe for you to be on your own.  To measure your ADLs, your doctor will ask how hard it is for you to do routine tasks. Your doctor may also want to know if you have changed the way you do a task because of a health problem. Your doctor may watch how you:  Walk back and forth.  Keep your balance while you stand or walk.  Move from sitting to standing or from a bed to a chair.  Button or unbutton a shirt or sweater.  Remove and put on your shoes.  It's common to feel a little worried or anxious if you find you can't do all the things you used to be able to do. Talking with your doctor about ADLs is a way to make sure you're as safe as possible and able to care for yourself as well as you can. You may want to bring a caregiver, friend, or family  member to your checkup. They can help you talk to your doctor.  Follow-up care is a key part of your treatment and safety. Be sure to make and go to all appointments, and call your doctor if you are having problems. It's also a good idea to know your test results and keep a list of the medicines you take.  Current as of: October 24, 2023  Content Version: 14.3    2024 [employer].   Care instructions adapted under license by your healthcare professional. If you have questions about a medical condition or this instruction, always ask your healthcare professional. [employer] disclaims any warranty or liability for your use of this information.    Preventing Falls: Care Instructions  Injuries and health problems such as trouble walking or poor eyesight can increase your risk of falling. So can some medicines. But there are things you can do to help prevent falls. You can exercise to get stronger. You can also arrange your home to make it safer.    Talk to your doctor about the medicines you take. Ask if any of them increase the risk of falls and whether they can be changed or stopped.   Try to exercise regularly. It can help improve your strength and balance. This can help lower your risk of falling.         Practice fall safety and prevention.   Wear low-heeled shoes that fit well and give your feet good support. Talk to your doctor if you have foot problems that make this hard.  Carry a cellphone or wear a medical alert device that you can use to call for help.  Use stepladders instead of chairs to reach high objects. Don't climb if you're at risk for falls. Ask for help, if needed.  Wear the correct eyeglasses, if you need them.        Make your home safer.   Remove rugs, cords, clutter, and furniture from walkways.  Keep your house well lit. Use night-lights in hallways and bathrooms.  Install and use sturdy handrails on stairways.  Wear nonskid footwear, even inside. Don't walk barefoot or  "in socks without shoes.        Be safe outside.   Use handrails, curb cuts, and ramps whenever possible.  Keep your hands free by using a shoulder bag or backpack.  Try to walk in well-lit areas. Watch out for uneven ground, changes in pavement, and debris.  Be careful in the winter. Walk on the grass or gravel when sidewalks are slippery. Use de-icer on steps and walkways. Add non-slip devices to shoes.    Put grab bars and nonskid mats in your shower or tub and near the toilet. Try to use a shower chair or bath bench when bathing.   Get into a tub or shower by putting in your weaker leg first. Get out with your strong side first. Have a phone or medical alert device in the bathroom with you.   Where can you learn more?  Go to https://www.[employer]/patiented  Enter G117 in the search box to learn more about \"Preventing Falls: Care Instructions.\"  Current as of: July 31, 2024  Content Version: 14.3    2024 [employer].   Care instructions adapted under license by your healthcare professional. If you have questions about a medical condition or this instruction, always ask your healthcare professional. [employer] disclaims any warranty or liability for your use of this information.    Hearing Loss: Care Instructions  Overview     Hearing loss is a sudden or slow decrease in how well you hear. It can range from slight to profound. Permanent hearing loss can occur with aging. It also can happen when you are exposed long-term to loud noise. Examples include listening to loud music, riding motorcycles, or being around other loud machines.  Hearing loss can affect your work and home life. It can make you feel lonely or depressed. You may feel that you have lost your independence. But hearing aids and other devices can help you hear better and feel connected to others.  Follow-up care is a key part of your treatment and safety. Be sure to make and go to all appointments, and call your doctor if " you are having problems. It's also a good idea to know your test results and keep a list of the medicines you take.  How can you care for yourself at home?  Avoid loud noises whenever possible. This helps keep your hearing from getting worse.  Always wear hearing protection around loud noises.  Wear a hearing aid as directed.  A professional can help you pick a hearing aid that will work best for you.  You can also get hearing aids over the counter for mild to moderate hearing loss.  Have hearing tests as your doctor suggests. They can show whether your hearing has changed. Your hearing aid may need to be adjusted.  Use other devices as needed. These may include:  Telephone amplifiers and hearing aids that can connect to a television, stereo, radio, or microphone.  Devices that use lights or vibrations. These alert you to the doorbell, a ringing telephone, or a baby monitor.  Television closed-captioning. This shows the words at the bottom of the screen. Most new TVs can do this.  TTY (text telephone). This lets you type messages back and forth on the telephone instead of talking or listening. These devices are also called TDD. When messages are typed on the keyboard, they are sent over the phone line to a receiving TTY. The message is shown on a monitor.  Use text messaging, social media, and email if it is hard for you to communicate by telephone.  Try to learn a listening technique called speechreading. It is not lipreading. You pay attention to people's gestures, expressions, posture, and tone of voice. These clues can help you understand what a person is saying. Face the person you are talking to, and have them face you. Make sure the lighting is good. You need to see the other person's face clearly.  Think about counseling if you need help to adjust to your hearing loss.  When should you call for help?  Watch closely for changes in your health, and be sure to contact your doctor if:    You think your hearing  "is getting worse.     You have new symptoms, such as dizziness or nausea.   Where can you learn more?  Go to https://www.[employer].net/patiented  Enter R798 in the search box to learn more about \"Hearing Loss: Care Instructions.\"  Current as of: September 27, 2023  Content Version: 14.3    2024 [employer].   Care instructions adapted under license by your healthcare professional. If you have questions about a medical condition or this instruction, always ask your healthcare professional. [employer] disclaims any warranty or liability for your use of this information.    Learning About Sleeping Well  What does sleeping well mean?     Sleeping well means getting enough sleep to feel good and stay healthy. How much sleep is enough varies among people.  The number of hours you sleep and how you feel when you wake up are both important. If you do not feel refreshed, you probably need more sleep. Another sign of not getting enough sleep is feeling tired during the day.  Experts recommend that adults get at least 7 or more hours of sleep per day. Children and older adults need more sleep.  Why is getting enough sleep important?  Getting enough quality sleep is a basic part of good health. When your sleep suffers, your physical health, mood, and your thoughts can suffer too. You may find yourself feeling more grumpy or stressed. Not getting enough sleep also can lead to serious problems, including injury, accidents, anxiety, and depression.  What might cause poor sleeping?  Many things can cause sleep problems, including:  Changes to your sleep schedule.  Stress. Stress can be caused by fear about a single event, such as giving a speech. Or you may have ongoing stress, such as worry about work or school.  Depression, anxiety, and other mental or emotional conditions.  Changes in your sleep habits or surroundings. This includes changes that happen where you sleep, such as noise, light, or sleeping in " "a different bed. It also includes changes in your sleep pattern, such as having jet lag or working a late shift.  Health problems, such as pain, breathing problems, and restless legs syndrome.  Lack of regular exercise.  Using alcohol, nicotine, or caffeine before bed.  How can you help yourself?  Here are some tips that may help you sleep more soundly and wake up feeling more refreshed.  Your sleeping area   Use your bedroom only for sleeping and sex. A bit of light reading may help you fall asleep. But if it doesn't, do your reading elsewhere in the house. Try not to use your TV, computer, smartphone, or tablet while you are in bed.  Be sure your bed is big enough to stretch out comfortably, especially if you have a sleep partner.  Keep your bedroom quiet, dark, and cool. Use curtains, blinds, or a sleep mask to block out light. To block out noise, use earplugs, soothing music, or a \"white noise\" machine.  Your evening and bedtime routine   Create a relaxing bedtime routine. You might want to take a warm shower or bath, or listen to soothing music.  Go to bed at the same time every night. And get up at the same time every morning, even if you feel tired.  What to avoid   Limit caffeine (coffee, tea, caffeinated sodas) during the day, and don't have any for at least 6 hours before bedtime.  Avoid drinking alcohol before bedtime. Alcohol can cause you to wake up more often during the night.  Try not to smoke or use tobacco, especially in the evening. Nicotine can keep you awake.  Limit naps during the day, especially close to bedtime.  Avoid lying in bed awake for too long. If you can't fall asleep or if you wake up in the middle of the night and can't get back to sleep within about 20 minutes, get out of bed and go to another room until you feel sleepy.  Avoid taking medicine right before bed that may keep you awake or make you feel hyper or energized. Your doctor can tell you if your medicine may do this and if " "you can take it earlier in the day.  If you can't sleep   Imagine yourself in a peaceful, pleasant scene. Focus on the details and feelings of being in a place that is relaxing.  Get up and do a quiet or boring activity until you feel sleepy.  Avoid drinking any liquids before going to bed to help prevent waking up often to use the bathroom.  Where can you learn more?  Go to https://www.[employer].net/patiented  Enter J942 in the search box to learn more about \"Learning About Sleeping Well.\"  Current as of: July 31, 2024  Content Version: 14.3    2024 [employer].   Care instructions adapted under license by your healthcare professional. If you have questions about a medical condition or this instruction, always ask your healthcare professional. [employer] disclaims any warranty or liability for your use of this information.    Bladder Training: Care Instructions  Your Care Instructions     Bladder training is used to treat urge incontinence and stress incontinence. Urge incontinence means that the need to urinate comes on so fast that you can't get to a toilet in time. Stress incontinence means that you leak urine because of pressure on your bladder. For example, it may happen when you laugh, cough, or lift something heavy.  Bladder training can increase how long you can wait before you have to urinate. It can also help your bladder hold more urine. And it can give you better control over the urge to urinate.  It is important to remember that bladder training takes a few weeks to a few months to make a difference. You may not see results right away, but don't give up.  Follow-up care is a key part of your treatment and safety. Be sure to make and go to all appointments, and call your doctor if you are having problems. It's also a good idea to know your test results and keep a list of the medicines you take.  How can you care for yourself at home?  Work with your doctor to come up with a " "bladder training program that is right for you. You may use one or more of the following methods.  Delayed urination  In the beginning, try to keep from urinating for 5 minutes after you first feel the need to go.  While you wait, take deep, slow breaths to relax. Kegel exercises can also help you delay the need to go to the bathroom.  After some practice, when you can easily wait 5 minutes to urinate, try to wait 10 minutes before you urinate.  Slowly increase the waiting period until you are able to control when you have to urinate.  Scheduled urination  Empty your bladder when you first wake up in the morning.  Schedule times throughout the day when you will urinate.  Start by going to the bathroom every hour, even if you don't need to go.  Slowly increase the time between trips to the bathroom.  When you have found a schedule that works well for you, keep doing it.  If you wake up during the night and have to urinate, do it. Apply your schedule to waking hours only.  Kegel exercises  These tighten and strengthen pelvic muscles, which can help you control the flow of urine. (If doing these exercises causes pain, stop doing them and talk with your doctor.) To do Kegel exercises:  Squeeze your muscles as if you were trying not to pass gas. Or squeeze your muscles as if you were stopping the flow of urine. Your belly, legs, and buttocks shouldn't move.  Hold the squeeze for 3 seconds, then relax for 5 to 10 seconds.  Start with 3 seconds, then add 1 second each week until you are able to squeeze for 10 seconds.  Repeat the exercise 10 times a session. Do 3 to 8 sessions a day.  When should you call for help?  Watch closely for changes in your health, and be sure to contact your doctor if:    Your incontinence is getting worse.     You do not get better as expected.   Where can you learn more?  Go to https://www.healthwise.net/patiented  Enter V684 in the search box to learn more about \"Bladder Training: Care " "Instructions.\"  Current as of: April 30, 2024  Content Version: 14.3    2024 [employer].   Care instructions adapted under license by your healthcare professional. If you have questions about a medical condition or this instruction, always ask your healthcare professional. [employer] disclaims any warranty or liability for your use of this information.         Werner Brady is a 89 year old, presenting for the following:  Wellness Visit        1/7/2025    10:35 AM   Additional Questions   Roomed by Laly           HPI    Needs refills.  She continues to have pain, in her back, her hands, etc. She has seen rheumatology and is taking Tylenol as needed. Wondering about anything else. Did not tolerate lidocaine patches.  She feels so much better after seeing urology and gyn for recurrent UTI's. She has a pessary in place.  She also goes into detail about her neurology consults. She wasn't pleased, she didn't receive results of EMG or lab work apparently.      Hypertension Follow-up    Do you check your blood pressure regularly outside of the clinic? No   Are you following a low salt diet? Yes  Are your blood pressures ever more than 140 on the top number (systolic) OR more   than 90 on the bottom number (diastolic), for example 140/90? No    Health Care Directive  Patient has a Health Care Directive on file  Advance care planning document is on file and is current.      1/7/2025   General Health   How would you rate your overall physical health? Good   Feel stress (tense, anxious, or unable to sleep) Only a little   (!) STRESS CONCERN      1/7/2025   Nutrition   Diet: Regular (no restrictions)         1/7/2025   Exercise   Days per week of moderate/strenous exercise 0 days   (!) EXERCISE CONCERN      1/7/2025   Social Factors   Frequency of gathering with friends or relatives More than three times a week   Worry food won't last until get money to buy more No   Food not last or not have " enough money for food? No   Do you have housing? (Housing is defined as stable permanent housing and does not include staying ouside in a car, in a tent, in an abandoned building, in an overnight shelter, or couch-surfing.) Yes   Are you worried about losing your housing? No   Lack of transportation? No   Unable to get utilities (heat,electricity)? Yes   Want help with housing or utility concern? No   (!) FINANCIAL RESOURCE STRAIN CONCERN      1/7/2025   Fall Risk   Fallen 2 or more times in the past year? No    No   Trouble with walking or balance? Yes    Yes   Gait Speed Test Interpretation Less than or equal to 5.00 seconds - PASS       Multiple values from one day are sorted in reverse-chronological order           1/7/2025   Activities of Daily Living- Home Safety   Needs help with the following daily activites Housework   Safety concerns in the home None of the above         1/7/2025   Dental   Dentist two times every year? Yes         1/7/2025   Hearing Screening   Hearing concerns? (!) I FEEL THAT PEOPLE ARE MUMBLING OR NOT SPEAKING CLEARLY.    (!) I NEED TO ASK PEOPLE TO SPEAK UP OR REPEAT THEMSELVES.    (!) TROUBLE UNDERSTANDING SOFT OR WHISPERED SPEECH.       Multiple values from one day are sorted in reverse-chronological order         1/7/2025   Driving Risk Screening   Patient/family members have concerns about driving No         1/7/2025   General Alertness/Fatigue Screening   Have you been more tired than usual lately? (!) YES         1/7/2025   Urinary Incontinence Screening   Bothered by leaking urine in past 6 months Yes         4/2/2024   TB Screening   Were you born outside of the [place]? No         Today's PHQ-2 Score:       1/7/2025    10:23 AM   PHQ-2 ( 1999 [employer])   Q1: Little interest or pleasure in doing things 0   Q2: Feeling down, depressed or hopeless 0   PHQ-2 Score 0    Q1: Little interest or pleasure in doing things Not at all   Q2: Feeling down, depressed or hopeless Not at all   PHQ-2  Score 0       Patient-reported           2025   Substance Use   Alcohol more than 3/day or more than 7/wk Not Applicable   Do you have a current opioid prescription? No   How severe/bad is pain from 1 to 10? 5/10   Do you use any other substances recreationally? No     Social History     Tobacco Use    Smoking status: Former     Current packs/day: 0.00     Average packs/day: 1 pack/day for 20.0 years (20.0 ttl pk-yrs)     Types: Cigarettes     Start date: 1951     Quit date: 1971     Years since quittin.0    Smokeless tobacco: Never   Vaping Use    Vaping status: Never Used   Substance Use Topics    Alcohol use: Yes     Comment: 1 wine a month maybe    Drug use: No                    Reviewed and updated as needed this visit by Provider                    BP Readings from Last 3 Encounters:   25 134/68   24 126/70   10/23/24 (!) 159/89    Wt Readings from Last 3 Encounters:   25 66.2 kg (146 lb)   24 64.6 kg (142 lb 6.4 oz)   10/23/24 64 kg (141 lb)                  Patient Active Problem List   Diagnosis    Urinary frequency    LEFT HIP PAIN    Right shoulder pain    Numbness in right leg    Cataract    Recurrent UTI    HYPERLIPIDEMIA LDL GOAL <130    Kyphosis of cervical region    Neck pain, chronic    Essential tremor    Pedal edema    Primary osteoarthritis of both knees    Chronic rhinitis    Essential hypertension    Dysuria    Chronic obstructive pulmonary disease, unspecified COPD type (H)    Osteoarthritis of both hands    Vaginal prolapse    Hip arthrosis    S/P total hip arthroplasty    Abnormal gait    Spondylosis of lumbar region without myelopathy or radiculopathy    Lymphedema     Past Surgical History:   Procedure Laterality Date    ARTHROPLASTY HIP Right 2020    Procedure: TOTAL Hip Arthroplasty;  Surgeon: Ashkan Shah MD;  Location: [facility] OR    AS TOTAL KNEE ARTHROPLASTY Right     HC ANTER COLPORRHAPHY,BLAD/VAGINA      Cystocele  Repair,rectocele repair.    JOINT REPLACEMENT, HIP RT/LT  3/07,     Joint Replacement Hip /LT- dislocated in front repeat surgery 1 week later       Social History     Tobacco Use    Smoking status: Former     Current packs/day: 0.00     Average packs/day: 1 pack/day for 20.0 years (20.0 ttl pk-yrs)     Types: Cigarettes     Start date: 1951     Quit date: 1971     Years since quittin.0    Smokeless tobacco: Never   Substance Use Topics    Alcohol use: Yes     Comment: 1 wine a month maybe     Family History   Problem Relation Age of Onset    Cancer Mother         Bladder    Neurologic Disorder Mother         heriditary tremor    C.A.D. Father     Cerebrovascular Disease Father     Allergies Maternal Grandfather     Respiratory Maternal Grandfather         Asthma    Diabetes Paternal Grandmother         Type II    Cancer Brother         Multiple myloma    C.A.D. Brother     Cancer Sister         kidney cancer    Neurofibromatosis Son     Breast Cancer No family hx of          Current Outpatient Medications   Medication Sig Dispense Refill    acetaminophen (TYLENOL) 325 MG tablet Take 3 tablets (975 mg) by mouth every 8 hours      acetaminophen-caffeine (EXCEDRIN TENSION HEADACHE) 500-65 MG TABS Take 2 tablets by mouth every 6 hours as needed for mild pain      albuterol (PROAIR HFA/PROVENTIL HFA/VENTOLIN HFA) 108 (90 Base) MCG/ACT inhaler Inhale 2 puffs into the lungs every 6 hours. 18 g 3    atenolol (TENORMIN) 25 MG tablet Take 1 tablet (25 mg) by mouth daily 90 tablet 3    chlorthalidone (HYGROTON) 25 MG tablet Take 2 tablets (50 mg) by mouth daily. 180 tablet 3    clotrimazole-betamethasone (LOTRISONE) 1-0.05 % external cream Apply topically 2 times daily. 45 g 2    diclofenac (VOLTAREN) 1 % topical gel Apply 4 g topically 4 times daily. 100 g 3    docusate sodium (COLACE) 100 MG capsule Take 1 capsule (100 mg) by mouth daily 60 capsule 4    [START ON 2025] estradiol (ESTRACE) 0.1 MG/GM  vaginal cream Place 2 g vaginally twice a week. 85 g 3    hydrocortisone 2.5 % ointment Apply topically 2 times daily. 20 g 3    ipratropium (ATROVENT) 0.03 % nasal spray Spray 2 sprays into both nostrils daily 60 mL 0    multivitamin w/minerals (THERA-VIT-M) tablet Take 1 tablet by mouth daily      potassium chloride ER (K-TAB) 20 MEQ CR tablet Take 1 tablet (20 mEq) by mouth daily 90 tablet 3    psyllium 28.3 % POWD Take 1 teaspoonful by mouth daily.      ipratropium (ATROVENT HFA) 17 MCG/ACT inhaler Inhale 2 puffs into the lungs every 6 hours (Patient not taking: Reported on 1/7/2025) 12.9 g 3     Allergies   Allergen Reactions    Bactrim [Sulfamethoxazole-Trimethoprim] Nausea and Vomiting    Cortisone Other (See Comments)     Couldn't walk well even after 2 weeks    Diclofenac Sodium Other (See Comments)     Took one tab in 2008, no reaction in note    Hydrocodone Nausea and Vomiting    Lidocaine Hallucination     Patches     Morphine And Codeine Nausea and Vomiting     Vomited 1.5 days    Oxycodone Nausea and Vomiting    Tramadol Nausea and Vomiting     Recent Labs   Lab Test 10/25/24  1537 09/10/23  0657 04/29/23  1945 08/16/22  1537 07/13/21  0932 05/04/21  0846 04/19/21  1707 07/21/20  1020 02/15/18  0928 01/05/17  0440 11/08/16  1334   A1C 6.0*  --   --   --   --   --   --   --   --   --   --    LDL  --   --   --   --  119*  --   --   --  154*  --   --    HDL  --   --   --   --  71  --   --   --  62  --   --    TRIG  --   --   --   --  105  --   --   --  98  --   --    ALT  --   --  17  --   --   --   --   --  23  --   --    CR  --  0.82 0.92   < > 0.86 0.80 0.86   < > 0.88   < > 0.93   GFRESTIMATED  --  70 61   < > 63 68 62   < > 61   < > 58*   GFRESTBLACK  --   --   --   --   --  79 72   < > 74   < > 70   POTASSIUM  --  3.5 3.5   < > 3.4 3.7 3.2*   < > 4.0   < > 4.0   TSH 1.45  --   --   --   --   --   --   --   --   --  1.83    < > = values in this interval not displayed.      Current providers sharing  "in care for this patient include:  Patient Care Team:  Cindy Flor APRN CNP as PCP - General (Nurse Practitioner - Family)  Ashkan Shah MD as MD (Orthopaedic Surgery)  Nieves Cee MD as MD (OB/Gyn)  Cindy Flor APRN CNP as Assigned PCP  Neli Winn PA-C as Physician Assistant (Urology)  Jack Toure MD as MD (Neurology)  Neli Winn PA-C as Assigned Surgical Provider  Macarena Borrero PA-C as Physician Assistant (OB/Gyn)  Jemima Porras MD as Assigned OBGYN Provider  Jack Toure MD as Assigned Neuroscience Provider    The following health maintenance items are reviewed in Epic and correct as of today:  Health Maintenance   Topic Date Due    DEXA  12/17/2016    LIPID  07/13/2022    BMP  09/10/2024    MEDICARE ANNUAL WELLNESS VISIT  09/11/2024    ANNUAL REVIEW OF HM ORDERS  11/22/2025    FALL RISK ASSESSMENT  01/07/2026    ADVANCE CARE PLANNING  04/02/2029    DTAP/TDAP/TD IMMUNIZATION (2 - Td or Tdap) 05/07/2029    SPIROMETRY  Completed    COPD ACTION PLAN  Completed    PHQ-2 (once per calendar year)  Completed    INFLUENZA VACCINE  Completed    Pneumococcal Vaccine: 50+ Years  Completed    ZOSTER IMMUNIZATION  Completed    RSV VACCINE  Completed    COVID-19 Vaccine  Completed    HPV IMMUNIZATION  Aged Out    MENINGITIS IMMUNIZATION  Aged Out    RSV MONOCLONAL ANTIBODY  Aged Out         Review of Systems  Constitutional, HEENT, cardiovascular, pulmonary, gi and gu systems are negative, except as otherwise noted.     Objective    Exam  /68   Pulse 54   Temp 97.7  F (36.5  C) (Tympanic)   Resp 16   Ht 1.588 m (5' 2.5\")   Wt 66.2 kg (146 lb)   LMP  (LMP Unknown)   SpO2 96%   BMI 26.28 kg/m     Estimated body mass index is 26.28 kg/m  as calculated from the following:    Height as of this encounter: 1.588 m (5' 2.5\").    Weight as of this encounter: 66.2 kg (146 lb).    Physical Exam  GENERAL: alert and no distress  EYES: Eyes " grossly normal to inspection and conjunctivae and sclerae normal  HENT: ear canals and TM's normal, nose and mouth without ulcers or lesions  NECK: no adenopathy, no asymmetry, masses, or scars  RESP: lungs clear to auscultation - no rales, rhonchi or wheezes  CV: regular rate and rhythm, normal S1 S2, no S3 or S4, no murmur, click or rub, wearing compression knee highs  ABDOMEN: soft, nontender, no hepatosplenomegaly, no masses and bowel sounds normal  MS: arthritic changes evident in both fingers  SKIN: no suspicious lesions or rashes  NEURO: Normal strength and tone, mentation intact and speech normal  PSYCH: mentation appears normal, affect normal/bright        1/7/2025   Mini Cog   Clock Draw Score 2 Normal   3 Item Recall 3 objects recalled   Mini Cog Total Score 5              Signed Electronically by: SUDHAKAR Yung CNP

## 2025-01-07 NOTE — LETTER
January 8, 2025      Caitlyn MULLIGAN Ida  [address]   [facility] [address]        Dear ,    We are writing to inform you of your test results.    Test results indicate you may require additional follow up, see comment below.    Resulted Orders   Lipid panel reflex to direct LDL Non-fasting   Result Value Ref Range    Cholesterol 238 (H) <200 mg/dL    Triglycerides 136 <150 mg/dL    Direct Measure HDL 75 >=50 mg/dL    LDL Cholesterol Calculated 136 (H) <100 mg/dL    Non HDL Cholesterol 163 (H) <130 mg/dL    Patient Fasting > 8hrs? No     Narrative    Cholesterol  Desirable: < 200 mg/dL  Borderline High: 200 - 239 mg/dL  High: >= 240 mg/dL    Triglycerides  Normal: < 150 mg/dL  Borderline High: 150 - 199 mg/dL  High: 200-499 mg/dL  Very High: >= 500 mg/dL    Direct Measure HDL  Female: >= 50 mg/dL   Male: >= 40 mg/dL    LDL Cholesterol  Desirable: < 100 mg/dL  Above Desirable: 100 - 129 mg/dL   Borderline High: 130 - 159 mg/dL   High:  160 - 189 mg/dL   Very High: >= 190 mg/dL    Non HDL Cholesterol  Desirable: < 130 mg/dL  Above Desirable: 130 - 159 mg/dL  Borderline High: 160 - 189 mg/dL  High: 190 - 219 mg/dL  Very High: >= 220 mg/dL   BASIC METABOLIC PANEL   Result Value Ref Range    Sodium 134 (L) 135 - 145 mmol/L    Potassium 4.3 3.4 - 5.3 mmol/L    Chloride 94 (L) 98 - 107 mmol/L    Carbon Dioxide (CO2) 30 (H) 22 - 29 mmol/L    Anion Gap 10 7 - 15 mmol/L    Urea Nitrogen 13.2 8.0 - 23.0 mg/dL    Creatinine 0.82 0.51 - 0.95 mg/dL    GFR Estimate 69 >60 mL/min/1.73m2      Comment:      eGFR calculated using 2021 CKD-EPI equation.    Calcium 10.3 8.8 - 10.4 mg/dL      Comment:      Reference intervals for this test were updated on 7/16/2024 to reflect our healthy population more accurately. There may be differences in the flagging of prior results with similar values performed with this method. Those prior results can be interpreted in the context of the updated reference intervals.    Glucose 96 70 -  99 mg/dL    Patient Fasting > 8hrs? No      Cholesterol labs were stable, nothing to do more with that other than healthy diet and staying as active as able.   Your sodium was a bit low, avoid drinking excessive water and add salt to diet.   Repeat labs in 3 months and as needed.   If you have any questions or concerns, please call the clinic at the number listed above.       Sincerely,      SUDHAKAR Yung CNP    Electronically signed

## 2025-01-27 ENCOUNTER — OFFICE VISIT (OUTPATIENT)
Dept: OBGYN | Facility: CLINIC | Age: 89
End: 2025-01-27
Payer: COMMERCIAL

## 2025-01-27 VITALS
RESPIRATION RATE: 16 BRPM | BODY MASS INDEX: 26.17 KG/M2 | SYSTOLIC BLOOD PRESSURE: 155 MMHG | HEIGHT: 63 IN | TEMPERATURE: 97 F | DIASTOLIC BLOOD PRESSURE: 70 MMHG | HEART RATE: 50 BPM | WEIGHT: 147.7 LBS

## 2025-01-27 DIAGNOSIS — N81.3 UTEROVAGINAL PROLAPSE, COMPLETE: ICD-10-CM

## 2025-01-27 DIAGNOSIS — T83.89XA VAGINAL EROSION SECONDARY TO PESSARY USE, INITIAL ENCOUNTER: Primary | ICD-10-CM

## 2025-01-27 DIAGNOSIS — N89.8 VAGINAL EROSION SECONDARY TO PESSARY USE, INITIAL ENCOUNTER: Primary | ICD-10-CM

## 2025-01-27 PROCEDURE — 99213 OFFICE O/P EST LOW 20 MIN: CPT | Performed by: OBSTETRICS & GYNECOLOGY

## 2025-01-27 NOTE — PROGRESS NOTES
"Pessary follow-up visit.    Caitlyn Wasserman is not complaining of any problems with the pessary. She is here for it to be removed, cleaned and reinserted. She was started on a \"large blueberry\" sized amount of vaginal estrogen daily for recurrent UTIs.    BP (!) 155/70 (BP Location: Left arm, Patient Position: Chair, Cuff Size: Adult Regular)   Pulse 50   Temp 97  F (36.1  C) (Tympanic)   Resp 16   Ht 1.588 m (5' 2.5\")   Wt 67 kg (147 lb 11.2 oz)   LMP  (LMP Unknown)   BMI 26.58 kg/m       EXAM:    General Appearance: Pleasant, alert, appropriate appearance for age. No acute distress  Head Exam: Normocephalic, without obvious abnormality.  Genitourinary Exam Female:   External genitalia: atrophic vulva  Urinary system: urethral meatus normal  Vagina: mucosa atrophic and pale, deep ulcer noted on the upper posterior vagina extending the the left sidewall.  Approximately 3 x 1 cm in size.    The size 2 3/4 in  gellhorn pessary was removed,    IMP:   Encounter Diagnoses   Name Primary?    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 She will continue vaginal estrogen until well healed.  We discussed that this will likely take a long time to heal and discussed doing pelvic floor physical floor physical therapy in the meantime.      Plan: Follow up in 4 weeks.    Jemima Porras MD on 1/27/2025 at 11:24 AM       "

## 2025-01-27 NOTE — NURSING NOTE
"Initial BP (!) 155/70 (BP Location: Left arm, Patient Position: Chair, Cuff Size: Adult Regular)   Pulse 50   Temp 97  F (36.1  C) (Tympanic)   Resp 16   Ht 1.588 m (5' 2.5\")   Wt 67 kg (147 lb 11.2 oz)   LMP  (LMP Unknown)   BMI 26.58 kg/m   Estimated body mass index is 26.58 kg/m  as calculated from the following:    Height as of this encounter: 1.588 m (5' 2.5\").    Weight as of this encounter: 67 kg (147 lb 11.2 oz). .    Gabriela Floyd, MIKEY    "

## 2025-02-03 ENCOUNTER — OFFICE VISIT (OUTPATIENT)
Dept: OBGYN | Facility: CLINIC | Age: 89
End: 2025-02-03
Payer: COMMERCIAL

## 2025-02-03 VITALS
TEMPERATURE: 96.8 F | DIASTOLIC BLOOD PRESSURE: 78 MMHG | OXYGEN SATURATION: 99 % | BODY MASS INDEX: 25.87 KG/M2 | SYSTOLIC BLOOD PRESSURE: 158 MMHG | WEIGHT: 146 LBS | HEIGHT: 63 IN | HEART RATE: 58 BPM

## 2025-02-03 DIAGNOSIS — T83.89XA VAGINAL EROSION SECONDARY TO PESSARY USE, INITIAL ENCOUNTER: Primary | ICD-10-CM

## 2025-02-03 DIAGNOSIS — N30.01 ACUTE CYSTITIS WITH HEMATURIA: ICD-10-CM

## 2025-02-03 DIAGNOSIS — N89.8 VAGINAL EROSION SECONDARY TO PESSARY USE, INITIAL ENCOUNTER: Primary | ICD-10-CM

## 2025-02-03 DIAGNOSIS — N81.3 UTEROVAGINAL PROLAPSE, COMPLETE: ICD-10-CM

## 2025-02-03 DIAGNOSIS — N30.01 ACUTE CYSTITIS WITH HEMATURIA: Primary | ICD-10-CM

## 2025-02-03 LAB
ALBUMIN UR-MCNC: 30 MG/DL
APPEARANCE UR: ABNORMAL
BACTERIA #/AREA URNS HPF: ABNORMAL /HPF
BILIRUB UR QL STRIP: NEGATIVE
COLOR UR AUTO: YELLOW
GLUCOSE UR STRIP-MCNC: NEGATIVE MG/DL
HGB UR QL STRIP: ABNORMAL
KETONES UR STRIP-MCNC: NEGATIVE MG/DL
LEUKOCYTE ESTERASE UR QL STRIP: ABNORMAL
NITRATE UR QL: POSITIVE
PH UR STRIP: 7 [PH] (ref 5–7)
RBC #/AREA URNS AUTO: ABNORMAL /HPF
SP GR UR STRIP: 1.01 (ref 1–1.03)
UROBILINOGEN UR STRIP-ACNC: 0.2 E.U./DL
WBC #/AREA URNS AUTO: >100 /HPF
WBC CLUMPS #/AREA URNS HPF: PRESENT /HPF

## 2025-02-03 PROCEDURE — 87186 SC STD MICRODIL/AGAR DIL: CPT | Mod: 59 | Performed by: OBSTETRICS & GYNECOLOGY

## 2025-02-03 PROCEDURE — 99213 OFFICE O/P EST LOW 20 MIN: CPT | Performed by: OBSTETRICS & GYNECOLOGY

## 2025-02-03 PROCEDURE — 81001 URINALYSIS AUTO W/SCOPE: CPT | Performed by: OBSTETRICS & GYNECOLOGY

## 2025-02-03 PROCEDURE — 87086 URINE CULTURE/COLONY COUNT: CPT | Performed by: OBSTETRICS & GYNECOLOGY

## 2025-02-03 NOTE — PROGRESS NOTES
"Pessary follow-up visit.    Caitlyn Wasserman is here for follow up of a vaginal erosion.  Her pessary was removed last week due to a deep erosion.  Today she presents complaining of spotting and mucous.  She had called over the weekend and told the bleeding would likely need more time to heal.    BP (!) 158/78 (BP Location: Right arm, Patient Position: Chair, Cuff Size: Adult Regular)   Pulse 58   Temp 96.8  F (36  C) (Tympanic)   Ht 1.588 m (5' 2.5\")   Wt 66.2 kg (146 lb)   LMP  (LMP Unknown)   SpO2 99%   BMI 26.28 kg/m       EXAM:    General Appearance: Pleasant, alert, appropriate appearance for age. No acute distress  Head Exam: Normocephalic, without obvious abnormality.  Genitourinary Exam Female:   External genitalia: atrophic vulva  Urinary system: urethral meatus normal  Vagina: erosions noted        Encounter Diagnoses   Name Primary?    Vaginal erosion secondary to pessary use, initial encounter Yes    Uterovaginal prolapse, complete     Acute cystitis with hematuria      Advised putting vaseline on her underwear where the bladder/cervix touches.  Discussed splinting to help urinate.  We had a discussion on anatomy and where the urethra is in relation to the prolapse and I did help her look at her pads and show her where to place a thin coat of vaseline so the urine could still absorb in front of it.    Her urine does show signs of a UTI, so antibiotics sent to her pharmacy.  Cultured pending.      Plan: Follow up in 2-4 weeks    Jemima Porras MD on 2/3/2025 at 4:15 PM       "

## 2025-02-03 NOTE — NURSING NOTE
"Initial BP (!) 158/78 (BP Location: Right arm, Patient Position: Chair, Cuff Size: Adult Regular)   Pulse 58   Temp 96.8  F (36  C) (Tympanic)   Ht 1.588 m (5' 2.5\")   Wt 66.2 kg (146 lb)   LMP  (LMP Unknown)   SpO2 99%   BMI 26.28 kg/m   Estimated body mass index is 26.28 kg/m  as calculated from the following:    Height as of this encounter: 1.588 m (5' 2.5\").    Weight as of this encounter: 66.2 kg (146 lb). .    Gabriela Floyd, MIKEY    "

## 2025-02-04 ENCOUNTER — TELEPHONE (OUTPATIENT)
Dept: OBGYN | Facility: CLINIC | Age: 89
End: 2025-02-04
Payer: COMMERCIAL

## 2025-02-04 NOTE — TELEPHONE ENCOUNTER
Call from pharmacy: Caitlyn pharmacist wondering about quantity or number of treatment days  Dr. Dove updated pharmacist as I was on the phone and clarification was received    ERNESTO Garcias  [facility]  Ob/Gyn Clinic

## 2025-02-05 LAB
BACTERIA UR CULT: ABNORMAL
BACTERIA UR CULT: ABNORMAL

## 2025-02-24 ENCOUNTER — PREP FOR PROCEDURE (OUTPATIENT)
Dept: OBGYN | Facility: CLINIC | Age: 89
End: 2025-02-24

## 2025-02-24 ENCOUNTER — TELEPHONE (OUTPATIENT)
Dept: OBGYN | Facility: CLINIC | Age: 89
End: 2025-02-24

## 2025-02-24 ENCOUNTER — HOSPITAL ENCOUNTER (OUTPATIENT)
Facility: CLINIC | Age: 89
End: 2025-02-24
Attending: OBSTETRICS & GYNECOLOGY | Admitting: OBSTETRICS & GYNECOLOGY
Payer: COMMERCIAL

## 2025-02-24 ENCOUNTER — HOSPITAL ENCOUNTER (OUTPATIENT)
Dept: ULTRASOUND IMAGING | Facility: CLINIC | Age: 89
Discharge: HOME | End: 2025-02-24
Attending: OBSTETRICS & GYNECOLOGY | Admitting: OBSTETRICS & GYNECOLOGY
Payer: COMMERCIAL

## 2025-02-24 ENCOUNTER — OFFICE VISIT (OUTPATIENT)
Dept: OBGYN | Facility: CLINIC | Age: 89
End: 2025-02-24
Payer: COMMERCIAL

## 2025-02-24 VITALS
HEART RATE: 65 BPM | RESPIRATION RATE: 16 BRPM | HEIGHT: 63 IN | DIASTOLIC BLOOD PRESSURE: 80 MMHG | BODY MASS INDEX: 25.11 KG/M2 | WEIGHT: 141.7 LBS | OXYGEN SATURATION: 99 % | SYSTOLIC BLOOD PRESSURE: 156 MMHG | TEMPERATURE: 97.5 F

## 2025-02-24 DIAGNOSIS — N81.3 UTEROVAGINAL PROLAPSE, COMPLETE: ICD-10-CM

## 2025-02-24 DIAGNOSIS — N81.3 PROCIDENTIA OF UTERUS: Primary | ICD-10-CM

## 2025-02-24 DIAGNOSIS — T83.89XA VAGINAL EROSION SECONDARY TO PESSARY USE, INITIAL ENCOUNTER: ICD-10-CM

## 2025-02-24 DIAGNOSIS — N89.8 VAGINAL EROSION SECONDARY TO PESSARY USE, INITIAL ENCOUNTER: ICD-10-CM

## 2025-02-24 DIAGNOSIS — N39.0 RECURRENT UTI: ICD-10-CM

## 2025-02-24 DIAGNOSIS — N39.41 URGE INCONTINENCE OF URINE: Primary | ICD-10-CM

## 2025-02-24 LAB
ALBUMIN UR-MCNC: ABNORMAL MG/DL
APPEARANCE UR: ABNORMAL
BACTERIA #/AREA URNS HPF: ABNORMAL /HPF
BILIRUB UR QL STRIP: NEGATIVE
COLOR UR AUTO: YELLOW
GLUCOSE UR STRIP-MCNC: NEGATIVE MG/DL
HGB UR QL STRIP: ABNORMAL
KETONES UR STRIP-MCNC: NEGATIVE MG/DL
LEUKOCYTE ESTERASE UR QL STRIP: ABNORMAL
MUCOUS THREADS #/AREA URNS LPF: PRESENT /LPF
NITRATE UR QL: POSITIVE
PH UR STRIP: 6.5 [PH] (ref 5–7)
RBC #/AREA URNS AUTO: ABNORMAL /HPF
SP GR UR STRIP: 1.01 (ref 1–1.03)
SQUAMOUS #/AREA URNS AUTO: ABNORMAL /LPF
UROBILINOGEN UR STRIP-ACNC: 0.2 E.U./DL
WBC #/AREA URNS AUTO: ABNORMAL /HPF
WBC CLUMPS #/AREA URNS HPF: PRESENT /HPF

## 2025-02-24 PROCEDURE — 87186 SC STD MICRODIL/AGAR DIL: CPT | Performed by: OBSTETRICS & GYNECOLOGY

## 2025-02-24 PROCEDURE — 99214 OFFICE O/P EST MOD 30 MIN: CPT | Performed by: OBSTETRICS & GYNECOLOGY

## 2025-02-24 PROCEDURE — 81001 URINALYSIS AUTO W/SCOPE: CPT | Performed by: OBSTETRICS & GYNECOLOGY

## 2025-02-24 PROCEDURE — 76856 US EXAM PELVIC COMPLETE: CPT

## 2025-02-24 PROCEDURE — 87086 URINE CULTURE/COLONY COUNT: CPT | Performed by: OBSTETRICS & GYNECOLOGY

## 2025-02-24 RX ORDER — CEFAZOLIN 2 G/1
2 INJECTION, POWDER, FOR SOLUTION INTRAVENOUS SEE ADMIN INSTRUCTIONS
OUTPATIENT
Start: 2025-02-24

## 2025-02-24 RX ORDER — ESTRADIOL 0.1 MG/G
2 CREAM VAGINAL
Qty: 85 G | Refills: 3 | Status: SHIPPED | OUTPATIENT
Start: 2025-02-24

## 2025-02-24 RX ORDER — CEFAZOLIN 2 G/1
2 INJECTION, POWDER, FOR SOLUTION INTRAVENOUS
OUTPATIENT
Start: 2025-02-24

## 2025-02-24 RX ORDER — ACETAMINOPHEN 325 MG/1
975 TABLET ORAL ONCE
OUTPATIENT
Start: 2025-02-24 | End: 2025-02-24

## 2025-02-24 NOTE — NURSING NOTE
Verbal Order from Dr. Dove received to collect a cath UA/C specimen.    Assistance from Dr. Wu for retraction due to vaginal prolapse and in sterile fashion, urine sample collected by quick catheter set up.    Patient tolerated well.  Specimen labeled and sent to lab by Gabriela ANDERSEN RN   [facility]

## 2025-02-24 NOTE — PROGRESS NOTES
"Pessary follow-up visit.    Caitlyn Wasserman is here for follow up of a vaginal erosion.  Her pessary was removed on 1/27/25 due to a deep erosion.  She is here for a recheck.  She states that when we tried a smaller pessary it fell out.  She feels like she might have a bladder infection.    BP (!) 156/80 (BP Location: Right arm, Patient Position: Chair, Cuff Size: Adult Regular)   Pulse 65   Temp 97.5  F (36.4  C) (Tympanic)   Resp 16   Ht 1.588 m (5' 2.5\")   Wt 64.3 kg (141 lb 11.2 oz)   LMP  (LMP Unknown)   SpO2 99%   BMI 25.50 kg/m       EXAM:    General Appearance: Pleasant, alert, appropriate appearance for age. No acute distress  Head Exam: Normocephalic, without obvious abnormality.  Genitourinary Exam Female:   External genitalia: atrophic vulva  Urinary system: urethral meatus normal  Vagina: erosions with good granulation tissue        Encounter Diagnoses   Name Primary?    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  She will continue vaginal estrogen.  We discussed trial of a smaller pessary or surgery.  She is interested in a colpocleisis.  We discussed risks and benefits.  We discussed that she cannot get pelvic exams or have intercourse and the colpocleisis cannot be undone.  We discussed risks of incontinence improving or getting worse.  Advised ultrasound prior to make sure her uterus and ovaries appear normal.  Will check a UA today.  Will plan antibiotics until surgery date for UTI.      Plan: Follow up in 2-4 weeks    Jemima Porras MD on 2/3/2025 at 11:24 AM          "

## 2025-02-24 NOTE — TELEPHONE ENCOUNTER
"[id number]  Caitlyn ISAAK Rascone    You are now scheduled for surgery at [facility].  Below are the details for your surgery.  Please read the \"Preparing for Your Surgery\" instructions and let us know if you have any questions.    Type of surgery: COLPOCLEISIS, COMPLETE   Surgeon:  Jemima Porras MD  Location of surgery: [facility] OR    Date of surgery: 3/11/25    Time: 9:00am   Arrival Time: 7:30am    Time can change, to be confirmed a couple of days prior by pre-op surgery nurse.    Pre-Op Appt Date: Patient to schedule with a PCP or Family Practice Provider within 30 days to the surgery.  Post-Op Appt Date:    Time:     Packet sent out: Yes  Pre-cert/Authorization completed:  TBD by Financial Securing Office.   MA Sterilization/Hysterectomy Acknowledgment Consent signed: Not Applicable    [facility]  [phone]    Fax: [phone]  Same Day Surgery [phone]  Fax: [phone]  [facility] [phone]    "

## 2025-02-24 NOTE — NURSING NOTE
"Initial BP (!) 156/80 (BP Location: Right arm, Patient Position: Chair, Cuff Size: Adult Regular)   Pulse 65   Temp 97.5  F (36.4  C) (Tympanic)   Resp 16   Ht 1.588 m (5' 2.5\")   Wt 64.3 kg (141 lb 11.2 oz)   LMP  (LMP Unknown)   SpO2 99%   BMI 25.50 kg/m   Estimated body mass index is 25.5 kg/m  as calculated from the following:    Height as of this encounter: 1.588 m (5' 2.5\").    Weight as of this encounter: 64.3 kg (141 lb 11.2 oz). .    Gabriela Floyd, [facility]    "

## 2025-02-25 DIAGNOSIS — N39.0 URINARY TRACT INFECTION WITHOUT HEMATURIA, SITE UNSPECIFIED: Primary | ICD-10-CM

## 2025-02-25 DIAGNOSIS — N81.3 PROCIDENTIA OF UTERUS: ICD-10-CM

## 2025-02-25 LAB — BACTERIA UR CULT: ABNORMAL

## 2025-02-25 NOTE — RESULT ENCOUNTER NOTE
Pt notified of below.  Pt reports understanding.  Pt does not have further questions or concerns.    Licha Fields   Ob/Gyn Clinic  RN

## 2025-02-26 ENCOUNTER — OFFICE VISIT (OUTPATIENT)
Dept: FAMILY MEDICINE | Facility: CLINIC | Age: 89
End: 2025-02-26
Payer: COMMERCIAL

## 2025-02-26 ENCOUNTER — ANESTHESIA EVENT (OUTPATIENT)
Dept: SURGERY | Facility: CLINIC | Age: 89
End: 2025-02-26
Payer: COMMERCIAL

## 2025-02-26 VITALS
HEART RATE: 67 BPM | OXYGEN SATURATION: 97 % | RESPIRATION RATE: 16 BRPM | DIASTOLIC BLOOD PRESSURE: 80 MMHG | SYSTOLIC BLOOD PRESSURE: 132 MMHG | HEIGHT: 63 IN | TEMPERATURE: 98.3 F | WEIGHT: 142.2 LBS | BODY MASS INDEX: 25.2 KG/M2

## 2025-02-26 DIAGNOSIS — R73.03 PREDIABETES: ICD-10-CM

## 2025-02-26 DIAGNOSIS — Z78.0 ASYMPTOMATIC POSTMENOPAUSAL STATUS: Primary | ICD-10-CM

## 2025-02-26 DIAGNOSIS — N81.10 VAGINAL PROLAPSE: ICD-10-CM

## 2025-02-26 DIAGNOSIS — Z01.818 PREOP GENERAL PHYSICAL EXAM: ICD-10-CM

## 2025-02-26 DIAGNOSIS — I10 ESSENTIAL HYPERTENSION: ICD-10-CM

## 2025-02-26 LAB
ANION GAP SERPL CALCULATED.3IONS-SCNC: 8 MMOL/L (ref 7–15)
BUN SERPL-MCNC: 14.1 MG/DL (ref 8–23)
CALCIUM SERPL-MCNC: 10.1 MG/DL (ref 8.8–10.4)
CHLORIDE SERPL-SCNC: 99 MMOL/L (ref 98–107)
CREAT SERPL-MCNC: 0.84 MG/DL (ref 0.51–0.95)
EGFRCR SERPLBLD CKD-EPI 2021: 67 ML/MIN/1.73M2
EST. AVERAGE GLUCOSE BLD GHB EST-MCNC: 123 MG/DL
GLUCOSE SERPL-MCNC: 107 MG/DL (ref 70–99)
HBA1C MFR BLD: 5.9 % (ref 0–5.6)
HCO3 SERPL-SCNC: 32 MMOL/L (ref 22–29)
POTASSIUM SERPL-SCNC: 3.4 MMOL/L (ref 3.4–5.3)
SODIUM SERPL-SCNC: 139 MMOL/L (ref 135–145)

## 2025-02-26 PROCEDURE — 99214 OFFICE O/P EST MOD 30 MIN: CPT | Performed by: FAMILY MEDICINE

## 2025-02-26 PROCEDURE — 3075F SYST BP GE 130 - 139MM HG: CPT | Performed by: FAMILY MEDICINE

## 2025-02-26 PROCEDURE — 3079F DIAST BP 80-89 MM HG: CPT | Performed by: FAMILY MEDICINE

## 2025-02-26 PROCEDURE — 80048 BASIC METABOLIC PNL TOTAL CA: CPT | Performed by: FAMILY MEDICINE

## 2025-02-26 PROCEDURE — 83036 HEMOGLOBIN GLYCOSYLATED A1C: CPT | Performed by: FAMILY MEDICINE

## 2025-02-26 PROCEDURE — 36415 COLL VENOUS BLD VENIPUNCTURE: CPT | Performed by: FAMILY MEDICINE

## 2025-02-26 PROCEDURE — 1126F AMNT PAIN NOTED NONE PRSNT: CPT | Performed by: FAMILY MEDICINE

## 2025-02-26 RX ORDER — LIDOCAINE 40 MG/G
CREAM TOPICAL
Status: CANCELLED | OUTPATIENT
Start: 2025-02-26

## 2025-02-26 RX ORDER — ACETAMINOPHEN 325 MG/1
975 TABLET ORAL ONCE
Status: CANCELLED | OUTPATIENT
Start: 2025-02-26 | End: 2025-02-26

## 2025-02-26 RX ORDER — GABAPENTIN 100 MG/1
100 CAPSULE ORAL
Status: CANCELLED | OUTPATIENT
Start: 2025-02-26

## 2025-02-26 RX ORDER — SODIUM CHLORIDE, SODIUM LACTATE, POTASSIUM CHLORIDE, CALCIUM CHLORIDE 600; 310; 30; 20 MG/100ML; MG/100ML; MG/100ML; MG/100ML
INJECTION, SOLUTION INTRAVENOUS CONTINUOUS
Status: CANCELLED | OUTPATIENT
Start: 2025-02-26

## 2025-02-26 ASSESSMENT — PAIN SCALES - GENERAL: PAINLEVEL_OUTOF10: NO PAIN (0)

## 2025-02-26 ASSESSMENT — COPD QUESTIONNAIRES: COPD: 1

## 2025-02-26 ASSESSMENT — LIFESTYLE VARIABLES: TOBACCO_USE: 1

## 2025-02-26 NOTE — PATIENT INSTRUCTIONS
How to Take Your Medication Before Surgery  Preoperative Medication Instructions   Antiplatelet or Anticoagulation Medication Instructions   - We reviewed the medication list and the patient is not on an antiplatelet or anticoagulation medications.    Additional Medication Instructions   - Beta Blockers (atenolol, metoprolol, propranolol) : Continue taking on the day of surgery.   - Diuretics (furosemide, hydrochlorothiazide, chlorothalidone): DO NOT TAKE on the day of surgery.   - LABA, inhaled corticosteroid, long-acting anticholinergics: Continue without modification.       Patient Education   Preparing for Your Surgery  For Adults  Getting started  In most cases, a nurse will call to review your health history and instructions. They will give you an arrival time based on your scheduled surgery time. Please be ready to share:  Your doctor's clinic name and phone number  Your medical, surgical, and anesthesia history  A list of allergies and sensitivities  A list of medicines, including herbal treatments and over-the-counter drugs  Whether the patient has a legal guardian (ask how to send us the papers in advance)  Note: You may not receive a call if you were seen at our PAC (Preoperative Assessment Center).  Please tell us if you're pregnant--or if there's any chance you might be pregnant. Some surgeries may injure a fetus (unborn baby), so they require a pregnancy test. Surgeries that are safe for a fetus don't always need a test, and you can choose whether to have one.   Preparing for surgery  Within 10 to 30 days of surgery: Have a pre-op exam (sometimes called an H&P, or History and Physical). This can be done at a clinic or pre-operative center.  If you're having a , you may not need this exam. Talk to your care team.  At your pre-op exam, talk to your care team about all medicines you take. (This includes CBD oil and any drugs, such as THC, marijuana, and other forms of cannabis.) If you need to  stop any medicine before surgery, ask when to start taking it again.  This is for your safety. Many medicines and drugs can make you bleed too much during surgery. Some change how well surgery (anesthesia) drugs work.  Call your insurance company to let them know you're having surgery. (If you don't have insurance, call [phone].)  Call your clinic if there's any change in your health. This includes a scrape or scratch near the surgery site, or any signs of a cold (sore throat, runny nose, cough, rash, fever).  Eating and drinking guidelines  For your safety: Unless your surgeon tells you otherwise, follow the guidelines below.  Eat and drink as normal until 8 hours before you arrive for surgery. After that, no food or milk. You can spit out gum when you arrive.  Drink clear liquids until 2 hours before you arrive. These are liquids you can see through, like water, Gatorade, and Propel Water. They also include plain black coffee and tea (no cream or milk).  No alcohol for 24 hours before you arrive. The night before surgery, stop any drinks that contain THC.  If your care team tells you to take medicine on the morning of surgery, it's okay to take it with a sip of water. No other medicines or drugs are allowed (including CBD oil)--follow your care team's instructions.  If you have questions the day of surgery, call your hospital or surgery center.   Preventing infection  Shower or bathe the night before and the morning of surgery. Follow the instructions your clinic gave you. (If no instructions, use regular soap.)  Don't shave or clip hair near your surgery site. We'll remove the hair if needed.  Don't smoke or vape the morning of surgery. No chewing tobacco for 6 hours before you arrive. A nicotine patch is okay. You may spit out nicotine gum when you arrive.  For some surgeries, the surgeon will tell you to fully quit smoking and nicotine.  We will make every effort to keep you safe from infection. We  will:  Clean our hands often with soap and water (or an alcohol-based hand rub).  Clean the skin at your surgery site with a special soap that kills germs.  Give you a special gown to keep you warm. (Cold raises the risk of infection.)  Wear hair covers, masks, gowns, and gloves during surgery.  Give antibiotic medicine, if prescribed. Not all surgeries need this medicine.  What to bring on the day of surgery  Photo ID and insurance card  Copy of your health care directive, if you have one  Glasses and hearing aids (bring cases)  You can't wear contacts during surgery  Inhaler and eye drops, if you use them (tell us about these when you arrive)  CPAP machine or breathing device, if you use them  A few personal items, if spending the night  If you have . . .  A pacemaker, ICD (cardiac defibrillator), or other implant: Bring the ID card.  An implanted stimulator: Bring the remote control.  A legal guardian: Bring a copy of the certified (court-stamped) guardianship papers.  Please remove any jewelry, including body piercings. Leave jewelry and other valuables at home.  If you're going home the day of surgery  You must have a responsible adult drive you home. They should stay with you overnight as well.  If you don't have someone to stay with you, and you aren't safe to go home alone, we may keep you overnight. Insurance often won't pay for this.  After surgery  If it's hard to control your pain or you need more pain medicine, please call your surgeon's office.  Questions?   If you have any questions for your care team, list them here:   ____________________________________________________________________________________________________________________________________________________________________________________________________________________________________________________________  For informational purposes only. Not to replace the advice of your health care provider. Copyright   2003, 2019 [facility]  Services. All rights reserved. Clinically reviewed by See Cheney MD. [employer] [id number] - REV 08/24.

## 2025-02-26 NOTE — PROGRESS NOTES
This preoperative Evaluation  [facility]  [address]  [facility] [address]  Phone: [phone]  Primary Provider: SUDHAKAR Yung CNP  Pre-op Performing Provider: FARHAD RIVERO DO  Feb 26, 2025 2/26/2025   Surgical Information   What procedure is being done? Colpocleisis,    Facility or Hospital where procedure/surgery will be performed: [place]  [facility]   Who is doing the procedure / surgery? Dr Jemima Dove   Date of surgery / procedure: March 11   Time of surgery / procedure: 9am   Where do you plan to recover after surgery? at home with family     Fax number for surgical facility: Note does not need to be faxed, will be available electronically in Epic.    Assessment & Plan     The proposed surgical procedure is considered INTERMEDIATE risk.    Preop general physical exam      Vaginal prolapse  Indication for surgery    Asymptomatic postmenopausal status  On vaginal estrogen     Essential hypertension  Recheck sodium level  - Basic metabolic panel  (Ca, Cl, CO2, Creat, Gluc, K, Na, BUN)  - Basic metabolic panel  (Ca, Cl, CO2, Creat, Gluc, K, Na, BUN)    Prediabetes  - Hemoglobin A1c  - Hemoglobin A1c              - No identified additional risk factors other than previously addressed    Preoperative Medication Instructions  Antiplatelet or Anticoagulation Medication Instructions   - We reviewed the medication list and the patient is not on an antiplatelet or anticoagulation medications.    Additional Medication Instructions   - Beta Blockers (atenolol, metoprolol, propranolol) : Continue taking on the day of surgery.   - Diuretics (furosemide, hydrochlorothiazide, chlorothalidone): DO NOT TAKE on the day of surgery.    Recommendation  Approval given to proceed with proposed procedure, without further diagnostic evaluation.    Werner Brady is a 89 year old, presenting for the following:  Pre-Op Exam          2/26/2025     1:26 PM    Additional Questions   Roomed by Alison GIBBS MA     [facility] related to upcoming procedure:           2/26/2025   Pre-Op Questionnaire   Have you ever had a heart attack or stroke? No   Have you ever had surgery on your heart or blood vessels, such as a stent placement, a coronary artery bypass, or surgery on an artery in your head, neck, heart, or legs? No   Do you have chest pain with activity? No   Do you have a history of heart failure? No   Do you currently have a cold, bronchitis or symptoms of other infection? No   Do you have a cough, shortness of breath, or wheezing? No   Do you or anyone in your family have previous history of blood clots? No   Do you or does anyone in your family have a serious bleeding problem such as prolonged bleeding following surgeries or cuts? No   Have you ever had problems with anemia or been told to take iron pills? No   Have you had any abnormal blood loss such as black, tarry or bloody stools, or abnormal vaginal bleeding? No   Have you ever had a blood transfusion? No   Are you willing to have a blood transfusion if it is medically needed before, during, or after your surgery? Yes   Have you or any of your relatives ever had problems with anesthesia? No   Do you have sleep apnea, excessive snoring or daytime drowsiness? No   Do you have any artifical heart valves or other implanted medical devices like a pacemaker, defibrillator, or continuous glucose monitor? No   Do you have artificial joints? (!) YES- hx of bilateral hip replacement, right knee surgery    Are you allergic to latex? No     Health Care Directive  Patient has a Health Care Directive on file    Preoperative Review of    reviewed - no record of controlled substances prescribed.      Status of Chronic Conditions:  See problem list for active medical problems.  Problems all longstanding and stable, except as noted/documented.  See ROS for pertinent symptoms related to these conditions.    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 Pennington in 2004      Osteoarthritis of both hands 05/07/2019     Priority: Medium    Chronic obstructive pulmonary disease, unspecified COPD type (H) 01/10/2017     Priority: Medium    Chronic rhinitis 12/04/2015     Priority: Medium    Essential hypertension 12/04/2015     Priority: Medium    Dysuria 12/04/2015     Priority: Medium    Primary osteoarthritis of both knees 10/08/2014     Priority: Medium    Pedal edema 12/03/2013     Priority: Medium    Essential tremor 11/15/2013     Priority: Medium    Kyphosis of cervical region 11/28/2011     Priority: Medium    Neck pain, chronic 11/28/2011     Priority: Medium    HYPERLIPIDEMIA LDL GOAL <130 10/31/2010     Priority: Medium    Recurrent UTI 09/20/2010     Priority: Medium    Cataract 07/06/2009     Priority: Medium     Utility update for deleted IMO code  [employer] Update utility      Right shoulder pain 10/27/2008     Priority: Medium    Numbness in right leg 10/27/2008     Priority: Medium    LEFT HIP PAIN 03/23/2006     Priority: Medium    Urinary frequency 06/09/2005     Priority: Medium      Past Medical History:   Diagnosis Date    COPD (chronic obstructive pulmonary disease) (H)     Migraine, unspecified, without mention of intractable migraine without mention of status migrainosus     Other urinary incontinence     Pure hypercholesterolemia     Unspecified essential hypertension      Past Surgical History:   Procedure Laterality Date    ARTHROPLASTY HIP Right 9/28/2020    Procedure: TOTAL  Hip Arthroplasty;  Surgeon: Ashkan Shah MD;  Location: [facility] OR    AS TOTAL KNEE ARTHROPLASTY Right 2011    HC ANTER COLPORRHAPHY,BLAD/VAGINA  6/04    Cystocele Repair,rectocele repair.    JOINT REPLACEMENT, HIP RT/LT  3/07, 4/07    Joint Replacement Hip /LT- dislocated in front repeat surgery 1 week later     Current Outpatient Medications   Medication Sig Dispense Refill    albuterol (PROAIR HFA/PROVENTIL HFA/VENTOLIN HFA) 108 (90 Base) MCG/ACT inhaler Inhale 2 puffs into the lungs every 6 hours. 18 g 3    amoxicillin-clavulanate (AUGMENTIN) 875-125 MG tablet Take 1 tablet by mouth 2 times daily for 14 days. 28 tablet 0    atenolol (TENORMIN) 25 MG tablet Take 1 tablet (25 mg) by mouth daily 90 tablet 3    chlorthalidone (HYGROTON) 25 MG tablet Take 2 tablets (50 mg) by mouth daily. 180 tablet 3    docusate sodium (COLACE) 100 MG capsule Take 1 capsule (100 mg) by mouth daily 60 capsule 4    estradiol (ESTRACE) 0.1 MG/GM vaginal cream Place 2 g vaginally twice a week. 85 g 3    multivitamin w/minerals (THERA-VIT-M) tablet Take 1 tablet by mouth daily      potassium chloride ER (K-TAB) 20 MEQ CR tablet Take 1 tablet (20 mEq) by mouth daily 90 tablet 3    acetaminophen (TYLENOL) 325 MG tablet Take 3 tablets (975 mg) by mouth every 8 hours (Patient not taking: Reported on 1/27/2025)      acetaminophen-caffeine (EXCEDRIN TENSION HEADACHE) 500-65 MG TABS Take 2 tablets by mouth every 6 hours as needed for mild pain (Patient not taking: Reported on 1/27/2025)      clotrimazole-betamethasone (LOTRISONE) 1-0.05 % external cream Apply topically 2 times daily. (Patient not taking: Reported on 2/26/2025) 45 g 2    diclofenac (VOLTAREN) 1 % topical gel Apply 4 g topically 4 times daily. (Patient not taking: Reported on 2/26/2025) 100 g 3    hydrocortisone 2.5 % ointment Apply topically 2 times daily. (Patient not taking: Reported on 2/26/2025) 20 g 3    ipratropium (ATROVENT HFA) 17 MCG/ACT inhaler Inhale 2 puffs into  the lungs every 6 hours (Patient not taking: Reported on 2025) 12.9 g 3    ipratropium (ATROVENT) 0.03 % nasal spray Spray 2 sprays into both nostrils daily (Patient not taking: Reported on 2025) 60 mL 0    psyllium 28.3 % POWD Take 1 teaspoonful by mouth daily. (Patient not taking: Reported on 2025)         Allergies   Allergen Reactions    Bactrim [Sulfamethoxazole-Trimethoprim] Nausea and Vomiting    Cortisone Other (See Comments)     Couldn't walk well even after 2 weeks    Diclofenac Sodium Other (See Comments)     Took one tab in , no reaction in note    Hydrocodone Nausea and Vomiting    Lidocaine Hallucination     Patches     Morphine And Codeine Nausea and Vomiting     Vomited 1.5 days    Oxycodone Nausea and Vomiting    Tramadol Nausea and Vomiting        Social History     Tobacco Use    Smoking status: Former     Current packs/day: 0.00     Average packs/day: 1 pack/day for 20.0 years (20.0 ttl pk-yrs)     Types: Cigarettes     Start date: 1951     Quit date: 1971     Years since quittin.1     Passive exposure: Past    Smokeless tobacco: Never   Substance Use Topics    Alcohol use: Yes     Comment: 1 wine a month maybe     Family History   Problem Relation Age of Onset    Cancer Mother         Bladder    Neurologic Disorder Mother         heriditary tremor    C.A.D. Father     Cerebrovascular Disease Father     Allergies Maternal Grandfather     Respiratory Maternal Grandfather         Asthma    Diabetes Paternal Grandmother         Type II    Cancer Brother         Multiple myloma    C.A.D. Brother     Cancer Sister         kidney cancer    Neurofibromatosis Son     Breast Cancer No family hx of      History   Drug Use No             Review of Systems  Constitutional, HEENT, cardiovascular, pulmonary, gi and gu systems are negative, except as otherwise noted.    Objective    /80 (BP Location: Right arm, Patient Position: Chair, Cuff Size: Adult Regular)   Pulse 67   " Temp 98.3  F (36.8  C)   Resp 16   Ht 1.588 m (5' 2.5\")   Wt 64.5 kg (142 lb 3.2 oz)   LMP  (LMP Unknown)   SpO2 97%   BMI 25.59 kg/m     Estimated body mass index is 25.59 kg/m  as calculated from the following:    Height as of this encounter: 1.588 m (5' 2.5\").    Weight as of this encounter: 64.5 kg (142 lb 3.2 oz).  Physical Exam  GENERAL: alert and no distress  EYES: Eyes grossly normal to inspection, PERRL and conjunctivae and sclerae normal  HENT: ear canals and TM's normal, nose and mouth without ulcers or lesions  NECK: no adenopathy, no asymmetry, masses, or scars  RESP: lungs clear to auscultation - no rales, rhonchi or wheezes  CV: regular rate and rhythm, normal S1 S2, no S3 or S4, no murmur, click or rub, no peripheral edema  ABDOMEN: soft, nontender, no hepatosplenomegaly, no masses and bowel sounds normal  MS: no gross musculoskeletal defects noted, no edema  SKIN: no suspicious lesions or rashes  NEURO: Normal strength and tone, mentation intact and speech normal  PSYCH: mentation appears normal, affect normal/bright    Recent Labs   Lab Test 01/07/25  1132 10/25/24  1537   *  --    POTASSIUM 4.3  --    CR 0.82  --    A1C  --  6.0*        Diagnostics  No labs were ordered during this visit.   No EKG required, no history of coronary heart disease, significant arrhythmia, peripheral arterial disease or other structural heart disease.    Revised Cardiac Risk Index (RCRI)  The patient has the following serious cardiovascular risks for perioperative complications:   - No serious cardiac risks = 0 points     RCRI Interpretation: 0 points: Class I (very low risk - 0.4% complication rate)         Signed Electronically by: FARHAD RIVERO,   A copy of this evaluation report is provided to the requesting physician.         "

## 2025-02-26 NOTE — ANESTHESIA PREPROCEDURE EVALUATION
Anesthesia Pre-Procedure Evaluation    Patient: Caitlyn Wasserman   MRN: [id number] : 1935        Procedure : Procedure(s):  COLPOCLEISIS, COMPLETE          Past Medical History:   Diagnosis Date    COPD (chronic obstructive pulmonary disease) (H)     Migraine, unspecified, without mention of intractable migraine without mention of status migrainosus     Other urinary incontinence     Pure hypercholesterolemia     Unspecified essential hypertension       Past Surgical History:   Procedure Laterality Date    ARTHROPLASTY HIP Right 2020    Procedure: TOTAL Hip Arthroplasty;  Surgeon: Ashkan Shah MD;  Location: [facility] OR    AS TOTAL KNEE ARTHROPLASTY Right     HC ANTER COLPORRHAPHY,BLAD/VAGINA      Cystocele Repair,rectocele repair.    JOINT REPLACEMENT, HIP RT/LT  3/07,     Joint Replacement Hip /LT- dislocated in front repeat surgery 1 week later      Allergies   Allergen Reactions    Bactrim [Sulfamethoxazole-Trimethoprim] Nausea and Vomiting    Cortisone Other (See Comments)     Couldn't walk well even after 2 weeks    Diclofenac Sodium Other (See Comments)     Took one tab in , no reaction in note    Hydrocodone Nausea and Vomiting    Lidocaine Hallucination     Patches     Morphine And Codeine Nausea and Vomiting     Vomited 1.5 days    Oxycodone Nausea and Vomiting    Tramadol Nausea and Vomiting      Social History     Tobacco Use    Smoking status: Former     Current packs/day: 0.00     Average packs/day: 1 pack/day for 20.0 years (20.0 ttl pk-yrs)     Types: Cigarettes     Start date: 1951     Quit date: 1971     Years since quittin.1     Passive exposure: Past    Smokeless tobacco: Never   Substance Use Topics    Alcohol use: Yes     Comment: 1 wine a month maybe      Wt Readings from Last 1 Encounters:   25 64.5 kg (142 lb 3.2 oz)        Anesthesia Evaluation   Pt has had prior anesthetic.         ROS/MED HX  ENT/Pulmonary:     (+)           allergic  "rhinitis,     tobacco use, Past use,         COPD,              Neurologic:     (+)      migraines,                          Cardiovascular:     (+) Dyslipidemia hypertension- -   -  - -                                      METS/Exercise Tolerance:     Hematologic:       Musculoskeletal:   (+)  arthritis,             GI/Hepatic:       Renal/Genitourinary:       Endo:       Psychiatric/Substance Use:       Infectious Disease:       Malignancy:       Other:      (+)  , H/O Chronic Pain,            OUTSIDE LABS:  CBC:   Lab Results   Component Value Date    WBC 6.1 09/10/2023    WBC 8.3 04/29/2023    HGB 13.1 09/10/2023    HGB 13.7 04/29/2023    HCT 38.3 09/10/2023    HCT 40.6 04/29/2023     09/10/2023     04/29/2023     BMP:   Lab Results   Component Value Date     (L) 01/07/2025     (L) 09/10/2023    POTASSIUM 4.3 01/07/2025    POTASSIUM 3.5 09/10/2023    CHLORIDE 94 (L) 01/07/2025    CHLORIDE 96 (L) 09/10/2023    CO2 30 (H) 01/07/2025    CO2 28 09/10/2023    BUN 13.2 01/07/2025    BUN 13.4 09/10/2023    CR 0.82 01/07/2025    CR 0.82 09/10/2023    GLC 96 01/07/2025    GLC 96 09/10/2023     COAGS:   Lab Results   Component Value Date    INR 1.16 (H) 10/09/2020     POC: No results found for: \"BGM\", \"HCG\", \"HCGS\"  HEPATIC:   Lab Results   Component Value Date    ALBUMIN 4.4 04/29/2023    PROTTOTAL 7.5 04/29/2023    ALT 17 04/29/2023    AST 24 04/29/2023    ALKPHOS 80 04/29/2023    BILITOTAL 0.5 04/29/2023     OTHER:   Lab Results   Component Value Date    A1C 5.9 (H) 02/26/2025    SACHI 10.3 01/07/2025    LIPASE 30 04/29/2023    TSH 1.45 10/25/2024    CRP <2.9 02/15/2018    SED 7 02/15/2018              Treff Mike Lorenzo, APRN CRNA    I have reviewed the pertinent notes and labs in the chart from the past 30 days and (re)examined the patient.  Any updates or changes from those notes are reflected in this note.    Clinically Significant Risk Factors Present on Admission                   # " "Hypertension: Noted on problem list           # Overweight: Estimated body mass index is 25.59 kg/m  as calculated from the following:    Height as of 2/26/25: 1.588 m (5' 2.5\").    Weight as of 2/26/25: 64.5 kg (142 lb 3.2 oz).                "

## 2025-02-27 NOTE — RESULT ENCOUNTER NOTE
Called and spoke with patient with below information. Patient understood and had no further questions.    Gala Staples, CMA

## 2025-02-27 NOTE — RESULT ENCOUNTER NOTE
Will forward to [facility] pool for pt notification of normal result.    Licha Fields   [facility]  RN

## 2025-02-27 NOTE — RESULT ENCOUNTER NOTE
Please call Caitlyn Wasserman and let her know her ultrasound was normal.    Jemima Porras MD on 2/27/2025 at 12:25 PM

## 2025-03-03 ENCOUNTER — TELEPHONE (OUTPATIENT)
Dept: OBGYN | Facility: CLINIC | Age: 89
End: 2025-03-03
Payer: COMMERCIAL

## 2025-03-03 NOTE — TELEPHONE ENCOUNTER
Procedure scheduled for 3/11/25 - sx at this point should not interfere with procedure    RENESTO Garcias  [facility]  Ob/Gyn [facility]

## 2025-03-03 NOTE — TELEPHONE ENCOUNTER
[facility] Call Center    Phone Message    May a detailed message be left on voicemail: yes     Reason for Call: Symptoms or Concerns     If patient has red-flag symptoms, warm transfer to triage line    Current symptom or concern: didn't feel good yesterday, had loose bowels, had a pain in lower left groin, 2:00 am had explosive diarhhea throughout the night    Symptoms have been present for:  1 day(s)    Has patient previously been seen for this? No      Are there any new or worsening symptoms? No, pt states she feels okay now but was told during pre-op to tell clinic of any sickness or concerns    Action Taken: Message routed to:  Other: [facility]    Travel Screening: Not Applicable     Date of Service:

## 2025-03-10 ENCOUNTER — PREP FOR PROCEDURE (OUTPATIENT)
Dept: OBGYN | Facility: CLINIC | Age: 89
End: 2025-03-10
Payer: COMMERCIAL

## 2025-03-10 DIAGNOSIS — N81.3 UTEROVAGINAL PROLAPSE, COMPLETE: Primary | ICD-10-CM

## 2025-03-10 DIAGNOSIS — N81.3 UTEROVAGINAL PROLAPSE, COMPLETE: ICD-10-CM

## 2025-03-11 ENCOUNTER — ANESTHESIA (OUTPATIENT)
Dept: SURGERY | Facility: CLINIC | Age: 89
End: 2025-03-11
Payer: COMMERCIAL

## 2025-03-18 ENCOUNTER — OFFICE VISIT (OUTPATIENT)
Dept: FAMILY MEDICINE | Facility: CLINIC | Age: 89
End: 2025-03-18
Payer: COMMERCIAL

## 2025-03-18 VITALS
OXYGEN SATURATION: 99 % | HEART RATE: 58 BPM | TEMPERATURE: 97.7 F | RESPIRATION RATE: 16 BRPM | SYSTOLIC BLOOD PRESSURE: 138 MMHG | HEIGHT: 63 IN | DIASTOLIC BLOOD PRESSURE: 60 MMHG | WEIGHT: 138 LBS | BODY MASS INDEX: 24.45 KG/M2

## 2025-03-18 DIAGNOSIS — R19.7 DIARRHEA, UNSPECIFIED TYPE: Primary | ICD-10-CM

## 2025-03-18 PROCEDURE — 1126F AMNT PAIN NOTED NONE PRSNT: CPT | Performed by: NURSE PRACTITIONER

## 2025-03-18 PROCEDURE — 99213 OFFICE O/P EST LOW 20 MIN: CPT | Performed by: NURSE PRACTITIONER

## 2025-03-18 PROCEDURE — 3075F SYST BP GE 130 - 139MM HG: CPT | Performed by: NURSE PRACTITIONER

## 2025-03-18 PROCEDURE — 3078F DIAST BP <80 MM HG: CPT | Performed by: NURSE PRACTITIONER

## 2025-03-18 ASSESSMENT — PAIN SCALES - GENERAL: PAINLEVEL_OUTOF10: NO PAIN (0)

## 2025-03-18 ASSESSMENT — ENCOUNTER SYMPTOMS: DIARRHEA: 1

## 2025-03-18 NOTE — PATIENT INSTRUCTIONS
"Continue to monitor stools.  Push fluids.  No more imodium and continue with metamucil daily.      When you are out of refills or the refills say \"zero\", it is time to schedule your next appointment in clinic!    Labs are released to you almost immediately and sometimes before I have had a chance to review them.  I review labs regularly and once they are all in, you will either be sent a letter with your results or if you are signed up for on-line services, you will be notified that results are available to you on [employer]. If there are serious findings, you typically will be called.    If you have any questions about your visit, your symptoms, your medication, your test results or it is not clear what your diagnosis or treatment plan is please contact me (via [employer]) or call the care team at [phone] and say \"Care Team\"          "

## 2025-03-18 NOTE — PROGRESS NOTES
"  Assessment & Plan     Diarrhea, unspecified type    Diarrhea was most likely from Augmentin. She declined having me list it as an allergy.   She will continue to monitor, ensure adequate hydration. Avoid any further imodium and resume metamucil daily.              See Patient Instructions  Patient Instructions   Continue to monitor stools.  Push fluids.  No more imodium and continue with metamucil daily.      When you are out of refills or the refills say \"zero\", it is time to schedule your next appointment in clinic!    Labs are released to you almost immediately and sometimes before I have had a chance to review them.  I review labs regularly and once they are all in, you will either be sent a letter with your results or if you are signed up for on-line services, you will be notified that results are available to you on [employer]. If there are serious findings, you typically will be called.    If you have any questions about your visit, your symptoms, your medication, your test results or it is not clear what your diagnosis or treatment plan is please contact me (via [employer] or call the care team at [phone] and say \"Care Team\"            Werner Brady is a 89 year old, presenting for the following health issues:  Diarrhea (X 3 weeks )      3/18/2025     3:34 PM   Additional Questions   Roomed by      Diarrhea    History of Present Illness       Reason for visit:  Diahrea  Symptom onset:  3-4 weeks ago  Symptoms include:  Better now but have a surgery next week  Symptom intensity:  Severe  Symptom progression:  Improving  Had these symptoms before:  No  What makes it worse:  No  What makes it better:  No   She is taking medications regularly.        States she was prescribed Augmentin for 14 days, after about day 8 she developed diarrhea. She stopped the augmentin which was prescribed for UTI. She has upcoming surgery for prolapse to be done.  She states she took imodium for a few days and stools are " "now firmed up. She has resumed metamucil daily. She has no abdominal discomfort. Denies any known urinary changes or concerns.  She's wondering if the diarrhea was from the antibiotic.            Review of Systems  Constitutional, HEENT, cardiovascular, pulmonary, gi and gu systems are negative, except as otherwise noted.      Objective    /60   Pulse 58   Temp 97.7  F (36.5  C) (Tympanic)   Resp 16   Ht 1.588 m (5' 2.5\")   Wt 62.6 kg (138 lb)   LMP  (LMP Unknown)   SpO2 99%   BMI 24.84 kg/m    Body mass index is 24.84 kg/m .  Physical Exam   GENERAL: healthy, alert and no distress  NECK: no adenopathy, no asymmetry  RESP: lungs clear to auscultation - no rales, rhonchi or wheezes  CV: regular rate and rhythm, normal S1 S2, no S3 or S4, no murmur  ABDOMEN: soft, nontender, no hepatosplenomegaly, no masses and bowel sounds normal  MS: no gross musculoskeletal defects noted              Signed Electronically by: Cindy Flor, SUDHAKAR CNP    "

## 2025-03-21 ENCOUNTER — TELEPHONE (OUTPATIENT)
Dept: FAMILY MEDICINE | Facility: CLINIC | Age: 89
End: 2025-03-21
Payer: COMMERCIAL

## 2025-03-21 NOTE — TELEPHONE ENCOUNTER
Patient Returning Call    Reason for call:  pt would like to talk to Cindy Flor regarding a med/surgery:  surgery scheduled for 3/26, pt experiencing diarrhea so appt was cancelled.  She stopped medication so knows the diarrhea is not caused by medication. Would like help knowing how to move forward    Information relayed to patient:  pt will receive phone call back from Cindy Flor    Patient has additional questions:  N/A    What are your questions/concerns:  see above    Okay to leave a detailed message?: Yes at Cell number on file:    Telephone Information:   Home [phone]

## 2025-03-24 NOTE — TELEPHONE ENCOUNTER
Copied from routing message:    Cindy Flor APRN CNP  P [facility]  Caller: Unspecified (3 days ago,  2:08 PM)  When I saw her, diarrhea had resolved? Is she still experiencing diarrhea now, even after stopping Augmentin?  GRACE Davison

## 2025-03-24 NOTE — TELEPHONE ENCOUNTER
Pt notified of provider's message below. Says that she's  been using the metamucil once a day as discussed at her recent visit with PCP. Says that she has surgery rescheduled for middle of April now, so does have some time. Says that she'll continue to monitor symptoms for now, and will call back if diarrhea persists.    Cindy Flor APRN CNP  [facility]6 minutes ago (1:59 PM)     CV  I don't think this sounds like diarrhea persisting. I would recommend the metamucil to bulk stools up, fluids and notify if they are loose again, then I would say to do stool testing.  GRACE Davison RN  [facility]

## 2025-03-24 NOTE — TELEPHONE ENCOUNTER
"Contacted patient   States she was having BM's all day long on Wednesday, Thursday was \"explosive\"  And light in color. Friday, Saturday normal stools. Sunday only 1 formed.   Today has had 5 stools formed but is concerned about the frequency and unpredictability. No episodes of incontinence yet.   Denies black tarry or blood in stools. Denies signs of dehydration.   She is passing gas and denies abdominal pain or fever.   She is not taking Imodium currently and she did stop the Augmentin. She did not finish it.   Her surgery for bladder repair cancelled 3/26  Reports eating and drinking well, drinks a lot of water to avoid hard stools.     Margi Mayers RN on 3/24/2025 at 1:48 PM            "

## 2025-04-02 ENCOUNTER — OFFICE VISIT (OUTPATIENT)
Dept: FAMILY MEDICINE | Facility: CLINIC | Age: 89
End: 2025-04-02
Payer: COMMERCIAL

## 2025-04-02 VITALS
TEMPERATURE: 97.7 F | OXYGEN SATURATION: 99 % | DIASTOLIC BLOOD PRESSURE: 78 MMHG | SYSTOLIC BLOOD PRESSURE: 138 MMHG | HEIGHT: 63 IN | RESPIRATION RATE: 16 BRPM | BODY MASS INDEX: 24.8 KG/M2 | WEIGHT: 140 LBS | HEART RATE: 50 BPM

## 2025-04-02 DIAGNOSIS — Z01.818 PREOP GENERAL PHYSICAL EXAM: Primary | ICD-10-CM

## 2025-04-02 DIAGNOSIS — N81.4 UTEROVAGINAL PROLAPSE: ICD-10-CM

## 2025-04-02 PROCEDURE — 1126F AMNT PAIN NOTED NONE PRSNT: CPT | Performed by: NURSE PRACTITIONER

## 2025-04-02 PROCEDURE — 3078F DIAST BP <80 MM HG: CPT | Performed by: NURSE PRACTITIONER

## 2025-04-02 PROCEDURE — 3075F SYST BP GE 130 - 139MM HG: CPT | Performed by: NURSE PRACTITIONER

## 2025-04-02 PROCEDURE — 99214 OFFICE O/P EST MOD 30 MIN: CPT | Performed by: NURSE PRACTITIONER

## 2025-04-02 PROCEDURE — 93000 ELECTROCARDIOGRAM COMPLETE: CPT | Performed by: NURSE PRACTITIONER

## 2025-04-02 ASSESSMENT — PAIN SCALES - GENERAL: PAINLEVEL_OUTOF10: NO PAIN (0)

## 2025-04-02 NOTE — PROGRESS NOTES
Preoperative Evaluation  [facility]  [address]  [facility] [address]  Phone: [phone]  Primary Provider: SUDHAKAR Yung CNP  Pre-op Performing Provider: SUDHAKAR Yung CNP  Apr 2, 2025 4/2/2025   Surgical Information   What procedure is being done? close vagina   Facility or Hospital where procedure/surgery will be performed: [facility]   Who is doing the procedure / surgery? Dr Jemima Dove   Date of surgery / procedure: april 18   Time of surgery / procedure: 730am   Where do you plan to recover after surgery? at home with family     Fax number for surgical facility: Note does not need to be faxed, will be available electronically in Epic.    Assessment & Plan     The proposed surgical procedure is considered INTERMEDIATE risk.    Preop general physical exam    - EKG 12-lead complete w/read - Clinics  - UA with Microscopic reflex to Culture - lab collect; Future  - Basic metabolic panel  (Ca, Cl, CO2, Creat, Gluc, K, Na, BUN); Future  - CBC with platelets; Future    Uterovaginal prolapse    - EKG 12-lead complete w/read - Clinics  - UA with Microscopic reflex to Culture - lab collect; Future  - Basic metabolic panel  (Ca, Cl, CO2, Creat, Gluc, K, Na, BUN); Future  - CBC with platelets; Future           Risks and Recommendations  The patient has the following additional risks and recommendations for perioperative complications:   - Consult Hospitalist / IM to assist with post-op medical management    Antiplatelet or Anticoagulation Medication Instructions   - We reviewed the medication list and the patient is not on an antiplatelet or anticoagulation medications.    Additional Medication Instructions  Take all scheduled medications on the day of surgery EXCEPT for modifications listed below:   - Diuretics (furosemide, hydrochlorothiazide, chlorothalidone): DO NOT TAKE on the day of surgery.    Recommendation  Approval given to  proceed with proposed procedure, without further diagnostic evaluation.    Werner Brady is a 89 year old, presenting for the following:  Pre-Op Exam          4/2/2025    10:26 AM   Additional Questions   Roomed by [facility]: she has had ongoing issues with recurrent UTI's and incontinence.  Scheduled for uterovaginal prolapse repair.  Offers no concerns with surgery.            4/2/2025   Pre-Op Questionnaire   Have you ever had a heart attack or stroke? No   Have you ever had surgery on your heart or blood vessels, such as a stent placement, a coronary artery bypass, or surgery on an artery in your head, neck, heart, or legs? No   Do you have chest pain with activity? No   Do you have a history of heart failure? No   Do you currently have a cold, bronchitis or symptoms of other infection? (!) UNKNOWN    Do you have a cough, shortness of breath, or wheezing? (!) YES mild   Do you or anyone in your family have previous history of blood clots? No   Do you or does anyone in your family have a serious bleeding problem such as prolonged bleeding following surgeries or cuts? No   Have you ever had problems with anemia or been told to take iron pills? (!) YES    Have you had any abnormal blood loss such as black, tarry or bloody stools, or abnormal vaginal bleeding? No   Have you ever had a blood transfusion? No   Are you willing to have a blood transfusion if it is medically needed before, during, or after your surgery? Yes   Have you or any of your relatives ever had problems with anesthesia? No   Do you have sleep apnea, excessive snoring or daytime drowsiness? No   Do you have any artifical heart valves or other implanted medical devices like a pacemaker, defibrillator, or continuous glucose monitor? No   Do you have artificial joints? (!) YES   Are you allergic to latex? No     Health Care Directive  Patient has a Health Care Directive on file      Preoperative Review of    reviewed - no record of  controlled substances prescribed.      Status of Chronic Conditions:  See problem list for active medical problems.  Problems all longstanding and stable, except as noted/documented.  See ROS for pertinent symptoms related to these conditions.    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 Pennington in 2004      Osteoarthritis of both hands 05/07/2019     Priority: Medium    Chronic obstructive pulmonary disease, unspecified COPD type (H) 01/10/2017     Priority: Medium    Chronic rhinitis 12/04/2015     Priority: Medium    Essential hypertension 12/04/2015     Priority: Medium    Dysuria 12/04/2015     Priority: Medium    Primary osteoarthritis of both knees 10/08/2014     Priority: Medium    Pedal edema 12/03/2013     Priority: Medium    Essential tremor 11/15/2013     Priority: Medium    Kyphosis of cervical region 11/28/2011     Priority: Medium    Neck pain, chronic 11/28/2011     Priority: Medium    HYPERLIPIDEMIA LDL GOAL <130 10/31/2010     Priority: Medium    Recurrent UTI 09/20/2010     Priority: Medium    Cataract 07/06/2009     Priority: Medium     Utility update for deleted IMO code  Imo Update utility      Right shoulder pain 10/27/2008     Priority: Medium    Numbness in right leg 10/27/2008     Priority: Medium    LEFT HIP PAIN 03/23/2006     Priority: Medium    Urinary frequency 06/09/2005     Priority: Medium      Past Medical History:   Diagnosis Date    COPD (chronic obstructive pulmonary disease) (H)     Migraine, unspecified, without mention of  intractable migraine without mention of status migrainosus     Other urinary incontinence     Pure hypercholesterolemia     Unspecified essential hypertension      Past Surgical History:   Procedure Laterality Date    ARTHROPLASTY HIP Right 9/28/2020    Procedure: TOTAL Hip Arthroplasty;  Surgeon: Ashkan Shah MD;  Location: [facility] OR    AS TOTAL KNEE ARTHROPLASTY Right 2011    HC ANTER COLPORRHAPHY,BLAD/VAGINA  6/04    Cystocele Repair,rectocele repair.    JOINT REPLACEMENT, HIP RT/LT  3/07, 4/07    Joint Replacement Hip /LT- dislocated in front repeat surgery 1 week later     Current Outpatient Medications   Medication Sig Dispense Refill    albuterol (PROAIR HFA/PROVENTIL HFA/VENTOLIN HFA) 108 (90 Base) MCG/ACT inhaler Inhale 2 puffs into the lungs every 6 hours. 18 g 3    atenolol (TENORMIN) 25 MG tablet Take 1 tablet (25 mg) by mouth daily 90 tablet 3    chlorthalidone (HYGROTON) 25 MG tablet Take 2 tablets (50 mg) by mouth daily. 180 tablet 3    estradiol (ESTRACE) 0.1 MG/GM vaginal cream Place 2 g vaginally twice a week. 85 g 3    ipratropium (ATROVENT HFA) 17 MCG/ACT inhaler Inhale 2 puffs into the lungs every 6 hours 12.9 g 3    ipratropium (ATROVENT) 0.03 % nasal spray Spray 2 sprays into both nostrils daily 60 mL 0    multivitamin w/minerals (THERA-VIT-M) tablet Take 1 tablet by mouth daily      potassium chloride ER (K-TAB) 20 MEQ CR tablet Take 1 tablet (20 mEq) by mouth daily 90 tablet 3    acetaminophen (TYLENOL) 325 MG tablet Take 3 tablets (975 mg) by mouth every 8 hours (Patient not taking: Reported on 1/27/2025)      acetaminophen-caffeine (EXCEDRIN TENSION HEADACHE) 500-65 MG TABS Take 2 tablets by mouth every 6 hours as needed for mild pain (Patient not taking: Reported on 1/27/2025)      clotrimazole-betamethasone (LOTRISONE) 1-0.05 % external cream Apply topically 2 times daily. (Patient not taking: Reported on 4/2/2025) 45 g 2    diclofenac (VOLTAREN) 1 % topical gel Apply 4 g topically  4 times daily. (Patient not taking: Reported on 2025) 100 g 3    docusate sodium (COLACE) 100 MG capsule Take 1 capsule (100 mg) by mouth daily (Patient not taking: Reported on 2025) 60 capsule 4    hydrocortisone 2.5 % ointment Apply topically 2 times daily. (Patient not taking: Reported on 2025) 20 g 3    psyllium 28.3 % POWD Take 1 teaspoonful by mouth daily. (Patient not taking: Reported on 2025)         Allergies   Allergen Reactions    Bactrim [Sulfamethoxazole-Trimethoprim] Nausea and Vomiting    Cortisone Other (See Comments)     Couldn't walk well even after 2 weeks    Diclofenac Sodium Other (See Comments)     Took one tab in , no reaction in note    Hydrocodone Nausea and Vomiting    Lidocaine Hallucination     Patches     Morphine And Codeine Nausea and Vomiting     Vomited 1.5 days    Oxycodone Nausea and Vomiting    Tramadol Nausea and Vomiting        Social History     Tobacco Use    Smoking status: Former     Current packs/day: 0.00     Average packs/day: 1 pack/day for 20.0 years (20.0 ttl pk-yrs)     Types: Cigarettes     Start date: 1951     Quit date: 1971     Years since quittin.2     Passive exposure: Past    Smokeless tobacco: Never   Substance Use Topics    Alcohol use: Yes     Comment: 1 wine a month maybe     Family History   Problem Relation Age of Onset    Cancer Mother         Bladder    Neurologic Disorder Mother         heriditary tremor    C.A.D. Father     Cerebrovascular Disease Father     Allergies Maternal Grandfather     Respiratory Maternal Grandfather         Asthma    Diabetes Paternal Grandmother         Type II    Cancer Brother         Multiple myloma    C.A.D. Brother     Cancer Sister         kidney cancer    Neurofibromatosis Son     Breast Cancer No family hx of      History   Drug Use No             Review of Systems  Constitutional, HEENT, cardiovascular, pulmonary, gi and gu systems are negative, except as otherwise  "noted.    Objective    /78   Pulse 50   Temp 97.7  F (36.5  C) (Tympanic)   Resp 16   Ht 1.588 m (5' 2.5\")   Wt 63.5 kg (140 lb)   LMP  (LMP Unknown)   SpO2 99%   BMI 25.20 kg/m     Estimated body mass index is 25.2 kg/m  as calculated from the following:    Height as of this encounter: 1.588 m (5' 2.5\").    Weight as of this encounter: 63.5 kg (140 lb).  Physical Exam  GENERAL: alert and no distress  EYES: Eyes grossly normal to inspection and conjunctivae and sclerae normal, wears glasses  HENT: mouth without ulcers or lesions  NECK: no adenopathy, no asymmetry, masses, or scars  RESP: lungs clear to auscultation - no rales, rhonchi or wheezes  CV: regular rate and rhythm, normal S1 S2, no S3 or S4, no murmur, click or rub, no peripheral edema  ABDOMEN: soft, nontender, no hepatosplenomegaly, no masses and bowel sounds normal  MS: no gross musculoskeletal defects noted, no edema  SKIN: no suspicious lesions or rashes  PSYCH: mentation appears normal, affect normal/bright    Recent Labs   Lab Test 02/26/25  1413 01/07/25  1132 10/25/24  1537    134*  --    POTASSIUM 3.4 4.3  --    CR 0.84 0.82  --    A1C 5.9*  --  6.0*        Diagnostics  No labs were ordered during this visit.   Will return next Friday for labs and urine to be done 7 days before surgery.  EKG: sinus bradycardia, normal axis, normal intervals, no acute ST/T changes c/w ischemia, no LVH by voltage criteria, unchanged from previous tracings    Revised Cardiac Risk Index (RCRI)  The patient has the following serious cardiovascular risks for perioperative complications:   - No serious cardiac risks = 0 points     RCRI Interpretation: 0 points: Class I (very low risk - 0.4% complication rate)         Signed Electronically by: SUDHAKAR Yung CNP  A copy of this evaluation report is provided to the requesting physician.         "

## 2025-04-02 NOTE — PATIENT INSTRUCTIONS
Return next Friday the  for lab tests.  EKG looked unchanged from previous.        How to Take Your Medication Before Surgery  Preoperative Medication Instructions   Antiplatelet or Anticoagulation Medication Instructions   - We reviewed the medication list and the patient is not on an antiplatelet or anticoagulation medications.    Additional Medication Instructions  Hold chlorthalidone the morning of surgery.   Stop all vitamins and supplements now.    Patient Education   Preparing for Your Surgery  For Adults  Getting started  In most cases, a nurse will call to review your health history and instructions. They will give you an arrival time based on your scheduled surgery time. Please be ready to share:  Your doctor's clinic name and phone number  Your medical, surgical, and anesthesia history  A list of allergies and sensitivities  A list of medicines, including herbal treatments and over-the-counter drugs  Whether the patient has a legal guardian (ask how to send us the papers in advance)  Note: You may not receive a call if you were seen at our PAC (Preoperative Assessment Center).  Please tell us if you're pregnant--or if there's any chance you might be pregnant. Some surgeries may injure a fetus (unborn baby), so they require a pregnancy test. Surgeries that are safe for a fetus don't always need a test, and you can choose whether to have one.   Preparing for surgery  Within 10 to 30 days of surgery: Have a pre-op exam (sometimes called an H&P, or History and Physical). This can be done at a clinic or pre-operative center.  If you're having a , you may not need this exam. Talk to your care team.  At your pre-op exam, talk to your care team about all medicines you take. (This includes CBD oil and any drugs, such as THC, marijuana, and other forms of cannabis.) If you need to stop any medicine before surgery, ask when to start taking it again.  This is for your safety. Many medicines and drugs can  make you bleed too much during surgery. Some change how well surgery (anesthesia) drugs work.  Call your insurance company to let them know you're having surgery. (If you don't have insurance, call [phone].)  Call your clinic if there's any change in your health. This includes a scrape or scratch near the surgery site, or any signs of a cold (sore throat, runny nose, cough, rash, fever).  Eating and drinking guidelines  For your safety: Unless your surgeon tells you otherwise, follow the guidelines below.  Eat and drink as normal until 8 hours before you arrive for surgery. After that, no food or milk. You can spit out gum when you arrive.  Drink clear liquids until 2 hours before you arrive. These are liquids you can see through, like water, Gatorade, and Propel Water. They also include plain black coffee and tea (no cream or milk).  No alcohol for 24 hours before you arrive. The night before surgery, stop any drinks that contain THC.  If your care team tells you to take medicine on the morning of surgery, it's okay to take it with a sip of water. No other medicines or drugs are allowed (including CBD oil)--follow your care team's instructions.  If you have questions the day of surgery, call your hospital or surgery center.   Preventing infection  Shower or bathe the night before and the morning of surgery. Follow the instructions your clinic gave you. (If no instructions, use regular soap.)  Don't shave or clip hair near your surgery site. We'll remove the hair if needed.  Don't smoke or vape the morning of surgery. No chewing tobacco for 6 hours before you arrive. A nicotine patch is okay. You may spit out nicotine gum when you arrive.  For some surgeries, the surgeon will tell you to fully quit smoking and nicotine.  We will make every effort to keep you safe from infection. We will:  Clean our hands often with soap and water (or an alcohol-based hand rub).  Clean the skin at your surgery site with a  special soap that kills germs.  Give you a special gown to keep you warm. (Cold raises the risk of infection.)  Wear hair covers, masks, gowns, and gloves during surgery.  Give antibiotic medicine, if prescribed. Not all surgeries need this medicine.  What to bring on the day of surgery  Photo ID and insurance card  Copy of your health care directive, if you have one  Glasses and hearing aids (bring cases)  You can't wear contacts during surgery  Inhaler and eye drops, if you use them (tell us about these when you arrive)  CPAP machine or breathing device, if you use them  A few personal items, if spending the night  If you have . . .  A pacemaker, ICD (cardiac defibrillator), or other implant: Bring the ID card.  An implanted stimulator: Bring the remote control.  A legal guardian: Bring a copy of the certified (court-stamped) guardianship papers.  Please remove any jewelry, including body piercings. Leave jewelry and other valuables at home.  If you're going home the day of surgery  You must have a responsible adult drive you home. They should stay with you overnight as well.  If you don't have someone to stay with you, and you aren't safe to go home alone, we may keep you overnight. Insurance often won't pay for this.  After surgery  If it's hard to control your pain or you need more pain medicine, please call your surgeon's office.  Questions?   If you have any questions for your care team, list them here:   ____________________________________________________________________________________________________________________________________________________________________________________________________________________________________________________________  For informational purposes only. Not to replace the advice of your health care provider. Copyright   2003, 2019 [facility]. All rights reserved. Clinically reviewed by See Cheney MD. [employer] [id number] - REV 08/24.

## 2025-04-15 ENCOUNTER — TELEPHONE (OUTPATIENT)
Dept: OBGYN | Facility: CLINIC | Age: 89
End: 2025-04-15
Payer: COMMERCIAL

## 2025-04-15 NOTE — TELEPHONE ENCOUNTER
"Patient asking if she will be ready for procedure on Friday due to UTI diagnosed at pre-op appointment     Patient reports at her pre-op appointment last Friday - she left a urine sample and was contacted yesterday that she has a UTI.    Patient started Macrobid yesterday but will not be finished with the prescription by Friday.     Patient feeling \" so much better already \" that she feels she would be up to the procedure on Friday.     Patient wanting to update Dr. Dove to see what she thinks and if the procedure will still be on for Friday?    Patient would like to know ASAP as she will need to contact the person that is coming to stay with her to update she will not need them to come.     Thanks    Licha ANDERSEN RN   [facility]         "

## 2025-04-15 NOTE — PROGRESS NOTES
"Caitlyn is a 85 year old who is being evaluated via a billable telephone visit.      What phone number would you like to be contacted at? [phone]  How would you like to obtain your AVS? MyChart    Assessment & Plan     Dysuria    - UA with Microscopic reflex to Culture; Future    Will have her come to clinic to do UA and treat accordingly.           BMI:   Estimated body mass index is 26.47 kg/m  as calculated from the following:    Height as of 6/2/21: 1.619 m (5' 3.75\").    Weight as of 6/2/21: 69.4 kg (153 lb).       See Patient Instructions  Patient Instructions   Come to clinic today to do UA to ensure we are treating appropriately.  Will be notified of those results.  Push fluids.  Seek emergent care if worsening.      Our Clinic hours are:  Mondays    7:20 am - 7 pm  Tues -  Fri  7:20 am - 5 pm    Clinic Phone: [phone]    The clinic lab opens at 7:30 am Mon - Fri and appointments are required.    [facility]. [phone]  Monday  8 am - 7pm  Tues - Fri 8 am - 5:30 pm             Return in about 1 week (around 6/16/2021) for or sooner if symptoms persist or worsen.    SUDHAKAR Yung CNP  [facility]    Subjective   Caitlyn is a 85 year old who presents for the following health issues  accompanied by her self :    HPI     Genitourinary - Female  Onset/Duration: yesterday  Description:   Painful urination (Dysuria): no           Frequency: YES  Blood in urine (Hematuria): no  Delay in urine (Hesitency): YES  Intensity: severe  Progression of Symptoms:  same  Accompanying Signs & Symptoms:  Fever/chills: no  Flank pain: no  Nausea and vomiting: no  Vaginal symptoms: none  Abdominal/Pelvic Pain: no  History:   History of frequent UTI s: YES  History of kidney stones: no  Sexually Active: no  Possibility of pregnancy: No  Precipitating or alleviating factors: None  Therapies tried and outcome:  none     Last issue with similar complaints was 2 months " ago, UC was inconclusive and she was treated with Bactrim.      Review of Systems   Constitutional, HEENT, cardiovascular, pulmonary, gi and gu systems are negative, except as otherwise noted.      Objective           Vitals:  No vitals were obtained today due to virtual visit.    Physical Exam   healthy, alert and no distress  PSYCH: Alert and oriented times 3; coherent speech, normal   rate and volume, able to articulate logical thoughts, able   to abstract reason, no tangential thoughts, no hallucinations   or delusions  Her affect is normal  RESP: No cough, no audible wheezing, able to talk in full sentences  Remainder of exam unable to be completed due to telephone visits                Phone call duration: 10 minutes   No

## 2025-04-15 NOTE — TELEPHONE ENCOUNTER
[facility] Call Center    Phone Message    May a detailed message be left on voicemail: yes     Reason for Call: Other: Pt is wanting a call back regarding an antibiotic she has been instructed to take prior to surgery with Chiquita, due to pt recent pre op results. Please call pt      Action Taken: Message routed to:  Other: YEU GUEVARA    Travel Screening: Not Applicable

## 2025-04-17 NOTE — TELEPHONE ENCOUNTER
As long is she is feeling better on antibiotics, OK to proceed.    Jemima Porras MD on 4/16/2025 at 10:39 PM       Pt notified of above.  Pt reports understanding.  Pt does not have further questions or concerns.    Licha Fields   [facility]  RN

## 2025-04-18 ENCOUNTER — HOSPITAL ENCOUNTER (OUTPATIENT)
Facility: CLINIC | Age: 89
Discharge: HOME | End: 2025-04-18
Attending: OBSTETRICS & GYNECOLOGY | Admitting: OBSTETRICS & GYNECOLOGY
Payer: COMMERCIAL

## 2025-04-18 VITALS
RESPIRATION RATE: 18 BRPM | TEMPERATURE: 98.2 F | BODY MASS INDEX: 24.8 KG/M2 | OXYGEN SATURATION: 97 % | HEART RATE: 59 BPM | SYSTOLIC BLOOD PRESSURE: 129 MMHG | DIASTOLIC BLOOD PRESSURE: 57 MMHG | HEIGHT: 63 IN | WEIGHT: 140 LBS

## 2025-04-18 DIAGNOSIS — Z98.890 POSTOPERATIVE STATE: Primary | ICD-10-CM

## 2025-04-18 PROCEDURE — 710N000012 HC RECOVERY PHASE 2, PER MINUTE: Performed by: OBSTETRICS & GYNECOLOGY

## 2025-04-18 PROCEDURE — 999N000141 HC STATISTIC PRE-PROCEDURE NURSING ASSESSMENT: Performed by: OBSTETRICS & GYNECOLOGY

## 2025-04-18 PROCEDURE — 271N000001 HC OR GENERAL SUPPLY NON-STERILE: Performed by: OBSTETRICS & GYNECOLOGY

## 2025-04-18 PROCEDURE — 57250 REPAIR RECTUM & VAGINA: CPT | Performed by: OBSTETRICS & GYNECOLOGY

## 2025-04-18 PROCEDURE — 258N000003 HC RX IP 258 OP 636: Performed by: OBSTETRICS & GYNECOLOGY

## 2025-04-18 PROCEDURE — 272N000001 HC OR GENERAL SUPPLY STERILE: Performed by: OBSTETRICS & GYNECOLOGY

## 2025-04-18 PROCEDURE — 250N000013 HC RX MED GY IP 250 OP 250 PS 637: Performed by: NURSE ANESTHETIST, CERTIFIED REGISTERED

## 2025-04-18 PROCEDURE — 250N000011 HC RX IP 250 OP 636: Mod: JZ | Performed by: OBSTETRICS & GYNECOLOGY

## 2025-04-18 PROCEDURE — 57120 COLPOCLEISIS LE FORT TYPE: CPT | Mod: 51 | Performed by: OBSTETRICS & GYNECOLOGY

## 2025-04-18 PROCEDURE — 370N000017 HC ANESTHESIA TECHNICAL FEE, PER MIN: Performed by: OBSTETRICS & GYNECOLOGY

## 2025-04-18 PROCEDURE — 360N000077 HC SURGERY LEVEL 4, PER MIN: Performed by: OBSTETRICS & GYNECOLOGY

## 2025-04-18 PROCEDURE — 258N000003 HC RX IP 258 OP 636: Performed by: NURSE ANESTHETIST, CERTIFIED REGISTERED

## 2025-04-18 RX ORDER — KETOROLAC TROMETHAMINE 15 MG/ML
15 INJECTION, SOLUTION INTRAMUSCULAR; INTRAVENOUS EVERY 6 HOURS PRN
Status: DISCONTINUED | OUTPATIENT
Start: 2025-04-18 | End: 2025-04-18 | Stop reason: HOSPADM

## 2025-04-18 RX ORDER — ONDANSETRON 4 MG/1
4 TABLET, ORALLY DISINTEGRATING ORAL EVERY 30 MIN PRN
Status: DISCONTINUED | OUTPATIENT
Start: 2025-04-18 | End: 2025-04-18 | Stop reason: HOSPADM

## 2025-04-18 RX ORDER — ACETAMINOPHEN 325 MG/1
975 TABLET ORAL ONCE
Status: COMPLETED | OUTPATIENT
Start: 2025-04-18 | End: 2025-04-18

## 2025-04-18 RX ORDER — TRAMADOL HYDROCHLORIDE 50 MG/1
50 TABLET ORAL EVERY 6 HOURS PRN
Status: DISCONTINUED | OUTPATIENT
Start: 2025-04-18 | End: 2025-04-18 | Stop reason: HOSPADM

## 2025-04-18 RX ORDER — ACETAMINOPHEN 325 MG/1
975 TABLET ORAL EVERY 6 HOURS PRN
COMMUNITY
Start: 2025-04-18

## 2025-04-18 RX ORDER — SODIUM CHLORIDE, SODIUM LACTATE, POTASSIUM CHLORIDE, CALCIUM CHLORIDE 600; 310; 30; 20 MG/100ML; MG/100ML; MG/100ML; MG/100ML
INJECTION, SOLUTION INTRAVENOUS CONTINUOUS
Status: DISCONTINUED | OUTPATIENT
Start: 2025-04-18 | End: 2025-04-18 | Stop reason: HOSPADM

## 2025-04-18 RX ORDER — MAGNESIUM SULFATE HEPTAHYDRATE 40 MG/ML
2 INJECTION, SOLUTION INTRAVENOUS ONCE
Status: DISCONTINUED | OUTPATIENT
Start: 2025-04-18 | End: 2025-04-18 | Stop reason: HOSPADM

## 2025-04-18 RX ORDER — AMOXICILLIN 250 MG
1-2 CAPSULE ORAL 2 TIMES DAILY PRN
Qty: 30 TABLET | Refills: 0 | Status: SHIPPED | OUTPATIENT
Start: 2025-04-18

## 2025-04-18 RX ORDER — ACETAMINOPHEN 325 MG/1
975 TABLET ORAL ONCE
Status: DISCONTINUED | OUTPATIENT
Start: 2025-04-18 | End: 2025-04-18 | Stop reason: HOSPADM

## 2025-04-18 RX ORDER — GABAPENTIN 100 MG/1
100 CAPSULE ORAL
Status: COMPLETED | OUTPATIENT
Start: 2025-04-18 | End: 2025-04-18

## 2025-04-18 RX ORDER — LIDOCAINE HYDROCHLORIDE AND EPINEPHRINE 10; 10 MG/ML; UG/ML
INJECTION, SOLUTION INFILTRATION; PERINEURAL PRN
Status: DISCONTINUED | OUTPATIENT
Start: 2025-04-18 | End: 2025-04-18 | Stop reason: HOSPADM

## 2025-04-18 RX ORDER — TRAMADOL HYDROCHLORIDE 50 MG/1
50 TABLET ORAL EVERY 6 HOURS PRN
Qty: 10 TABLET | Refills: 0 | Status: SHIPPED | OUTPATIENT
Start: 2025-04-18 | End: 2025-04-21

## 2025-04-18 RX ORDER — ONDANSETRON 2 MG/ML
4 INJECTION INTRAMUSCULAR; INTRAVENOUS EVERY 30 MIN PRN
Status: DISCONTINUED | OUTPATIENT
Start: 2025-04-18 | End: 2025-04-18 | Stop reason: HOSPADM

## 2025-04-18 RX ORDER — NALOXONE HYDROCHLORIDE 0.4 MG/ML
0.1 INJECTION, SOLUTION INTRAMUSCULAR; INTRAVENOUS; SUBCUTANEOUS
Status: DISCONTINUED | OUTPATIENT
Start: 2025-04-18 | End: 2025-04-18 | Stop reason: HOSPADM

## 2025-04-18 RX ORDER — LIDOCAINE 40 MG/G
CREAM TOPICAL
Status: DISCONTINUED | OUTPATIENT
Start: 2025-04-18 | End: 2025-04-18 | Stop reason: HOSPADM

## 2025-04-18 RX ADMIN — GABAPENTIN 100 MG: 100 CAPSULE ORAL at 06:26

## 2025-04-18 RX ADMIN — KETOROLAC TROMETHAMINE 15 MG: 15 INJECTION, SOLUTION INTRAMUSCULAR; INTRAVENOUS at 10:44

## 2025-04-18 RX ADMIN — ACETAMINOPHEN 975 MG: 325 TABLET ORAL at 06:29

## 2025-04-18 RX ADMIN — SODIUM CHLORIDE, POTASSIUM CHLORIDE, SODIUM LACTATE AND CALCIUM CHLORIDE: 600; 310; 30; 20 INJECTION, SOLUTION INTRAVENOUS at 06:38

## 2025-04-18 ASSESSMENT — ACTIVITIES OF DAILY LIVING (ADL)
ADLS_ACUITY_SCORE: 37

## 2025-04-18 NOTE — PROCEDURES
4/18/2025     PREOPERATIVE DIAGNOSIS:  Complete procidentia, recurrent, failed pessary usage     POSTOPERATIVE DIAGNOSIS:  same     PROCEDURE:  colpocleisis     SURGEON:  Jemima Dove MD      ASSIST:  Dr. Higgins.  This assistance of this surgeon was required due to the need for additional skilled surgical hands to retract and hold instruments due to the nature of the surgical procedure and risk of complications. Please see above for documented duties.       ANESTHESIA:  Sedation with local     Estimated blood loss was 30cc.            PROCEDURE:  The patient brought in the operating room.  Sedation was initiated without difficulty.  [facility]        Pelvic exam revealed complete procidentia.   She was  then prepped and draped in usual manner. A posterior weighted  speculum was placed in the vagina. The apex of the vagina was grasped with an allis clamp. The cervix was dilated to a 6 Hegar dilator.      The vaginal mucosa was infiltrated the cystocele with 1% lidocaine with epinephrine  and dilute vasopressin with an Allis clamp being placed in the midline to provide countertraction. A rectangle was marked on both the anterior and posterior  vaginal walls leaving space for a red rubber catheter between them. Once the areas were properly marked, a scalpel was used to zoe the edges. The vaginal mucosa was dissected off the underlying cystocele with sharp dissection with scissors, and the cystocele was dissected away from the vaginal mucosa on either  side with sponge dissection.    The cystocele was isolated. The excessive vaginal mucosa was excised.  Attention was directed posteriorly. The mucosa was infiltrated with 1% lidocaine with epinephrine  and dilute vasopressin with an Allis clamp being placed in the midline to provide countertraction.  The mucosa was then  incised in the midline with countertraction being provided by Allis  clamps, and this continued  "up in the midline. The rectovaginal fascia  was then dissected off the underlying vaginal tissue.  Space was left for a drain along the apex and sides of the vagina. The apex of the vagina was closed with 4 running sutures of 2.0 Vicryl tacking the anterior to the posterior portions of the vagina at the apex with the catheter behind it.  The side incisions were re approximated with a running suture of 2.0 vicryl from the corners down the sidewall to the inferior corners, with the drain behind them leaving a \"tunnel\" for drainage.   The vagina was then closed with 5 purse string sutures of 2.0 vicryl starting at the apex and working to the lower incision.  The lower mucosal incision was closed with 2.0 vicryl.  The At the end of the procedure the drain was trimmed and there was excellent elevation of the bladder and rectum.    A perineorrhaphy was performed.  The perineum was injected with local anesthetic and a jay-shaped use of perineal skin was excised.  Vaginal mucosa was injected with 1% lidocaine with dilute vasopressin and incised along the midline posteriorly.   The vaginal mucosa over the rectum was closed with 2.0 Vicryl suture in the usual fashion and the perineum was reapproximated in the fashion of a second-degree laceration.    Good support was obtained.  The red rubber catheter was trimmed, tied in a knot and placed in the vagina.      Diaz was draining clear urine at the end of the procedure. The diaz was removed. The patient was awakened and taken to her room in stable condition.  Sponge, lap and instrument counts were correct x2.        Jemima Dove MD ....................  4/18/2025  5:07 PM    "

## 2025-04-30 DIAGNOSIS — I10 ESSENTIAL HYPERTENSION: ICD-10-CM

## 2025-04-30 RX ORDER — ATENOLOL 25 MG/1
25 TABLET ORAL DAILY
Qty: 90 TABLET | Refills: 1 | Status: SHIPPED | OUTPATIENT
Start: 2025-04-30

## 2025-04-30 RX ORDER — POTASSIUM CHLORIDE 1500 MG/1
20 TABLET, EXTENDED RELEASE ORAL DAILY
Qty: 90 TABLET | Refills: 1 | Status: SHIPPED | OUTPATIENT
Start: 2025-04-30

## 2025-05-01 ENCOUNTER — OFFICE VISIT (OUTPATIENT)
Dept: OBGYN | Facility: CLINIC | Age: 89
End: 2025-05-01
Payer: COMMERCIAL

## 2025-05-01 VITALS
DIASTOLIC BLOOD PRESSURE: 72 MMHG | SYSTOLIC BLOOD PRESSURE: 143 MMHG | WEIGHT: 138.6 LBS | RESPIRATION RATE: 18 BRPM | TEMPERATURE: 97 F | HEIGHT: 63 IN | HEART RATE: 59 BPM | BODY MASS INDEX: 24.56 KG/M2

## 2025-05-01 DIAGNOSIS — Z09 POSTOP CHECK: Primary | ICD-10-CM

## 2025-05-01 PROCEDURE — 3078F DIAST BP <80 MM HG: CPT | Performed by: OBSTETRICS & GYNECOLOGY

## 2025-05-01 PROCEDURE — 3077F SYST BP >= 140 MM HG: CPT | Performed by: OBSTETRICS & GYNECOLOGY

## 2025-05-01 PROCEDURE — 99024 POSTOP FOLLOW-UP VISIT: CPT | Performed by: OBSTETRICS & GYNECOLOGY

## 2025-05-01 NOTE — NURSING NOTE
"Initial BP (!) 143/72 (BP Location: Left arm, Patient Position: Chair, Cuff Size: Adult Regular)   Pulse 59   Temp 97  F (36.1  C) (Tympanic)   Resp 18   Ht 1.588 m (5' 2.5\")   Wt 62.9 kg (138 lb 9.6 oz)   LMP  (LMP Unknown)   BMI 24.95 kg/m   Estimated body mass index is 24.95 kg/m  as calculated from the following:    Height as of this encounter: 1.588 m (5' 2.5\").    Weight as of this encounter: 62.9 kg (138 lb 9.6 oz). .    Gabriela Floyd, MIKEY    "

## 2025-05-04 NOTE — PROGRESS NOTES
SUBJECTIVE:  Caitlyn returns for followup of   colpocleisis  surgery. In the interval of time Caitlyn is doing well.   She is experiencing no pain, soreness None, and states that her bladder is working well and she has no leakage.  She has no drainage or bleeding.  Initially she had a little drainage, but it has stopped.    ROS: general review of systems are negative,    EXAM:       Genitourinary Exam  Vulva: normal, no lesions  Urethral meatus: normal size and location, no lesions or discharge  Vagina: incisions well healed.  No drainage or erythema.  Her drain was removed today without complications.             IMPRESSION: doing well.    RECOMMENDATIONS: RTC prn.

## 2025-06-11 ENCOUNTER — OFFICE VISIT (OUTPATIENT)
Dept: FAMILY MEDICINE | Facility: CLINIC | Age: 89
End: 2025-06-11
Payer: COMMERCIAL

## 2025-06-11 ENCOUNTER — RESULTS FOLLOW-UP (OUTPATIENT)
Dept: FAMILY MEDICINE | Facility: CLINIC | Age: 89
End: 2025-06-11

## 2025-06-11 VITALS
OXYGEN SATURATION: 99 % | HEART RATE: 54 BPM | RESPIRATION RATE: 16 BRPM | BODY MASS INDEX: 24.8 KG/M2 | HEIGHT: 63 IN | WEIGHT: 140 LBS | DIASTOLIC BLOOD PRESSURE: 60 MMHG | SYSTOLIC BLOOD PRESSURE: 128 MMHG | TEMPERATURE: 98.6 F

## 2025-06-11 DIAGNOSIS — E87.1 HYPONATREMIA: ICD-10-CM

## 2025-06-11 DIAGNOSIS — N39.0 URINARY TRACT INFECTION WITHOUT HEMATURIA, SITE UNSPECIFIED: Primary | ICD-10-CM

## 2025-06-11 DIAGNOSIS — K58.1 IRRITABLE BOWEL SYNDROME WITH CONSTIPATION: Primary | ICD-10-CM

## 2025-06-11 DIAGNOSIS — R35.0 URINARY FREQUENCY: ICD-10-CM

## 2025-06-11 DIAGNOSIS — N39.0 URINARY TRACT INFECTION WITHOUT HEMATURIA, SITE UNSPECIFIED: ICD-10-CM

## 2025-06-11 LAB
ALBUMIN UR-MCNC: NEGATIVE MG/DL
ANION GAP SERPL CALCULATED.3IONS-SCNC: 5 MMOL/L (ref 7–15)
APPEARANCE UR: ABNORMAL
BACTERIA #/AREA URNS HPF: ABNORMAL /HPF
BILIRUB UR QL STRIP: NEGATIVE
BUN SERPL-MCNC: 11.5 MG/DL (ref 8–23)
CALCIUM SERPL-MCNC: 10.2 MG/DL (ref 8.8–10.4)
CHLORIDE SERPL-SCNC: 95 MMOL/L (ref 98–107)
COLOR UR AUTO: YELLOW
CREAT SERPL-MCNC: 0.77 MG/DL (ref 0.51–0.95)
EGFRCR SERPLBLD CKD-EPI 2021: 74 ML/MIN/1.73M2
GLUCOSE SERPL-MCNC: 91 MG/DL (ref 70–99)
GLUCOSE UR STRIP-MCNC: NEGATIVE MG/DL
HCO3 SERPL-SCNC: 31 MMOL/L (ref 22–29)
HGB UR QL STRIP: ABNORMAL
KETONES UR STRIP-MCNC: NEGATIVE MG/DL
LEUKOCYTE ESTERASE UR QL STRIP: ABNORMAL
NITRATE UR QL: POSITIVE
PH UR STRIP: 7.5 [PH] (ref 5–7)
POTASSIUM SERPL-SCNC: 4 MMOL/L (ref 3.4–5.3)
RBC #/AREA URNS AUTO: ABNORMAL /HPF
SODIUM SERPL-SCNC: 131 MMOL/L (ref 135–145)
SP GR UR STRIP: 1.01 (ref 1–1.03)
SQUAMOUS #/AREA URNS AUTO: ABNORMAL /LPF
UROBILINOGEN UR STRIP-ACNC: 0.2 E.U./DL
WBC #/AREA URNS AUTO: ABNORMAL /HPF
WBC CLUMPS #/AREA URNS HPF: PRESENT /HPF

## 2025-06-11 PROCEDURE — 1125F AMNT PAIN NOTED PAIN PRSNT: CPT | Performed by: NURSE PRACTITIONER

## 2025-06-11 PROCEDURE — 36415 COLL VENOUS BLD VENIPUNCTURE: CPT | Performed by: NURSE PRACTITIONER

## 2025-06-11 PROCEDURE — 80048 BASIC METABOLIC PNL TOTAL CA: CPT | Performed by: NURSE PRACTITIONER

## 2025-06-11 PROCEDURE — 99214 OFFICE O/P EST MOD 30 MIN: CPT | Performed by: NURSE PRACTITIONER

## 2025-06-11 PROCEDURE — 81001 URINALYSIS AUTO W/SCOPE: CPT | Performed by: NURSE PRACTITIONER

## 2025-06-11 PROCEDURE — 3078F DIAST BP <80 MM HG: CPT | Performed by: NURSE PRACTITIONER

## 2025-06-11 PROCEDURE — 3074F SYST BP LT 130 MM HG: CPT | Performed by: NURSE PRACTITIONER

## 2025-06-11 PROCEDURE — G2211 COMPLEX E/M VISIT ADD ON: HCPCS | Performed by: NURSE PRACTITIONER

## 2025-06-11 RX ORDER — POLYETHYLENE GLYCOL 3350 17 G/17G
1 POWDER, FOR SOLUTION ORAL DAILY
Qty: 255 G | Refills: 1 | Status: SHIPPED | OUTPATIENT
Start: 2025-06-11

## 2025-06-11 RX ORDER — NITROFURANTOIN 25; 75 MG/1; MG/1
100 CAPSULE ORAL 2 TIMES DAILY
Qty: 14 CAPSULE | Refills: 0 | Status: SHIPPED | OUTPATIENT
Start: 2025-06-11

## 2025-06-11 ASSESSMENT — PAIN SCALES - GENERAL: PAINLEVEL_OUTOF10: MILD PAIN (2)

## 2025-06-11 NOTE — PATIENT INSTRUCTIONS
"Continue with Metamucil and fiber daily.  Use the Miralax as needed for constipation.  Will let you know about urine results.      When you are out of refills or the refills say \"zero\", it is time to schedule your next appointment in clinic!    Labs are released to you almost immediately and sometimes before I have had a chance to review them.  I review labs regularly and once they are all in, you will either be sent a letter with your results or if you are signed up for on-line services, you will be notified that results are available to you on [employer]. If there are serious findings, you typically will be called.    If you have any questions about your visit, your symptoms, your medication, your test results or it is not clear what your diagnosis or treatment plan is please contact me (via [employer]) or call the care team at [phone] and say \"Care Team\"          "

## 2025-06-11 NOTE — PROGRESS NOTES
"  Assessment & Plan     Irritable bowel syndrome with constipation    - polyethylene glycol (MIRALAX) 17 GM/Dose powder; Take 17 g (1 Capful) by mouth daily.  Discussed how to take the medication(s), expected outcomes, potential side effects.    Urinary frequency    - UA with Microscopic reflex to Culture - lab collect; Future  - UA with Microscopic reflex to Culture - lab collect  - Urine Microscopic Exam  - Urine Culture    Urinary tract infection without hematuria, site unspecified    - Basic metabolic panel  (Ca, Cl, CO2, Creat, Gluc, K, Na, BUN)  Macrobid twice a day for 7 days.  Discussed how to take the medication(s), expected outcomes, potential side effects.    Patient Instructions   Continue with Metamucil and fiber daily.  Use the Miralax as needed for constipation.  Will let you know about urine results.      When you are out of refills or the refills say \"zero\", it is time to schedule your next appointment in clinic!    Labs are released to you almost immediately and sometimes before I have had a chance to review them.  I review labs regularly and once they are all in, you will either be sent a letter with your results or if you are signed up for on-line services, you will be notified that results are available to you on [employer]. If there are serious findings, you typically will be called.    If you have any questions about your visit, your symptoms, your medication, your test results or it is not clear what your diagnosis or treatment plan is please contact me (via [employer]) or call the care team at [phone] and say \"Care Team\"                BMI  Estimated body mass index is 25.2 kg/m  as calculated from the following:    Height as of this encounter: 1.588 m (5' 2.5\").    Weight as of this encounter: 63.5 kg (140 lb).         Follow-up   Return in about 7 months (around 1/11/2026) for or sooner if symptoms persist or worsen, Physical Exam, Lab Work, Med Check.        Werner Brady is a 89 year " "old, presenting for the following health issues:  Constipation (With abdominal pain )        6/11/2025    10:04 AM   Additional Questions   Roomed by      History of Present Illness       Reason for visit:  Bowel problems  Symptom onset:  More than a month  Symptoms include:  Change of normal bowel habits  Symptom intensity:  Severe  Symptom progression:  Staying the same  What makes it worse:  It is not easy and it did worsen  What makes it better:  No   She is taking medications regularly.        States she is feeling a lot better after bladder surgery. She is not wearing a pad. She has however, had increase in frequency over past couple of days and would like to make sure she doesn't have a UTI.  She's also struggling with constipation. She takes Metamucil and prunes daily. Sometimes uses Colace for movements.  No other concerns voiced today and is otherwise doing well.            Review of Systems  Constitutional, HEENT, cardiovascular, pulmonary, gi and gu systems are negative, except as otherwise noted.      Objective    /60   Pulse 54   Temp 98.6  F (37  C) (Tympanic)   Resp 16   Ht 1.588 m (5' 2.5\")   Wt 63.5 kg (140 lb)   LMP  (LMP Unknown)   SpO2 99%   BMI 25.20 kg/m    Body mass index is 25.2 kg/m .  Physical Exam   GENERAL: healthy, alert and no distress  NECK: no adenopathy, no asymmetry  RESP: lungs clear  CV: regular rate and rhythm  ABDOMEN: soft, non tender  MS: no gross musculoskeletal defects noted      Results for orders placed or performed in visit on 06/11/25 (from the past 24 hours)   UA with Microscopic reflex to Culture - lab collect    Specimen: Urine, Clean Catch   Result Value Ref Range    Color Urine Yellow Colorless, Straw, Light Yellow, Yellow    Appearance Urine Cloudy (A) Clear    Glucose Urine Negative Negative mg/dL    Bilirubin Urine Negative Negative    Ketones Urine Negative Negative mg/dL    Specific Gravity Urine 1.015 1.003 - 1.035    Blood Urine Trace (A) " Negative    pH Urine 7.5 (H) 5.0 - 7.0    Protein Albumin Urine Negative Negative mg/dL    Urobilinogen Urine 0.2 0.2, 1.0 E.U./dL    Nitrite Urine Positive (A) Negative    Leukocyte Esterase Urine Moderate (A) Negative   Urine Microscopic Exam   Result Value Ref Range    Bacteria Urine Moderate (A) None Seen /HPF    RBC Urine 0-2 0-2 /HPF /HPF    WBC Urine 25-50 (A) 0-5 /HPF /HPF    Squamous Epithelials Urine Few (A) None Seen /LPF    WBC Clumps Urine Present (A) None Seen /HPF           Signed Electronically by: SUDHAKAR Yung CNP

## 2025-06-11 NOTE — TELEPHONE ENCOUNTER
Pt returned call & was read providers result note from today.  Pt verbalized understanding & will  & start Macrobid. Will push fluids & will follow up with any further questions.    Mirian Hilliard RN

## 2025-06-12 LAB — BACTERIA UR CULT: NORMAL

## 2025-06-17 ENCOUNTER — TELEPHONE (OUTPATIENT)
Dept: OBGYN | Facility: CLINIC | Age: 89
End: 2025-06-17

## 2025-06-17 ENCOUNTER — RESULTS FOLLOW-UP (OUTPATIENT)
Dept: OBGYN | Facility: CLINIC | Age: 89
End: 2025-06-17

## 2025-06-17 ENCOUNTER — OFFICE VISIT (OUTPATIENT)
Dept: OBGYN | Facility: CLINIC | Age: 89
End: 2025-06-17
Payer: COMMERCIAL

## 2025-06-17 ENCOUNTER — TELEPHONE (OUTPATIENT)
Dept: FAMILY MEDICINE | Facility: CLINIC | Age: 89
End: 2025-06-17
Payer: COMMERCIAL

## 2025-06-17 VITALS
BODY MASS INDEX: 24.98 KG/M2 | HEART RATE: 59 BPM | HEIGHT: 63 IN | SYSTOLIC BLOOD PRESSURE: 123 MMHG | DIASTOLIC BLOOD PRESSURE: 89 MMHG | RESPIRATION RATE: 18 BRPM | TEMPERATURE: 98.1 F | WEIGHT: 141 LBS

## 2025-06-17 DIAGNOSIS — N89.8 VAGINAL ITCHING: ICD-10-CM

## 2025-06-17 DIAGNOSIS — R39.9 UTI SYMPTOMS: Primary | ICD-10-CM

## 2025-06-17 LAB
ALBUMIN UR-MCNC: ABNORMAL MG/DL
APPEARANCE UR: ABNORMAL
BACTERIA #/AREA URNS HPF: ABNORMAL /HPF
BILIRUB UR QL STRIP: NEGATIVE
CLUE CELLS: ABNORMAL
COLOR UR AUTO: YELLOW
GLUCOSE UR STRIP-MCNC: NEGATIVE MG/DL
HGB UR QL STRIP: ABNORMAL
KETONES UR STRIP-MCNC: NEGATIVE MG/DL
LEUKOCYTE ESTERASE UR QL STRIP: ABNORMAL
NITRATE UR QL: POSITIVE
PH UR STRIP: 7 [PH] (ref 5–7)
RBC #/AREA URNS AUTO: ABNORMAL /HPF
SP GR UR STRIP: 1.01 (ref 1–1.03)
SQUAMOUS #/AREA URNS AUTO: ABNORMAL /LPF
TRICHOMONAS, WET PREP: ABNORMAL
UROBILINOGEN UR STRIP-ACNC: 1 E.U./DL
WBC #/AREA URNS AUTO: ABNORMAL /HPF
WBC CLUMPS #/AREA URNS HPF: PRESENT /HPF
WBC'S/HIGH POWER FIELD, WET PREP: ABNORMAL
YEAST, WET PREP: ABNORMAL

## 2025-06-17 PROCEDURE — 99213 OFFICE O/P EST LOW 20 MIN: CPT | Mod: 24 | Performed by: OBSTETRICS & GYNECOLOGY

## 2025-06-17 PROCEDURE — 3079F DIAST BP 80-89 MM HG: CPT | Performed by: OBSTETRICS & GYNECOLOGY

## 2025-06-17 PROCEDURE — 3074F SYST BP LT 130 MM HG: CPT | Performed by: OBSTETRICS & GYNECOLOGY

## 2025-06-17 PROCEDURE — 87210 SMEAR WET MOUNT SALINE/INK: CPT | Performed by: OBSTETRICS & GYNECOLOGY

## 2025-06-17 PROCEDURE — 87086 URINE CULTURE/COLONY COUNT: CPT | Performed by: OBSTETRICS & GYNECOLOGY

## 2025-06-17 PROCEDURE — 81001 URINALYSIS AUTO W/SCOPE: CPT | Performed by: OBSTETRICS & GYNECOLOGY

## 2025-06-17 RX ORDER — CEPHALEXIN 500 MG/1
500 CAPSULE ORAL 2 TIMES DAILY
Qty: 20 CAPSULE | Refills: 0 | Status: SHIPPED | OUTPATIENT
Start: 2025-06-17 | End: 2025-06-27

## 2025-06-17 NOTE — TELEPHONE ENCOUNTER
Can she come in for an exam this afternoon? I would plan for repeat UA/UCx, wet prep  Thanks   Dr. Last      Patient scheduled for 1515 today with Dr. Last. Check in time is 1500. Patient reports understanding.    Licha ANDERSEN RN   [facility]

## 2025-06-17 NOTE — TELEPHONE ENCOUNTER
Symptoms    Describe your symptoms: Pt was treated by MARKEL Flor last week 6/11 and despite finishing antibiotics, all of the symptoms are now back and worse.  Please call patient and advise.      Any pain: Yes:     How long have you been having symptoms: Ongoing      Have you been seen for this:  Yes: 6/11    Appointment offered?: No    Triage offered?: Yes:     Home remedies tried:     Preferred Pharmacy:   [place], [place] -   [address] [facility] [address]  Phone: [phone] Fax: [phone] Alternate Fax: [phone]    Okay to leave a detailed message?: Yes at Home number on file [phone] (home)

## 2025-06-17 NOTE — NURSING NOTE
"Initial /89 (BP Location: Left arm, Patient Position: Chair, Cuff Size: Adult Regular)   Pulse 59   Temp 98.1  F (36.7  C) (Tympanic)   Resp 18   Ht 1.588 m (5' 2.5\")   Wt 64 kg (141 lb)   LMP  (LMP Unknown)   BMI 25.38 kg/m   Estimated body mass index is 25.38 kg/m  as calculated from the following:    Height as of this encounter: 1.588 m (5' 2.5\").    Weight as of this encounter: 64 kg (141 lb). .    " [facility]  Speech Language Pathology Department  Dysphagia Therapy Progress Note    Patient Name:  Helder Sharp Jr.   MRN:  [id number]  Admitting Diagnosis: hypoxia    Recommendations:     General recommendations:  dysphagia therapy  Diet texture/consistency recommendations:  NPO  Medications: NPO  Discharge recommendations:  nursing facility, skilled     Subjective     Patient awake and alert.  Pain/Comfort:  0/10  Spiritual/Cultural/Uatsdin Beliefs/Practices that affect care: no  Respiratory Status: oxymask    Objective:     Therapeutic Activities:  Pt completed base of tongue and laryngeal x60 with minimal-moderate cues.  Pt tolerated thermal stimulation to the anterior faucial pillars x10 with 100 swallow responses.     Assessment:     Pt continues to present with oropharyngeal dysphagia and remains unsafe for PO diet.    Goals:     Multidisciplinary Problems       SLP Goals          Problem: SLP    Goal Priority Disciplines Outcome   SLP Goal     SLP    Description: LTG: To tolerate least restrictive diet without clinical signs/sx of aspiration. (Continue)  ST. Tongue base/laryngeal excursion exercise (continue)  2. Tolerate thermal stimulation x10 with 100% swallow response with less than a 2 second delay. (Continue)  3. Puree solids without clinical signs/sx of aspiration.                      Patient Education:     Patient and spouse were provided with verbal education regarding POC.  Understanding was verbalized.    Plan:     Will continue to follow and tx as appropriate.    SLP Follow-Up:  Yes   Patient to be seen:  daily   Plan of Care expires:  10/13/23  Plan of Care reviewed with:  patient, spouse       Time Tracking:     SLP Treatment Date:    10/9/23  Speech Start Time:   1340  Speech Stop Time:    1355    Speech Total Time (min):   15    Billable minutes:  Treatment of Swallow Dysfunction, 15 minutes       10/09/2023

## 2025-06-17 NOTE — TELEPHONE ENCOUNTER
Cindy Flor APRN CNP to [facility] (Selected Message)    6/17/25 10:17 AM  I would like her to reach out to ob/gyn due to these continued concerns please.  Cindy Flor, MARBINNP

## 2025-06-17 NOTE — PROGRESS NOTES
[facility]  OB/GYN Clinic   Gynecology Consult Note    CC:     Chief Complaint   Patient presents with    Urinary Problem       HPI: Ms. Wasserman is a 89 year old female who has symptoms of a possible bladder infection. She has issues with recurrent bladder infections and recently completed a course of antibiotics. Her symptoms got somewhat improved on the antibiotics but have now returned intensely. She has urinary frequency, peeing every hour and urgency. No hematuria or dysuria. She has chronic vulvar itching but this is long-standing. No discharge issues. Has been using estrace cream to try and help with the itching and vulvar irritation.     ROS: A 10 pt ROS was completed and found to be otherwise negative unless mentioned in the HPI.     PMH:   Past Medical History:   Diagnosis Date    COPD (chronic obstructive pulmonary disease) (H)     Migraine, unspecified, without mention of intractable migraine without mention of status migrainosus     Other urinary incontinence     Pure hypercholesterolemia     Unspecified essential hypertension        PSHx:   Past Surgical History:   Procedure Laterality Date    ARTHROPLASTY HIP Right 2020    Procedure: TOTAL Hip Arthroplasty;  Surgeon: Ashkan Shah MD;  Location: [facility] OR    AS TOTAL KNEE ARTHROPLASTY Right     HC ANTER COLPORRHAPHY,BLAD/VAGINA      Cystocele Repair,rectocele repair.    JOINT REPLACEMENT, HIP RT/LT  3/07,     Joint Replacement Hip /LT- dislocated in front repeat surgery 1 week later    LEFORTE COLPOCLEISIS PROCEDURE (VAGINAL OBLITERATION) N/A 2025    Procedure: COLPOCLEISIS, COMPLETE;  Surgeon: Jemima Porras MD;  Location: [facility] OR       OBHx:   OB History    Para Term  AB Living   3 3 3 0 0 3   SAB IAB Ectopic Multiple Live Births   0 0 0 0 3      # Outcome Date GA Lbr Terence/2nd Weight Sex Type Anes PTL Lv   3 Term            2 Term            1 Term               Obstetric Comments     64,64,72       Medications:   Current Outpatient Medications   Medication Sig Dispense Refill    acetaminophen (TYLENOL) 325 MG tablet Take 3 tablets (975 mg) by mouth every 6 hours as needed for mild pain.      acetaminophen-caffeine (EXCEDRIN TENSION HEADACHE) 500-65 MG TABS Take 2 tablets by mouth every 6 hours as needed for mild pain      albuterol (PROAIR HFA/PROVENTIL HFA/VENTOLIN HFA) 108 (90 Base) MCG/ACT inhaler Inhale 2 puffs into the lungs every 6 hours. 18 g 3    atenolol (TENORMIN) 25 MG tablet TAKE ONE TABLET BY MOUTH ONCE DAILY 90 tablet 1    cephALEXin (KEFLEX) 500 MG capsule Take 1 capsule (500 mg) by mouth 2 times daily for 10 days. 20 capsule 0    chlorthalidone (HYGROTON) 25 MG tablet Take 2 tablets (50 mg) by mouth daily. 180 tablet 3    docusate sodium (COLACE) 100 MG capsule Take 1 capsule (100 mg) by mouth daily 60 capsule 4    estradiol (ESTRACE) 0.1 MG/GM vaginal cream Place 2 g vaginally twice a week. 85 g 3    hydrocortisone 2.5 % ointment Apply topically 2 times daily. 20 g 3    ipratropium (ATROVENT HFA) 17 MCG/ACT inhaler Inhale 2 puffs into the lungs every 6 hours 12.9 g 3    ipratropium (ATROVENT) 0.03 % nasal spray Spray 2 sprays into both nostrils daily 60 mL 0    multivitamin w/minerals (THERA-VIT-M) tablet Take 1 tablet by mouth daily      nitroFURantoin macrocrystal-monohydrate (MACROBID) 100 MG capsule Take 1 capsule (100 mg) by mouth 2 times daily. 14 capsule 0    polyethylene glycol (MIRALAX) 17 GM/Dose powder Take 17 g (1 Capful) by mouth daily. 255 g 1    psyllium 28.3 % POWD Take 1 teaspoonful by mouth daily.      acetaminophen (TYLENOL) 325 MG tablet Take 3 tablets (975 mg) by mouth every 8 hours (Patient not taking: Reported on 2025)      clotrimazole-betamethasone (LOTRISONE) 1-0.05 % external cream Apply topically 2 times daily. (Patient not taking: Reported on 2025) 45 g 2    diclofenac (VOLTAREN) 1 % topical gel Apply 4 g topically 4 times daily.  (Patient not taking: Reported on 2025) 100 g 3    potassium chloride ER (K-TAB) 20 MEQ CR tablet TAKE ONE TABLET BY MOUTH ONCE DAILY (Patient not taking: Reported on 2025) 90 tablet 1     Current Facility-Administered Medications   Medication Dose Route Frequency Provider Last Rate Last Admin    lidocaine 112 mL, EPINEPHrine (ADRENALIN) 1.12 mg in sodium chloride 0.9 % 1,113.12 mL (TUMESCENT)   Irrigation Once Jan Rausch MD        lidocaine 112 mL, EPINEPHrine (ADRENALIN) 1.12 mg in sodium chloride 0.9 % 1,113.12 mL (TUMESCENT)   Irrigation Once Jan Rausch MD           Allergies:      Allergies   Allergen Reactions    Bactrim [Sulfamethoxazole-Trimethoprim] Nausea and Vomiting    Cortisone Other (See Comments)     Couldn't walk well even after 2 weeks    Diclofenac Sodium Other (See Comments)     Took one tab in , no reaction in note    Hydrocodone Nausea and Vomiting    Lidocaine Hallucination     Patches     Morphine And Codeine Nausea and Vomiting     Vomited 1.5 days    Oxycodone Nausea and Vomiting    Tramadol Nausea and Vomiting       Social History:   Social History     Socioeconomic History    Marital status:      Spouse name: Neeraj Wasserman    Number of children: 3    Years of education: 14    Highest education level: Not on file   Occupational History     Employer: RETIRED   Tobacco Use    Smoking status: Former     Current packs/day: 0.00     Average packs/day: 1 pack/day for 20.0 years (20.0 ttl pk-yrs)     Types: Cigarettes     Start date: 1951     Quit date: 1971     Years since quittin.4     Passive exposure: Past    Smokeless tobacco: Never   Vaping Use    Vaping status: Never Used   Substance and Sexual Activity    Alcohol use: Yes     Comment: 1 wine a month maybe    Drug use: No    Sexual activity: Not Currently     Partners: Male   Other Topics Concern     Service No    Blood Transfusions No    Caffeine Concern Yes     Comment: 1 a day     Occupational Exposure No    Hobby Hazards No    Sleep Concern No    Stress Concern No    Weight Concern Yes     Comment: always ongoing problem    Special Diet Yes     Comment: calcium and vit D    Back Care No    Exercise No    Bike Helmet No    Seat Belt Yes    Self-Exams Yes    Parent/sibling w/ CABG, MI or angioplasty before 65F 55M? No   Social History Narrative    Not on file     Social Drivers of Health     Financial Resource Strain: High Risk (1/7/2025)    Financial Resource Strain     Within the past 12 months, have you or your family members you live with been unable to get utilities (heat, electricity) when it was really needed?: Yes   Food Insecurity: Low Risk  (1/7/2025)    Food Insecurity     Within the past 12 months, did you worry that your food would run out before you got money to buy more?: No     Within the past 12 months, did the food you bought just not last and you didn t have money to get more?: No   Transportation Needs: Low Risk  (1/7/2025)    Transportation Needs     Within the past 12 months, has lack of transportation kept you from medical appointments, getting your medicines, non-medical meetings or appointments, work, or from getting things that you need?: No   Physical Activity: Unknown (1/7/2025)    Exercise Vital Sign     Days of Exercise per Week: 0 days     Minutes of Exercise per Session: Not on file   Recent Concern: Physical Activity - Inactive (1/7/2025)    Exercise Vital Sign     Days of Exercise per Week: 0 days     Minutes of Exercise per Session: 0 min   Stress: No Stress Concern Present (1/7/2025)    [employer] - Occupational Stress Questionnaire     Feeling of Stress : Only a little   Social Connections: Unknown (1/7/2025)    Social Connection and Isolation Panel [NHANES]     Frequency of Communication with Friends and Family: Not on file     Frequency of Social Gatherings with Friends and Family: More than three times a week     Attends  Yazdanism Services: Not on file     Active Member of Clubs or Organizations: Not on file     Attends Club or Organization Meetings: Not on file     Marital Status: Not on file   Interpersonal Safety: Low Risk  (2025)    Interpersonal Safety     Do you feel physically and emotionally safe where you currently live?: Yes     Within the past 12 months, have you been hit, slapped, kicked or otherwise physically hurt by someone?: No     Within the past 12 months, have you been humiliated or emotionally abused in other ways by your partner or ex-partner?: No   Housing Stability: Low Risk  (2025)    Housing Stability     Do you have housing? : Yes     Are you worried about losing your housing?: No     Social History     Socioeconomic History    Marital status:      Spouse name: Neeraj Wasserman    Number of children: 3    Years of education: 14    Highest education level: None   Occupational History     Employer: RETIRED   Tobacco Use    Smoking status: Former     Current packs/day: 0.00     Average packs/day: 1 pack/day for 20.0 years (20.0 ttl pk-yrs)     Types: Cigarettes     Start date: 1951     Quit date: 1971     Years since quittin.4     Passive exposure: Past    Smokeless tobacco: Never   Vaping Use    Vaping status: Never Used   Substance and Sexual Activity    Alcohol use: Yes     Comment: 1 wine a month maybe    Drug use: No    Sexual activity: Not Currently     Partners: Male   Other Topics Concern     Service No    Blood Transfusions No    Caffeine Concern Yes     Comment: 1 a day    Occupational Exposure No    Hobby Hazards No    Sleep Concern No    Stress Concern No    Weight Concern Yes     Comment: always ongoing problem    Special Diet Yes     Comment: calcium and vit D    Back Care No    Exercise No    Bike Helmet No    Seat Belt Yes    Self-Exams Yes    Parent/sibling w/ CABG, MI or angioplasty before 65F 55M? No     Social Drivers of Health     Financial Resource  Strain: High Risk (1/7/2025)    Financial Resource Strain     Within the past 12 months, have you or your family members you live with been unable to get utilities (heat, electricity) when it was really needed?: Yes   Food Insecurity: Low Risk  (1/7/2025)    Food Insecurity     Within the past 12 months, did you worry that your food would run out before you got money to buy more?: No     Within the past 12 months, did the food you bought just not last and you didn t have money to get more?: No   Transportation Needs: Low Risk  (1/7/2025)    Transportation Needs     Within the past 12 months, has lack of transportation kept you from medical appointments, getting your medicines, non-medical meetings or appointments, work, or from getting things that you need?: No   Physical Activity: Unknown (1/7/2025)    Exercise Vital Sign     Days of Exercise per Week: 0 days   Recent Concern: Physical Activity - Inactive (1/7/2025)    Exercise Vital Sign     Days of Exercise per Week: 0 days     Minutes of Exercise per Session: 0 min   Stress: No Stress Concern Present (1/7/2025)    [employer] - Occupational Stress Questionnaire     Feeling of Stress : Only a little   Social Connections: Unknown (1/7/2025)    Social Connection and Isolation Panel [NHANES]     Frequency of Social Gatherings with Friends and Family: More than three times a week   Interpersonal Safety: Low Risk  (4/18/2025)    Interpersonal Safety     Do you feel physically and emotionally safe where you currently live?: Yes     Within the past 12 months, have you been hit, slapped, kicked or otherwise physically hurt by someone?: No     Within the past 12 months, have you been humiliated or emotionally abused in other ways by your partner or ex-partner?: No   Housing Stability: Low Risk  (1/7/2025)    Housing Stability     Do you have housing? : Yes     Are you worried about losing your housing?: No       Family History:   Family  "History   Problem Relation Age of Onset    Cancer Mother         Bladder    Neurologic Disorder Mother         heriditary tremor    C.A.D. Father     Cerebrovascular Disease Father     Allergies Maternal Grandfather     Respiratory Maternal Grandfather         Asthma    Diabetes Paternal Grandmother         Type II    Cancer Brother         Multiple myloma    C.A.D. Brother     Cancer Sister         kidney cancer    Neurofibromatosis Son     Breast Cancer No family hx of        Physical Exam:   Vitals:    06/17/25 1504   BP: 123/89   BP Location: Left arm   Patient Position: Chair   Cuff Size: Adult Regular   Pulse: 59   Resp: 18   Temp: 98.1  F (36.7  C)   TempSrc: Tympanic   Weight: 64 kg (141 lb)   Height: 1.588 m (5' 2.5\")      Estimated body mass index is 25.38 kg/m  as calculated from the following:    Height as of this encounter: 1.588 m (5' 2.5\").    Weight as of this encounter: 64 kg (141 lb).    Gen: Pleasant, talkative female in no apparent distress   Respiratory: breathing comfortably on room air   Cardiac: Regular rate, warm and well-perfused.   GI: Abd soft and non-tender  : External genitalia with moderate vulvovaginal atrophy and very thinned tissues. Mild erythema. Well-healed perineorrhaphy without obvious vaginal discharge. Swab was placed on left vaginal track from colpocleisis.   MSK: Grossly normal movement of all four extremities  Psych: mood and affect bright   Lower extremity: edema not present     A&P: 89 yo with UTI symptoms and vaginal irritation.   Plan UA/UCx and wet prep.   Recommended increasing estrace cream to 3x a week for moderate vulvovaginal atrophy and recurrent UTI.     Aurora Last MD  OB/GYN  6/17/2025               "

## 2025-06-17 NOTE — TELEPHONE ENCOUNTER
Return call to patient.    Spoke with patient on the phone.    S-(situation): worsening symptoms of UTI     B-(background):  4/18/2025 PROCEDURE: colpocleisis with Dr. Dove     Patient had UTI  DX by Cindy DE LA FUENTE CNP on 6/11/2025 and treated with Macrobid     Patient contacted FM with worsening symptoms - Cindy DE LA FUENTE CNP instructed patient to call OB       A-(assessment): Treated with Macrobid for 7 days. Today is the last day of medication. Symptoms are worsening instead of improving - Frequency of urination is worse and patient has noticed her perineum id very itchy. No discharge.     Culture >100,000 CFU/mL Mixture of urogenital anushka      UA showed + Nitrite and moderate Leukocyte, cloudy urine, 7.5 pH with trace of blood     2 weeks ago is the first time patient has had urinary symptoms since procedure with Dr. Dove       R-(recommendations): Please review and advise.    Patient is going out of town on Monday so would like resolution before she travels.    OFFICE VISIT?    Thank you.    Licha ANDERSEN RN   [facility]

## 2025-06-17 NOTE — TELEPHONE ENCOUNTER
[facility] Call Center    Phone Message    May a detailed message be left on voicemail: yes     Reason for Call: Other: Pt tested positive for a UTI and received medication for it from her PCP. Pt states she is still having symptoms. Pt is requesting a call back from the clinic to discuss options on what to do. Pt states she is heading out of town Monday. Please advise.      Action Taken: Other: OBGYN    Travel Screening: Not Applicable     Date of Service:

## 2025-06-19 LAB — BACTERIA UR CULT: NORMAL

## 2025-06-29 ENCOUNTER — RESULTS FOLLOW-UP (OUTPATIENT)
Dept: FAMILY MEDICINE | Facility: CLINIC | Age: 89
End: 2025-06-29
Payer: COMMERCIAL

## 2025-06-29 DIAGNOSIS — I10 ESSENTIAL HYPERTENSION: Primary | ICD-10-CM

## 2025-06-29 RX ORDER — AMLODIPINE BESYLATE 5 MG/1
5 TABLET ORAL DAILY
Qty: 90 TABLET | Refills: 3 | Status: SHIPPED | OUTPATIENT
Start: 2025-06-29

## 2025-06-30 NOTE — TELEPHONE ENCOUNTER
Called and informed patient of the provider's instructions.     Patient agrees with plan and expressed understanding.    Ania Abdi RN on 6/30/2025 at 3:43 PM

## 2025-06-30 NOTE — TELEPHONE ENCOUNTER
Continue to monitor bowels off of Miralax and use as needed only if feeling constipated again.  Cindy Flor, CFNP

## 2025-06-30 NOTE — TELEPHONE ENCOUNTER
Called and informed patient of the provider's instructions.     Patient agrees with plan and expressed understanding. Patient will  medication and has RN BP follow up on 7/15/25.    Patient is requesting advice from provider because she was in for an appointment for a bowel problem on 6/11/25 and started taking Miralax.    Patient reports for the last few weeks that she has bowel leaks all of the time, that she is not able to control, and the consistency ranges from liquid peanut butter.  Today patient had a few large bowel movements where she feels cleared out.   Patient stopped Miralax about 5 days ago.  Patient did not take metamucil yesterday or today.     Please advise.     Ania Abdi RN on 6/30/2025 at 11:30 AM

## 2025-07-15 ENCOUNTER — LAB (OUTPATIENT)
Dept: LAB | Facility: CLINIC | Age: 89
End: 2025-07-15
Payer: COMMERCIAL

## 2025-07-15 ENCOUNTER — TELEPHONE (OUTPATIENT)
Dept: FAMILY MEDICINE | Facility: CLINIC | Age: 89
End: 2025-07-15

## 2025-07-15 ENCOUNTER — ALLIED HEALTH/NURSE VISIT (OUTPATIENT)
Dept: FAMILY MEDICINE | Facility: CLINIC | Age: 89
End: 2025-07-15
Payer: COMMERCIAL

## 2025-07-15 ENCOUNTER — RESULTS FOLLOW-UP (OUTPATIENT)
Dept: FAMILY MEDICINE | Facility: CLINIC | Age: 89
End: 2025-07-15

## 2025-07-15 VITALS — DIASTOLIC BLOOD PRESSURE: 64 MMHG | HEART RATE: 57 BPM | SYSTOLIC BLOOD PRESSURE: 128 MMHG

## 2025-07-15 DIAGNOSIS — I10 ESSENTIAL HYPERTENSION: ICD-10-CM

## 2025-07-15 DIAGNOSIS — Z01.30 BLOOD PRESSURE CHECK: Primary | ICD-10-CM

## 2025-07-15 LAB
ANION GAP SERPL CALCULATED.3IONS-SCNC: 12 MMOL/L (ref 7–15)
BUN SERPL-MCNC: 11.5 MG/DL (ref 8–23)
CALCIUM SERPL-MCNC: 9.5 MG/DL (ref 8.8–10.4)
CHLORIDE SERPL-SCNC: 100 MMOL/L (ref 98–107)
CREAT SERPL-MCNC: 0.8 MG/DL (ref 0.51–0.95)
EGFRCR SERPLBLD CKD-EPI 2021: 71 ML/MIN/1.73M2
GLUCOSE SERPL-MCNC: 84 MG/DL (ref 70–99)
HCO3 SERPL-SCNC: 27 MMOL/L (ref 22–29)
POTASSIUM SERPL-SCNC: 4.7 MMOL/L (ref 3.4–5.3)
SODIUM SERPL-SCNC: 139 MMOL/L (ref 135–145)

## 2025-07-15 PROCEDURE — 3078F DIAST BP <80 MM HG: CPT

## 2025-07-15 PROCEDURE — 36415 COLL VENOUS BLD VENIPUNCTURE: CPT

## 2025-07-15 PROCEDURE — 99207 PR NO CHARGE NURSE ONLY: CPT

## 2025-07-15 PROCEDURE — 80048 BASIC METABOLIC PNL TOTAL CA: CPT

## 2025-07-15 PROCEDURE — 3074F SYST BP LT 130 MM HG: CPT

## 2025-07-15 NOTE — NURSING NOTE
Caitlyn presents to clinic for a blood pressure check.Today's reading is 128/64 with pulse of 57. She also checked it at [facility] omn 7/5 (125/56) and 7/9 (130/57). She is having an increase in her ankles swelling as well but otherwise is tolerating the Norvasc. I will forward this to Cindy Flor for her review.    Cindy Marino RN

## 2025-07-15 NOTE — TELEPHONE ENCOUNTER
Caitlyn presents to clinic for a blood pressure check.Today's reading is 128/64 with pulse of 57. She also checked it at [facility] omn 7/5 (125/56) and 7/9 (130/57). She is having an increase in her ankles swelling as well but otherwise is tolerating the Norvasc. I will forward this to Cindy Flor for her review.    Cindy Marino RN

## 2025-07-17 ENCOUNTER — OFFICE VISIT (OUTPATIENT)
Dept: FAMILY MEDICINE | Facility: CLINIC | Age: 89
End: 2025-07-17
Payer: COMMERCIAL

## 2025-07-17 VITALS
BODY MASS INDEX: 25.52 KG/M2 | WEIGHT: 144 LBS | SYSTOLIC BLOOD PRESSURE: 124 MMHG | DIASTOLIC BLOOD PRESSURE: 64 MMHG | RESPIRATION RATE: 18 BRPM | HEIGHT: 63 IN | OXYGEN SATURATION: 99 % | HEART RATE: 54 BPM | TEMPERATURE: 97.3 F

## 2025-07-17 DIAGNOSIS — R35.0 URINARY FREQUENCY: Primary | ICD-10-CM

## 2025-07-17 DIAGNOSIS — I10 ESSENTIAL HYPERTENSION: ICD-10-CM

## 2025-07-17 DIAGNOSIS — T88.7XXA MEDICATION SIDE EFFECTS: ICD-10-CM

## 2025-07-17 LAB
ALBUMIN UR-MCNC: NEGATIVE MG/DL
APPEARANCE UR: ABNORMAL
BACTERIA #/AREA URNS HPF: ABNORMAL /HPF
BILIRUB UR QL STRIP: NEGATIVE
COLOR UR AUTO: YELLOW
GLUCOSE UR STRIP-MCNC: NEGATIVE MG/DL
HGB UR QL STRIP: ABNORMAL
KETONES UR STRIP-MCNC: NEGATIVE MG/DL
LEUKOCYTE ESTERASE UR QL STRIP: ABNORMAL
NITRATE UR QL: NEGATIVE
PH UR STRIP: 7 [PH] (ref 5–7)
RBC #/AREA URNS AUTO: ABNORMAL /HPF
SP GR UR STRIP: 1.02 (ref 1–1.03)
UROBILINOGEN UR STRIP-ACNC: 0.2 E.U./DL
WBC #/AREA URNS AUTO: ABNORMAL /HPF
WBC CLUMPS #/AREA URNS HPF: PRESENT /HPF

## 2025-07-17 ASSESSMENT — PAIN SCALES - GENERAL: PAINLEVEL_OUTOF10: NO PAIN (0)

## 2025-07-17 NOTE — PATIENT INSTRUCTIONS
Stop the amlodipine.   Hold the potassium.     RN BP recheck in 2-4 weeks. Schedule a lab appointment at that same time.     I'll let you know if the urine culture is positive. In the mean time okay to try some coconut oil to help moisturize the perianal area.

## 2025-07-17 NOTE — PROGRESS NOTES
"  Assessment & Plan     Urinary frequency  Suspect ongoing atrophic vaginitis. Will wait for culture. Continue with estradiol. Additionally, increased perianal moisture. Moisturizing can be helpful. Okay to try coconut oil applied directly to the area. Plan follow up if not improving.    - UA Macroscopic with reflex to Microscopic and Culture - Clinic Collect  - Urine Microscopic Exam  - Urine Culture    Medication side effects  Advised to hold amlodipine and follow up with repeat BP recheck and labs in 2-4 weeks. Additionally advised to hold potassium. She is in agreement in the plan    Essential hypertension  BP is very well controlled. Okay to run a little high at 89 years old. Continue with atenolol.   - Basic metabolic panel  (Ca, Cl, CO2, Creat, Gluc, K, Na, BUN); Future    BMI  Estimated body mass index is 25.92 kg/m  as calculated from the following:    Height as of this encounter: 1.588 m (5' 2.5\").    Weight as of this encounter: 65.3 kg (144 lb).       Werner Brady is a 89 year old, presenting for the following health issues:  Hypertension and Urinary Frequency        7/17/2025     2:05 PM   Additional Questions   Roomed by Colleen TELLES MA     History of Present Illness       Hypertension: She presents for follow up of hypertension.  She does not check blood pressure  regularly outside of the clinic. Outpatient blood pressures have not been over 140/90. She does not follow a low salt diet.     Reason for visit:  Swelling in legs from new medication    She eats 2-3 servings of fruits and vegetables daily.She exercises with enough effort to increase her heart rate 9 or less minutes per day.  She exercises with enough effort to increase her heart rate 3 or less days per week.   She is taking medications regularly.      *Recent change in medication. Due to low sodium her chlorthalidone was discontinued and switched to amlodipine.  Had nurse BP check on 7/15, told RN that she has had some leg swelling since " "changing her medication  Does still have some swelling in lower legs and feet today, feels like right one is worse. She is taking potassium as well.     Feels like she has had some urinary frequency in the last week, would like to check for UTI today  Notes that she does get frequent UTIs    No sob, difficulty breathing, or chest pain.     Review of Systems  Constitutional, HEENT, cardiovascular, pulmonary, gi and gu systems are negative, except as otherwise noted.      Objective    /64   Pulse 54   Temp 97.3  F (36.3  C) (Tympanic)   Resp 18   Ht 1.588 m (5' 2.5\")   Wt 65.3 kg (144 lb)   LMP  (LMP Unknown)   SpO2 99%   BMI 25.92 kg/m    Body mass index is 25.92 kg/m .  Physical Exam   GENERAL: alert and no distress  NECK: no adenopathy, no asymmetry, masses, or scars  RESP: lungs clear to auscultation - no rales, rhonchi or wheezes  CV: regular rate and rhythm, normal S1 S2, no S3 or S4, no murmur, click or rub, no peripheral edema  ABDOMEN: soft, nontender, no hepatosplenomegaly, no masses and bowel sounds normal  MS: no gross musculoskeletal defects noted, bilateral lower extremity edema present. Wearing compression stockings.   PSYCH: mentation appears normal, affect normal/bright          Recent Results (from the past 24 hours)   UA Macroscopic with reflex to Microscopic and Culture - Clinic Collect    Specimen: Urine, Clean Catch   Result Value Ref Range    Color Urine Yellow Colorless, Straw, Light Yellow, Yellow    Appearance Urine Cloudy (A) Clear    Glucose Urine Negative Negative mg/dL    Bilirubin Urine Negative Negative    Ketones Urine Negative Negative mg/dL    Specific Gravity Urine 1.020 1.003 - 1.035    Blood Urine Moderate (A) Negative    pH Urine 7.0 5.0 - 7.0    Protein Albumin Urine Negative Negative mg/dL    Urobilinogen Urine 0.2 0.2, 1.0 E.U./dL    Nitrite Urine Negative Negative    Leukocyte Esterase Urine Large (A) Negative   Urine Microscopic Exam   Result Value Ref Range    " Bacteria Urine Moderate (A) None Seen /HPF    RBC Urine 2-5 (A) 0-2 /HPF /HPF    WBC Urine 25-50 (A) 0-5 /HPF /HPF    WBC Clumps Urine Present (A) None Seen /HPF         Signed Electronically by: SUDHAKAR Lin CNP

## 2025-08-06 ENCOUNTER — ALLIED HEALTH/NURSE VISIT (OUTPATIENT)
Dept: FAMILY MEDICINE | Facility: CLINIC | Age: 89
End: 2025-08-06
Payer: COMMERCIAL

## 2025-08-06 ENCOUNTER — LAB (OUTPATIENT)
Dept: LAB | Facility: CLINIC | Age: 89
End: 2025-08-06
Payer: COMMERCIAL

## 2025-08-06 ENCOUNTER — TELEPHONE (OUTPATIENT)
Dept: FAMILY MEDICINE | Facility: CLINIC | Age: 89
End: 2025-08-06

## 2025-08-06 VITALS — OXYGEN SATURATION: 94 % | HEART RATE: 50 BPM | SYSTOLIC BLOOD PRESSURE: 160 MMHG | DIASTOLIC BLOOD PRESSURE: 70 MMHG

## 2025-08-06 DIAGNOSIS — I10 ESSENTIAL HYPERTENSION: Primary | ICD-10-CM

## 2025-08-06 DIAGNOSIS — I10 ESSENTIAL HYPERTENSION: ICD-10-CM

## 2025-08-06 LAB
ANION GAP SERPL CALCULATED.3IONS-SCNC: 8 MMOL/L (ref 7–15)
BUN SERPL-MCNC: 15.1 MG/DL (ref 8–23)
CALCIUM SERPL-MCNC: 9.5 MG/DL (ref 8.8–10.4)
CHLORIDE SERPL-SCNC: 102 MMOL/L (ref 98–107)
CREAT SERPL-MCNC: 0.83 MG/DL (ref 0.51–0.95)
EGFRCR SERPLBLD CKD-EPI 2021: 68 ML/MIN/1.73M2
GLUCOSE SERPL-MCNC: 89 MG/DL (ref 70–99)
HCO3 SERPL-SCNC: 28 MMOL/L (ref 22–29)
POTASSIUM SERPL-SCNC: 4.6 MMOL/L (ref 3.4–5.3)
SODIUM SERPL-SCNC: 138 MMOL/L (ref 135–145)

## 2025-08-06 PROCEDURE — 80048 BASIC METABOLIC PNL TOTAL CA: CPT

## 2025-08-06 PROCEDURE — 36415 COLL VENOUS BLD VENIPUNCTURE: CPT

## 2025-08-06 PROCEDURE — 3077F SYST BP >= 140 MM HG: CPT

## 2025-08-06 PROCEDURE — 99207 PR NO CHARGE NURSE ONLY: CPT

## 2025-08-06 PROCEDURE — 3078F DIAST BP <80 MM HG: CPT

## 2025-08-19 ENCOUNTER — OFFICE VISIT (OUTPATIENT)
Dept: OBGYN | Facility: CLINIC | Age: 89
End: 2025-08-19
Payer: COMMERCIAL

## 2025-08-19 ENCOUNTER — TELEPHONE (OUTPATIENT)
Dept: OBGYN | Facility: CLINIC | Age: 89
End: 2025-08-19

## 2025-08-19 VITALS
BODY MASS INDEX: 25.34 KG/M2 | HEART RATE: 56 BPM | DIASTOLIC BLOOD PRESSURE: 74 MMHG | WEIGHT: 143 LBS | SYSTOLIC BLOOD PRESSURE: 157 MMHG | HEIGHT: 63 IN

## 2025-08-19 DIAGNOSIS — R39.9 UTI SYMPTOMS: Primary | ICD-10-CM

## 2025-08-19 DIAGNOSIS — N39.0 RECURRENT UTI (URINARY TRACT INFECTION): ICD-10-CM

## 2025-08-19 LAB
ALBUMIN UR-MCNC: NEGATIVE MG/DL
APPEARANCE UR: ABNORMAL
BACTERIA #/AREA URNS HPF: ABNORMAL /HPF
BILIRUB UR QL STRIP: NEGATIVE
COLOR UR AUTO: YELLOW
GLUCOSE UR STRIP-MCNC: NEGATIVE MG/DL
HGB UR QL STRIP: ABNORMAL
KETONES UR STRIP-MCNC: NEGATIVE MG/DL
LEUKOCYTE ESTERASE UR QL STRIP: ABNORMAL
NITRATE UR QL: NEGATIVE
PH UR STRIP: 5.5 [PH] (ref 5–7)
RBC #/AREA URNS AUTO: ABNORMAL /HPF
SP GR UR STRIP: 1.01 (ref 1–1.03)
SQUAMOUS #/AREA URNS AUTO: ABNORMAL /LPF
UROBILINOGEN UR STRIP-ACNC: 0.2 E.U./DL
WBC #/AREA URNS AUTO: ABNORMAL /HPF
WBC CLUMPS #/AREA URNS HPF: PRESENT /HPF

## 2025-08-19 PROCEDURE — 99213 OFFICE O/P EST LOW 20 MIN: CPT | Performed by: PHYSICIAN ASSISTANT

## 2025-08-19 PROCEDURE — 81001 URINALYSIS AUTO W/SCOPE: CPT | Performed by: PHYSICIAN ASSISTANT

## 2025-08-19 PROCEDURE — 87086 URINE CULTURE/COLONY COUNT: CPT | Performed by: PHYSICIAN ASSISTANT

## 2025-08-19 PROCEDURE — 3078F DIAST BP <80 MM HG: CPT | Performed by: PHYSICIAN ASSISTANT

## 2025-08-19 PROCEDURE — 3077F SYST BP >= 140 MM HG: CPT | Performed by: PHYSICIAN ASSISTANT

## 2025-08-19 PROCEDURE — 87088 URINE BACTERIA CULTURE: CPT | Performed by: PHYSICIAN ASSISTANT

## 2025-08-19 RX ORDER — CEPHALEXIN 500 MG/1
500 CAPSULE ORAL 2 TIMES DAILY
Qty: 14 CAPSULE | Refills: 0 | Status: SHIPPED | OUTPATIENT
Start: 2025-08-19 | End: 2025-08-26

## 2025-08-20 ENCOUNTER — PATIENT OUTREACH (OUTPATIENT)
Dept: CARE COORDINATION | Facility: CLINIC | Age: 89
End: 2025-08-20
Payer: COMMERCIAL

## 2025-08-20 LAB — BACTERIA UR CULT: ABNORMAL

## 2025-08-25 ENCOUNTER — OFFICE VISIT (OUTPATIENT)
Dept: FAMILY MEDICINE | Facility: CLINIC | Age: 89
End: 2025-08-25
Payer: COMMERCIAL

## 2025-08-25 VITALS
HEIGHT: 63 IN | DIASTOLIC BLOOD PRESSURE: 80 MMHG | WEIGHT: 140 LBS | BODY MASS INDEX: 24.8 KG/M2 | RESPIRATION RATE: 16 BRPM | OXYGEN SATURATION: 99 % | SYSTOLIC BLOOD PRESSURE: 128 MMHG | HEART RATE: 51 BPM | TEMPERATURE: 97.7 F

## 2025-08-25 DIAGNOSIS — I10 ESSENTIAL HYPERTENSION: Primary | ICD-10-CM

## 2025-08-25 DIAGNOSIS — R60.0 PERIPHERAL EDEMA: ICD-10-CM

## 2025-08-25 PROCEDURE — 3078F DIAST BP <80 MM HG: CPT | Performed by: NURSE PRACTITIONER

## 2025-08-25 PROCEDURE — 3074F SYST BP LT 130 MM HG: CPT | Performed by: NURSE PRACTITIONER

## 2025-08-25 PROCEDURE — 99214 OFFICE O/P EST MOD 30 MIN: CPT | Performed by: NURSE PRACTITIONER

## 2025-08-25 PROCEDURE — 1126F AMNT PAIN NOTED NONE PRSNT: CPT | Performed by: NURSE PRACTITIONER

## 2025-08-25 ASSESSMENT — PAIN SCALES - GENERAL: PAINLEVEL_OUTOF10: NO PAIN (0)

## 2025-09-04 ENCOUNTER — OFFICE VISIT (OUTPATIENT)
Dept: OBGYN | Facility: CLINIC | Age: 89
End: 2025-09-04
Payer: COMMERCIAL

## 2025-09-04 ENCOUNTER — RESULTS FOLLOW-UP (OUTPATIENT)
Dept: OBGYN | Facility: CLINIC | Age: 89
End: 2025-09-04

## 2025-09-04 ENCOUNTER — TELEPHONE (OUTPATIENT)
Dept: OBGYN | Facility: CLINIC | Age: 89
End: 2025-09-04

## 2025-09-04 VITALS
TEMPERATURE: 97.6 F | BODY MASS INDEX: 25.07 KG/M2 | SYSTOLIC BLOOD PRESSURE: 189 MMHG | WEIGHT: 141.5 LBS | HEIGHT: 63 IN | HEART RATE: 58 BPM | DIASTOLIC BLOOD PRESSURE: 72 MMHG

## 2025-09-04 DIAGNOSIS — B96.89 BACTERIAL VAGINOSIS: ICD-10-CM

## 2025-09-04 DIAGNOSIS — N89.8 VAGINAL ITCHING: ICD-10-CM

## 2025-09-04 DIAGNOSIS — R39.9 UTI SYMPTOMS: Primary | ICD-10-CM

## 2025-09-04 DIAGNOSIS — I10 ESSENTIAL HYPERTENSION: ICD-10-CM

## 2025-09-04 DIAGNOSIS — N76.0 BACTERIAL VAGINOSIS: ICD-10-CM

## 2025-09-04 LAB
ALBUMIN UR-MCNC: NEGATIVE MG/DL
APPEARANCE UR: ABNORMAL
BACTERIA #/AREA URNS HPF: ABNORMAL /HPF
BILIRUB UR QL STRIP: NEGATIVE
CLUE CELLS: PRESENT
COLOR UR AUTO: YELLOW
GLUCOSE UR STRIP-MCNC: NEGATIVE MG/DL
HGB UR QL STRIP: ABNORMAL
KETONES UR STRIP-MCNC: NEGATIVE MG/DL
LEUKOCYTE ESTERASE UR QL STRIP: ABNORMAL
NITRATE UR QL: NEGATIVE
PH UR STRIP: 6.5 [PH] (ref 5–7)
RBC #/AREA URNS AUTO: ABNORMAL /HPF
SP GR UR STRIP: 1.01 (ref 1–1.03)
SQUAMOUS #/AREA URNS AUTO: ABNORMAL /LPF
TRICHOMONAS, WET PREP: ABNORMAL
UROBILINOGEN UR STRIP-ACNC: 0.2 E.U./DL
WBC #/AREA URNS AUTO: >100 /HPF
WBC'S/HIGH POWER FIELD, WET PREP: ABNORMAL
YEAST, WET PREP: ABNORMAL

## 2025-09-04 RX ORDER — FLUCONAZOLE 150 MG/1
150 TABLET ORAL ONCE
Qty: 1 TABLET | Refills: 0 | Status: SHIPPED | OUTPATIENT
Start: 2025-09-04 | End: 2025-09-04

## 2025-09-04 RX ORDER — METRONIDAZOLE 7.5 MG/G
1 GEL VAGINAL DAILY
Qty: 25 G | Refills: 0 | Status: SHIPPED | OUTPATIENT
Start: 2025-09-04 | End: 2025-09-09

## 2025-09-04 RX ORDER — NITROFURANTOIN 25; 75 MG/1; MG/1
100 CAPSULE ORAL 2 TIMES DAILY
Qty: 20 CAPSULE | Refills: 0 | Status: SHIPPED | OUTPATIENT
Start: 2025-09-04 | End: 2025-09-14

## 2025-09-04 RX ORDER — NYSTATIN 100000 U/G
OINTMENT TOPICAL 2 TIMES DAILY PRN
Qty: 30 G | Refills: 0 | Status: SHIPPED | OUTPATIENT
Start: 2025-09-04

## (undated) DEVICE — SOL WATER IRRIG 1000ML BOTTLE 07139-09

## (undated) DEVICE — SUCTION IRR SYSTEM W/TIP INTERPULSE

## (undated) DEVICE — SUCTION MANIFOLD NEPTUNE 2 SYS 1 PORT 702-025-000

## (undated) DEVICE — BLADE KNIFE SURG 15 371115

## (undated) DEVICE — PREP CHLORHEXIDINE 4% 4OZ (HIBICLENS) 57504

## (undated) DEVICE — DRAPE IOBAN LG .375X23.5" 6648EZ

## (undated) DEVICE — GOWN IMPERVIOUS ZONED LG

## (undated) DEVICE — ESU HOLSTER PLASTIC DISP E2400

## (undated) DEVICE — GLOVE BIOGEL PI MICRO SZ 5.5 48555

## (undated) DEVICE — SU PDO 1 STRATAFIX 36X36CM CTX TAPERPOINT SXPD2B405

## (undated) DEVICE — ESU PENCIL W/COATED BLADE E2450H

## (undated) DEVICE — SU VICRYL 2-0 CT-2 CR 8X18" J726D

## (undated) DEVICE — SU STRATAFIX MONOCRYL 3-0 SPIRAL PS-2 30CM SXMP1B106

## (undated) DEVICE — PREP DURAPREP 26ML APL 8630

## (undated) DEVICE — GLOVE BIOGEL PI MICRO INDICATOR UNDERGLOVE SZ 6.0 48960

## (undated) DEVICE — PACK LAPAROSCOPY/PELVISCOPY STD

## (undated) DEVICE — SPONGE RAY-TEC 4X8" 7318

## (undated) DEVICE — SU VICRYL 0 CT-2 27" UND J270H

## (undated) DEVICE — PACK TOTAL HIP W/POUCH RIVERSIDE LATEX FREE

## (undated) DEVICE — GLOVE PROTEXIS W/NEU-THERA 7.5  2D73TE75

## (undated) DEVICE — SU VICRYL 2-0 CT-2 27" J333H

## (undated) DEVICE — GLOVE PROTEXIS W/NEU-THERA 8.5  2D73TE85

## (undated) DEVICE — ESU PENCIL SMOKE EVAC W/ROCKER SWITCH 0703-047-000

## (undated) DEVICE — GLOVE PROTEXIS W/NEU-THERA 8.0  2D73TE80

## (undated) DEVICE — GLOVE BIOGEL PI ULTRATOUCH G SZ 6.5 42165

## (undated) DEVICE — STOCKING SLEEVE COMPRESSION CALF MED

## (undated) DEVICE — BLADE SAW SAGITTAL STRK 18X90X1.37MM HD SYS 6 6118-137-090

## (undated) DEVICE — TUBING SUCTION 12"X1/4" N612

## (undated) DEVICE — SU VICRYL 2-0 CT-1 27" UND J259H

## (undated) DEVICE — DRAPE SHEET REV FOLD 3/4 9349

## (undated) DEVICE — GLOVE PROTEXIS BLUE W/NEU-THERA 8.5  2D73EB85

## (undated) DEVICE — GOWN IMPERVIOUS ZONED XLG 9041

## (undated) DEVICE — PAD PERI INDIV WRAP 11" 2022

## (undated) DEVICE — CLOSURE SYS SKIN PREMIERPRO EXOFIN FUSION 4X22CM STRL 3472

## (undated) DEVICE — DECANTER VIAL 2006S

## (undated) DEVICE — STOCKING SLEEVE COMPRESSION CALF LG

## (undated) DEVICE — SUCTION TIP YANKAUER STR K87

## (undated) DEVICE — BLADE KNIFE SURG 10 371110

## (undated) DEVICE — CATH TRAY FOLEY SURESTEP 16FR W/URINE MTR STATLK LF A303416A

## (undated) DEVICE — GOWN XLG DISP 9545

## (undated) DEVICE — TAPE CLOTH ADHESIVE 3" ZONAS

## (undated) DEVICE — PILLOW ABDUCT HIP LG

## (undated) DEVICE — SU PDO 3-0 STRATAFIX 24CM FS/FS REV CUT SXPD2B419

## (undated) DEVICE — BONE CLEANING TIP INTERPULSE  0210-010-000

## (undated) DEVICE — ESU ELEC BLADE 4" COATED

## (undated) RX ORDER — MAGNESIUM SULFATE HEPTAHYDRATE 40 MG/ML
INJECTION, SOLUTION INTRAVENOUS
Status: DISPENSED
Start: 2025-04-18

## (undated) RX ORDER — KETOROLAC TROMETHAMINE 15 MG/ML
INJECTION, SOLUTION INTRAMUSCULAR; INTRAVENOUS
Status: DISPENSED
Start: 2025-04-18

## (undated) RX ORDER — LIDOCAINE HYDROCHLORIDE 10 MG/ML
INJECTION, SOLUTION EPIDURAL; INFILTRATION; INTRACAUDAL; PERINEURAL
Status: DISPENSED
Start: 2025-04-18

## (undated) RX ORDER — ACETAMINOPHEN 325 MG/1
TABLET ORAL
Status: DISPENSED
Start: 2020-09-28

## (undated) RX ORDER — PROPOFOL 10 MG/ML
INJECTION, EMULSION INTRAVENOUS
Status: DISPENSED
Start: 2025-04-18

## (undated) RX ORDER — TRANEXAMIC ACID 650 MG/1
TABLET ORAL
Status: DISPENSED
Start: 2020-09-28

## (undated) RX ORDER — CEFAZOLIN SODIUM 1 G/3ML
INJECTION, POWDER, FOR SOLUTION INTRAMUSCULAR; INTRAVENOUS
Status: DISPENSED
Start: 2025-04-18

## (undated) RX ORDER — FENTANYL CITRATE 50 UG/ML
INJECTION, SOLUTION INTRAMUSCULAR; INTRAVENOUS
Status: DISPENSED
Start: 2025-04-18

## (undated) RX ORDER — DEXAMETHASONE SODIUM PHOSPHATE 4 MG/ML
INJECTION, SOLUTION INTRA-ARTICULAR; INTRALESIONAL; INTRAMUSCULAR; INTRAVENOUS; SOFT TISSUE
Status: DISPENSED
Start: 2020-09-28

## (undated) RX ORDER — ACETAMINOPHEN 325 MG/1
TABLET ORAL
Status: DISPENSED
Start: 2025-04-18

## (undated) RX ORDER — CEFAZOLIN SODIUM 2 G/100ML
INJECTION, SOLUTION INTRAVENOUS
Status: DISPENSED
Start: 2020-09-28

## (undated) RX ORDER — DEXAMETHASONE SODIUM PHOSPHATE 4 MG/ML
INJECTION, SOLUTION INTRA-ARTICULAR; INTRALESIONAL; INTRAMUSCULAR; INTRAVENOUS; SOFT TISSUE
Status: DISPENSED
Start: 2025-04-18

## (undated) RX ORDER — ONDANSETRON 2 MG/ML
INJECTION INTRAMUSCULAR; INTRAVENOUS
Status: DISPENSED
Start: 2020-09-28

## (undated) RX ORDER — FENTANYL CITRATE-0.9 % NACL/PF 10 MCG/ML
PLASTIC BAG, INJECTION (ML) INTRAVENOUS
Status: DISPENSED
Start: 2020-09-28

## (undated) RX ORDER — LIDOCAINE HYDROCHLORIDE 10 MG/ML
INJECTION, SOLUTION EPIDURAL; INFILTRATION; INTRACAUDAL; PERINEURAL
Status: DISPENSED
Start: 2020-09-28

## (undated) RX ORDER — ONDANSETRON 2 MG/ML
INJECTION INTRAMUSCULAR; INTRAVENOUS
Status: DISPENSED
Start: 2025-04-18

## (undated) RX ORDER — GABAPENTIN 100 MG/1
CAPSULE ORAL
Status: DISPENSED
Start: 2025-04-18

## (undated) RX ORDER — LIDOCAINE HYDROCHLORIDE AND EPINEPHRINE 10; 10 MG/ML; UG/ML
INJECTION, SOLUTION INFILTRATION; PERINEURAL
Status: DISPENSED
Start: 2025-04-18

## (undated) RX ORDER — GABAPENTIN 300 MG/1
CAPSULE ORAL
Status: DISPENSED
Start: 2020-09-28

## (undated) RX ORDER — GLYCOPYRROLATE 0.2 MG/ML
INJECTION, SOLUTION INTRAMUSCULAR; INTRAVENOUS
Status: DISPENSED
Start: 2020-09-28